# Patient Record
Sex: FEMALE | Race: BLACK OR AFRICAN AMERICAN | NOT HISPANIC OR LATINO | Employment: OTHER | URBAN - METROPOLITAN AREA
[De-identification: names, ages, dates, MRNs, and addresses within clinical notes are randomized per-mention and may not be internally consistent; named-entity substitution may affect disease eponyms.]

---

## 2017-01-05 ENCOUNTER — AMBULATORY - HEALTHEAST (OUTPATIENT)
Dept: NURSING | Facility: CLINIC | Age: 67
End: 2017-01-05

## 2017-01-05 DIAGNOSIS — I26.99 PULMONARY EMBOLISM (H): ICD-10-CM

## 2017-01-10 ENCOUNTER — COMMUNICATION - HEALTHEAST (OUTPATIENT)
Dept: NURSING | Facility: CLINIC | Age: 67
End: 2017-01-10

## 2017-01-14 ENCOUNTER — COMMUNICATION - HEALTHEAST (OUTPATIENT)
Dept: CARDIOLOGY | Facility: CLINIC | Age: 67
End: 2017-01-14

## 2017-01-14 DIAGNOSIS — I25.10 CAD (CORONARY ARTERY DISEASE): ICD-10-CM

## 2017-01-17 ENCOUNTER — COMMUNICATION - HEALTHEAST (OUTPATIENT)
Dept: NURSING | Facility: CLINIC | Age: 67
End: 2017-01-17

## 2017-01-17 ENCOUNTER — OFFICE VISIT - HEALTHEAST (OUTPATIENT)
Dept: CARDIOLOGY | Facility: CLINIC | Age: 67
End: 2017-01-17

## 2017-01-17 ENCOUNTER — AMBULATORY - HEALTHEAST (OUTPATIENT)
Dept: LAB | Facility: CLINIC | Age: 67
End: 2017-01-17

## 2017-01-17 DIAGNOSIS — E78.5 HYPERLIPIDEMIA, UNSPECIFIED HYPERLIPIDEMIA TYPE: ICD-10-CM

## 2017-01-17 DIAGNOSIS — I10 ESSENTIAL HYPERTENSION: ICD-10-CM

## 2017-01-17 DIAGNOSIS — I25.10 CAD (CORONARY ARTERY DISEASE): ICD-10-CM

## 2017-01-17 DIAGNOSIS — I26.99 PULMONARY EMBOLISM (H): ICD-10-CM

## 2017-01-17 ASSESSMENT — MIFFLIN-ST. JEOR: SCORE: 1107.82

## 2017-02-06 ENCOUNTER — COMMUNICATION - HEALTHEAST (OUTPATIENT)
Dept: PULMONOLOGY | Facility: OTHER | Age: 67
End: 2017-02-06

## 2017-02-12 ENCOUNTER — COMMUNICATION - HEALTHEAST (OUTPATIENT)
Dept: CARDIOLOGY | Facility: CLINIC | Age: 67
End: 2017-02-12

## 2017-02-12 DIAGNOSIS — R07.9 CHEST PAIN: ICD-10-CM

## 2017-02-28 ENCOUNTER — COMMUNICATION - HEALTHEAST (OUTPATIENT)
Dept: NURSING | Facility: CLINIC | Age: 67
End: 2017-02-28

## 2017-03-01 ENCOUNTER — COMMUNICATION - HEALTHEAST (OUTPATIENT)
Dept: NURSING | Facility: CLINIC | Age: 67
End: 2017-03-01

## 2017-03-01 ENCOUNTER — AMBULATORY - HEALTHEAST (OUTPATIENT)
Dept: LAB | Facility: CLINIC | Age: 67
End: 2017-03-01

## 2017-03-01 DIAGNOSIS — I26.99 PULMONARY EMBOLISM (H): ICD-10-CM

## 2017-03-10 ENCOUNTER — COMMUNICATION - HEALTHEAST (OUTPATIENT)
Dept: NURSING | Facility: CLINIC | Age: 67
End: 2017-03-10

## 2017-03-10 DIAGNOSIS — I26.99 PULMONARY EMBOLISM (H): ICD-10-CM

## 2017-03-20 ENCOUNTER — AMBULATORY - HEALTHEAST (OUTPATIENT)
Dept: CARE COORDINATION | Facility: CLINIC | Age: 67
End: 2017-03-20

## 2017-03-28 ENCOUNTER — AMBULATORY - HEALTHEAST (OUTPATIENT)
Dept: CARE COORDINATION | Facility: CLINIC | Age: 67
End: 2017-03-28

## 2017-04-12 ENCOUNTER — COMMUNICATION - HEALTHEAST (OUTPATIENT)
Dept: NURSING | Facility: CLINIC | Age: 67
End: 2017-04-12

## 2017-04-12 ENCOUNTER — OFFICE VISIT - HEALTHEAST (OUTPATIENT)
Dept: FAMILY MEDICINE | Facility: CLINIC | Age: 67
End: 2017-04-12

## 2017-04-12 DIAGNOSIS — F17.200 TOBACCO USE DISORDER: ICD-10-CM

## 2017-04-12 DIAGNOSIS — I25.10 CORONARY ATHEROSCLEROSIS: ICD-10-CM

## 2017-04-12 DIAGNOSIS — I10 ESSENTIAL HYPERTENSION: ICD-10-CM

## 2017-04-12 DIAGNOSIS — Z12.11 COLON CANCER SCREENING: ICD-10-CM

## 2017-04-12 DIAGNOSIS — R35.0 FREQUENT URINATION: ICD-10-CM

## 2017-04-12 DIAGNOSIS — J44.9 CHRONIC OBSTRUCTIVE PULMONARY DISEASE, UNSPECIFIED COPD TYPE (H): ICD-10-CM

## 2017-04-12 DIAGNOSIS — E78.5 HYPERLIPIDEMIA, UNSPECIFIED HYPERLIPIDEMIA TYPE: ICD-10-CM

## 2017-04-12 DIAGNOSIS — I26.99 PULMONARY EMBOLISM (H): ICD-10-CM

## 2017-04-12 LAB
CHOLEST SERPL-MCNC: 153 MG/DL
FASTING STATUS PATIENT QL REPORTED: YES
HDLC SERPL-MCNC: 53 MG/DL
LDLC SERPL CALC-MCNC: 91 MG/DL
TRIGL SERPL-MCNC: 45 MG/DL

## 2017-04-12 ASSESSMENT — MIFFLIN-ST. JEOR: SCORE: 1103.29

## 2017-04-15 ENCOUNTER — COMMUNICATION - HEALTHEAST (OUTPATIENT)
Dept: CARDIOLOGY | Facility: CLINIC | Age: 67
End: 2017-04-15

## 2017-04-15 DIAGNOSIS — I25.10 CAD (CORONARY ARTERY DISEASE): ICD-10-CM

## 2017-05-24 ENCOUNTER — COMMUNICATION - HEALTHEAST (OUTPATIENT)
Dept: NURSING | Facility: CLINIC | Age: 67
End: 2017-05-24

## 2017-05-24 ENCOUNTER — AMBULATORY - HEALTHEAST (OUTPATIENT)
Dept: LAB | Facility: CLINIC | Age: 67
End: 2017-05-24

## 2017-05-24 DIAGNOSIS — I26.99 PULMONARY EMBOLISM (H): ICD-10-CM

## 2017-06-01 ENCOUNTER — COMMUNICATION - HEALTHEAST (OUTPATIENT)
Dept: FAMILY MEDICINE | Facility: CLINIC | Age: 67
End: 2017-06-01

## 2017-06-01 DIAGNOSIS — I82.90 BLOOD CLOT IN VEIN: ICD-10-CM

## 2017-06-20 ENCOUNTER — AMBULATORY - HEALTHEAST (OUTPATIENT)
Dept: PULMONOLOGY | Facility: OTHER | Age: 67
End: 2017-06-20

## 2017-06-20 ENCOUNTER — COMMUNICATION - HEALTHEAST (OUTPATIENT)
Dept: PULMONOLOGY | Facility: OTHER | Age: 67
End: 2017-06-20

## 2017-06-21 ENCOUNTER — COMMUNICATION - HEALTHEAST (OUTPATIENT)
Dept: CARDIOLOGY | Facility: CLINIC | Age: 67
End: 2017-06-21

## 2017-06-21 DIAGNOSIS — E78.5 HYPERLIPIDEMIA: ICD-10-CM

## 2017-07-12 ENCOUNTER — COMMUNICATION - HEALTHEAST (OUTPATIENT)
Dept: NURSING | Facility: CLINIC | Age: 67
End: 2017-07-12

## 2017-07-13 ENCOUNTER — COMMUNICATION - HEALTHEAST (OUTPATIENT)
Dept: FAMILY MEDICINE | Facility: CLINIC | Age: 67
End: 2017-07-13

## 2017-07-13 ENCOUNTER — AMBULATORY - HEALTHEAST (OUTPATIENT)
Dept: LAB | Facility: CLINIC | Age: 67
End: 2017-07-13

## 2017-07-13 DIAGNOSIS — I26.99 PULMONARY EMBOLISM (H): ICD-10-CM

## 2017-08-17 ENCOUNTER — COMMUNICATION - HEALTHEAST (OUTPATIENT)
Dept: CARDIOLOGY | Facility: CLINIC | Age: 67
End: 2017-08-17

## 2017-08-17 DIAGNOSIS — I10 HTN (HYPERTENSION): ICD-10-CM

## 2017-08-24 ENCOUNTER — COMMUNICATION - HEALTHEAST (OUTPATIENT)
Dept: NURSING | Facility: CLINIC | Age: 67
End: 2017-08-24

## 2017-08-24 ENCOUNTER — AMBULATORY - HEALTHEAST (OUTPATIENT)
Dept: LAB | Facility: CLINIC | Age: 67
End: 2017-08-24

## 2017-08-24 DIAGNOSIS — I26.99 PULMONARY EMBOLISM (H): ICD-10-CM

## 2017-09-18 ENCOUNTER — RECORDS - HEALTHEAST (OUTPATIENT)
Dept: GENERAL RADIOLOGY | Facility: CLINIC | Age: 67
End: 2017-09-18

## 2017-09-18 ENCOUNTER — OFFICE VISIT - HEALTHEAST (OUTPATIENT)
Dept: FAMILY MEDICINE | Facility: CLINIC | Age: 67
End: 2017-09-18

## 2017-09-18 DIAGNOSIS — M54.50 ACUTE MIDLINE LOW BACK PAIN WITHOUT SCIATICA: ICD-10-CM

## 2017-09-18 DIAGNOSIS — M54.50 LOW BACK PAIN: ICD-10-CM

## 2017-09-18 DIAGNOSIS — Z23 NEED FOR IMMUNIZATION AGAINST INFLUENZA: ICD-10-CM

## 2017-09-18 ASSESSMENT — MIFFLIN-ST. JEOR: SCORE: 1108.28

## 2017-09-20 ENCOUNTER — OFFICE VISIT - HEALTHEAST (OUTPATIENT)
Dept: PHYSICAL THERAPY | Facility: REHABILITATION | Age: 67
End: 2017-09-20

## 2017-09-20 DIAGNOSIS — F17.200 TOBACCO USE DISORDER: ICD-10-CM

## 2017-09-20 DIAGNOSIS — M54.50 ACUTE MIDLINE LOW BACK PAIN WITHOUT SCIATICA: ICD-10-CM

## 2017-09-20 DIAGNOSIS — M81.0 OSTEOPOROSIS: ICD-10-CM

## 2017-09-20 DIAGNOSIS — M62.81 GENERALIZED MUSCLE WEAKNESS: ICD-10-CM

## 2017-09-27 ENCOUNTER — OFFICE VISIT - HEALTHEAST (OUTPATIENT)
Dept: PHYSICAL THERAPY | Facility: REHABILITATION | Age: 67
End: 2017-09-27

## 2017-09-27 DIAGNOSIS — M81.0 OSTEOPOROSIS: ICD-10-CM

## 2017-09-27 DIAGNOSIS — F17.200 TOBACCO USE DISORDER: ICD-10-CM

## 2017-09-27 DIAGNOSIS — M62.81 GENERALIZED MUSCLE WEAKNESS: ICD-10-CM

## 2017-09-27 DIAGNOSIS — M54.50 ACUTE MIDLINE LOW BACK PAIN WITHOUT SCIATICA: ICD-10-CM

## 2017-10-05 ENCOUNTER — OFFICE VISIT - HEALTHEAST (OUTPATIENT)
Dept: PHYSICAL THERAPY | Facility: REHABILITATION | Age: 67
End: 2017-10-05

## 2017-10-05 DIAGNOSIS — M81.0 OSTEOPOROSIS: ICD-10-CM

## 2017-10-05 DIAGNOSIS — M62.81 GENERALIZED MUSCLE WEAKNESS: ICD-10-CM

## 2017-10-05 DIAGNOSIS — F17.200 TOBACCO USE DISORDER: ICD-10-CM

## 2017-10-05 DIAGNOSIS — M54.50 ACUTE MIDLINE LOW BACK PAIN WITHOUT SCIATICA: ICD-10-CM

## 2017-10-26 ENCOUNTER — COMMUNICATION - HEALTHEAST (OUTPATIENT)
Dept: NURSING | Facility: CLINIC | Age: 67
End: 2017-10-26

## 2017-10-27 ENCOUNTER — AMBULATORY - HEALTHEAST (OUTPATIENT)
Dept: LAB | Facility: CLINIC | Age: 67
End: 2017-10-27

## 2017-10-27 ENCOUNTER — COMMUNICATION - HEALTHEAST (OUTPATIENT)
Dept: NURSING | Facility: CLINIC | Age: 67
End: 2017-10-27

## 2017-10-27 DIAGNOSIS — I26.99 PULMONARY EMBOLISM (H): ICD-10-CM

## 2017-11-15 ENCOUNTER — RECORDS - HEALTHEAST (OUTPATIENT)
Dept: ADMINISTRATIVE | Facility: OTHER | Age: 67
End: 2017-11-15

## 2017-11-18 ASSESSMENT — MIFFLIN-ST. JEOR: SCORE: 1127.1

## 2017-11-20 ENCOUNTER — SURGERY - HEALTHEAST (OUTPATIENT)
Dept: CARDIOLOGY | Facility: CLINIC | Age: 67
End: 2017-11-20

## 2017-11-20 ASSESSMENT — MIFFLIN-ST. JEOR: SCORE: 1116.9

## 2017-11-21 ENCOUNTER — COMMUNICATION - HEALTHEAST (OUTPATIENT)
Dept: FAMILY MEDICINE | Facility: CLINIC | Age: 67
End: 2017-11-21

## 2017-11-21 ENCOUNTER — COMMUNICATION - HEALTHEAST (OUTPATIENT)
Dept: ADMINISTRATIVE | Facility: CLINIC | Age: 67
End: 2017-11-21

## 2017-11-21 DIAGNOSIS — I26.99 PULMONARY EMBOLISM (H): ICD-10-CM

## 2017-11-22 ENCOUNTER — COMMUNICATION - HEALTHEAST (OUTPATIENT)
Dept: FAMILY MEDICINE | Facility: CLINIC | Age: 67
End: 2017-11-22

## 2017-11-24 ENCOUNTER — COMMUNICATION - HEALTHEAST (OUTPATIENT)
Dept: NURSING | Facility: CLINIC | Age: 67
End: 2017-11-24

## 2017-11-24 ENCOUNTER — AMBULATORY - HEALTHEAST (OUTPATIENT)
Dept: LAB | Facility: CLINIC | Age: 67
End: 2017-11-24

## 2017-11-24 DIAGNOSIS — I26.99 PULMONARY EMBOLISM (H): ICD-10-CM

## 2017-11-27 ENCOUNTER — OFFICE VISIT - HEALTHEAST (OUTPATIENT)
Dept: FAMILY MEDICINE | Facility: CLINIC | Age: 67
End: 2017-11-27

## 2017-11-27 ENCOUNTER — COMMUNICATION - HEALTHEAST (OUTPATIENT)
Dept: NURSING | Facility: CLINIC | Age: 67
End: 2017-11-27

## 2017-11-27 DIAGNOSIS — I26.99 PULMONARY EMBOLISM (H): ICD-10-CM

## 2017-11-27 DIAGNOSIS — R68.84 JAW PAIN: ICD-10-CM

## 2017-11-27 DIAGNOSIS — I20.89 EQUIVALENT ANGINA (H): ICD-10-CM

## 2017-11-27 DIAGNOSIS — S40.021S: ICD-10-CM

## 2017-11-27 DIAGNOSIS — I25.83 CORONARY ATHEROSCLEROSIS DUE TO LIPID RICH PLAQUE: ICD-10-CM

## 2017-11-27 DIAGNOSIS — I10 ESSENTIAL HYPERTENSION: ICD-10-CM

## 2017-11-27 DIAGNOSIS — R23.4 LOCALIZED SKIN DESQUAMATION: ICD-10-CM

## 2017-11-27 DIAGNOSIS — I21.4 NSTEMI (NON-ST ELEVATED MYOCARDIAL INFARCTION) (H): ICD-10-CM

## 2017-11-27 DIAGNOSIS — F17.200 TOBACCO USE DISORDER: ICD-10-CM

## 2017-11-27 ASSESSMENT — MIFFLIN-ST. JEOR: SCORE: 1119.62

## 2017-11-28 ENCOUNTER — COMMUNICATION - HEALTHEAST (OUTPATIENT)
Dept: CARE COORDINATION | Facility: CLINIC | Age: 67
End: 2017-11-28

## 2017-11-30 ENCOUNTER — AMBULATORY - HEALTHEAST (OUTPATIENT)
Dept: CARDIAC REHAB | Facility: CLINIC | Age: 67
End: 2017-11-30

## 2017-11-30 DIAGNOSIS — I21.4 NSTEMI (NON-ST ELEVATED MYOCARDIAL INFARCTION) (H): ICD-10-CM

## 2017-11-30 DIAGNOSIS — Z95.5 S/P CORONARY ARTERY STENT PLACEMENT: ICD-10-CM

## 2017-11-30 DIAGNOSIS — R68.84 JAW PAIN: ICD-10-CM

## 2017-12-01 ENCOUNTER — COMMUNICATION - HEALTHEAST (OUTPATIENT)
Dept: CARE COORDINATION | Facility: CLINIC | Age: 67
End: 2017-12-01

## 2017-12-04 ENCOUNTER — OFFICE VISIT - HEALTHEAST (OUTPATIENT)
Dept: CARDIOLOGY | Facility: CLINIC | Age: 67
End: 2017-12-04

## 2017-12-04 DIAGNOSIS — F17.200 TOBACCO USE DISORDER: ICD-10-CM

## 2017-12-04 DIAGNOSIS — I21.4 NSTEMI (NON-ST ELEVATED MYOCARDIAL INFARCTION) (H): ICD-10-CM

## 2017-12-04 DIAGNOSIS — I10 ESSENTIAL HYPERTENSION: ICD-10-CM

## 2017-12-04 DIAGNOSIS — I25.83 CORONARY ATHEROSCLEROSIS DUE TO LIPID RICH PLAQUE: ICD-10-CM

## 2017-12-04 DIAGNOSIS — I26.99 PULMONARY EMBOLISM (H): ICD-10-CM

## 2017-12-04 ASSESSMENT — MIFFLIN-ST. JEOR: SCORE: 1135.04

## 2017-12-05 ENCOUNTER — COMMUNICATION - HEALTHEAST (OUTPATIENT)
Dept: CARDIOLOGY | Facility: CLINIC | Age: 67
End: 2017-12-05

## 2017-12-05 ENCOUNTER — COMMUNICATION - HEALTHEAST (OUTPATIENT)
Dept: CARE COORDINATION | Facility: CLINIC | Age: 67
End: 2017-12-05

## 2017-12-05 ENCOUNTER — COMMUNICATION - HEALTHEAST (OUTPATIENT)
Dept: FAMILY MEDICINE | Facility: CLINIC | Age: 67
End: 2017-12-05

## 2017-12-05 DIAGNOSIS — I82.90 BLOOD CLOT IN VEIN: ICD-10-CM

## 2017-12-05 DIAGNOSIS — I25.10 CAD (CORONARY ARTERY DISEASE): ICD-10-CM

## 2017-12-06 ENCOUNTER — AMBULATORY - HEALTHEAST (OUTPATIENT)
Dept: CARDIOLOGY | Facility: CLINIC | Age: 67
End: 2017-12-06

## 2017-12-06 ENCOUNTER — COMMUNICATION - HEALTHEAST (OUTPATIENT)
Dept: NURSING | Facility: CLINIC | Age: 67
End: 2017-12-06

## 2017-12-06 ENCOUNTER — AMBULATORY - HEALTHEAST (OUTPATIENT)
Dept: CARDIAC REHAB | Facility: CLINIC | Age: 67
End: 2017-12-06

## 2017-12-06 DIAGNOSIS — Z95.5 S/P CORONARY ARTERY STENT PLACEMENT: ICD-10-CM

## 2017-12-06 DIAGNOSIS — I26.99 PULMONARY EMBOLISM (H): ICD-10-CM

## 2017-12-06 DIAGNOSIS — I25.10 CAD (CORONARY ARTERY DISEASE): ICD-10-CM

## 2017-12-06 DIAGNOSIS — I21.4 NSTEMI (NON-ST ELEVATED MYOCARDIAL INFARCTION) (H): ICD-10-CM

## 2017-12-06 LAB
CHOLEST SERPL-MCNC: 134 MG/DL
FASTING STATUS PATIENT QL REPORTED: YES
HDLC SERPL-MCNC: 51 MG/DL
LDLC SERPL CALC-MCNC: 74 MG/DL
TRIGL SERPL-MCNC: 44 MG/DL

## 2017-12-11 ENCOUNTER — AMBULATORY - HEALTHEAST (OUTPATIENT)
Dept: CARDIAC REHAB | Facility: CLINIC | Age: 67
End: 2017-12-11

## 2017-12-11 DIAGNOSIS — I21.4 NSTEMI (NON-ST ELEVATED MYOCARDIAL INFARCTION) (H): ICD-10-CM

## 2017-12-12 ENCOUNTER — AMBULATORY - HEALTHEAST (OUTPATIENT)
Dept: LAB | Facility: CLINIC | Age: 67
End: 2017-12-12

## 2017-12-12 ENCOUNTER — COMMUNICATION - HEALTHEAST (OUTPATIENT)
Dept: SCHEDULING | Facility: CLINIC | Age: 67
End: 2017-12-12

## 2017-12-12 ENCOUNTER — COMMUNICATION - HEALTHEAST (OUTPATIENT)
Dept: NURSING | Facility: CLINIC | Age: 67
End: 2017-12-12

## 2017-12-12 DIAGNOSIS — I26.99 PULMONARY EMBOLISM (H): ICD-10-CM

## 2017-12-13 ENCOUNTER — AMBULATORY - HEALTHEAST (OUTPATIENT)
Dept: CARDIAC REHAB | Facility: CLINIC | Age: 67
End: 2017-12-13

## 2017-12-13 DIAGNOSIS — Z95.5 S/P CORONARY ARTERY STENT PLACEMENT: ICD-10-CM

## 2017-12-13 DIAGNOSIS — I21.4 NSTEMI (NON-ST ELEVATED MYOCARDIAL INFARCTION) (H): ICD-10-CM

## 2017-12-15 ENCOUNTER — AMBULATORY - HEALTHEAST (OUTPATIENT)
Dept: CARDIAC REHAB | Facility: CLINIC | Age: 67
End: 2017-12-15

## 2017-12-15 ENCOUNTER — COMMUNICATION - HEALTHEAST (OUTPATIENT)
Dept: CARE COORDINATION | Facility: CLINIC | Age: 67
End: 2017-12-15

## 2017-12-15 DIAGNOSIS — I21.4 NSTEMI (NON-ST ELEVATED MYOCARDIAL INFARCTION) (H): ICD-10-CM

## 2017-12-15 DIAGNOSIS — Z95.5 S/P CORONARY ARTERY STENT PLACEMENT: ICD-10-CM

## 2017-12-18 ENCOUNTER — AMBULATORY - HEALTHEAST (OUTPATIENT)
Dept: CARDIAC REHAB | Facility: CLINIC | Age: 67
End: 2017-12-18

## 2017-12-18 DIAGNOSIS — I21.4 NSTEMI (NON-ST ELEVATED MYOCARDIAL INFARCTION) (H): ICD-10-CM

## 2017-12-20 ENCOUNTER — AMBULATORY - HEALTHEAST (OUTPATIENT)
Dept: CARDIAC REHAB | Facility: CLINIC | Age: 67
End: 2017-12-20

## 2017-12-20 DIAGNOSIS — I21.4 NSTEMI (NON-ST ELEVATED MYOCARDIAL INFARCTION) (H): ICD-10-CM

## 2017-12-22 ENCOUNTER — AMBULATORY - HEALTHEAST (OUTPATIENT)
Dept: CARDIAC REHAB | Facility: CLINIC | Age: 67
End: 2017-12-22

## 2017-12-22 DIAGNOSIS — I21.4 NSTEMI (NON-ST ELEVATED MYOCARDIAL INFARCTION) (H): ICD-10-CM

## 2017-12-22 DIAGNOSIS — Z95.5 S/P CORONARY ARTERY STENT PLACEMENT: ICD-10-CM

## 2017-12-26 ENCOUNTER — AMBULATORY - HEALTHEAST (OUTPATIENT)
Dept: LAB | Facility: CLINIC | Age: 67
End: 2017-12-26

## 2017-12-26 ENCOUNTER — COMMUNICATION - HEALTHEAST (OUTPATIENT)
Dept: NURSING | Facility: CLINIC | Age: 67
End: 2017-12-26

## 2017-12-26 DIAGNOSIS — I26.99 PULMONARY EMBOLISM (H): ICD-10-CM

## 2017-12-27 ENCOUNTER — AMBULATORY - HEALTHEAST (OUTPATIENT)
Dept: CARDIAC REHAB | Facility: CLINIC | Age: 67
End: 2017-12-27

## 2017-12-27 DIAGNOSIS — I21.4 NSTEMI (NON-ST ELEVATED MYOCARDIAL INFARCTION) (H): ICD-10-CM

## 2017-12-27 DIAGNOSIS — Z95.5 S/P CORONARY ARTERY STENT PLACEMENT: ICD-10-CM

## 2018-01-03 ENCOUNTER — AMBULATORY - HEALTHEAST (OUTPATIENT)
Dept: CARDIAC REHAB | Facility: CLINIC | Age: 68
End: 2018-01-03

## 2018-01-03 DIAGNOSIS — I21.4 NSTEMI (NON-ST ELEVATED MYOCARDIAL INFARCTION) (H): ICD-10-CM

## 2018-01-03 DIAGNOSIS — Z95.5 S/P CORONARY ARTERY STENT PLACEMENT: ICD-10-CM

## 2018-01-05 ENCOUNTER — AMBULATORY - HEALTHEAST (OUTPATIENT)
Dept: CARDIAC REHAB | Facility: CLINIC | Age: 68
End: 2018-01-05

## 2018-01-05 DIAGNOSIS — Z95.5 S/P CORONARY ARTERY STENT PLACEMENT: ICD-10-CM

## 2018-01-05 DIAGNOSIS — I21.4 NSTEMI (NON-ST ELEVATED MYOCARDIAL INFARCTION) (H): ICD-10-CM

## 2018-01-08 ENCOUNTER — AMBULATORY - HEALTHEAST (OUTPATIENT)
Dept: CARDIAC REHAB | Facility: CLINIC | Age: 68
End: 2018-01-08

## 2018-01-08 DIAGNOSIS — I21.4 NSTEMI (NON-ST ELEVATED MYOCARDIAL INFARCTION) (H): ICD-10-CM

## 2018-01-10 ENCOUNTER — AMBULATORY - HEALTHEAST (OUTPATIENT)
Dept: CARDIAC REHAB | Facility: CLINIC | Age: 68
End: 2018-01-10

## 2018-01-10 DIAGNOSIS — I21.4 NSTEMI (NON-ST ELEVATED MYOCARDIAL INFARCTION) (H): ICD-10-CM

## 2018-01-13 ENCOUNTER — COMMUNICATION - HEALTHEAST (OUTPATIENT)
Dept: CARDIOLOGY | Facility: CLINIC | Age: 68
End: 2018-01-13

## 2018-01-13 DIAGNOSIS — I25.10 CAD (CORONARY ARTERY DISEASE): ICD-10-CM

## 2018-01-16 ENCOUNTER — AMBULATORY - HEALTHEAST (OUTPATIENT)
Dept: LAB | Facility: CLINIC | Age: 68
End: 2018-01-16

## 2018-01-16 ENCOUNTER — COMMUNICATION - HEALTHEAST (OUTPATIENT)
Dept: NURSING | Facility: CLINIC | Age: 68
End: 2018-01-16

## 2018-01-16 DIAGNOSIS — I26.99 PULMONARY EMBOLISM (H): ICD-10-CM

## 2018-01-16 LAB — INR PPP: 3.3 (ref 0.9–1.1)

## 2018-01-17 ENCOUNTER — OFFICE VISIT - HEALTHEAST (OUTPATIENT)
Dept: CARDIOLOGY | Facility: CLINIC | Age: 68
End: 2018-01-17

## 2018-01-17 ENCOUNTER — AMBULATORY - HEALTHEAST (OUTPATIENT)
Dept: CARDIAC REHAB | Facility: CLINIC | Age: 68
End: 2018-01-17

## 2018-01-17 DIAGNOSIS — I21.4 NSTEMI (NON-ST ELEVATED MYOCARDIAL INFARCTION) (H): ICD-10-CM

## 2018-01-17 DIAGNOSIS — I25.10 CAD (CORONARY ARTERY DISEASE): ICD-10-CM

## 2018-01-17 DIAGNOSIS — Z95.5 S/P CORONARY ARTERY STENT PLACEMENT: ICD-10-CM

## 2018-01-17 ASSESSMENT — MIFFLIN-ST. JEOR: SCORE: 1139.58

## 2018-01-19 ENCOUNTER — AMBULATORY - HEALTHEAST (OUTPATIENT)
Dept: CARDIAC REHAB | Facility: CLINIC | Age: 68
End: 2018-01-19

## 2018-01-19 DIAGNOSIS — Z95.5 S/P CORONARY ARTERY STENT PLACEMENT: ICD-10-CM

## 2018-01-19 DIAGNOSIS — I21.4 NSTEMI (NON-ST ELEVATED MYOCARDIAL INFARCTION) (H): ICD-10-CM

## 2018-01-22 ENCOUNTER — COMMUNICATION - HEALTHEAST (OUTPATIENT)
Dept: CARDIOLOGY | Facility: CLINIC | Age: 68
End: 2018-01-22

## 2018-01-22 DIAGNOSIS — E78.5 HYPERLIPIDEMIA: ICD-10-CM

## 2018-01-24 ENCOUNTER — AMBULATORY - HEALTHEAST (OUTPATIENT)
Dept: CARDIAC REHAB | Facility: CLINIC | Age: 68
End: 2018-01-24

## 2018-01-24 DIAGNOSIS — I21.4 NSTEMI (NON-ST ELEVATED MYOCARDIAL INFARCTION) (H): ICD-10-CM

## 2018-01-24 DIAGNOSIS — Z95.5 S/P CORONARY ARTERY STENT PLACEMENT: ICD-10-CM

## 2018-01-26 ENCOUNTER — AMBULATORY - HEALTHEAST (OUTPATIENT)
Dept: CARDIAC REHAB | Facility: CLINIC | Age: 68
End: 2018-01-26

## 2018-01-26 DIAGNOSIS — I21.4 NSTEMI (NON-ST ELEVATED MYOCARDIAL INFARCTION) (H): ICD-10-CM

## 2018-01-26 DIAGNOSIS — Z95.5 S/P CORONARY ARTERY STENT PLACEMENT: ICD-10-CM

## 2018-01-29 ENCOUNTER — AMBULATORY - HEALTHEAST (OUTPATIENT)
Dept: CARDIAC REHAB | Facility: CLINIC | Age: 68
End: 2018-01-29

## 2018-01-29 DIAGNOSIS — I21.4 NSTEMI (NON-ST ELEVATED MYOCARDIAL INFARCTION) (H): ICD-10-CM

## 2018-01-29 DIAGNOSIS — Z95.5 S/P CORONARY ARTERY STENT PLACEMENT: ICD-10-CM

## 2018-01-30 ENCOUNTER — AMBULATORY - HEALTHEAST (OUTPATIENT)
Dept: LAB | Facility: CLINIC | Age: 68
End: 2018-01-30

## 2018-01-30 ENCOUNTER — COMMUNICATION - HEALTHEAST (OUTPATIENT)
Dept: NURSING | Facility: CLINIC | Age: 68
End: 2018-01-30

## 2018-01-30 DIAGNOSIS — I26.99 PULMONARY EMBOLISM (H): ICD-10-CM

## 2018-01-30 LAB — INR PPP: 3 (ref 0.9–1.1)

## 2018-01-31 ENCOUNTER — AMBULATORY - HEALTHEAST (OUTPATIENT)
Dept: CARDIAC REHAB | Facility: CLINIC | Age: 68
End: 2018-01-31

## 2018-01-31 DIAGNOSIS — I21.4 NSTEMI (NON-ST ELEVATED MYOCARDIAL INFARCTION) (H): ICD-10-CM

## 2018-01-31 DIAGNOSIS — Z95.5 S/P CORONARY ARTERY STENT PLACEMENT: ICD-10-CM

## 2018-02-02 ENCOUNTER — AMBULATORY - HEALTHEAST (OUTPATIENT)
Dept: CARDIOLOGY | Facility: CLINIC | Age: 68
End: 2018-02-02

## 2018-02-02 ENCOUNTER — AMBULATORY - HEALTHEAST (OUTPATIENT)
Dept: CARDIAC REHAB | Facility: CLINIC | Age: 68
End: 2018-02-02

## 2018-02-02 DIAGNOSIS — I21.4 NSTEMI (NON-ST ELEVATED MYOCARDIAL INFARCTION) (H): ICD-10-CM

## 2018-02-02 DIAGNOSIS — Z95.5 S/P CORONARY ARTERY STENT PLACEMENT: ICD-10-CM

## 2018-02-07 ENCOUNTER — AMBULATORY - HEALTHEAST (OUTPATIENT)
Dept: CARDIAC REHAB | Facility: CLINIC | Age: 68
End: 2018-02-07

## 2018-02-07 DIAGNOSIS — Z95.5 S/P CORONARY ARTERY STENT PLACEMENT: ICD-10-CM

## 2018-02-07 DIAGNOSIS — I21.4 NSTEMI (NON-ST ELEVATED MYOCARDIAL INFARCTION) (H): ICD-10-CM

## 2018-02-12 ENCOUNTER — AMBULATORY - HEALTHEAST (OUTPATIENT)
Dept: CARDIAC REHAB | Facility: CLINIC | Age: 68
End: 2018-02-12

## 2018-02-12 DIAGNOSIS — I21.4 NSTEMI (NON-ST ELEVATED MYOCARDIAL INFARCTION) (H): ICD-10-CM

## 2018-02-12 DIAGNOSIS — Z95.5 S/P CORONARY ARTERY STENT PLACEMENT: ICD-10-CM

## 2018-02-13 ENCOUNTER — COMMUNICATION - HEALTHEAST (OUTPATIENT)
Dept: NURSING | Facility: CLINIC | Age: 68
End: 2018-02-13

## 2018-02-13 ENCOUNTER — COMMUNICATION - HEALTHEAST (OUTPATIENT)
Dept: CARDIOLOGY | Facility: CLINIC | Age: 68
End: 2018-02-13

## 2018-02-13 ENCOUNTER — AMBULATORY - HEALTHEAST (OUTPATIENT)
Dept: LAB | Facility: CLINIC | Age: 68
End: 2018-02-13

## 2018-02-13 DIAGNOSIS — I26.99 PULMONARY EMBOLISM (H): ICD-10-CM

## 2018-02-13 DIAGNOSIS — I10 HTN (HYPERTENSION): ICD-10-CM

## 2018-02-13 LAB — INR PPP: 3.1 (ref 0.9–1.1)

## 2018-02-14 ENCOUNTER — AMBULATORY - HEALTHEAST (OUTPATIENT)
Dept: CARDIAC REHAB | Facility: CLINIC | Age: 68
End: 2018-02-14

## 2018-02-14 DIAGNOSIS — I21.4 NSTEMI (NON-ST ELEVATED MYOCARDIAL INFARCTION) (H): ICD-10-CM

## 2018-02-14 DIAGNOSIS — Z95.5 S/P CORONARY ARTERY STENT PLACEMENT: ICD-10-CM

## 2018-02-16 ENCOUNTER — AMBULATORY - HEALTHEAST (OUTPATIENT)
Dept: CARDIAC REHAB | Facility: CLINIC | Age: 68
End: 2018-02-16

## 2018-02-16 DIAGNOSIS — Z95.5 S/P CORONARY ARTERY STENT PLACEMENT: ICD-10-CM

## 2018-02-16 DIAGNOSIS — I21.4 NSTEMI (NON-ST ELEVATED MYOCARDIAL INFARCTION) (H): ICD-10-CM

## 2018-02-20 ENCOUNTER — COMMUNICATION - HEALTHEAST (OUTPATIENT)
Dept: CARDIOLOGY | Facility: CLINIC | Age: 68
End: 2018-02-20

## 2018-02-20 DIAGNOSIS — I10 HTN (HYPERTENSION): ICD-10-CM

## 2018-02-21 ENCOUNTER — AMBULATORY - HEALTHEAST (OUTPATIENT)
Dept: CARDIAC REHAB | Facility: CLINIC | Age: 68
End: 2018-02-21

## 2018-02-21 DIAGNOSIS — Z95.5 S/P CORONARY ARTERY STENT PLACEMENT: ICD-10-CM

## 2018-02-21 DIAGNOSIS — I21.4 NSTEMI (NON-ST ELEVATED MYOCARDIAL INFARCTION) (H): ICD-10-CM

## 2018-02-22 ENCOUNTER — COMMUNICATION - HEALTHEAST (OUTPATIENT)
Dept: CARDIOLOGY | Facility: CLINIC | Age: 68
End: 2018-02-22

## 2018-02-23 ENCOUNTER — AMBULATORY - HEALTHEAST (OUTPATIENT)
Dept: CARDIAC REHAB | Facility: CLINIC | Age: 68
End: 2018-02-23

## 2018-02-23 DIAGNOSIS — I21.4 NSTEMI (NON-ST ELEVATED MYOCARDIAL INFARCTION) (H): ICD-10-CM

## 2018-02-26 ENCOUNTER — AMBULATORY - HEALTHEAST (OUTPATIENT)
Dept: CARDIAC REHAB | Facility: CLINIC | Age: 68
End: 2018-02-26

## 2018-02-26 DIAGNOSIS — I21.4 NSTEMI (NON-ST ELEVATED MYOCARDIAL INFARCTION) (H): ICD-10-CM

## 2018-02-26 DIAGNOSIS — Z95.5 S/P CORONARY ARTERY STENT PLACEMENT: ICD-10-CM

## 2018-02-28 ENCOUNTER — AMBULATORY - HEALTHEAST (OUTPATIENT)
Dept: CARDIAC REHAB | Facility: CLINIC | Age: 68
End: 2018-02-28

## 2018-02-28 DIAGNOSIS — Z95.5 S/P CORONARY ARTERY STENT PLACEMENT: ICD-10-CM

## 2018-02-28 DIAGNOSIS — I21.4 NSTEMI (NON-ST ELEVATED MYOCARDIAL INFARCTION) (H): ICD-10-CM

## 2018-03-01 ENCOUNTER — COMMUNICATION - HEALTHEAST (OUTPATIENT)
Dept: INTERNAL MEDICINE | Facility: CLINIC | Age: 68
End: 2018-03-01

## 2018-03-01 ENCOUNTER — AMBULATORY - HEALTHEAST (OUTPATIENT)
Dept: LAB | Facility: CLINIC | Age: 68
End: 2018-03-01

## 2018-03-01 DIAGNOSIS — I26.99 PULMONARY EMBOLISM (H): ICD-10-CM

## 2018-03-01 LAB — INR PPP: 2 (ref 0.9–1.1)

## 2018-03-02 ENCOUNTER — AMBULATORY - HEALTHEAST (OUTPATIENT)
Dept: CARDIAC REHAB | Facility: CLINIC | Age: 68
End: 2018-03-02

## 2018-03-02 DIAGNOSIS — I21.4 NSTEMI (NON-ST ELEVATED MYOCARDIAL INFARCTION) (H): ICD-10-CM

## 2018-03-02 DIAGNOSIS — Z95.5 S/P CORONARY ARTERY STENT PLACEMENT: ICD-10-CM

## 2018-03-05 ENCOUNTER — AMBULATORY - HEALTHEAST (OUTPATIENT)
Dept: CARDIAC REHAB | Facility: CLINIC | Age: 68
End: 2018-03-05

## 2018-03-05 DIAGNOSIS — I21.4 NSTEMI (NON-ST ELEVATED MYOCARDIAL INFARCTION) (H): ICD-10-CM

## 2018-03-07 ENCOUNTER — AMBULATORY - HEALTHEAST (OUTPATIENT)
Dept: CARDIAC REHAB | Facility: CLINIC | Age: 68
End: 2018-03-07

## 2018-03-07 DIAGNOSIS — I21.4 NSTEMI (NON-ST ELEVATED MYOCARDIAL INFARCTION) (H): ICD-10-CM

## 2018-03-07 DIAGNOSIS — Z95.5 S/P CORONARY ARTERY STENT PLACEMENT: ICD-10-CM

## 2018-03-09 ENCOUNTER — AMBULATORY - HEALTHEAST (OUTPATIENT)
Dept: CARDIAC REHAB | Facility: CLINIC | Age: 68
End: 2018-03-09

## 2018-03-09 DIAGNOSIS — I21.4 NSTEMI (NON-ST ELEVATED MYOCARDIAL INFARCTION) (H): ICD-10-CM

## 2018-03-09 DIAGNOSIS — Z95.5 S/P CORONARY ARTERY STENT PLACEMENT: ICD-10-CM

## 2018-03-15 ENCOUNTER — AMBULATORY - HEALTHEAST (OUTPATIENT)
Dept: LAB | Facility: CLINIC | Age: 68
End: 2018-03-15

## 2018-03-15 ENCOUNTER — COMMUNICATION - HEALTHEAST (OUTPATIENT)
Dept: INTERNAL MEDICINE | Facility: CLINIC | Age: 68
End: 2018-03-15

## 2018-03-15 DIAGNOSIS — I26.99 PULMONARY EMBOLISM (H): ICD-10-CM

## 2018-03-15 LAB — INR PPP: 2.5 (ref 0.9–1.1)

## 2018-04-12 ENCOUNTER — AMBULATORY - HEALTHEAST (OUTPATIENT)
Dept: LAB | Facility: CLINIC | Age: 68
End: 2018-04-12

## 2018-04-12 ENCOUNTER — COMMUNICATION - HEALTHEAST (OUTPATIENT)
Dept: ANTICOAGULATION | Facility: CLINIC | Age: 68
End: 2018-04-12

## 2018-04-12 DIAGNOSIS — I26.99 PULMONARY EMBOLISM (H): ICD-10-CM

## 2018-04-12 LAB — INR PPP: 2.5 (ref 0.9–1.1)

## 2018-05-10 ENCOUNTER — AMBULATORY - HEALTHEAST (OUTPATIENT)
Dept: LAB | Facility: CLINIC | Age: 68
End: 2018-05-10

## 2018-05-10 ENCOUNTER — COMMUNICATION - HEALTHEAST (OUTPATIENT)
Dept: ANTICOAGULATION | Facility: CLINIC | Age: 68
End: 2018-05-10

## 2018-05-10 DIAGNOSIS — I26.99 PULMONARY EMBOLISM (H): ICD-10-CM

## 2018-05-10 LAB — INR PPP: 1.8 (ref 0.9–1.1)

## 2018-05-24 ENCOUNTER — COMMUNICATION - HEALTHEAST (OUTPATIENT)
Dept: ANTICOAGULATION | Facility: CLINIC | Age: 68
End: 2018-05-24

## 2018-05-24 ENCOUNTER — AMBULATORY - HEALTHEAST (OUTPATIENT)
Dept: LAB | Facility: CLINIC | Age: 68
End: 2018-05-24

## 2018-05-24 DIAGNOSIS — I26.99 PULMONARY EMBOLISM (H): ICD-10-CM

## 2018-05-24 LAB — INR PPP: 2.3 (ref 0.9–1.1)

## 2018-06-07 ENCOUNTER — COMMUNICATION - HEALTHEAST (OUTPATIENT)
Dept: ANTICOAGULATION | Facility: CLINIC | Age: 68
End: 2018-06-07

## 2018-06-07 ENCOUNTER — AMBULATORY - HEALTHEAST (OUTPATIENT)
Dept: LAB | Facility: CLINIC | Age: 68
End: 2018-06-07

## 2018-06-07 DIAGNOSIS — I26.99 PULMONARY EMBOLISM (H): ICD-10-CM

## 2018-06-07 LAB — INR PPP: 2.3 (ref 0.9–1.1)

## 2018-06-28 ENCOUNTER — COMMUNICATION - HEALTHEAST (OUTPATIENT)
Dept: ANTICOAGULATION | Facility: CLINIC | Age: 68
End: 2018-06-28

## 2018-06-28 ENCOUNTER — AMBULATORY - HEALTHEAST (OUTPATIENT)
Dept: LAB | Facility: CLINIC | Age: 68
End: 2018-06-28

## 2018-06-28 DIAGNOSIS — I26.99 PULMONARY EMBOLISM (H): ICD-10-CM

## 2018-06-28 LAB — INR PPP: 3.4 (ref 0.9–1.1)

## 2018-07-12 ENCOUNTER — COMMUNICATION - HEALTHEAST (OUTPATIENT)
Dept: ANTICOAGULATION | Facility: CLINIC | Age: 68
End: 2018-07-12

## 2018-07-12 ENCOUNTER — AMBULATORY - HEALTHEAST (OUTPATIENT)
Dept: LAB | Facility: CLINIC | Age: 68
End: 2018-07-12

## 2018-07-12 DIAGNOSIS — I26.99 PULMONARY EMBOLISM (H): ICD-10-CM

## 2018-07-12 LAB — INR PPP: 1.9 (ref 0.9–1.1)

## 2018-07-26 ENCOUNTER — COMMUNICATION - HEALTHEAST (OUTPATIENT)
Dept: ANTICOAGULATION | Facility: CLINIC | Age: 68
End: 2018-07-26

## 2018-07-26 ENCOUNTER — AMBULATORY - HEALTHEAST (OUTPATIENT)
Dept: LAB | Facility: CLINIC | Age: 68
End: 2018-07-26

## 2018-07-26 DIAGNOSIS — I26.99 PULMONARY EMBOLISM (H): ICD-10-CM

## 2018-07-26 LAB — INR PPP: 2.3 (ref 0.9–1.1)

## 2018-08-16 ENCOUNTER — COMMUNICATION - HEALTHEAST (OUTPATIENT)
Dept: ANTICOAGULATION | Facility: CLINIC | Age: 68
End: 2018-08-16

## 2018-08-27 ENCOUNTER — COMMUNICATION - HEALTHEAST (OUTPATIENT)
Dept: CARDIOLOGY | Facility: CLINIC | Age: 68
End: 2018-08-27

## 2018-08-27 DIAGNOSIS — E78.5 HYPERLIPIDEMIA: ICD-10-CM

## 2018-08-29 ENCOUNTER — COMMUNICATION - HEALTHEAST (OUTPATIENT)
Dept: ANTICOAGULATION | Facility: CLINIC | Age: 68
End: 2018-08-29

## 2018-08-29 ENCOUNTER — OFFICE VISIT - HEALTHEAST (OUTPATIENT)
Dept: FAMILY MEDICINE | Facility: CLINIC | Age: 68
End: 2018-08-29

## 2018-08-29 DIAGNOSIS — R53.83 FATIGUE, UNSPECIFIED TYPE: ICD-10-CM

## 2018-08-29 DIAGNOSIS — Z12.31 VISIT FOR SCREENING MAMMOGRAM: ICD-10-CM

## 2018-08-29 DIAGNOSIS — H81.10 BENIGN PAROXYSMAL POSITIONAL VERTIGO, UNSPECIFIED LATERALITY: ICD-10-CM

## 2018-08-29 DIAGNOSIS — I26.99 PULMONARY EMBOLISM (H): ICD-10-CM

## 2018-08-29 DIAGNOSIS — Z23 NEED FOR VACCINATION: ICD-10-CM

## 2018-08-29 DIAGNOSIS — F17.200 TOBACCO USE DISORDER: ICD-10-CM

## 2018-08-29 DIAGNOSIS — J43.9 PULMONARY EMPHYSEMA, UNSPECIFIED EMPHYSEMA TYPE (H): ICD-10-CM

## 2018-08-29 DIAGNOSIS — J42 CHRONIC BRONCHITIS, UNSPECIFIED CHRONIC BRONCHITIS TYPE (H): ICD-10-CM

## 2018-08-29 LAB
ANION GAP SERPL CALCULATED.3IONS-SCNC: 12 MMOL/L (ref 5–18)
BUN SERPL-MCNC: 18 MG/DL (ref 8–22)
CALCIUM SERPL-MCNC: 10.1 MG/DL (ref 8.5–10.5)
CHLORIDE BLD-SCNC: 106 MMOL/L (ref 98–107)
CO2 SERPL-SCNC: 23 MMOL/L (ref 22–31)
CREAT SERPL-MCNC: 1.08 MG/DL (ref 0.6–1.1)
ERYTHROCYTE [DISTWIDTH] IN BLOOD BY AUTOMATED COUNT: 11.4 % (ref 11–14.5)
GFR SERPL CREATININE-BSD FRML MDRD: 50 ML/MIN/1.73M2
GLUCOSE BLD-MCNC: 103 MG/DL (ref 70–125)
HCT VFR BLD AUTO: 49.3 % (ref 35–47)
HGB BLD-MCNC: 16.2 G/DL (ref 12–16)
INR PPP: 3.1 (ref 0.9–1.1)
MCH RBC QN AUTO: 32 PG (ref 27–34)
MCHC RBC AUTO-ENTMCNC: 32.8 G/DL (ref 32–36)
MCV RBC AUTO: 98 FL (ref 80–100)
PLATELET # BLD AUTO: 241 THOU/UL (ref 140–440)
PMV BLD AUTO: 7.7 FL (ref 7–10)
POTASSIUM BLD-SCNC: 4.1 MMOL/L (ref 3.5–5)
RBC # BLD AUTO: 5.05 MILL/UL (ref 3.8–5.4)
SODIUM SERPL-SCNC: 141 MMOL/L (ref 136–145)
WBC: 8.5 THOU/UL (ref 4–11)

## 2018-08-29 ASSESSMENT — MIFFLIN-ST. JEOR: SCORE: 1098.75

## 2018-08-30 ENCOUNTER — COMMUNICATION - HEALTHEAST (OUTPATIENT)
Dept: FAMILY MEDICINE | Facility: CLINIC | Age: 68
End: 2018-08-30

## 2018-09-05 ENCOUNTER — HOSPITAL ENCOUNTER (OUTPATIENT)
Dept: MAMMOGRAPHY | Facility: CLINIC | Age: 68
Discharge: HOME | End: 2018-09-05
Attending: FAMILY MEDICINE

## 2018-09-05 DIAGNOSIS — Z12.31 VISIT FOR SCREENING MAMMOGRAM: ICD-10-CM

## 2018-09-19 ENCOUNTER — COMMUNICATION - HEALTHEAST (OUTPATIENT)
Dept: ANTICOAGULATION | Facility: CLINIC | Age: 68
End: 2018-09-19

## 2018-09-20 ENCOUNTER — COMMUNICATION - HEALTHEAST (OUTPATIENT)
Dept: ANTICOAGULATION | Facility: CLINIC | Age: 68
End: 2018-09-20

## 2018-09-20 ENCOUNTER — AMBULATORY - HEALTHEAST (OUTPATIENT)
Dept: LAB | Facility: CLINIC | Age: 68
End: 2018-09-20

## 2018-09-20 DIAGNOSIS — I26.99 PULMONARY EMBOLISM (H): ICD-10-CM

## 2018-09-20 LAB — INR PPP: 2.8 (ref 0.9–1.1)

## 2018-10-04 ENCOUNTER — COMMUNICATION - HEALTHEAST (OUTPATIENT)
Dept: ANTICOAGULATION | Facility: CLINIC | Age: 68
End: 2018-10-04

## 2018-10-04 ENCOUNTER — AMBULATORY - HEALTHEAST (OUTPATIENT)
Dept: LAB | Facility: CLINIC | Age: 68
End: 2018-10-04

## 2018-10-04 DIAGNOSIS — I26.99 PULMONARY EMBOLISM (H): ICD-10-CM

## 2018-10-04 LAB — INR PPP: 2.6 (ref 0.9–1.1)

## 2018-10-12 ENCOUNTER — COMMUNICATION - HEALTHEAST (OUTPATIENT)
Dept: CARDIOLOGY | Facility: CLINIC | Age: 68
End: 2018-10-12

## 2018-10-12 DIAGNOSIS — I25.10 CAD (CORONARY ARTERY DISEASE): ICD-10-CM

## 2018-10-15 ENCOUNTER — COMMUNICATION - HEALTHEAST (OUTPATIENT)
Dept: FAMILY MEDICINE | Facility: CLINIC | Age: 68
End: 2018-10-15

## 2018-10-15 DIAGNOSIS — I21.4 NSTEMI (NON-ST ELEVATED MYOCARDIAL INFARCTION) (H): ICD-10-CM

## 2018-10-15 DIAGNOSIS — J43.9 PULMONARY EMPHYSEMA, UNSPECIFIED EMPHYSEMA TYPE (H): ICD-10-CM

## 2018-11-01 ENCOUNTER — COMMUNICATION - HEALTHEAST (OUTPATIENT)
Dept: ANTICOAGULATION | Facility: CLINIC | Age: 68
End: 2018-11-01

## 2018-11-01 ENCOUNTER — AMBULATORY - HEALTHEAST (OUTPATIENT)
Dept: LAB | Facility: CLINIC | Age: 68
End: 2018-11-01

## 2018-11-01 DIAGNOSIS — I26.99 PULMONARY EMBOLISM (H): ICD-10-CM

## 2018-11-01 LAB — INR PPP: 2.9 (ref 0.9–1.1)

## 2018-11-07 ENCOUNTER — OFFICE VISIT - HEALTHEAST (OUTPATIENT)
Dept: CARDIOLOGY | Facility: CLINIC | Age: 68
End: 2018-11-07

## 2018-11-07 DIAGNOSIS — I25.10 CORONARY ARTERY DISEASE: ICD-10-CM

## 2018-11-07 DIAGNOSIS — E78.5 HYPERLIPIDEMIA: ICD-10-CM

## 2018-11-07 DIAGNOSIS — I21.4 NSTEMI (NON-ST ELEVATED MYOCARDIAL INFARCTION) (H): ICD-10-CM

## 2018-11-07 DIAGNOSIS — I10 HTN (HYPERTENSION): ICD-10-CM

## 2018-11-07 ASSESSMENT — MIFFLIN-ST. JEOR: SCORE: 1089.68

## 2018-11-09 ENCOUNTER — COMMUNICATION - HEALTHEAST (OUTPATIENT)
Dept: ANTICOAGULATION | Facility: CLINIC | Age: 68
End: 2018-11-09

## 2018-11-09 DIAGNOSIS — I26.99 PULMONARY EMBOLISM (H): ICD-10-CM

## 2018-11-29 ENCOUNTER — AMBULATORY - HEALTHEAST (OUTPATIENT)
Dept: LAB | Facility: CLINIC | Age: 68
End: 2018-11-29

## 2018-11-29 ENCOUNTER — COMMUNICATION - HEALTHEAST (OUTPATIENT)
Dept: ANTICOAGULATION | Facility: CLINIC | Age: 68
End: 2018-11-29

## 2018-11-29 DIAGNOSIS — I26.99 PULMONARY EMBOLISM (H): ICD-10-CM

## 2018-11-29 LAB — INR PPP: 1.9 (ref 0.9–1.1)

## 2018-12-13 ENCOUNTER — AMBULATORY - HEALTHEAST (OUTPATIENT)
Dept: LAB | Facility: CLINIC | Age: 68
End: 2018-12-13

## 2018-12-13 ENCOUNTER — COMMUNICATION - HEALTHEAST (OUTPATIENT)
Dept: ANTICOAGULATION | Facility: CLINIC | Age: 68
End: 2018-12-13

## 2018-12-13 DIAGNOSIS — I26.99 PULMONARY EMBOLISM (H): ICD-10-CM

## 2018-12-13 LAB — INR PPP: 2.7 (ref 0.9–1.1)

## 2019-01-01 ENCOUNTER — COMMUNICATION - HEALTHEAST (OUTPATIENT)
Dept: FAMILY MEDICINE | Facility: CLINIC | Age: 69
End: 2019-01-01

## 2019-01-01 DIAGNOSIS — I82.90 BLOOD CLOT IN VEIN: ICD-10-CM

## 2019-01-03 ENCOUNTER — COMMUNICATION - HEALTHEAST (OUTPATIENT)
Dept: ANTICOAGULATION | Facility: CLINIC | Age: 69
End: 2019-01-03

## 2019-01-03 ENCOUNTER — AMBULATORY - HEALTHEAST (OUTPATIENT)
Dept: LAB | Facility: CLINIC | Age: 69
End: 2019-01-03

## 2019-01-03 DIAGNOSIS — I26.99 PULMONARY EMBOLISM (H): ICD-10-CM

## 2019-01-03 LAB — INR PPP: 1.7 (ref 0.9–1.1)

## 2019-01-17 ENCOUNTER — AMBULATORY - HEALTHEAST (OUTPATIENT)
Dept: LAB | Facility: CLINIC | Age: 69
End: 2019-01-17

## 2019-01-17 ENCOUNTER — COMMUNICATION - HEALTHEAST (OUTPATIENT)
Dept: ANTICOAGULATION | Facility: CLINIC | Age: 69
End: 2019-01-17

## 2019-01-17 DIAGNOSIS — I26.99 PULMONARY EMBOLISM (H): ICD-10-CM

## 2019-01-17 LAB — INR PPP: 2.4 (ref 0.9–1.1)

## 2019-02-05 ENCOUNTER — COMMUNICATION - HEALTHEAST (OUTPATIENT)
Dept: ANTICOAGULATION | Facility: CLINIC | Age: 69
End: 2019-02-05

## 2019-02-05 ENCOUNTER — AMBULATORY - HEALTHEAST (OUTPATIENT)
Dept: LAB | Facility: CLINIC | Age: 69
End: 2019-02-05

## 2019-02-05 DIAGNOSIS — I26.99 PULMONARY EMBOLISM (H): ICD-10-CM

## 2019-02-05 LAB — INR PPP: 1.9 (ref 0.9–1.1)

## 2019-02-19 ENCOUNTER — AMBULATORY - HEALTHEAST (OUTPATIENT)
Dept: LAB | Facility: CLINIC | Age: 69
End: 2019-02-19

## 2019-02-19 ENCOUNTER — COMMUNICATION - HEALTHEAST (OUTPATIENT)
Dept: ANTICOAGULATION | Facility: CLINIC | Age: 69
End: 2019-02-19

## 2019-02-19 DIAGNOSIS — I26.99 PULMONARY EMBOLISM (H): ICD-10-CM

## 2019-02-19 LAB — INR PPP: 2.4 (ref 0.9–1.1)

## 2019-03-06 ENCOUNTER — COMMUNICATION - HEALTHEAST (OUTPATIENT)
Dept: FAMILY MEDICINE | Facility: CLINIC | Age: 69
End: 2019-03-06

## 2019-03-06 DIAGNOSIS — J43.9 PULMONARY EMPHYSEMA, UNSPECIFIED EMPHYSEMA TYPE (H): ICD-10-CM

## 2019-03-13 ENCOUNTER — COMMUNICATION - HEALTHEAST (OUTPATIENT)
Dept: FAMILY MEDICINE | Facility: CLINIC | Age: 69
End: 2019-03-13

## 2019-03-19 ENCOUNTER — COMMUNICATION - HEALTHEAST (OUTPATIENT)
Dept: ANTICOAGULATION | Facility: CLINIC | Age: 69
End: 2019-03-19

## 2019-03-21 ENCOUNTER — COMMUNICATION - HEALTHEAST (OUTPATIENT)
Dept: ANTICOAGULATION | Facility: CLINIC | Age: 69
End: 2019-03-21

## 2019-03-21 ENCOUNTER — AMBULATORY - HEALTHEAST (OUTPATIENT)
Dept: LAB | Facility: CLINIC | Age: 69
End: 2019-03-21

## 2019-03-21 DIAGNOSIS — I26.99 PULMONARY EMBOLISM (H): ICD-10-CM

## 2019-03-21 LAB — INR PPP: 2.1 (ref 0.9–1.1)

## 2019-04-10 ENCOUNTER — COMMUNICATION - HEALTHEAST (OUTPATIENT)
Dept: CARDIOLOGY | Facility: CLINIC | Age: 69
End: 2019-04-10

## 2019-04-10 DIAGNOSIS — I25.10 CAD (CORONARY ARTERY DISEASE): ICD-10-CM

## 2019-04-13 ENCOUNTER — COMMUNICATION - HEALTHEAST (OUTPATIENT)
Dept: FAMILY MEDICINE | Facility: CLINIC | Age: 69
End: 2019-04-13

## 2019-04-13 DIAGNOSIS — J43.9 PULMONARY EMPHYSEMA, UNSPECIFIED EMPHYSEMA TYPE (H): ICD-10-CM

## 2019-04-25 ENCOUNTER — COMMUNICATION - HEALTHEAST (OUTPATIENT)
Dept: ANTICOAGULATION | Facility: CLINIC | Age: 69
End: 2019-04-25

## 2019-05-02 ENCOUNTER — AMBULATORY - HEALTHEAST (OUTPATIENT)
Dept: LAB | Facility: CLINIC | Age: 69
End: 2019-05-02

## 2019-05-02 ENCOUNTER — COMMUNICATION - HEALTHEAST (OUTPATIENT)
Dept: ANTICOAGULATION | Facility: CLINIC | Age: 69
End: 2019-05-02

## 2019-05-02 DIAGNOSIS — I26.99 PULMONARY EMBOLISM (H): ICD-10-CM

## 2019-05-02 LAB — INR PPP: 1.7 (ref 0.9–1.1)

## 2019-05-16 ENCOUNTER — AMBULATORY - HEALTHEAST (OUTPATIENT)
Dept: LAB | Facility: CLINIC | Age: 69
End: 2019-05-16

## 2019-05-16 ENCOUNTER — COMMUNICATION - HEALTHEAST (OUTPATIENT)
Dept: ANTICOAGULATION | Facility: CLINIC | Age: 69
End: 2019-05-16

## 2019-05-16 DIAGNOSIS — I26.99 PULMONARY EMBOLISM (H): ICD-10-CM

## 2019-05-16 LAB — INR PPP: 1.7 (ref 0.9–1.1)

## 2019-05-30 ENCOUNTER — AMBULATORY - HEALTHEAST (OUTPATIENT)
Dept: LAB | Facility: CLINIC | Age: 69
End: 2019-05-30

## 2019-05-30 ENCOUNTER — COMMUNICATION - HEALTHEAST (OUTPATIENT)
Dept: ANTICOAGULATION | Facility: CLINIC | Age: 69
End: 2019-05-30

## 2019-05-30 DIAGNOSIS — I26.99 PULMONARY EMBOLISM (H): ICD-10-CM

## 2019-05-30 LAB — INR PPP: 2.4 (ref 0.9–1.1)

## 2019-06-10 ENCOUNTER — OFFICE VISIT - HEALTHEAST (OUTPATIENT)
Dept: FAMILY MEDICINE | Facility: CLINIC | Age: 69
End: 2019-06-10

## 2019-06-10 ENCOUNTER — COMMUNICATION - HEALTHEAST (OUTPATIENT)
Dept: ANTICOAGULATION | Facility: CLINIC | Age: 69
End: 2019-06-10

## 2019-06-10 DIAGNOSIS — E78.2 MIXED HYPERLIPIDEMIA: ICD-10-CM

## 2019-06-10 DIAGNOSIS — I26.99 PULMONARY EMBOLISM (H): ICD-10-CM

## 2019-06-10 DIAGNOSIS — I10 ESSENTIAL HYPERTENSION: ICD-10-CM

## 2019-06-10 DIAGNOSIS — J42 CHRONIC BRONCHITIS, UNSPECIFIED CHRONIC BRONCHITIS TYPE (H): ICD-10-CM

## 2019-06-10 DIAGNOSIS — L81.9 PIGMENTED SKIN LESION: ICD-10-CM

## 2019-06-10 DIAGNOSIS — J43.9 PULMONARY EMPHYSEMA, UNSPECIFIED EMPHYSEMA TYPE (H): ICD-10-CM

## 2019-06-10 DIAGNOSIS — F17.200 TOBACCO USE DISORDER: ICD-10-CM

## 2019-06-10 LAB
CHOLEST SERPL-MCNC: 160 MG/DL
FASTING STATUS PATIENT QL REPORTED: NO
HDLC SERPL-MCNC: 57 MG/DL
INR PPP: 2.2 (ref 0.9–1.1)
LDLC SERPL CALC-MCNC: 87 MG/DL
TRIGL SERPL-MCNC: 78 MG/DL

## 2019-06-10 ASSESSMENT — MIFFLIN-ST. JEOR: SCORE: 1089.68

## 2019-06-11 ENCOUNTER — COMMUNICATION - HEALTHEAST (OUTPATIENT)
Dept: FAMILY MEDICINE | Facility: CLINIC | Age: 69
End: 2019-06-11

## 2019-06-19 ENCOUNTER — COMMUNICATION - HEALTHEAST (OUTPATIENT)
Dept: FAMILY MEDICINE | Facility: CLINIC | Age: 69
End: 2019-06-19

## 2019-06-19 DIAGNOSIS — I82.90 BLOOD CLOT IN VEIN: ICD-10-CM

## 2019-06-26 ENCOUNTER — COMMUNICATION - HEALTHEAST (OUTPATIENT)
Dept: FAMILY MEDICINE | Facility: CLINIC | Age: 69
End: 2019-06-26

## 2019-06-26 ENCOUNTER — AMBULATORY - HEALTHEAST (OUTPATIENT)
Dept: NURSING | Facility: CLINIC | Age: 69
End: 2019-06-26

## 2019-06-28 ENCOUNTER — COMMUNICATION - HEALTHEAST (OUTPATIENT)
Dept: FAMILY MEDICINE | Facility: CLINIC | Age: 69
End: 2019-06-28

## 2019-06-28 ENCOUNTER — AMBULATORY - HEALTHEAST (OUTPATIENT)
Dept: NURSING | Facility: CLINIC | Age: 69
End: 2019-06-28

## 2019-06-28 DIAGNOSIS — I10 HTN (HYPERTENSION): ICD-10-CM

## 2019-06-28 DIAGNOSIS — I10 ESSENTIAL HYPERTENSION: ICD-10-CM

## 2019-07-08 ENCOUNTER — COMMUNICATION - HEALTHEAST (OUTPATIENT)
Dept: ANTICOAGULATION | Facility: CLINIC | Age: 69
End: 2019-07-08

## 2019-07-08 ENCOUNTER — COMMUNICATION - HEALTHEAST (OUTPATIENT)
Dept: CARDIOLOGY | Facility: CLINIC | Age: 69
End: 2019-07-08

## 2019-07-08 ENCOUNTER — COMMUNICATION - HEALTHEAST (OUTPATIENT)
Dept: FAMILY MEDICINE | Facility: CLINIC | Age: 69
End: 2019-07-08

## 2019-07-08 ENCOUNTER — AMBULATORY - HEALTHEAST (OUTPATIENT)
Dept: LAB | Facility: CLINIC | Age: 69
End: 2019-07-08

## 2019-07-08 DIAGNOSIS — I26.99 PULMONARY EMBOLISM (H): ICD-10-CM

## 2019-07-08 DIAGNOSIS — I10 ESSENTIAL HYPERTENSION: ICD-10-CM

## 2019-07-08 LAB — INR PPP: 2.2 (ref 0.9–1.1)

## 2019-07-22 ENCOUNTER — AMBULATORY - HEALTHEAST (OUTPATIENT)
Dept: NURSING | Facility: CLINIC | Age: 69
End: 2019-07-22

## 2019-07-22 ENCOUNTER — COMMUNICATION - HEALTHEAST (OUTPATIENT)
Dept: FAMILY MEDICINE | Facility: CLINIC | Age: 69
End: 2019-07-22

## 2019-07-22 ENCOUNTER — AMBULATORY - HEALTHEAST (OUTPATIENT)
Dept: LAB | Facility: CLINIC | Age: 69
End: 2019-07-22

## 2019-07-22 DIAGNOSIS — I10 ESSENTIAL HYPERTENSION: ICD-10-CM

## 2019-07-22 LAB
ANION GAP SERPL CALCULATED.3IONS-SCNC: 9 MMOL/L (ref 5–18)
BUN SERPL-MCNC: 13 MG/DL (ref 8–22)
CALCIUM SERPL-MCNC: 9.6 MG/DL (ref 8.5–10.5)
CHLORIDE BLD-SCNC: 104 MMOL/L (ref 98–107)
CO2 SERPL-SCNC: 25 MMOL/L (ref 22–31)
CREAT SERPL-MCNC: 0.98 MG/DL (ref 0.6–1.1)
GFR SERPL CREATININE-BSD FRML MDRD: 56 ML/MIN/1.73M2
GLUCOSE BLD-MCNC: 85 MG/DL (ref 70–125)
POTASSIUM BLD-SCNC: 4.5 MMOL/L (ref 3.5–5)
SODIUM SERPL-SCNC: 138 MMOL/L (ref 136–145)

## 2019-07-29 ENCOUNTER — COMMUNICATION - HEALTHEAST (OUTPATIENT)
Dept: CARDIOLOGY | Facility: CLINIC | Age: 69
End: 2019-07-29

## 2019-08-05 ENCOUNTER — AMBULATORY - HEALTHEAST (OUTPATIENT)
Dept: LAB | Facility: CLINIC | Age: 69
End: 2019-08-05

## 2019-08-05 ENCOUNTER — COMMUNICATION - HEALTHEAST (OUTPATIENT)
Dept: ANTICOAGULATION | Facility: CLINIC | Age: 69
End: 2019-08-05

## 2019-08-05 DIAGNOSIS — I26.99 PULMONARY EMBOLISM (H): ICD-10-CM

## 2019-08-05 LAB — INR PPP: 2.6 (ref 0.9–1.1)

## 2019-09-12 ENCOUNTER — AMBULATORY - HEALTHEAST (OUTPATIENT)
Dept: LAB | Facility: CLINIC | Age: 69
End: 2019-09-12

## 2019-09-12 ENCOUNTER — COMMUNICATION - HEALTHEAST (OUTPATIENT)
Dept: ANTICOAGULATION | Facility: CLINIC | Age: 69
End: 2019-09-12

## 2019-09-12 DIAGNOSIS — I26.99 PULMONARY EMBOLISM (H): ICD-10-CM

## 2019-09-12 LAB — INR PPP: 2.6 (ref 0.9–1.1)

## 2019-10-07 ENCOUNTER — COMMUNICATION - HEALTHEAST (OUTPATIENT)
Dept: ANTICOAGULATION | Facility: CLINIC | Age: 69
End: 2019-10-07

## 2019-10-07 ENCOUNTER — COMMUNICATION - HEALTHEAST (OUTPATIENT)
Dept: FAMILY MEDICINE | Facility: CLINIC | Age: 69
End: 2019-10-07

## 2019-10-07 ENCOUNTER — OFFICE VISIT - HEALTHEAST (OUTPATIENT)
Dept: FAMILY MEDICINE | Facility: CLINIC | Age: 69
End: 2019-10-07

## 2019-10-07 DIAGNOSIS — E78.2 MIXED HYPERLIPIDEMIA: ICD-10-CM

## 2019-10-07 DIAGNOSIS — J43.9 PULMONARY EMPHYSEMA, UNSPECIFIED EMPHYSEMA TYPE (H): ICD-10-CM

## 2019-10-07 DIAGNOSIS — J44.1 COPD EXACERBATION (H): ICD-10-CM

## 2019-10-07 DIAGNOSIS — Z00.00 ROUTINE GENERAL MEDICAL EXAMINATION AT A HEALTH CARE FACILITY: ICD-10-CM

## 2019-10-07 DIAGNOSIS — J42 CHRONIC BRONCHITIS, UNSPECIFIED CHRONIC BRONCHITIS TYPE (H): ICD-10-CM

## 2019-10-07 DIAGNOSIS — I10 ESSENTIAL HYPERTENSION: ICD-10-CM

## 2019-10-07 DIAGNOSIS — M81.0 AGE-RELATED OSTEOPOROSIS WITHOUT CURRENT PATHOLOGICAL FRACTURE: ICD-10-CM

## 2019-10-07 DIAGNOSIS — Z86.711 HISTORY OF PULMONARY EMBOLISM: ICD-10-CM

## 2019-10-07 DIAGNOSIS — E66.3 OVERWEIGHT (BMI 25.0-29.9): ICD-10-CM

## 2019-10-07 DIAGNOSIS — F17.200 TOBACCO USE DISORDER: ICD-10-CM

## 2019-10-07 LAB
ERYTHROCYTE [DISTWIDTH] IN BLOOD BY AUTOMATED COUNT: 11.8 % (ref 11–14.5)
HCT VFR BLD AUTO: 46.3 % (ref 35–47)
HGB BLD-MCNC: 15.4 G/DL (ref 12–16)
INR PPP: 2.2 (ref 0.9–1.1)
MCH RBC QN AUTO: 32.4 PG (ref 27–34)
MCHC RBC AUTO-ENTMCNC: 33.1 G/DL (ref 32–36)
MCV RBC AUTO: 98 FL (ref 80–100)
PLATELET # BLD AUTO: 195 THOU/UL (ref 140–440)
PMV BLD AUTO: 7.2 FL (ref 7–10)
RBC # BLD AUTO: 4.74 MILL/UL (ref 3.8–5.4)
WBC: 7.3 THOU/UL (ref 4–11)

## 2019-10-07 ASSESSMENT — MIFFLIN-ST. JEOR: SCORE: 1084.92

## 2019-10-08 ENCOUNTER — COMMUNICATION - HEALTHEAST (OUTPATIENT)
Dept: FAMILY MEDICINE | Facility: CLINIC | Age: 69
End: 2019-10-08

## 2019-10-08 ENCOUNTER — COMMUNICATION - HEALTHEAST (OUTPATIENT)
Dept: CARDIOLOGY | Facility: CLINIC | Age: 69
End: 2019-10-08

## 2019-10-08 DIAGNOSIS — E78.5 HYPERLIPIDEMIA: ICD-10-CM

## 2019-10-11 ENCOUNTER — RECORDS - HEALTHEAST (OUTPATIENT)
Dept: BONE DENSITY | Facility: CLINIC | Age: 69
End: 2019-10-11

## 2019-10-11 ENCOUNTER — RECORDS - HEALTHEAST (OUTPATIENT)
Dept: ADMINISTRATIVE | Facility: OTHER | Age: 69
End: 2019-10-11

## 2019-10-11 DIAGNOSIS — M81.0 AGE-RELATED OSTEOPOROSIS WITHOUT CURRENT PATHOLOGICAL FRACTURE: ICD-10-CM

## 2019-10-14 ENCOUNTER — COMMUNICATION - HEALTHEAST (OUTPATIENT)
Dept: FAMILY MEDICINE | Facility: CLINIC | Age: 69
End: 2019-10-14

## 2019-10-14 ENCOUNTER — COMMUNICATION - HEALTHEAST (OUTPATIENT)
Dept: ANTICOAGULATION | Facility: CLINIC | Age: 69
End: 2019-10-14

## 2019-10-14 DIAGNOSIS — M81.0 AGE-RELATED OSTEOPOROSIS WITHOUT CURRENT PATHOLOGICAL FRACTURE: ICD-10-CM

## 2019-10-14 DIAGNOSIS — Z86.711 HISTORY OF PULMONARY EMBOLISM: ICD-10-CM

## 2019-10-16 ENCOUNTER — COMMUNICATION - HEALTHEAST (OUTPATIENT)
Dept: FAMILY MEDICINE | Facility: CLINIC | Age: 69
End: 2019-10-16

## 2019-10-21 ENCOUNTER — COMMUNICATION - HEALTHEAST (OUTPATIENT)
Dept: ANTICOAGULATION | Facility: CLINIC | Age: 69
End: 2019-10-21

## 2019-10-21 ENCOUNTER — AMBULATORY - HEALTHEAST (OUTPATIENT)
Dept: LAB | Facility: CLINIC | Age: 69
End: 2019-10-21

## 2019-10-21 DIAGNOSIS — Z86.711 HISTORY OF PULMONARY EMBOLISM: ICD-10-CM

## 2019-10-21 LAB — INR PPP: 2.2 (ref 0.9–1.1)

## 2019-10-31 ENCOUNTER — COMMUNICATION - HEALTHEAST (OUTPATIENT)
Dept: ANTICOAGULATION | Facility: CLINIC | Age: 69
End: 2019-10-31

## 2019-10-31 ENCOUNTER — AMBULATORY - HEALTHEAST (OUTPATIENT)
Dept: LAB | Facility: CLINIC | Age: 69
End: 2019-10-31

## 2019-10-31 DIAGNOSIS — Z86.711 HISTORY OF PULMONARY EMBOLISM: ICD-10-CM

## 2019-10-31 LAB — INR PPP: 2.2 (ref 0.9–1.1)

## 2019-11-06 ENCOUNTER — OFFICE VISIT - HEALTHEAST (OUTPATIENT)
Dept: CARDIOLOGY | Facility: CLINIC | Age: 69
End: 2019-11-06

## 2019-11-06 DIAGNOSIS — R06.00 DYSPNEA: ICD-10-CM

## 2019-11-06 DIAGNOSIS — I25.10 CAD (CORONARY ARTERY DISEASE): ICD-10-CM

## 2019-11-06 ASSESSMENT — MIFFLIN-ST. JEOR: SCORE: 1091.95

## 2019-11-27 ENCOUNTER — COMMUNICATION - HEALTHEAST (OUTPATIENT)
Dept: ANTICOAGULATION | Facility: CLINIC | Age: 69
End: 2019-11-27

## 2019-11-27 ENCOUNTER — AMBULATORY - HEALTHEAST (OUTPATIENT)
Dept: LAB | Facility: CLINIC | Age: 69
End: 2019-11-27

## 2019-11-27 DIAGNOSIS — Z86.711 HISTORY OF PULMONARY EMBOLISM: ICD-10-CM

## 2019-11-27 LAB — INR PPP: 1.8 (ref 0.9–1.1)

## 2019-12-04 ENCOUNTER — COMMUNICATION - HEALTHEAST (OUTPATIENT)
Dept: FAMILY MEDICINE | Facility: CLINIC | Age: 69
End: 2019-12-04

## 2019-12-04 DIAGNOSIS — I82.90 BLOOD CLOT IN VEIN: ICD-10-CM

## 2019-12-09 ENCOUNTER — COMMUNICATION - HEALTHEAST (OUTPATIENT)
Dept: CARDIOLOGY | Facility: CLINIC | Age: 69
End: 2019-12-09

## 2019-12-09 DIAGNOSIS — I21.4 NSTEMI (NON-ST ELEVATED MYOCARDIAL INFARCTION) (H): ICD-10-CM

## 2019-12-11 ENCOUNTER — COMMUNICATION - HEALTHEAST (OUTPATIENT)
Dept: ANTICOAGULATION | Facility: CLINIC | Age: 69
End: 2019-12-11

## 2019-12-11 ENCOUNTER — AMBULATORY - HEALTHEAST (OUTPATIENT)
Dept: LAB | Facility: CLINIC | Age: 69
End: 2019-12-11

## 2019-12-11 DIAGNOSIS — Z86.711 HISTORY OF PULMONARY EMBOLISM: ICD-10-CM

## 2019-12-11 LAB — INR PPP: 2.4 (ref 0.9–1.1)

## 2019-12-15 ENCOUNTER — COMMUNICATION - HEALTHEAST (OUTPATIENT)
Dept: FAMILY MEDICINE | Facility: CLINIC | Age: 69
End: 2019-12-15

## 2019-12-15 DIAGNOSIS — J43.9 PULMONARY EMPHYSEMA, UNSPECIFIED EMPHYSEMA TYPE (H): ICD-10-CM

## 2020-01-06 ENCOUNTER — COMMUNICATION - HEALTHEAST (OUTPATIENT)
Dept: FAMILY MEDICINE | Facility: CLINIC | Age: 70
End: 2020-01-06

## 2020-01-06 ENCOUNTER — COMMUNICATION - HEALTHEAST (OUTPATIENT)
Dept: CARDIOLOGY | Facility: CLINIC | Age: 70
End: 2020-01-06

## 2020-01-06 DIAGNOSIS — I10 ESSENTIAL HYPERTENSION: ICD-10-CM

## 2020-01-06 DIAGNOSIS — E78.5 HYPERLIPIDEMIA: ICD-10-CM

## 2020-01-10 ENCOUNTER — COMMUNICATION - HEALTHEAST (OUTPATIENT)
Dept: ANTICOAGULATION | Facility: CLINIC | Age: 70
End: 2020-01-10

## 2020-01-10 ENCOUNTER — COMMUNICATION - HEALTHEAST (OUTPATIENT)
Dept: FAMILY MEDICINE | Facility: CLINIC | Age: 70
End: 2020-01-10

## 2020-01-10 DIAGNOSIS — I10 ESSENTIAL HYPERTENSION: ICD-10-CM

## 2020-01-15 ENCOUNTER — AMBULATORY - HEALTHEAST (OUTPATIENT)
Dept: LAB | Facility: CLINIC | Age: 70
End: 2020-01-15

## 2020-01-15 ENCOUNTER — COMMUNICATION - HEALTHEAST (OUTPATIENT)
Dept: ANTICOAGULATION | Facility: CLINIC | Age: 70
End: 2020-01-15

## 2020-01-15 DIAGNOSIS — Z86.711 HISTORY OF PULMONARY EMBOLISM: ICD-10-CM

## 2020-01-15 LAB — INR PPP: 2.2 (ref 0.9–1.1)

## 2020-02-21 ENCOUNTER — AMBULATORY - HEALTHEAST (OUTPATIENT)
Dept: LAB | Facility: CLINIC | Age: 70
End: 2020-02-21

## 2020-02-21 ENCOUNTER — COMMUNICATION - HEALTHEAST (OUTPATIENT)
Dept: ANTICOAGULATION | Facility: CLINIC | Age: 70
End: 2020-02-21

## 2020-02-21 DIAGNOSIS — Z86.711 HISTORY OF PULMONARY EMBOLISM: ICD-10-CM

## 2020-02-21 LAB — INR PPP: 2.1 (ref 0.9–1.1)

## 2020-03-20 ENCOUNTER — AMBULATORY - HEALTHEAST (OUTPATIENT)
Dept: LAB | Facility: CLINIC | Age: 70
End: 2020-03-20

## 2020-03-20 ENCOUNTER — COMMUNICATION - HEALTHEAST (OUTPATIENT)
Dept: ANTICOAGULATION | Facility: CLINIC | Age: 70
End: 2020-03-20

## 2020-03-20 DIAGNOSIS — Z86.711 HISTORY OF PULMONARY EMBOLISM: ICD-10-CM

## 2020-03-20 LAB — INR PPP: 1.9 (ref 0.9–1.1)

## 2020-03-27 ENCOUNTER — COMMUNICATION - HEALTHEAST (OUTPATIENT)
Dept: ANTICOAGULATION | Facility: CLINIC | Age: 70
End: 2020-03-27

## 2020-04-10 ENCOUNTER — COMMUNICATION - HEALTHEAST (OUTPATIENT)
Dept: ANTICOAGULATION | Facility: CLINIC | Age: 70
End: 2020-04-10

## 2020-04-15 ENCOUNTER — COMMUNICATION - HEALTHEAST (OUTPATIENT)
Dept: ANTICOAGULATION | Facility: CLINIC | Age: 70
End: 2020-04-15

## 2020-04-15 ENCOUNTER — COMMUNICATION - HEALTHEAST (OUTPATIENT)
Dept: FAMILY MEDICINE | Facility: CLINIC | Age: 70
End: 2020-04-15

## 2020-04-15 ENCOUNTER — AMBULATORY - HEALTHEAST (OUTPATIENT)
Dept: LAB | Facility: CLINIC | Age: 70
End: 2020-04-15

## 2020-04-15 DIAGNOSIS — Z86.711 HISTORY OF PULMONARY EMBOLISM: ICD-10-CM

## 2020-04-15 LAB — INR PPP: 1.8 (ref 0.9–1.1)

## 2020-04-29 ENCOUNTER — COMMUNICATION - HEALTHEAST (OUTPATIENT)
Dept: FAMILY MEDICINE | Facility: CLINIC | Age: 70
End: 2020-04-29

## 2020-04-29 DIAGNOSIS — I82.90 BLOOD CLOT IN VEIN: ICD-10-CM

## 2020-05-01 ENCOUNTER — RECORDS - HEALTHEAST (OUTPATIENT)
Dept: ADMINISTRATIVE | Facility: OTHER | Age: 70
End: 2020-05-01

## 2020-05-06 ENCOUNTER — AMBULATORY - HEALTHEAST (OUTPATIENT)
Dept: LAB | Facility: CLINIC | Age: 70
End: 2020-05-06

## 2020-05-06 ENCOUNTER — COMMUNICATION - HEALTHEAST (OUTPATIENT)
Dept: ANTICOAGULATION | Facility: CLINIC | Age: 70
End: 2020-05-06

## 2020-05-06 DIAGNOSIS — Z86.711 HISTORY OF PULMONARY EMBOLISM: ICD-10-CM

## 2020-05-06 LAB — INR PPP: 2.8 (ref 0.9–1.1)

## 2020-05-21 ENCOUNTER — COMMUNICATION - HEALTHEAST (OUTPATIENT)
Dept: FAMILY MEDICINE | Facility: CLINIC | Age: 70
End: 2020-05-21

## 2020-05-21 ENCOUNTER — OFFICE VISIT - HEALTHEAST (OUTPATIENT)
Dept: CARDIOLOGY | Facility: CLINIC | Age: 70
End: 2020-05-21

## 2020-05-21 DIAGNOSIS — J43.9 PULMONARY EMPHYSEMA, UNSPECIFIED EMPHYSEMA TYPE (H): ICD-10-CM

## 2020-05-27 ENCOUNTER — AMBULATORY - HEALTHEAST (OUTPATIENT)
Dept: LAB | Facility: CLINIC | Age: 70
End: 2020-05-27

## 2020-05-27 ENCOUNTER — COMMUNICATION - HEALTHEAST (OUTPATIENT)
Dept: ANTICOAGULATION | Facility: CLINIC | Age: 70
End: 2020-05-27

## 2020-05-27 DIAGNOSIS — Z86.711 HISTORY OF PULMONARY EMBOLISM: ICD-10-CM

## 2020-05-27 LAB — INR PPP: 2.8 (ref 0.9–1.1)

## 2020-06-03 ENCOUNTER — COMMUNICATION - HEALTHEAST (OUTPATIENT)
Dept: SCHEDULING | Facility: CLINIC | Age: 70
End: 2020-06-03

## 2020-07-01 ENCOUNTER — AMBULATORY - HEALTHEAST (OUTPATIENT)
Dept: LAB | Facility: CLINIC | Age: 70
End: 2020-07-01

## 2020-07-01 ENCOUNTER — COMMUNICATION - HEALTHEAST (OUTPATIENT)
Dept: ANTICOAGULATION | Facility: CLINIC | Age: 70
End: 2020-07-01

## 2020-07-01 DIAGNOSIS — Z86.711 HISTORY OF PULMONARY EMBOLISM: ICD-10-CM

## 2020-07-01 LAB — INR PPP: 3.9 (ref 0.9–1.1)

## 2020-07-15 ENCOUNTER — COMMUNICATION - HEALTHEAST (OUTPATIENT)
Dept: ANTICOAGULATION | Facility: CLINIC | Age: 70
End: 2020-07-15

## 2020-07-15 ENCOUNTER — AMBULATORY - HEALTHEAST (OUTPATIENT)
Dept: LAB | Facility: CLINIC | Age: 70
End: 2020-07-15

## 2020-07-15 DIAGNOSIS — Z86.711 HISTORY OF PULMONARY EMBOLISM: ICD-10-CM

## 2020-07-15 LAB — INR PPP: 2.4 (ref 0.9–1.1)

## 2020-07-29 ENCOUNTER — AMBULATORY - HEALTHEAST (OUTPATIENT)
Dept: LAB | Facility: CLINIC | Age: 70
End: 2020-07-29

## 2020-07-29 ENCOUNTER — COMMUNICATION - HEALTHEAST (OUTPATIENT)
Dept: ANTICOAGULATION | Facility: CLINIC | Age: 70
End: 2020-07-29

## 2020-07-29 DIAGNOSIS — Z86.711 HISTORY OF PULMONARY EMBOLISM: ICD-10-CM

## 2020-07-29 LAB — INR PPP: 2.4 (ref 0.9–1.1)

## 2020-08-26 ENCOUNTER — COMMUNICATION - HEALTHEAST (OUTPATIENT)
Dept: ANTICOAGULATION | Facility: CLINIC | Age: 70
End: 2020-08-26

## 2020-08-26 ENCOUNTER — AMBULATORY - HEALTHEAST (OUTPATIENT)
Dept: LAB | Facility: CLINIC | Age: 70
End: 2020-08-26

## 2020-08-26 DIAGNOSIS — Z86.711 HISTORY OF PULMONARY EMBOLISM: ICD-10-CM

## 2020-08-26 LAB — INR PPP: 2.2 (ref 0.9–1.1)

## 2020-08-30 ENCOUNTER — COMMUNICATION - HEALTHEAST (OUTPATIENT)
Dept: FAMILY MEDICINE | Facility: CLINIC | Age: 70
End: 2020-08-30

## 2020-08-30 DIAGNOSIS — I10 HTN (HYPERTENSION): ICD-10-CM

## 2020-09-04 ENCOUNTER — COMMUNICATION - HEALTHEAST (OUTPATIENT)
Dept: CARDIOLOGY | Facility: CLINIC | Age: 70
End: 2020-09-04

## 2020-09-04 DIAGNOSIS — I21.4 NSTEMI (NON-ST ELEVATED MYOCARDIAL INFARCTION) (H): ICD-10-CM

## 2020-09-11 ENCOUNTER — SURGERY - HEALTHEAST (OUTPATIENT)
Dept: SURGERY | Facility: CLINIC | Age: 70
End: 2020-09-11

## 2020-09-11 ENCOUNTER — ANESTHESIA - HEALTHEAST (OUTPATIENT)
Dept: SURGERY | Facility: CLINIC | Age: 70
End: 2020-09-11

## 2020-09-11 DIAGNOSIS — N20.1 URETERAL STONE: ICD-10-CM

## 2020-09-11 ASSESSMENT — MIFFLIN-ST. JEOR: SCORE: 1098.75

## 2020-09-12 ENCOUNTER — COMMUNICATION - HEALTHEAST (OUTPATIENT)
Dept: SCHEDULING | Facility: CLINIC | Age: 70
End: 2020-09-12

## 2020-09-12 ASSESSMENT — MIFFLIN-ST. JEOR: SCORE: 1110.09

## 2020-09-15 ENCOUNTER — AMBULATORY - HEALTHEAST (OUTPATIENT)
Dept: CARE COORDINATION | Facility: CLINIC | Age: 70
End: 2020-09-15

## 2020-09-15 ENCOUNTER — COMMUNICATION - HEALTHEAST (OUTPATIENT)
Dept: FAMILY MEDICINE | Facility: CLINIC | Age: 70
End: 2020-09-15

## 2020-09-15 ENCOUNTER — COMMUNICATION - HEALTHEAST (OUTPATIENT)
Dept: ANTICOAGULATION | Facility: CLINIC | Age: 70
End: 2020-09-15

## 2020-09-15 DIAGNOSIS — N17.9 AKI (ACUTE KIDNEY INJURY) (H): ICD-10-CM

## 2020-09-15 DIAGNOSIS — Z86.711 HISTORY OF PULMONARY EMBOLISM: ICD-10-CM

## 2020-09-16 ENCOUNTER — COMMUNICATION - HEALTHEAST (OUTPATIENT)
Dept: FAMILY MEDICINE | Facility: CLINIC | Age: 70
End: 2020-09-16

## 2020-09-16 ENCOUNTER — COMMUNICATION - HEALTHEAST (OUTPATIENT)
Dept: NURSING | Facility: CLINIC | Age: 70
End: 2020-09-16

## 2020-09-16 DIAGNOSIS — I82.90 BLOOD CLOT IN VEIN: ICD-10-CM

## 2020-09-17 ENCOUNTER — AMBULATORY - HEALTHEAST (OUTPATIENT)
Dept: LAB | Facility: CLINIC | Age: 70
End: 2020-09-17

## 2020-09-17 ENCOUNTER — COMMUNICATION - HEALTHEAST (OUTPATIENT)
Dept: ANTICOAGULATION | Facility: CLINIC | Age: 70
End: 2020-09-17

## 2020-09-17 DIAGNOSIS — Z86.711 HISTORY OF PULMONARY EMBOLISM: ICD-10-CM

## 2020-09-17 LAB — INR PPP: 2.9 (ref 0.9–1.1)

## 2020-09-21 ENCOUNTER — COMMUNICATION - HEALTHEAST (OUTPATIENT)
Dept: ANTICOAGULATION | Facility: CLINIC | Age: 70
End: 2020-09-21

## 2020-09-21 ENCOUNTER — OFFICE VISIT - HEALTHEAST (OUTPATIENT)
Dept: FAMILY MEDICINE | Facility: CLINIC | Age: 70
End: 2020-09-21

## 2020-09-21 DIAGNOSIS — J43.9 PULMONARY EMPHYSEMA, UNSPECIFIED EMPHYSEMA TYPE (H): ICD-10-CM

## 2020-09-21 DIAGNOSIS — N20.1 URETERAL STONE: ICD-10-CM

## 2020-09-21 DIAGNOSIS — N17.9 AKI (ACUTE KIDNEY INJURY) (H): ICD-10-CM

## 2020-09-21 DIAGNOSIS — N10 ACUTE PYELONEPHRITIS: ICD-10-CM

## 2020-09-21 DIAGNOSIS — Z86.711 HISTORY OF PULMONARY EMBOLISM: ICD-10-CM

## 2020-09-21 DIAGNOSIS — Z23 NEED FOR IMMUNIZATION AGAINST INFLUENZA: ICD-10-CM

## 2020-09-21 DIAGNOSIS — J42 CHRONIC BRONCHITIS, UNSPECIFIED CHRONIC BRONCHITIS TYPE (H): ICD-10-CM

## 2020-09-21 DIAGNOSIS — I10 ESSENTIAL HYPERTENSION: ICD-10-CM

## 2020-09-21 LAB — INR PPP: 2.5 (ref 0.9–1.1)

## 2020-09-21 ASSESSMENT — MIFFLIN-ST. JEOR: SCORE: 1113.27

## 2020-09-22 ENCOUNTER — COMMUNICATION - HEALTHEAST (OUTPATIENT)
Dept: FAMILY MEDICINE | Facility: CLINIC | Age: 70
End: 2020-09-22

## 2020-09-23 ENCOUNTER — COMMUNICATION - HEALTHEAST (OUTPATIENT)
Dept: CARDIOLOGY | Facility: CLINIC | Age: 70
End: 2020-09-23

## 2020-09-23 DIAGNOSIS — E78.5 HYPERLIPIDEMIA: ICD-10-CM

## 2020-09-24 ENCOUNTER — AMBULATORY - HEALTHEAST (OUTPATIENT)
Dept: SURGERY | Facility: HOSPITAL | Age: 70
End: 2020-09-24

## 2020-09-24 DIAGNOSIS — Z11.59 ENCOUNTER FOR SCREENING FOR OTHER VIRAL DISEASES: ICD-10-CM

## 2020-09-25 ENCOUNTER — AMBULATORY - HEALTHEAST (OUTPATIENT)
Dept: LAB | Facility: CLINIC | Age: 70
End: 2020-09-25

## 2020-09-25 DIAGNOSIS — Z11.59 ENCOUNTER FOR SCREENING FOR OTHER VIRAL DISEASES: ICD-10-CM

## 2020-09-27 ENCOUNTER — COMMUNICATION - HEALTHEAST (OUTPATIENT)
Dept: SCHEDULING | Facility: CLINIC | Age: 70
End: 2020-09-27

## 2020-09-29 ENCOUNTER — SURGERY - HEALTHEAST (OUTPATIENT)
Dept: SURGERY | Facility: HOSPITAL | Age: 70
End: 2020-09-29

## 2020-09-29 ENCOUNTER — ANESTHESIA - HEALTHEAST (OUTPATIENT)
Dept: SURGERY | Facility: HOSPITAL | Age: 70
End: 2020-09-29

## 2020-09-30 ENCOUNTER — COMMUNICATION - HEALTHEAST (OUTPATIENT)
Dept: FAMILY MEDICINE | Facility: CLINIC | Age: 70
End: 2020-09-30

## 2020-09-30 DIAGNOSIS — Z86.711 HISTORY OF PULMONARY EMBOLISM: ICD-10-CM

## 2020-10-05 LAB
ANION GAP SERPL CALCULATED.3IONS-SCNC: 10 MMOL/L (ref 5–18)
BUN SERPL-MCNC: 13 MG/DL (ref 8–28)
CALCIUM SERPL-MCNC: 9.3 MG/DL (ref 8.5–10.5)
CHLORIDE BLD-SCNC: 107 MMOL/L (ref 98–107)
CO2 SERPL-SCNC: 22 MMOL/L (ref 22–31)
CREAT SERPL-MCNC: 0.95 MG/DL (ref 0.6–1.1)
GFR SERPL CREATININE-BSD FRML MDRD: 58 ML/MIN/1.73M2
GLUCOSE BLD-MCNC: 78 MG/DL (ref 70–125)
POTASSIUM BLD-SCNC: 4.5 MMOL/L (ref 3.5–5)
SODIUM SERPL-SCNC: 139 MMOL/L (ref 136–145)

## 2020-10-06 ENCOUNTER — COMMUNICATION - HEALTHEAST (OUTPATIENT)
Dept: ANTICOAGULATION | Facility: CLINIC | Age: 70
End: 2020-10-06

## 2020-10-06 ENCOUNTER — AMBULATORY - HEALTHEAST (OUTPATIENT)
Dept: LAB | Facility: CLINIC | Age: 70
End: 2020-10-06

## 2020-10-06 DIAGNOSIS — Z86.711 HISTORY OF PULMONARY EMBOLISM: ICD-10-CM

## 2020-10-06 LAB — INR PPP: 1.8 (ref 0.9–1.1)

## 2020-10-16 ENCOUNTER — COMMUNICATION - HEALTHEAST (OUTPATIENT)
Dept: ANTICOAGULATION | Facility: CLINIC | Age: 70
End: 2020-10-16

## 2020-10-16 ENCOUNTER — AMBULATORY - HEALTHEAST (OUTPATIENT)
Dept: ANTICOAGULATION | Facility: CLINIC | Age: 70
End: 2020-10-16

## 2020-10-16 ENCOUNTER — AMBULATORY - HEALTHEAST (OUTPATIENT)
Dept: LAB | Facility: CLINIC | Age: 70
End: 2020-10-16

## 2020-10-16 DIAGNOSIS — Z86.711 HISTORY OF PULMONARY EMBOLISM: ICD-10-CM

## 2020-10-16 LAB — INR PPP: 2.1 (ref 0.9–1.1)

## 2020-10-20 ENCOUNTER — AMBULATORY - HEALTHEAST (OUTPATIENT)
Dept: ANTICOAGULATION | Facility: CLINIC | Age: 70
End: 2020-10-20

## 2020-10-20 DIAGNOSIS — Z86.711 HISTORY OF PULMONARY EMBOLISM: ICD-10-CM

## 2020-10-21 ENCOUNTER — COMMUNICATION - HEALTHEAST (OUTPATIENT)
Dept: SCHEDULING | Facility: CLINIC | Age: 70
End: 2020-10-21

## 2020-10-30 ENCOUNTER — AMBULATORY - HEALTHEAST (OUTPATIENT)
Dept: LAB | Facility: CLINIC | Age: 70
End: 2020-10-30

## 2020-10-30 ENCOUNTER — COMMUNICATION - HEALTHEAST (OUTPATIENT)
Dept: ANTICOAGULATION | Facility: CLINIC | Age: 70
End: 2020-10-30

## 2020-10-30 DIAGNOSIS — Z86.711 HISTORY OF PULMONARY EMBOLISM: ICD-10-CM

## 2020-10-30 LAB — INR PPP: 2.3 (ref 0.9–1.1)

## 2020-11-20 ENCOUNTER — COMMUNICATION - HEALTHEAST (OUTPATIENT)
Dept: SCHEDULING | Facility: CLINIC | Age: 70
End: 2020-11-20

## 2020-11-28 ENCOUNTER — COMMUNICATION - HEALTHEAST (OUTPATIENT)
Dept: FAMILY MEDICINE | Facility: CLINIC | Age: 70
End: 2020-11-28

## 2020-11-28 DIAGNOSIS — I10 HTN (HYPERTENSION): ICD-10-CM

## 2020-11-29 RX ORDER — METOPROLOL SUCCINATE 100 MG/1
100 TABLET, EXTENDED RELEASE ORAL AT BEDTIME
Qty: 90 TABLET | Refills: 2 | Status: SHIPPED | OUTPATIENT
Start: 2020-11-29 | End: 2021-08-06

## 2020-12-07 ENCOUNTER — AMBULATORY - HEALTHEAST (OUTPATIENT)
Dept: LAB | Facility: CLINIC | Age: 70
End: 2020-12-07

## 2020-12-07 ENCOUNTER — COMMUNICATION - HEALTHEAST (OUTPATIENT)
Dept: ANTICOAGULATION | Facility: CLINIC | Age: 70
End: 2020-12-07

## 2020-12-07 DIAGNOSIS — Z86.711 HISTORY OF PULMONARY EMBOLISM: ICD-10-CM

## 2020-12-07 LAB — INR PPP: 2.2 (ref 0.9–1.1)

## 2020-12-15 ENCOUNTER — COMMUNICATION - HEALTHEAST (OUTPATIENT)
Dept: FAMILY MEDICINE | Facility: CLINIC | Age: 70
End: 2020-12-15

## 2020-12-15 DIAGNOSIS — I10 ESSENTIAL HYPERTENSION: ICD-10-CM

## 2020-12-17 ENCOUNTER — COMMUNICATION - HEALTHEAST (OUTPATIENT)
Dept: ADMINISTRATIVE | Facility: CLINIC | Age: 70
End: 2020-12-17

## 2020-12-17 DIAGNOSIS — J43.9 PULMONARY EMPHYSEMA, UNSPECIFIED EMPHYSEMA TYPE (H): ICD-10-CM

## 2021-01-04 ENCOUNTER — COMMUNICATION - HEALTHEAST (OUTPATIENT)
Dept: FAMILY MEDICINE | Facility: CLINIC | Age: 71
End: 2021-01-04

## 2021-01-04 ENCOUNTER — COMMUNICATION - HEALTHEAST (OUTPATIENT)
Dept: ANTICOAGULATION | Facility: CLINIC | Age: 71
End: 2021-01-04

## 2021-01-04 ENCOUNTER — AMBULATORY - HEALTHEAST (OUTPATIENT)
Dept: LAB | Facility: CLINIC | Age: 71
End: 2021-01-04

## 2021-01-04 DIAGNOSIS — Z86.711 HISTORY OF PULMONARY EMBOLISM: ICD-10-CM

## 2021-01-04 LAB — INR PPP: 2.1 (ref 0.9–1.1)

## 2021-01-05 ENCOUNTER — COMMUNICATION - HEALTHEAST (OUTPATIENT)
Dept: FAMILY MEDICINE | Facility: CLINIC | Age: 71
End: 2021-01-05

## 2021-01-05 ENCOUNTER — OFFICE VISIT - HEALTHEAST (OUTPATIENT)
Dept: FAMILY MEDICINE | Facility: CLINIC | Age: 71
End: 2021-01-05

## 2021-01-05 DIAGNOSIS — I10 ESSENTIAL HYPERTENSION: ICD-10-CM

## 2021-01-05 DIAGNOSIS — Z12.11 COLON CANCER SCREENING: ICD-10-CM

## 2021-01-05 DIAGNOSIS — E78.2 MIXED HYPERLIPIDEMIA: ICD-10-CM

## 2021-01-05 LAB
ANION GAP SERPL CALCULATED.3IONS-SCNC: 12 MMOL/L (ref 5–18)
BUN SERPL-MCNC: 14 MG/DL (ref 8–28)
CALCIUM SERPL-MCNC: 9 MG/DL (ref 8.5–10.5)
CHLORIDE BLD-SCNC: 108 MMOL/L (ref 98–107)
CHOLEST SERPL-MCNC: 158 MG/DL
CO2 SERPL-SCNC: 19 MMOL/L (ref 22–31)
CREAT SERPL-MCNC: 0.9 MG/DL (ref 0.6–1.1)
ERYTHROCYTE [DISTWIDTH] IN BLOOD BY AUTOMATED COUNT: 12.7 % (ref 11–14.5)
FASTING STATUS PATIENT QL REPORTED: YES
GFR SERPL CREATININE-BSD FRML MDRD: >60 ML/MIN/1.73M2
GLUCOSE BLD-MCNC: 84 MG/DL (ref 70–125)
HCT VFR BLD AUTO: 49.5 % (ref 35–47)
HDLC SERPL-MCNC: 57 MG/DL
HGB BLD-MCNC: 16.3 G/DL (ref 12–16)
LDLC SERPL CALC-MCNC: 82 MG/DL
MCH RBC QN AUTO: 31.5 PG (ref 27–34)
MCHC RBC AUTO-ENTMCNC: 32.9 G/DL (ref 32–36)
MCV RBC AUTO: 96 FL (ref 80–100)
PLATELET # BLD AUTO: 230 THOU/UL (ref 140–440)
PMV BLD AUTO: 6.9 FL (ref 7–10)
POTASSIUM BLD-SCNC: 4 MMOL/L (ref 3.5–5)
RBC # BLD AUTO: 5.16 MILL/UL (ref 3.8–5.4)
SODIUM SERPL-SCNC: 139 MMOL/L (ref 136–145)
TRIGL SERPL-MCNC: 96 MG/DL
WBC: 8.4 THOU/UL (ref 4–11)

## 2021-01-05 ASSESSMENT — MIFFLIN-ST. JEOR: SCORE: 1100.57

## 2021-01-06 ENCOUNTER — COMMUNICATION - HEALTHEAST (OUTPATIENT)
Dept: FAMILY MEDICINE | Facility: CLINIC | Age: 71
End: 2021-01-06

## 2021-01-06 DIAGNOSIS — N63.0 LUMP OR MASS IN BREAST: ICD-10-CM

## 2021-01-06 DIAGNOSIS — N63.41 UNSPECIFIED LUMP IN RIGHT BREAST, SUBAREOLAR: ICD-10-CM

## 2021-01-13 ENCOUNTER — HOSPITAL ENCOUNTER (OUTPATIENT)
Dept: MAMMOGRAPHY | Facility: CLINIC | Age: 71
Discharge: HOME | End: 2021-01-13
Attending: FAMILY MEDICINE

## 2021-01-13 DIAGNOSIS — N63.41 UNSPECIFIED LUMP IN RIGHT BREAST, SUBAREOLAR: ICD-10-CM

## 2021-01-13 DIAGNOSIS — N63.0 LUMP OR MASS IN BREAST: ICD-10-CM

## 2021-01-14 ENCOUNTER — COMMUNICATION - HEALTHEAST (OUTPATIENT)
Dept: FAMILY MEDICINE | Facility: CLINIC | Age: 71
End: 2021-01-14

## 2021-01-21 ENCOUNTER — OFFICE VISIT - HEALTHEAST (OUTPATIENT)
Dept: FAMILY MEDICINE | Facility: CLINIC | Age: 71
End: 2021-01-21

## 2021-01-21 DIAGNOSIS — I10 ESSENTIAL HYPERTENSION: ICD-10-CM

## 2021-01-21 DIAGNOSIS — N61.1 ABSCESS OF SKIN OF BREAST: ICD-10-CM

## 2021-01-21 ASSESSMENT — MIFFLIN-ST. JEOR: SCORE: 1099.66

## 2021-01-26 ENCOUNTER — AMBULATORY - HEALTHEAST (OUTPATIENT)
Dept: MULTI SPECIALTY CLINIC | Facility: CLINIC | Age: 71
End: 2021-01-26

## 2021-01-26 LAB — COLOGUARD-ABSTRACT: NEGATIVE

## 2021-02-01 ENCOUNTER — COMMUNICATION - HEALTHEAST (OUTPATIENT)
Dept: ADMINISTRATIVE | Facility: CLINIC | Age: 71
End: 2021-02-01

## 2021-02-01 DIAGNOSIS — I10 ESSENTIAL HYPERTENSION: ICD-10-CM

## 2021-02-02 RX ORDER — LOSARTAN POTASSIUM AND HYDROCHLOROTHIAZIDE 12.5; 1 MG/1; MG/1
1 TABLET ORAL DAILY
Qty: 90 TABLET | Refills: 3 | Status: SHIPPED | OUTPATIENT
Start: 2021-02-02 | End: 2022-02-22

## 2021-02-15 ENCOUNTER — COMMUNICATION - HEALTHEAST (OUTPATIENT)
Dept: ANTICOAGULATION | Facility: CLINIC | Age: 71
End: 2021-02-15

## 2021-02-15 ENCOUNTER — AMBULATORY - HEALTHEAST (OUTPATIENT)
Dept: LAB | Facility: CLINIC | Age: 71
End: 2021-02-15

## 2021-02-15 DIAGNOSIS — Z86.711 HISTORY OF PULMONARY EMBOLISM: ICD-10-CM

## 2021-02-15 LAB — INR PPP: 2 (ref 0.9–1.1)

## 2021-02-17 ENCOUNTER — AMBULATORY - HEALTHEAST (OUTPATIENT)
Dept: ANTICOAGULATION | Facility: CLINIC | Age: 71
End: 2021-02-17

## 2021-02-17 ENCOUNTER — COMMUNICATION - HEALTHEAST (OUTPATIENT)
Dept: FAMILY MEDICINE | Facility: CLINIC | Age: 71
End: 2021-02-17

## 2021-02-17 DIAGNOSIS — Z86.711 HISTORY OF PULMONARY EMBOLISM: ICD-10-CM

## 2021-02-27 ENCOUNTER — OFFICE VISIT - HEALTHEAST (OUTPATIENT)
Dept: FAMILY MEDICINE | Facility: CLINIC | Age: 71
End: 2021-02-27

## 2021-02-27 ENCOUNTER — COMMUNICATION - HEALTHEAST (OUTPATIENT)
Dept: SCHEDULING | Facility: CLINIC | Age: 71
End: 2021-02-27

## 2021-02-27 DIAGNOSIS — Z71.85 VACCINE COUNSELING: ICD-10-CM

## 2021-02-27 DIAGNOSIS — L02.219 CUTANEOUS ABSCESS OF TRUNK, UNSPECIFIED SITE OF TRUNK: ICD-10-CM

## 2021-02-27 DIAGNOSIS — N64.4 BREAST PAIN: ICD-10-CM

## 2021-03-10 ENCOUNTER — COMMUNICATION - HEALTHEAST (OUTPATIENT)
Dept: FAMILY MEDICINE | Facility: CLINIC | Age: 71
End: 2021-03-10

## 2021-03-10 DIAGNOSIS — J43.9 PULMONARY EMPHYSEMA, UNSPECIFIED EMPHYSEMA TYPE (H): ICD-10-CM

## 2021-03-24 ENCOUNTER — COMMUNICATION - HEALTHEAST (OUTPATIENT)
Dept: FAMILY MEDICINE | Facility: CLINIC | Age: 71
End: 2021-03-24

## 2021-03-29 ENCOUNTER — COMMUNICATION - HEALTHEAST (OUTPATIENT)
Dept: ANTICOAGULATION | Facility: CLINIC | Age: 71
End: 2021-03-29

## 2021-03-29 ENCOUNTER — AMBULATORY - HEALTHEAST (OUTPATIENT)
Dept: LAB | Facility: CLINIC | Age: 71
End: 2021-03-29

## 2021-03-29 DIAGNOSIS — Z86.711 HISTORY OF PULMONARY EMBOLISM: ICD-10-CM

## 2021-03-29 LAB — INR PPP: 2.6 (ref 0.9–1.1)

## 2021-04-14 ENCOUNTER — OFFICE VISIT - HEALTHEAST (OUTPATIENT)
Dept: FAMILY MEDICINE | Facility: CLINIC | Age: 71
End: 2021-04-14

## 2021-04-14 ENCOUNTER — COMMUNICATION - HEALTHEAST (OUTPATIENT)
Dept: SCHEDULING | Facility: CLINIC | Age: 71
End: 2021-04-14

## 2021-04-14 DIAGNOSIS — L72.9 INFECTED CYST OF SKIN: ICD-10-CM

## 2021-04-14 DIAGNOSIS — L08.9 INFECTED CYST OF SKIN: ICD-10-CM

## 2021-04-14 LAB
FASTING STATUS PATIENT QL REPORTED: NORMAL
GLUCOSE BLD-MCNC: 85 MG/DL (ref 70–125)

## 2021-04-19 ENCOUNTER — COMMUNICATION - HEALTHEAST (OUTPATIENT)
Dept: FAMILY MEDICINE | Facility: CLINIC | Age: 71
End: 2021-04-19

## 2021-04-19 DIAGNOSIS — N64.4 BREAST PAIN, RIGHT: ICD-10-CM

## 2021-04-19 DIAGNOSIS — N60.01 BENIGN CYST OF RIGHT BREAST: ICD-10-CM

## 2021-04-23 ENCOUNTER — AMBULATORY - HEALTHEAST (OUTPATIENT)
Dept: FAMILY MEDICINE | Facility: CLINIC | Age: 71
End: 2021-04-23

## 2021-04-23 DIAGNOSIS — N64.4 BREAST PAIN, RIGHT: ICD-10-CM

## 2021-04-29 ENCOUNTER — HOSPITAL ENCOUNTER (OUTPATIENT)
Dept: MAMMOGRAPHY | Facility: CLINIC | Age: 71
Discharge: HOME | End: 2021-04-29
Attending: STUDENT IN AN ORGANIZED HEALTH CARE EDUCATION/TRAINING PROGRAM

## 2021-04-29 ENCOUNTER — OFFICE VISIT - HEALTHEAST (OUTPATIENT)
Dept: FAMILY MEDICINE | Facility: CLINIC | Age: 71
End: 2021-04-29

## 2021-04-29 ENCOUNTER — COMMUNICATION - HEALTHEAST (OUTPATIENT)
Dept: FAMILY MEDICINE | Facility: CLINIC | Age: 71
End: 2021-04-29

## 2021-04-29 DIAGNOSIS — N64.4 BREAST PAIN, RIGHT: ICD-10-CM

## 2021-04-29 DIAGNOSIS — H81.11 BENIGN PAROXYSMAL POSITIONAL VERTIGO OF RIGHT EAR: ICD-10-CM

## 2021-04-29 DIAGNOSIS — N60.01 BENIGN CYST OF RIGHT BREAST: ICD-10-CM

## 2021-04-29 LAB
ALBUMIN UR-MCNC: NEGATIVE G/DL
APPEARANCE UR: CLEAR
ATRIAL RATE - MUSE: 81 BPM
BASOPHILS # BLD AUTO: 0.1 THOU/UL (ref 0–0.2)
BASOPHILS NFR BLD AUTO: 1 % (ref 0–2)
BILIRUB UR QL STRIP: NEGATIVE
COLOR UR AUTO: YELLOW
DIASTOLIC BLOOD PRESSURE - MUSE: NORMAL
EOSINOPHIL # BLD AUTO: 0.1 THOU/UL (ref 0–0.4)
EOSINOPHIL NFR BLD AUTO: 1 % (ref 0–6)
ERYTHROCYTE [DISTWIDTH] IN BLOOD BY AUTOMATED COUNT: 13.5 % (ref 11–14.5)
GLUCOSE UR STRIP-MCNC: NEGATIVE MG/DL
HCT VFR BLD AUTO: 48.5 % (ref 35–47)
HGB BLD-MCNC: 16.3 G/DL (ref 12–16)
HGB UR QL STRIP: NEGATIVE
IMM GRANULOCYTES # BLD: 0.1 THOU/UL
IMM GRANULOCYTES NFR BLD: 1 %
INTERPRETATION ECG - MUSE: NORMAL
KETONES UR STRIP-MCNC: NEGATIVE MG/DL
LEUKOCYTE ESTERASE UR QL STRIP: NEGATIVE
LYMPHOCYTES # BLD AUTO: 2.1 THOU/UL (ref 0.8–4.4)
LYMPHOCYTES NFR BLD AUTO: 23 % (ref 20–40)
MCH RBC QN AUTO: 31.7 PG (ref 27–34)
MCHC RBC AUTO-ENTMCNC: 33.6 G/DL (ref 32–36)
MCV RBC AUTO: 94 FL (ref 80–100)
MONOCYTES # BLD AUTO: 0.8 THOU/UL (ref 0–0.9)
MONOCYTES NFR BLD AUTO: 8 % (ref 2–10)
NEUTROPHILS # BLD AUTO: 6 THOU/UL (ref 2–7.7)
NEUTROPHILS NFR BLD AUTO: 66 % (ref 50–70)
NITRATE UR QL: NEGATIVE
P AXIS - MUSE: 76 DEGREES
PH UR STRIP: 5.5 [PH] (ref 5–8)
PLATELET # BLD AUTO: 228 THOU/UL (ref 140–440)
PMV BLD AUTO: 8.6 FL (ref 7–10)
PR INTERVAL - MUSE: 146 MS
QRS DURATION - MUSE: 72 MS
QT - MUSE: 358 MS
QTC - MUSE: 415 MS
R AXIS - MUSE: 64 DEGREES
RBC # BLD AUTO: 5.14 MILL/UL (ref 3.8–5.4)
SP GR UR STRIP: 1.02 (ref 1–1.03)
SYSTOLIC BLOOD PRESSURE - MUSE: NORMAL
T AXIS - MUSE: 64 DEGREES
UROBILINOGEN UR STRIP-ACNC: NORMAL
VENTRICULAR RATE- MUSE: 81 BPM
WBC: 9.1 THOU/UL (ref 4–11)

## 2021-04-29 RX ORDER — MECLIZINE HYDROCHLORIDE 25 MG/1
25 TABLET ORAL 3 TIMES DAILY PRN
Qty: 30 TABLET | Refills: 0 | Status: SHIPPED | OUTPATIENT
Start: 2021-04-29 | End: 2021-09-09

## 2021-05-03 ENCOUNTER — HOSPITAL ENCOUNTER (OUTPATIENT)
Dept: SURGERY | Facility: CLINIC | Age: 71
Discharge: HOME | End: 2021-05-03
Attending: STUDENT IN AN ORGANIZED HEALTH CARE EDUCATION/TRAINING PROGRAM

## 2021-05-03 DIAGNOSIS — N63.10 BREAST MASS, RIGHT: ICD-10-CM

## 2021-05-03 ASSESSMENT — MIFFLIN-ST. JEOR: SCORE: 1085.14

## 2021-05-04 ENCOUNTER — COMMUNICATION - HEALTHEAST (OUTPATIENT)
Dept: SURGERY | Facility: CLINIC | Age: 71
End: 2021-05-04

## 2021-05-05 ENCOUNTER — OFFICE VISIT - HEALTHEAST (OUTPATIENT)
Dept: OCCUPATIONAL THERAPY | Facility: REHABILITATION | Age: 71
End: 2021-05-05

## 2021-05-05 DIAGNOSIS — R26.81 UNSTEADINESS ON FEET: ICD-10-CM

## 2021-05-05 DIAGNOSIS — H81.11 BENIGN PAROXYSMAL POSITIONAL VERTIGO, RIGHT: ICD-10-CM

## 2021-05-05 DIAGNOSIS — Z78.9 DECREASED ACTIVITIES OF DAILY LIVING (ADL): ICD-10-CM

## 2021-05-07 ENCOUNTER — OFFICE VISIT - HEALTHEAST (OUTPATIENT)
Dept: OCCUPATIONAL THERAPY | Facility: REHABILITATION | Age: 71
End: 2021-05-07

## 2021-05-07 DIAGNOSIS — H81.11 BENIGN PAROXYSMAL POSITIONAL VERTIGO, RIGHT: ICD-10-CM

## 2021-05-07 DIAGNOSIS — R26.81 UNSTEADINESS ON FEET: ICD-10-CM

## 2021-05-07 DIAGNOSIS — Z78.9 DECREASED ACTIVITIES OF DAILY LIVING (ADL): ICD-10-CM

## 2021-05-09 ENCOUNTER — AMBULATORY - HEALTHEAST (OUTPATIENT)
Dept: SURGERY | Facility: AMBULATORY SURGERY CENTER | Age: 71
End: 2021-05-09

## 2021-05-09 DIAGNOSIS — Z11.59 ENCOUNTER FOR SCREENING FOR OTHER VIRAL DISEASES: ICD-10-CM

## 2021-05-11 ENCOUNTER — COMMUNICATION - HEALTHEAST (OUTPATIENT)
Dept: FAMILY MEDICINE | Facility: CLINIC | Age: 71
End: 2021-05-11

## 2021-05-11 DIAGNOSIS — I82.90 BLOOD CLOT IN VEIN: ICD-10-CM

## 2021-05-18 ENCOUNTER — COMMUNICATION - HEALTHEAST (OUTPATIENT)
Dept: FAMILY MEDICINE | Facility: CLINIC | Age: 71
End: 2021-05-18

## 2021-05-18 ENCOUNTER — COMMUNICATION - HEALTHEAST (OUTPATIENT)
Dept: ANTICOAGULATION | Facility: CLINIC | Age: 71
End: 2021-05-18

## 2021-05-18 ENCOUNTER — OFFICE VISIT - HEALTHEAST (OUTPATIENT)
Dept: FAMILY MEDICINE | Facility: CLINIC | Age: 71
End: 2021-05-18

## 2021-05-18 DIAGNOSIS — H60.393 INFECTIVE OTITIS EXTERNA, BILATERAL: ICD-10-CM

## 2021-05-18 DIAGNOSIS — Z66 DNR (DO NOT RESUSCITATE): ICD-10-CM

## 2021-05-18 DIAGNOSIS — Z01.818 PREOP GENERAL PHYSICAL EXAM: ICD-10-CM

## 2021-05-18 DIAGNOSIS — J43.9 PULMONARY EMPHYSEMA, UNSPECIFIED EMPHYSEMA TYPE (H): ICD-10-CM

## 2021-05-18 DIAGNOSIS — Z78.9 DNI (DO NOT INTUBATE): ICD-10-CM

## 2021-05-18 DIAGNOSIS — I82.409 RECURRENT DEEP VEIN THROMBOSIS (DVT) (H): ICD-10-CM

## 2021-05-18 DIAGNOSIS — I25.83 CORONARY ATHEROSCLEROSIS DUE TO LIPID RICH PLAQUE: ICD-10-CM

## 2021-05-18 DIAGNOSIS — Z95.5 HISTORY OF HEART ARTERY STENT: ICD-10-CM

## 2021-05-18 DIAGNOSIS — Z86.711 HISTORY OF PULMONARY EMBOLISM: ICD-10-CM

## 2021-05-18 DIAGNOSIS — I25.2 HISTORY OF MI (MYOCARDIAL INFARCTION): ICD-10-CM

## 2021-05-18 DIAGNOSIS — Z79.01 ON WARFARIN AT HOME: ICD-10-CM

## 2021-05-18 LAB
ANION GAP SERPL CALCULATED.3IONS-SCNC: 10 MMOL/L (ref 5–18)
BUN SERPL-MCNC: 14 MG/DL (ref 8–28)
CALCIUM SERPL-MCNC: 8.9 MG/DL (ref 8.5–10.5)
CHLORIDE BLD-SCNC: 103 MMOL/L (ref 98–107)
CO2 SERPL-SCNC: 25 MMOL/L (ref 22–31)
CREAT SERPL-MCNC: 0.87 MG/DL (ref 0.6–1.1)
ERYTHROCYTE [DISTWIDTH] IN BLOOD BY AUTOMATED COUNT: 13.5 % (ref 11–14.5)
GFR SERPL CREATININE-BSD FRML MDRD: >60 ML/MIN/1.73M2
GLUCOSE BLD-MCNC: 84 MG/DL (ref 70–125)
HCT VFR BLD AUTO: 46.7 % (ref 35–47)
HGB BLD-MCNC: 15.8 G/DL (ref 12–16)
INR PPP: 2.2 (ref 0.9–1.1)
MCH RBC QN AUTO: 32 PG (ref 27–34)
MCHC RBC AUTO-ENTMCNC: 33.8 G/DL (ref 32–36)
MCV RBC AUTO: 95 FL (ref 80–100)
PLATELET # BLD AUTO: 216 THOU/UL (ref 140–440)
PMV BLD AUTO: 8.6 FL (ref 7–10)
POTASSIUM BLD-SCNC: 4.4 MMOL/L (ref 3.5–5)
RBC # BLD AUTO: 4.94 MILL/UL (ref 3.8–5.4)
SODIUM SERPL-SCNC: 138 MMOL/L (ref 136–145)
WBC: 7.1 THOU/UL (ref 4–11)

## 2021-05-18 RX ORDER — ALBUTEROL SULFATE 90 UG/1
AEROSOL, METERED RESPIRATORY (INHALATION)
Qty: 25.5 G | Refills: 3 | Status: SHIPPED | OUTPATIENT
Start: 2021-05-18 | End: 2022-06-01

## 2021-05-18 ASSESSMENT — MIFFLIN-ST. JEOR: SCORE: 1103.75

## 2021-05-19 ENCOUNTER — COMMUNICATION - HEALTHEAST (OUTPATIENT)
Dept: FAMILY MEDICINE | Facility: CLINIC | Age: 71
End: 2021-05-19

## 2021-05-19 ENCOUNTER — COMMUNICATION - HEALTHEAST (OUTPATIENT)
Dept: ANTICOAGULATION | Facility: CLINIC | Age: 71
End: 2021-05-19

## 2021-05-20 ENCOUNTER — COMMUNICATION - HEALTHEAST (OUTPATIENT)
Dept: FAMILY MEDICINE | Facility: CLINIC | Age: 71
End: 2021-05-20

## 2021-05-24 ENCOUNTER — COMMUNICATION - HEALTHEAST (OUTPATIENT)
Dept: FAMILY MEDICINE | Facility: CLINIC | Age: 71
End: 2021-05-24

## 2021-05-25 ENCOUNTER — OFFICE VISIT - HEALTHEAST (OUTPATIENT)
Dept: FAMILY MEDICINE | Facility: CLINIC | Age: 71
End: 2021-05-25

## 2021-05-25 DIAGNOSIS — N60.01 CYST OF RIGHT BREAST: ICD-10-CM

## 2021-05-26 ENCOUNTER — RECORDS - HEALTHEAST (OUTPATIENT)
Dept: ADMINISTRATIVE | Facility: OTHER | Age: 71
End: 2021-05-26

## 2021-05-26 DIAGNOSIS — E78.5 HYPERLIPIDEMIA: ICD-10-CM

## 2021-05-26 RX ORDER — ROSUVASTATIN CALCIUM 40 MG/1
TABLET, COATED ORAL
Qty: 90 TABLET | Refills: 0 | Status: SHIPPED | OUTPATIENT
Start: 2021-05-26 | End: 2021-08-05

## 2021-05-27 ENCOUNTER — COMMUNICATION - HEALTHEAST (OUTPATIENT)
Dept: FAMILY MEDICINE | Facility: CLINIC | Age: 71
End: 2021-05-27

## 2021-05-27 VITALS
HEART RATE: 96 BPM | DIASTOLIC BLOOD PRESSURE: 83 MMHG | TEMPERATURE: 97.9 F | RESPIRATION RATE: 18 BRPM | OXYGEN SATURATION: 97 % | SYSTOLIC BLOOD PRESSURE: 130 MMHG

## 2021-05-27 VITALS
DIASTOLIC BLOOD PRESSURE: 78 MMHG | WEIGHT: 146 LBS | HEIGHT: 60 IN | SYSTOLIC BLOOD PRESSURE: 138 MMHG | OXYGEN SATURATION: 97 % | HEART RATE: 84 BPM | BODY MASS INDEX: 28.66 KG/M2

## 2021-05-27 DIAGNOSIS — Z86.711 HISTORY OF PULMONARY EMBOLISM: ICD-10-CM

## 2021-05-27 LAB — BACTERIA SPEC CULT: NORMAL

## 2021-05-27 NOTE — TELEPHONE ENCOUNTER
Refill Approved    Rx renewed per Medication Renewal Policy. Medication was last renewed on 3/7/2019.         Gold Lopez, [facility] Connection Triage/Med Refill 4/15/2019     Requested Prescriptions   Pending Prescriptions Disp Refills     PROAIR HFA 90 mcg/actuation inhaler [Pharmacy Med Name: PROAIR HFA INH 8.5GM W/COUNT 90MCG] 25.5 g 0     Sig: USE 1 TO 2 INHALATIONS EVERY 4 TO 6 HOURS AS NEEDED FOR WHEEZING (NEED APPOINTMENT FOR FOLLOW UP MONITORING FOR THIS MEDICATION)       Albuterol/Levalbuterol Refill Protocol Passed - 4/13/2019 12:18 PM        Passed - PCP or prescribing provider visit in last year     Last office visit with prescriber/PCP: 8/29/2018 Miri Woodruff MD OR same dept: 8/29/2018 Miri Woodruff MD OR same specialty: 8/29/2018 Miri Woodruff MD Last physical: Visit date not found       Next appt within 3 mo: Visit date not found  Next physical within 3 mo: Visit date not found  Prescriber OR PCP: Miri Woodruff MD  Last diagnosis associated with med order: 1. Pulmonary emphysema, unspecified emphysema type (H)  - PROAIR HFA 90 mcg/actuation inhaler [Pharmacy Med Name: PROAIR HFA INH 8.5GM W/COUNT 90MCG]; USE 1 TO 2 INHALATIONS EVERY 4 TO 6 HOURS AS NEEDED FOR WHEEZING (NEED APPOINTMENT FOR FOLLOW UP MONITORING FOR THIS MEDICATION)  Dispense: 25.5 g; Refill: 0    If protocol passes may refill for 6 months if within 3 months of last provider visit (or a total of 9 months). If patient requesting >1 inhaler per month refill x 6 months and have patient make appointment with provider.

## 2021-05-28 NOTE — TELEPHONE ENCOUNTER
ANTICOAGULATION  MANAGEMENT PROGRAM    Leonela Christianson is overdue for INR check.  Reminder call made.    Left message for Leonela. If returning call, please schedule INR check as soon as possible.    Hector Holguin RN

## 2021-05-28 NOTE — TELEPHONE ENCOUNTER
ANTICOAGULATION  MANAGEMENT    Assessment     Today's INR result of 1.7 is Subtherapeutic (goal INR of 2.0-3.0)        Warfarin taken as previously instructed    No new diet changes affecting INR    No new medication/supplements affecting INR    Continues to tolerate warfarin with no reported s/s of bleeding or thromboembolism     Previous INR was Therapeutic    Plan:     Spoke with Leonela regarding INR result and instructed:     Warfarin Dosing Instructions:  Take a one time boost dose of 4.5 mg today only then continue current warfarin dose 3 mg daily on Tue, Thu, Sat; and 4.5 mg daily rest of week  (0 % change)    Instructed patient to follow up no later than: 2 weeks    Education provided: target INR goal and significance of current INR result, importance of following up for INR monitoring at instructed interval, importance of taking warfarin as instructed and importance of notifying clinic for changes in medications    Leonela verbalizes understanding and agrees to warfarin dosing plan.    Instructed to call the [facility] for any changes, questions or concerns. (#[phone])   ?   Jenny Stanley RN    Subjective/Objective:      Leonela Christianson, a 68 y.o. female is on warfarin.     Leonela reports:     Home warfarin dose: verbally confirmed home dose with Leonela and updated on anticoagulation calendar     Missed doses: No     Medication changes:  No     S/S of bleeding or thromboembolism:  No     New Injury or illness:  No     Changes in diet or alcohol consumption:  No     Upcoming surgery, procedure or cardioversion:  No    Anticoagulation Episode Summary     Current INR goal:   2.0-3.0   TTR:   79.0 % (4.2 y)   Next INR check:   5/16/2019   INR from last check:   1.70! (5/2/2019)   Weekly max warfarin dose:      Target end date:      INR check location:      Preferred lab:      Send INR reminders to:   ANTICOAGULATION POOL B (MPW,HUG,STW,RVL,OAK,RLN)    Indications    Pulmonary embolism (H) [I26.99]            Comments:            Anticoagulation Care Providers     Provider Role Specialty Phone number    Miri Woodruff MD Referring Family Medicine [phone]

## 2021-05-28 NOTE — TELEPHONE ENCOUNTER
Who is calling:  patient  Reason for Call:  Patient scheduled INR check for 5-2-19  Date of last appointment with primary care: 8-29-18  Okay to leave a detailed message: Yes

## 2021-05-28 NOTE — TELEPHONE ENCOUNTER
ANTICOAGULATION  MANAGEMENT    Assessment     Today's INR result of 1.7 is Subtherapeutic (goal INR of 2.0-3.0)        Warfarin taken as previously instructed    Increased greens/vitamin K intake may be affecting INR- had broccoli 2x last week (normally does not eat broccoli; she'll cut it out)    No new medication/supplements affecting INR    Continues to tolerate warfarin with no reported s/s of bleeding or thromboembolism     Previous INR was Subtherapeutic    Plan:     Spoke with Leonela regarding INR result and instructed:     Warfarin Dosing Instructions:  Take booster dose of 4.5 mg today only then continue current warfarin dose    3 mg every Tue, Thu, Sat; 4.5 mg all other days         Instructed patient to follow up no later than: 2 weeks. Appt is made for 5/30.     Education provided: importance of consistent vitamin K intake, impact of vitamin K foods on INR, vitamin K content of foods and importance of taking warfarin as instructed    Leonela verbalizes understanding and agrees to warfarin dosing plan.    Instructed to call the [facility] for any changes, questions or concerns. (#[phone])   ?   Hector Holguin RN    Subjective/Objective:      Leonelagayle Christianson, a 68 y.o. female is on warfarin.     Leonela reports:     Home warfarin dose: verbally confirmed home dose with pt and updated on anticoagulation calendar     Missed doses: No     Medication changes:  No     S/S of bleeding or thromboembolism:  No     New Injury or illness:  No     Changes in diet or alcohol consumption:  Yes, had broccoli.      Upcoming surgery, procedure or cardioversion:  No    Anticoagulation Episode Summary     Current INR goal:   2.0-3.0   TTR:   78.3 % (4.3 y)   Next INR check:   5/30/2019   INR from last check:   1.70! (5/16/2019)   Weekly max warfarin dose:      Target end date:      INR check location:      Preferred lab:      Send INR reminders to:   ANTICOAGULATION POOL B (MPJUSTIN,JESUS,STW,RVL,OAK,RLN)     Indications    Pulmonary embolism (H) [I26.99]           Comments:            Anticoagulation Care Providers     Provider Role Specialty Phone number    Miri Woodruff MD Referring Family Medicine [phone]

## 2021-05-29 NOTE — TELEPHONE ENCOUNTER
ANTICOAGULATION  MANAGEMENT    Assessment     Today's INR result of 2.2 is Therapeutic (goal INR of 2.0-3.0)        Warfarin taken as previously instructed    No new diet changes affecting INR    No interaction expected between lisinopril-hctz and warfarin - stopping this today    Also is going to start smoking cessation, by using lozenges    Continues to tolerate warfarin with no reported s/s of bleeding or thromboembolism     Previous INR was Therapeutic    Plan:     Spoke with Leonela regarding INR result and instructed:     Warfarin Dosing Instructions:  Continue current warfarin dose 3 mg daily on Tue, Thu, Sat; and 4.5 mg daily rest of week  (0 % change)    Instructed patient to follow up no later than: 2-4 weeks    Education provided: target INR goal and significance of current INR result, importance of following up for INR monitoring at instructed interval, importance of taking warfarin as instructed, no interaction anticipated between warfarin and lisinopril-hctz, importance of notifying clinic for changes in medications, monitoring for bleeding signs and symptoms and when to seek medical attention/emergency care    Leonela verbalizes understanding and agrees to warfarin dosing plan.    Instructed to call the [facility] for any changes, questions or concerns. (#[phone])   ?   Jenny Stanley RN    Subjective/Objective:      Leonela Christianson, a 69 y.o. female is on warfarin.     Leonela reports:     Home warfarin dose: verbally confirmed home dose with Leonela and updated on anticoagulation calendar     Missed doses: No     Medication changes:  Yes: stopping lisinopril-hctz today     S/S of bleeding or thromboembolism:  No     New Injury or illness:  No     Changes in diet or alcohol consumption:  No     Upcoming surgery, procedure or cardioversion:  No    Anticoagulation Episode Summary     Current INR goal:   2.0-3.0   TTR:   78.3 % (4.3 y)   Next INR check:   7/8/2019   INR from last check:   2.20  (6/10/2019)   Weekly max warfarin dose:      Target end date:      INR check location:      Preferred lab:      Send INR reminders to:   [facility]    Indications    Pulmonary embolism (H) [I26.99]           Comments:            Anticoagulation Care Providers     Provider Role Specialty Phone number    Miri Woodruff MD Referring Family Medicine [phone]

## 2021-05-29 NOTE — PROGRESS NOTES
Assessment/Plan:       1. Nicotine Dependence  Patient continues to be interested in smoking cessation.  She would like to try a gradual approach.  After discussion of risks and benefits of multiple methods, she wishes to try nicotine lozenges.  Sent this to her mail order pharmacy.  She will try to replace 1 cigarette/week with a lozenge and taper gradually.  - nicotine polacrilex (COMMIT) 4 MG lozenge; Apply 1 lozenge (4 mg total) to the mouth or throat as needed for smoking cessation.  Dispense: 48 each; Refill: 1    2. Pulmonary emphysema, unspecified emphysema type (H)/Chronic bronchitis, unspecified chronic bronchitis type (H)  Patient has seen pulmonology in the past Dr. Truong.  Reviewed PFTs from 2016 showing moderate to severe obstruction.  She continues on tiotropium-olodaterol and feels that this works well.  She rarely uses her pro-air inhaler.  She does not wish to have another visit with pulmonology at present, feels as though her breathing is pretty stable.    3. Essential hypertension  Patient has been taking her lisinopril-hydrochlorothiazide irregularly.  Asked her to stop this and recheck her blood pressure at a nurse-only visit in 1 to 2 weeks.  We can then decide if she needs to restart this medication.  She denies any history of lower extremity edema.    4. Pigmented skin lesion  Unclear etiology.  This appears consistent with a solar lentigo, patient states it is only been present for 1 week.  She will keep an eye on growth or change and come in for biopsy if needed.    5. Mixed hyperlipidemia  Patient continues on Crestor and tolerates this well.  She is due for updated lipid profile, drawn today.  - Lipid [place] RANDOM    6. Pulmonary embolism (H)  Due for INR, continues on Coumadin and Plavix.  - INR        Subjective:       Leonela Christianson is a 69 y.o. female who presents for follow-up of her COPD she states.  She has been coughing more frequently recently.  She is wondering if  "this could have to do with her lisinopril-hydrochlorothiazide.  She states she has been taking this every other evening.  Her sister told her that this could be the cause of her cough.  She does feel short of breath from time to time, continues on her maintenance inhaler in the morning.  She rarely uses pro-air.  She states that \"sometimes\" she will come down to 1/2 pack/day of cigarettes, sometimes she will smoke more.  She is quite sedentary, does not participate in any regular activities.  Once a week she will go to visit her father, but smokes outside his home.  She smokes outside her own home as well.  She continues to be interested in quitting smoking, has tried hypnosis, acupuncture and a nicotine patch which gave her a rash.  She would also like to show me a skin lesion on her left wrist that has been present for approximately 1 week.  It is darkly pigmented.  It does not bother, itch or hurt.  She has no similar lesions elsewhere.    Labs Reviewed:  Lab Results   Component Value Date    WBC 8.5 08/29/2018    HGB 16.2 (H) 08/29/2018    HCT 49.3 (H) 08/29/2018     08/29/2018    CHOL 134 12/06/2017    TRIG 44 12/06/2017    HDL 51 12/06/2017    ALT 22 12/17/2015    AST 24 12/17/2015     08/29/2018    K 4.1 08/29/2018     08/29/2018    CREATININE 1.08 08/29/2018    BUN 18 08/29/2018    CO2 23 08/29/2018    TSH 1.90 10/10/2016    INR 2.20 (H) 06/10/2019    HGBA1C 6.3 (H) 10/29/2012     Other studies Reviewed:  PFT 6/27/16:  FEV1/FVC is 60% prebronchodilator and 59% postbronchodilator and is reduced.  FEV1 is 1.03L or 58% predicted and is reduced.  FVC is 1.71L or 77% predicted and reduced.  There was no improvement in spirometry after a single inhaled dose of bronchodilator.  TLC is 4.46L or 104% predicted and is normal.  RV is 2.73L or 150% predicted and is increased.  DLCO is 4.08 ml/min/mmHg or 21% predicted and is reduced when it   is corrected for hemoglobin.     Impression:  Full " Pulmonary Function Test is abnormal.  PFTs are consistent with moderate-severe obstructive ventilatory defect.  Spirometry is not consistent with reversibility.  There is no hyperinflation.  There is air-trapping.  Diffusion capacity when corrected for hemoglobin is severely reduced.     Yonis Truong DO         The following portions of the patient's history were reviewed and updated as appropriate: allergies, current medications, past medical history, past social history, past surgical history and problem list.      Current Outpatient Medications:      clopidogrel (PLAVIX) 75 mg tablet, Take 1 tablet (75 mg total) by mouth daily., Disp: 90 tablet, Rfl: 5     lisinopril-hydrochlorothiazide (PRINZIDE,ZESTORETIC) 10-12.5 mg per tablet, TAKE 1 TABLET DAILY, Disp: 90 tablet, Rfl: 1     metoprolol succinate (TOPROL XL) 50 MG 24 hr tablet, Take 1 tablet (50 mg total) by mouth at bedtime., Disp: 90 tablet, Rfl: 5     nitroglycerin (NITROSTAT) 0.4 MG SL tablet, Place 1 tablet (0.4 mg total) under the tongue every 5 (five) minutes as needed for chest pain., Disp: 20 tablet, Rfl: 0     PROAIR HFA 90 mcg/actuation inhaler, USE 1 TO 2 INHALATIONS EVERY 4 TO 6 HOURS AS NEEDED FOR WHEEZING (NEED APPOINTMENT FOR FOLLOW UP MONITORING FOR THIS MEDICATION), Disp: 25.5 g, Rfl: 0     rosuvastatin (CRESTOR) 40 MG tablet, Take 1 tablet (40 mg total) by mouth Daily after lunch., Disp: 90 tablet, Rfl: 5     tiotropium-olodaterol 2.5-2.5 mcg/actuation Mist, Inhale 2 puffs daily., Disp: 12 g, Rfl: 3     warfarin (COUMADIN/JANTOVEN) 3 MG tablet, TAKE ONE AND ONE-HALF TABLETS DAILY AS DIRECTED (DOSE ADJUSTED BASED ON INR), Disp: 126 tablet, Rfl: 1     cyclobenzaprine (FLEXERIL) 10 MG tablet, Take 1 tablet (10 mg total) by mouth every 8 (eight) hours as needed for muscle spasms., Disp: 30 tablet, Rfl: 1    Review of Systems   A 12 point comprehensive review of systems was negative except as noted.      Objective:      /76 (Patient Site:  Left Arm, Patient Position: Sitting, Cuff Size: Adult Regular)   Pulse 88   Resp 24   Ht 5' (1.524 m)   Wt 144 lb (65.3 kg)   SpO2 97%   Breastfeeding? No   BMI 28.12 kg/m      General appearance: alert, appears stated age and cooperative  Head: Normocephalic, without obvious abnormality, atraumatic  Eyes: Conjunctivae clear, sclerae anicteric  Lungs: Slightly decreased breath sounds throughout, symmetric, slightly prolonged expiratory phase, minimal wheeze auscultated  Heart: regular rate and rhythm, S1, S2 normal, no murmur, click, rub or gallop  Extremities: extremities normal, atraumatic, no cyanosis or edema  Skin: 4 x 2 mm darkly pigmented macule left dorsal wrist

## 2021-05-29 NOTE — TELEPHONE ENCOUNTER
ANTICOAGULATION  MANAGEMENT    Assessment     Today's INR result of 2.4 is Therapeutic (goal INR of 2.0-3.0)        Warfarin taken as previously instructed    No new diet changes affecting INR    No new medication/supplements affecting INR    Continues to tolerate warfarin with no reported s/s of bleeding or thromboembolism     Previous INR was Subtherapeutic    Plan:     Spoke with Leonela regarding INR result and instructed:     Warfarin Dosing Instructions:  Continue current warfarin dose 3 mg daily on Tue, Thu, Sat; and 4.5 mg daily rest of week  (0 % change)    Instructed patient to follow up no later than: 2-3 weeks (will check at 6/10 OV)    Education provided: target INR goal and significance of current INR result, importance of following up for INR monitoring at instructed interval, importance of taking warfarin as instructed and importance of notifying clinic for changes in medications    Leonela verbalizes understanding and agrees to warfarin dosing plan.    Instructed to call the [facility] for any changes, questions or concerns. (#[phone])   ?   Jenny Stanley RN    Subjective/Objective:      Leonela Christianson, a 69 y.o. female is on warfarin.     Leonela reports:     Home warfarin dose: verbally confirmed home dose with Leonela and updated on anticoagulation calendar     Missed doses: No     Medication changes:  No     S/S of bleeding or thromboembolism:  No     New Injury or illness:  No     Changes in diet or alcohol consumption:  No     Upcoming surgery, procedure or cardioversion:  No    Anticoagulation Episode Summary     Current INR goal:   2.0-3.0   TTR:   78.1 % (4.3 y)   Next INR check:   6/20/2019   INR from last check:   2.40 (5/30/2019)   Weekly max warfarin dose:      Target end date:      INR check location:      Preferred lab:      Send INR reminders to:   [facility]    Indications    Pulmonary embolism (H) [I26.99]           Comments:            Anticoagulation Care Providers      Provider Role Specialty Phone number    Miri Woodruff MD Referring Family Medicine [phone]

## 2021-05-29 NOTE — TELEPHONE ENCOUNTER
Routed to the anticoagulation pool.    Ashly Summers, RN, BSN, PHN  [facility] Medication Refill Nurse  6/19/2019  7:58 AM

## 2021-05-29 NOTE — PATIENT INSTRUCTIONS - HE
Let us stop the lisinopril-hydrochlorothiazide and come in for a nurse-only blood pressure check in 1 to 2 weeks.  We will see if you even need this medication.    I sent nicotine lozenges to your pharmacy.  Try to replace 1 cigarette/week with a lozenge.      Let us see you back in the fall for a Medicare wellness visit.

## 2021-05-30 VITALS — HEIGHT: 60 IN | BODY MASS INDEX: 28.86 KG/M2 | WEIGHT: 147 LBS

## 2021-05-30 VITALS — BODY MASS INDEX: 29.06 KG/M2 | WEIGHT: 148 LBS | HEIGHT: 60 IN

## 2021-05-30 NOTE — TELEPHONE ENCOUNTER
Return call to patient to discuss BP issues. Patient notes that she stopped her lisinopril/hctz last month after she developed cough. Subsequently, her PCP increased metoprolol succinate to 100 mg daily. Today upon recheck of her BP, it was noted to be elevated at 150/100. She also states she had BP rechecked an hour later at her local fire station and it remained elevated, she reports heart rate in the 90's.     Dr. Gonzalez, patient states PCP is out on maternity leave so she is seeking your advise as what to do with her BP pressure medications. Please advise. -ejb

## 2021-05-30 NOTE — TELEPHONE ENCOUNTER
----- Message from Nitish Shields sent at 7/8/2019 10:42 AM CDT -----  Contact: Leonela  General phone call:    Caller: Leonela  Primary cardiologist: Dr. Gonzalez  Detailed reason for call: Patient has been having issues with her BP being too high she is wanting to know if there are any other medications she can take to control this.  New or active symptoms? Active  Best phone number: [phone]  Best time to contact: anytime  Ok to leave a detailed message? yes  Device? no    Additional Info:

## 2021-05-30 NOTE — TELEPHONE ENCOUNTER
Called and spoke with patient, stated whatever you think would be most beneficial for her to take she will try    Would [facility] with what you decide    Laura Brown, CMA

## 2021-05-30 NOTE — PROGRESS NOTES
I met with Leonela Christianson at the request of Dr. Woodruff to recheck her blood pressure.  Blood pressure medications on the MAR were reviewed with patient.    Patient has taken all medications as per usual regimen: Yes  Patient reports tolerating them without any issues or concerns: Yes    Vitals:    06/26/19 1043   BP: 154/76   Patient Site: Left Arm   Patient Position: Sitting   Cuff Size: Adult Regular   Pulse: 84       Pt was in a hurry and unable to wait for 5 minutes for the recheck. Pt states she will stop back in clinic on Friday to have BP checked again.

## 2021-05-30 NOTE — PROGRESS NOTES
This is high enough that I would have her restart her lisinopril-HCTZ.  She should take this daily in the AM.  She can come back in about a week for a recheck.

## 2021-05-30 NOTE — TELEPHONE ENCOUNTER
Called patient and reviewed recommendations per Dr. Gonzalez. Patient verbalized understanding and agreement with plan. Patient states she will have labs and v/s checked at PCP office. Order for BMP placed and Rx sent to mail order pharmacy per patient request. She will call 1 week after starting medication with update. Direct contact information reviewed. No further questions or concerns. -silvinab    ----- Message -----  From: Bandar Gonzalez MD  Sent: 7/8/2019   3:07 PM  To: Merlene Lew RN    Would trial losartan/HCTZ 50mg/12.5mg combination and obtain BMP in 1 weeks and have her BP/HR checked next week as well

## 2021-05-30 NOTE — TELEPHONE ENCOUNTER
Patient Returning Call  Reason for call:  Patient called back.  Information relayed to patient:  Informed of Dr Woodruff's message listed below.  Patient states she is just going to stay on the same dose of metoprolol 50 mg and go back on lisinopril/HCTZ 10-12.5 mg.  Please call and advise.  Patient has additional questions:  No  If YES, what are your questions/concerns:    Okay to leave a detailed message?: Yes

## 2021-05-30 NOTE — TELEPHONE ENCOUNTER
I sent in a 90-day supply of an increased dose of metoprolol.  After starting this medicine, about 2 weeks later, she should come in for a recheck of her blood pressure and pulse.

## 2021-05-30 NOTE — TELEPHONE ENCOUNTER
We can either restart just the HCTZ portion, which can be helpful if she ever has lower extremity swelling, or we can increase her metoprolol.  Let me know if she has a preference.

## 2021-05-30 NOTE — TELEPHONE ENCOUNTER
----- Message from Bandar Gonzalez MD sent at 7/29/2019  3:15 PM CDT -----  Labs ar enormal - good news

## 2021-05-30 NOTE — TELEPHONE ENCOUNTER
Patient in today for lab draw and requesting BP check since there has been med changes.  BP today is 150/100.  Patient states she will call her cardiologist  Shelia James

## 2021-05-30 NOTE — TELEPHONE ENCOUNTER
Patient in today for BP check.  Started Losartan on 7/8  BP  Is 136 80  Pulse 80  Patient will notify her cardiologist of these results  Shelia James

## 2021-05-30 NOTE — TELEPHONE ENCOUNTER
I called and spoke with patient myself.  She is willing to try an increased dose of metoprolol.  She will let us know if she has any side effects, otherwise will come in in about 2 weeks for recheck of blood pressure and pulse.

## 2021-05-30 NOTE — TELEPHONE ENCOUNTER
ANTICOAGULATION  MANAGEMENT    Assessment     Today's INR result of 2.2 is Therapeutic (goal INR of 2.0-3.0)        Warfarin taken as previously instructed    No new diet changes affecting INR    No new medication/supplements affecting INR    Continues to tolerate warfarin with no reported s/s of bleeding or thromboembolism     Previous INR was Therapeutic    Plan:     Left a detailed message for Leonela regarding INR result and instructed:     Warfarin Dosing Instructions:  Continue current warfarin dose 3 mg daily on Tue, Thu, Sat; and 4.5 mg daily rest of week  (0 % change)    Instructed patient to follow up no later than: 4 weeks    Education provided: target INR goal and significance of current INR result and importance of notifying clinic for changes in medications    Instructed to call the [facility] for any changes, questions or concerns. (#[phone])   ?   Jenny Stanley RN    Subjective/Objective:      Leonela Christianson, a 69 y.o. female is on warfarin.     Leonela reports:     Home warfarin dose: as updated on anticoagulation calendar per template     Missed doses: No     Medication changes:  No     S/S of bleeding or thromboembolism:  No     New Injury or illness:  No     Changes in diet or alcohol consumption:  No     Upcoming surgery, procedure or cardioversion:  No    Anticoagulation Episode Summary     Current INR goal:   2.0-3.0   TTR:   78.6 % (4.4 y)   Next INR check:   8/5/2019   INR from last check:   2.20 (7/8/2019)   Weekly max warfarin dose:      Target end date:      INR check location:      Preferred lab:      Send INR reminders to:   [facility]    Indications    Pulmonary embolism (H) [I26.99]           Comments:            Anticoagulation Care Providers     Provider Role Specialty Phone number    Miri Woodruff MD Referring Family Medicine [phone]

## 2021-05-30 NOTE — TELEPHONE ENCOUNTER
Patient in today for BP check    Patient declines restarting the lisinopril/HCTZ as it causes her to cough    BP today was 140/86 with no medication     Patient wondering if you feel like she needs a prescription, if something else could be prescribed that does not cause her to cough.    Laura Brown, CMA

## 2021-05-30 NOTE — TELEPHONE ENCOUNTER
Called patient with lab results. Patient notes BP improved last week when she had checked at PCP office-- noted to be 136/80. Patient will continue current dose of losartan/hctz 50/12.5 mg daily and call with any other questions or concerns. -vaishali

## 2021-05-30 NOTE — TELEPHONE ENCOUNTER
----- Message from Miri Woodruff MD sent at 6/26/2019 11:05 AM CDT -----    This is high enough that I would have her restart her lisinopril-HCTZ.  She should take this daily in the AM.  She can come back in about a week for a recheck.

## 2021-05-31 VITALS — HEIGHT: 60 IN | WEIGHT: 150.6 LBS | BODY MASS INDEX: 29.57 KG/M2

## 2021-05-31 VITALS — BODY MASS INDEX: 29.88 KG/M2 | WEIGHT: 153 LBS

## 2021-05-31 VITALS — HEIGHT: 60 IN | WEIGHT: 148.1 LBS | BODY MASS INDEX: 29.08 KG/M2

## 2021-05-31 VITALS — HEIGHT: 60 IN | WEIGHT: 152 LBS | BODY MASS INDEX: 29.84 KG/M2

## 2021-05-31 VITALS — BODY MASS INDEX: 30.08 KG/M2 | WEIGHT: 154 LBS

## 2021-05-31 VITALS — HEIGHT: 60 IN | BODY MASS INDEX: 30.43 KG/M2 | WEIGHT: 155 LBS

## 2021-05-31 VITALS — BODY MASS INDEX: 29.88 KG/M2 | WEIGHT: 153 LBS | WEIGHT: 154 LBS | BODY MASS INDEX: 30.08 KG/M2

## 2021-05-31 VITALS — BODY MASS INDEX: 30.12 KG/M2 | WEIGHT: 154.2 LBS

## 2021-05-31 VITALS — WEIGHT: 153 LBS | BODY MASS INDEX: 29.88 KG/M2

## 2021-05-31 VITALS — BODY MASS INDEX: 30.23 KG/M2 | WEIGHT: 154.8 LBS

## 2021-05-31 VITALS — BODY MASS INDEX: 30.27 KG/M2 | WEIGHT: 155 LBS

## 2021-05-31 VITALS — BODY MASS INDEX: 30.51 KG/M2 | WEIGHT: 156.2 LBS

## 2021-05-31 VITALS — BODY MASS INDEX: 29.84 KG/M2 | WEIGHT: 152.8 LBS

## 2021-05-31 VITALS — WEIGHT: 154.8 LBS | BODY MASS INDEX: 30.23 KG/M2

## 2021-05-31 VITALS — HEIGHT: 60 IN | BODY MASS INDEX: 30.23 KG/M2 | WEIGHT: 154 LBS

## 2021-05-31 VITALS — WEIGHT: 155 LBS | BODY MASS INDEX: 30.27 KG/M2

## 2021-05-31 NOTE — TELEPHONE ENCOUNTER
ANTICOAGULATION  MANAGEMENT    Assessment     Today's INR result of 2.6 is Therapeutic (goal INR of 2.0-3.0)        Warfarin taken as previously instructed    No new diet changes affecting INR    No new medication/supplements affecting INR    Continues to tolerate warfarin with no reported s/s of bleeding or thromboembolism     Previous INR was Therapeutic    Plan:     Left a detailed message for Leonela regarding INR result and instructed:     Warfarin Dosing Instructions:  Continue current warfarin dose 3 mg daily on Tue, Thu, Sat; and 4.5 mg daily rest of week  (0 % change)    Instructed patient to follow up no later than: 4-6 weeks    Education provided: target INR goal and significance of current INR result, importance of notifying clinic for changes in medications and importance of notifying clinic for diarrhea, nausea/vomiting, reduced intake and/or illness    Instructed to call the [facility] for any changes, questions or concerns. (#[phone])   ?   Jenny Stanley RN    Subjective/Objective:      Leonela Christianson, a 69 y.o. female is on warfarin.     Leonela reports:     Home warfarin dose: as updated on anticoagulation calendar per template     Missed doses: No     Medication changes:  No     S/S of bleeding or thromboembolism:  No     New Injury or illness:  No     Changes in diet or alcohol consumption:  No     Upcoming surgery, procedure or cardioversion:  No    Anticoagulation Episode Summary     Current INR goal:   2.0-3.0   TTR:   79.0 % (4.5 y)   Next INR check:   9/16/2019   INR from last check:   2.60 (8/5/2019)   Weekly max warfarin dose:      Target end date:      INR check location:      Preferred lab:      [facility]    Indications    Pulmonary embolism (H) [I26.99]           Comments:            Anticoagulation Care Providers     Provider Role Specialty Phone number    Miri Woodruff MD Referring Family Medicine [phone]

## 2021-06-01 ENCOUNTER — RECORDS - HEALTHEAST (OUTPATIENT)
Dept: ADMINISTRATIVE | Facility: OTHER | Age: 71
End: 2021-06-01

## 2021-06-01 VITALS — WEIGHT: 152.2 LBS | BODY MASS INDEX: 29.72 KG/M2

## 2021-06-01 VITALS — BODY MASS INDEX: 30.08 KG/M2 | WEIGHT: 154 LBS

## 2021-06-01 VITALS — BODY MASS INDEX: 30.27 KG/M2 | WEIGHT: 155 LBS

## 2021-06-01 VITALS — WEIGHT: 154.6 LBS | BODY MASS INDEX: 30.19 KG/M2

## 2021-06-01 VITALS — BODY MASS INDEX: 30.04 KG/M2 | WEIGHT: 153.8 LBS

## 2021-06-01 VITALS — BODY MASS INDEX: 29.96 KG/M2 | WEIGHT: 153.4 LBS

## 2021-06-01 VITALS — BODY MASS INDEX: 30.39 KG/M2 | WEIGHT: 155.6 LBS

## 2021-06-01 VITALS — BODY MASS INDEX: 30.23 KG/M2 | WEIGHT: 154.8 LBS

## 2021-06-01 VITALS — WEIGHT: 155.6 LBS | BODY MASS INDEX: 30.39 KG/M2

## 2021-06-01 VITALS — BODY MASS INDEX: 30.35 KG/M2 | WEIGHT: 155.4 LBS

## 2021-06-01 VITALS — WEIGHT: 154.8 LBS | BODY MASS INDEX: 30.23 KG/M2

## 2021-06-01 DIAGNOSIS — I21.4 NSTEMI (NON-ST ELEVATED MYOCARDIAL INFARCTION) (H): ICD-10-CM

## 2021-06-01 RX ORDER — CLOPIDOGREL BISULFATE 75 MG/1
TABLET ORAL
Qty: 90 TABLET | Refills: 0 | Status: SHIPPED | OUTPATIENT
Start: 2021-06-01 | End: 2021-08-31

## 2021-06-01 NOTE — TELEPHONE ENCOUNTER
ANTICOAGULATION  MANAGEMENT    Assessment     Today's INR result of 2.6 is Therapeutic (goal INR of 2.0-3.0)        Warfarin taken as previously instructed    No new diet changes affecting INR    No new medication/supplements affecting INR    Continues to tolerate warfarin with no reported s/s of bleeding or thromboembolism     Previous INR was Therapeutic    Plan:     Spoke with Leonela regarding INR result and instructed:     Warfarin Dosing Instructions:  Continue current warfarin dose 3 mg daily on Tue, Thu, Sat; and 4.5 mg daily rest of week  (0 % change)    Instructed patient to follow up no later than: 4-6 weeks (Okay to check at 10/7 [facility])    Education provided: target INR goal and significance of current INR result, importance of following up for INR monitoring at instructed interval, importance of taking warfarin as instructed and importance of notifying clinic for changes in medications    Leonela verbalizes understanding and agrees to warfarin dosing plan.    Instructed to call the [facility] for any changes, questions or concerns. (#[phone])   ?   Jenny Stanley RN    Subjective/Objective:      Leonela Christianson, a 69 y.o. female is on warfarin.     Leonela reports:     Home warfarin dose: verbally confirmed home dose with Leonela and updated on anticoagulation calendar     Missed doses: No     Medication changes:  No     S/S of bleeding or thromboembolism:  No     New Injury or illness:  No     Changes in diet or alcohol consumption:  No     Upcoming surgery, procedure or cardioversion:  No    Anticoagulation Episode Summary     Current INR goal:   2.0-3.0   TTR:   79.5 %   Next INR check:   10/24/2019   INR from last check:   2.60 (9/12/2019)   Weekly max warfarin dose:      Target end date:      INR check location:      Preferred lab:      Send INR reminders to:   [facility]    Indications    Pulmonary embolism (H) [I26.99]           Comments:            Anticoagulation Care Providers      Provider Role Specialty Phone number    Miri Woodruff MD Referring Family Medicine [phone]

## 2021-06-02 VITALS — WEIGHT: 146 LBS | BODY MASS INDEX: 28.66 KG/M2 | HEIGHT: 60 IN

## 2021-06-02 VITALS — WEIGHT: 144 LBS | HEIGHT: 60 IN | BODY MASS INDEX: 28.27 KG/M2

## 2021-06-02 NOTE — TELEPHONE ENCOUNTER
----- Message from Miri Woodruff MD sent at 10/12/2019  5:57 PM CDT -----  Please call patient:  The bone density test shows osteoporosis and a high risk of fracture.  Please ask patient if she would be willing to try a bone density medicine that she only has to take once weekly.  I would also like her to take a calcium supplement and I will send to pharm if she is willing to take this also.  ELLIOT oWodruff MD

## 2021-06-02 NOTE — TELEPHONE ENCOUNTER
I called pt about her INR while she was on hold waiting to talk to clinic about the medications prescribed at her [facility] today.    The pharmacy her meds were sent to is not covered anymore under her insurance.    Pt would like her new meds send to [facility] home delivery on file.  Please call pt with any questions or concerns (she knows to check INR approx 4 days after starting doxy/pred)

## 2021-06-02 NOTE — TELEPHONE ENCOUNTER
Patient Returning Call  Reason for call:  lmtcb   Information relayed to patient:  Relayed msg and patient states: She does not want to start Bone density medication ,but she will try the Supplement. Please send to : [facility] ( AS LISTED). Patient will follow up as needed.   Patient has additional questions:  No  If YES, what are your questions/concerns:  Na  Okay to leave a detailed message?: No call back needed

## 2021-06-02 NOTE — PROGRESS NOTES
Assessment and Plan:     1. Routine general medical examination at a health care facility  Routine medicare wellness visit, updated in EMR.  Labs updated recently with the exception of hemogram, will update today.  Patient will return for pneumonia vaccine, will get flu vaccine today.  See below for problem-specific plans.    2. Pulmonary emphysema, unspecified emphysema type (H)/Chronic bronchitis, unspecified chronic bronchitis type (H)  Patient was encouraged to continue to attempt to quit smoking.  She continues on tiotropium-olodaterol per pulmonology.    3.  COPD exacerbation (H)  Recommended a taper of steroid and course of antibiotics as below.  Follow-up as needed if not improved.  - predniSONE (DELTASONE) 20 MG tablet; Take 2 tabs daily for 4 days, then 1 tab daily for 4 days then 0.5 tab daily for 4 days.  Dispense: 14 tablet; Refill: 0  - doxycycline (MONODOX) 100 MG capsule; Take 1 capsule (100 mg total) by mouth 2 (two) times a day.  Dispense: 20 capsule; Refill: 0    4. Essential hypertension  Well-controlled on losartan-HCTZ.  Labs updated recently and normal.  - HM2(CBC w/o Differential)    5. Mixed hyperlipidemia  Continues on Crestor and tolerates this well.  Lipid profile up to date.    6. Nicotine Dependence  Patient continues to try to cut back, albeit largely unsuccessfully.  Nicotine replacement is not covered by her insurance, which she cites as a barrier.  Discussed free samples through [employer] [place].    7. Overweight (BMI 25.0-29.9)  Patient walks for exercise.  She is otherwise precontemplative about lifestyle changes in this regard.  Generally has a very unhealthy diet.    8. Age-related osteoporosis without current pathological fracture  Patient is willing to update her bone density.  She would be open to calcium supplements, declines bisphosphonate therapy.  - DXA Bone Density Scan; Future    9. History of pulmonary embolism  Continues on warfarin.  - INR      The patient's current  medical problems were reviewed.    I have had an Advance Directives discussion with the patient.  The following health maintenance schedule was reviewed with the patient and provided in printed form in the after visit summary:   Health Maintenance   Topic Date Due     HEPATITIS C SCREENING  1950     MEDICARE ANNUAL WELLNESS VISIT  05/28/2015     PNEUMOCOCCAL IMMUNIZATION 65+ LOW/MEDIUM RISK (1 of 2 - PCV13) 05/28/2015     DXA SCAN  05/28/2015     ZOSTER VACCINES (2 of 2) 06/07/2017     INFLUENZA VACCINE RULE BASED (1) 08/01/2019     FALL RISK ASSESSMENT  08/29/2019     MAMMOGRAM  09/05/2020     COLOGUARD  11/15/2020     TD 18+ HE  07/06/2021     ADVANCE CARE PLANNING  04/12/2022        Subjective:   Chief Complaint: Leonela Christianson is an 69 y.o. female here for an Annual Wellness visit.     HPI:  Patient reports she has had a productive cough for 1.5 months.  This started with typical URI symptoms, however she has been left with a productive cough.  She denies significant shortness of breath, but does feel that she wheezes.  She continues to smoke.  She denies fevers.  She also reports itchy ears bilaterally.    Review of Systems:  Please see above.  The rest of the complete review of systems are negative for all systems.    Patient Care Team:  Miri Woodruff MD as PCP - General (Family Medicine)  Miri Woodruff MD as Assigned PCP     Patient Active Problem List   Diagnosis     Hyperlipidemia     Nicotine Dependence     Overweight (BMI 25.0-29.9)     Osteoporosis     Chronic bronchitis, unspecified chronic bronchitis type (H)     History of pulmonary embolism     Upper back pain on left side     Lump of skin     Pulmonary nodule     Pulmonary emphysema, unspecified emphysema type (H)     Essential hypertension     History of non-ST elevation myocardial infarction (NSTEMI)     Coronary atherosclerosis due to lipid rich plaque     Pigmented skin lesion     Past Medical History:   Diagnosis  Date     COPD (chronic obstructive pulmonary disease) (H)      Heart attack (H) 01/2012     Hyperlipidemia      Hypertension      Pulmonary embolism (H)       Past Surgical History:   Procedure Laterality Date     CV CORONARY ANGIOGRAM N/A 11/20/2017    Procedure: Coronary Angiogram;  Surgeon: Daniela Mazariegos MD;  Location: [facility];  Service:      CV LEFT HEART CATHETERIZATION WO LEFT VETRICULOGRAM Left 11/20/2017    Procedure: Left Heart Catheterization Without Left Ventriculogram;  Surgeon: Daniela Mazariegos MD;  Location: [facility];  Service:      HYSTERECTOMY      fibroids     OOPHORECTOMY       MA TOTAL ABDOM HYSTERECTOMY      Description: Total Abdominal Hysterectomy;  Recorded: 04/19/2012;  Comments: for fibroids      Family History   Problem Relation Age of Onset     No Medical Problems Mother      No Medical Problems Father      Diabetes Paternal Uncle      Breast cancer Neg Hx      Cancer Neg Hx      Colon cancer Neg Hx      Heart disease Neg Hx       Social History     Socioeconomic History     Marital status:      Spouse name: Not on file     Number of children: Not on file     Years of education: Not on file     Highest education level: Not on file   Occupational History     Not on file   Social Needs     Financial resource strain: Not on file     Food insecurity:     Worry: Not on file     Inability: Not on file     Transportation needs:     Medical: Not on file     Non-medical: Not on file   Tobacco Use     Smoking status: Current Every Day Smoker     Packs/day: 0.75     Years: 55.00     Pack years: 41.25     Types: Cigarettes     Smokeless tobacco: Never Used   Substance and Sexual Activity     Alcohol use: No     Drug use: No     Sexual activity: Not Currently     Partners: Male   Lifestyle     Physical activity:     Days per week: Not on file     Minutes per session: Not on file     Stress: Not on file   Relationships     Social connections:     Talks on phone: Not on  "file     Gets together: Not on file     Attends [employer] service: Not on file     Active member of club or organization: Not on file     Attends meetings of clubs or organizations: Not on file     Relationship status: Not on file     Intimate partner violence:     Fear of current or ex partner: Not on file     Emotionally abused: Not on file     Physically abused: Not on file     Forced sexual activity: Not on file   Other Topics Concern     Not on file   Social History Narrative     Not on file      Current Outpatient Medications   Medication Sig Dispense Refill     clopidogrel (PLAVIX) 75 mg tablet Take 1 tablet (75 mg total) by mouth daily. 90 tablet 5     losartan-hydrochlorothiazide (HYZAAR) 50-12.5 mg per tablet Take 1 tablet by mouth daily. 90 tablet 3     metoprolol succinate (TOPROL-XL) 100 MG 24 hr tablet Take 1 tablet (100 mg total) by mouth at bedtime. 90 tablet 3     nitroglycerin (NITROSTAT) 0.4 MG SL tablet Place 1 tablet (0.4 mg total) under the tongue every 5 (five) minutes as needed for chest pain. 20 tablet 0     PROAIR HFA 90 mcg/actuation inhaler USE 1 TO 2 INHALATIONS EVERY 4 TO 6 HOURS AS NEEDED FOR WHEEZING (NEED APPOINTMENT FOR FOLLOW UP MONITORING FOR THIS MEDICATION) 25.5 g 0     rosuvastatin (CRESTOR) 40 MG tablet Take 1 tablet (40 mg total) by mouth Daily after lunch. 90 tablet 5     tiotropium-olodaterol 2.5-2.5 mcg/actuation Mist Inhale 2 puffs daily. 12 g 3     warfarin (COUMADIN/JANTOVEN) 3 MG tablet TAKE ONE AND ONE-HALF TABLETS DAILY AS DIRECTED (DOSE ADJUSTED BASED ON INR) 126 tablet 1     cyclobenzaprine (FLEXERIL) 10 MG tablet Take 1 tablet (10 mg total) by mouth every 8 (eight) hours as needed for muscle spasms. 30 tablet 1     No current facility-administered medications for this visit.       Objective:     Visit Vitals  /68 (Patient Site: Left Arm, Patient Position: Sitting, Cuff Size: Adult Regular)   Pulse 100   Temp 98.2  F (36.8  C) (Oral)   Resp 20   Ht 4' 11.1\" " "(1.501 m)   Wt 146 lb 1.6 oz (66.3 kg)   SpO2 95%   Breastfeeding? No   BMI 29.41 kg/m         VisionScreening:  No exam data present     PHYSICAL EXAM  /68 (Patient Site: Left Arm, Patient Position: Sitting, Cuff Size: Adult Regular)   Pulse 100   Temp 98.2  F (36.8  C) (Oral)   Resp 20   Ht 4' 11.1\" (1.501 m)   Wt 146 lb 1.6 oz (66.3 kg)   SpO2 95%   Breastfeeding? No   BMI 29.41 kg/m      General Appearance:    Alert, cooperative, no distress, appears stated age   Head:    Normocephalic, without obvious abnormality, atraumatic   Eyes:    PERRL, conjunctiva/corneas clear, EOM's intact both eyes   Ears:    Normal TM's and external ear canals, both ears   Nose:   Nares normal, septum midline, mucosa normal, no drainage     or sinus tenderness   Throat:   Lips, mucosa, and tongue normal; teeth and gums normal   Neck:   Supple, symmetrical, trachea midline, no adenopathy;     thyroid:  no enlargement/tenderness/nodules; no carotid    bruit or JVD   Back:     Symmetric, no curvature, ROM normal, no CVA tenderness   Lungs:     Clear to auscultation bilaterally, respirations unlabored   Chest Wall:    No tenderness or deformity    Heart:    Regular rate and rhythm, S1 and S2 normal, no murmur, rub    or gallop   Breast Exam:    No tenderness, masses, or nipple abnormality   Abdomen:     Soft, non-tender, bowel sounds active all four quadrants,     no masses, no organomegaly   Genitalia:    Not examined   Rectal:    Not examined   Extremities:   Extremities normal, atraumatic, no cyanosis or edema   Pulses:   2+ and symmetric all extremities   Skin:   Skin color, texture, turgor normal, no rashes or lesions   Lymph nodes:   Cervical, supraclavicular, and axillary nodes normal   Neurologic:   CNII-XII intact, normal strength, sensation and reflexes     throughout         Assessment Results 10/7/2019   Activities of Daily Living No help needed   Instrumental Activities of Daily Living No help needed   Mini Cog " Total Score 4   Some recent data might be hidden     A Mini-Cog score of 0-2 suggests the possibility of dementia, score of 3-5 suggests no dementia    Identified Health Risks:     The patient was provided with suggestions to help her develop a healthy physical lifestyle.   The patient was counseled and encouraged to consider modifying their diet and eating habits. She was provided with information on recommended healthy diet options.  Information on urinary incontinence and treatment options given to patient.  Information regarding advance directives (living [place]), including where she can download the appropriate form, was provided to the patient via the AVS.

## 2021-06-02 NOTE — TELEPHONE ENCOUNTER
Left message to call back for: ariadna  Information to relay to patient:  Results and MD note below

## 2021-06-02 NOTE — TELEPHONE ENCOUNTER
ANTICOAGULATION  MANAGEMENT    Assessment     Today's INR result of 2.2 is Therapeutic (goal INR of 2.0-3.0)        Warfarin taken as previously instructed    Plans to start eating doris greens tomorrow    Potential interaction between doxycyline and prednisone and warfarin which may affect subsequent INRs - is having it sent via home delivery so will not receive right away    Continues to tolerate warfarin with no reported s/s of bleeding or thromboembolism     Previous INR was Therapeutic    Plan:     Spoke with Leonela regarding INR result and instructed:     Warfarin Dosing Instructions:  Change warfarin dose to 3 mg daily on Tue, Sat; and 4.5 mg daily rest of week  (5.6 % change)  Pt tends to go low when she eats doris greens as she eats them daily until gone    Instructed patient to follow up no later than: 4-5 days after starting doxy and prednisone    Education provided: target INR goal and significance of current INR result, importance of following up for INR monitoring at instructed interval, importance of taking warfarin as instructed and importance of notifying clinic for changes in medications    Leonela verbalizes understanding and agrees to warfarin dosing plan.    Instructed to call the [facility] for any changes, questions or concerns. (#[phone])   ?   Jenny Stanley RN    Subjective/Objective:      Leonela Christianson, a 69 y.o. female is on warfarin.     Leonela reports:     Home warfarin dose: verbally confirmed home dose with Leonela and updated on anticoagulation calendar     Missed doses: No     Medication changes:  Yes: doxy and prednisone, 10 and 12 days worth     S/S of bleeding or thromboembolism:  No     New Injury or illness:  Yes: COPD exacerbation     Changes in diet or alcohol consumption:  Yes: plans to start eating doris greens tomorrow     Upcoming surgery, procedure or cardioversion:  No    Anticoagulation Episode Summary     Current INR goal:   2.0-3.0   TTR:   79.5 %    Next INR check:   10/18/2019   INR from last check:   2.20 (10/7/2019)   Weekly max warfarin dose:      Target end date:      INR check location:      Preferred lab:      Send INR reminders to:   [facility]    Indications    History of pulmonary embolism [Z86.711]           Comments:            Anticoagulation Care Providers     Provider Role Specialty Phone number    Miri Woodruff MD Referring Family Medicine [phone]

## 2021-06-02 NOTE — TELEPHONE ENCOUNTER
Who is calling:  Patient  Reason for Call:  Patient is asking for call back from [facility] to clarify when she should follow up with INR, she states that she started new medication and thought she was advised to follow up within 4 days, however, is unable to get to clinic within 4 days.  Date of last appointment with primary care: na  Okay to leave a detailed message: Yes

## 2021-06-02 NOTE — TELEPHONE ENCOUNTER
Medication Question or Clarification  Who is calling: Patient  What medication are you calling about? (include dose and sig)   rosuvastatin (CRESTOR) 40 MG tablet 90 tablet 5 11/7/2018     Sig - Route: Take 1 tablet (40 mg total) by mouth Daily after lunch. - Oral    Sent to pharmacy as: rosuvastatin (CRESTOR) 40 MG tablet        Who prescribed the medication?:   Bandar Gonzalez MD  What is your question/concern?:   Patient is needing a bridge prescription of 10 tablets of above medication.  Patient's mail delivery pharmacy is mailing above medication tomorrow.  Patient is requesting to  medication today.  She is out of above medication.  Pharmacy:   [facility] #[id number]  Okay to leave a detailed message?: Yes  Site CMT - Please call the pharmacy to obtain any additional needed information.    Please expedite per patient request.

## 2021-06-02 NOTE — TELEPHONE ENCOUNTER
ANTICOAGULATION  MANAGEMENT    Assessment     Today's INR result of 2.2 is Therapeutic (goal INR of 2.0-3.0)        Warfarin taken as previously instructed    No new diet changes affecting INR    No new medication/supplements affecting INR    Continues to tolerate warfarin with no reported s/s of bleeding or thromboembolism     Previous INR was Therapeutic    Plan:     Spoke with Leonela regarding INR result and instructed:     Warfarin Dosing Instructions:  Continue current warfarin dose 3 mg daily on Tue, Thu, Sat; and 4.5 mg daily rest of week  (0 % change)    Instructed patient to follow up no later than: 4 weeks    Education provided: target INR goal and significance of current INR result, importance of notifying clinic for changes in medications and importance of notifying clinic for diarrhea, nausea/vomiting, reduced intake and/or illness    Leonela verbalizes understanding and agrees to warfarin dosing plan.    Instructed to call the [facility] for any changes, questions or concerns. (#[phone])   ?   Jenny Stanley RN    Subjective/Objective:      Leonela Christianson, a 69 y.o. female is on warfarin.     Leonela reports:     Home warfarin dose: as updated on anticoagulation calendar per template     Missed doses: No     Medication changes:  No     S/S of bleeding or thromboembolism:  No     New Injury or illness:  No     Changes in diet or alcohol consumption:  No     Upcoming surgery, procedure or cardioversion:  No    Anticoagulation Episode Summary     Current INR goal:   2.0-3.0   TTR:   79.5 %   Next INR check:   11/28/2019   INR from last check:   2.20 (10/31/2019)   Weekly max warfarin dose:      Target end date:      INR check location:      Preferred lab:      Send INR reminders to:   [facility]    Indications    History of pulmonary embolism [Z86.711]           Comments:            Anticoagulation Care Providers     Provider Role Specialty Phone number    Miri Woodruff MD  [facility] [phone]

## 2021-06-02 NOTE — TELEPHONE ENCOUNTER
Anticoagulation Annual Referral Renewal Review    Leonela Christianson's chart reviewed for annual renewal of referral to anticoagulation monitoring.        Criteria for anticoagulation nurse and/or pharmacist renewal met   Warfarin indication: PE Yes , DVT/PE with previous provider documentation patient to be on extended anticoagulation. see encounter 3/10/17   Current with INR monitoring/compliant Yes Yes   Date of last office visit 10/7/19 Yes, had office visit within last year   Time in Therapeutic Range (TTR) 83 % Yes, TTR > 60%       Leonela Christianson met all criteria for anticoagulation management program initiated renewal.  New INR standing orders and anticoagulation referral renewal placed.      Hector Holguin RN  3:49 PM

## 2021-06-02 NOTE — TELEPHONE ENCOUNTER
Spoke with pt.  Unable to get into clinic this week, can check INR on Monday.  Pt was planning on eating doris greens last week, but never did, advised that pt could add in 1-2 servings this week.  Advised pt to monitor for any signs of bleeding or increased bruising.

## 2021-06-02 NOTE — TELEPHONE ENCOUNTER
ANTICOAGULATION  MANAGEMENT    Assessment     Today's INR result of 2.2 is Therapeutic (goal INR of 2.0-3.0)        Less warfarin taken than instructed which may be affecting INR - never did eat the greens like she thought she would, so she didn't change her dose    No new diet changes affecting INR    Interaction between prednisone and doxycycline and warfarin may be affecting INR - 2 more days of doxy and 4 more days of pred    Continues to tolerate warfarin with no reported s/s of bleeding or thromboembolism     Previous INR was Therapeutic    Plan:     Spoke with Leonela regarding INR result and instructed:     Warfarin Dosing Instructions:  Continue current warfarin dose 3 mg daily on Tue, Thu, Sat; and 4.5 mg daily rest of week  (0 % change)    Instructed patient to follow up no later than: 7-10 days    Education provided: target INR goal and significance of current INR result, importance of following up for INR monitoring at instructed interval, importance of taking warfarin as instructed, potential interaction between warfarin and prednisone and doxycycline, importance of notifying clinic for changes in medications and importance of notifying clinic for diarrhea, nausea/vomiting, reduced intake and/or illness    Leonela verbalizes understanding and agrees to warfarin dosing plan.    Instructed to call the [facility] for any changes, questions or concerns. (#[phone])   ?   Jenny Stanley RN    Subjective/Objective:      Leoenla Christianson, a 69 y.o. female is on warfarin.     Leonela reports:     Home warfarin dose: verbally confirmed home dose with Leonela and updated on anticoagulation calendar     Missed doses: No     Medication changes:  Yes: still on doxy and pred     S/S of bleeding or thromboembolism:  No     New Injury or illness:  No     Changes in diet or alcohol consumption:  No     Upcoming surgery, procedure or cardioversion:  No    Anticoagulation Episode Summary     Current INR goal:    2.0-3.0   TTR:   79.5 %   Next INR check:   10/30/2019   INR from last check:   2.20 (10/21/2019)   Weekly max warfarin dose:      Target end date:      INR check location:      Preferred lab:      Send INR reminders to:   [facility]    Indications    History of pulmonary embolism [Z86.711]           Comments:            Anticoagulation Care Providers     Provider Role Specialty Phone number    Miri Woodruff MD Referring Family Medicine [phone]

## 2021-06-02 NOTE — TELEPHONE ENCOUNTER
Who is calling:  Patient  Reason for Call:  Patient was returning Jenny's phone call about her INR.   Date of last appointment with primary care: n/a  Okay to leave a detailed message: Yes

## 2021-06-03 VITALS — HEIGHT: 60 IN | WEIGHT: 144 LBS | BODY MASS INDEX: 28.27 KG/M2

## 2021-06-03 VITALS
DIASTOLIC BLOOD PRESSURE: 70 MMHG | BODY MASS INDEX: 29.84 KG/M2 | RESPIRATION RATE: 16 BRPM | SYSTOLIC BLOOD PRESSURE: 110 MMHG | HEART RATE: 92 BPM | WEIGHT: 148 LBS | HEIGHT: 59 IN

## 2021-06-03 VITALS
HEART RATE: 100 BPM | RESPIRATION RATE: 20 BRPM | SYSTOLIC BLOOD PRESSURE: 112 MMHG | BODY MASS INDEX: 29.45 KG/M2 | TEMPERATURE: 98.2 F | OXYGEN SATURATION: 95 % | WEIGHT: 146.1 LBS | DIASTOLIC BLOOD PRESSURE: 68 MMHG | HEIGHT: 59 IN

## 2021-06-03 NOTE — TELEPHONE ENCOUNTER
ANTICOAGULATION  MANAGEMENT    Assessment     Today's INR result of 1.8 is Subtherapeutic (goal INR of 2.0-3.0)        Warfarin taken as previously instructed    Increased greens/vitamin K intake may be affecting INR - eating more greens this week    No new medication/supplements affecting INR    Continues to tolerate warfarin with no reported s/s of bleeding or thromboembolism     Previous INR was Therapeutic    Plan:     Spoke with Leonela regarding INR result and instructed:     Warfarin Dosing Instructions:  Take 6 mg today and 4.5 mg tomorrow then continue current warfarin dose 3 mg daily on Tue, Thu, Sat; and 4.5 mg daily rest of week  (0 % change)    Instructed patient to follow up no later than: 2 weeks    Education provided: importance of consistent vitamin K intake, target INR goal and significance of current INR result, importance of following up for INR monitoring at instructed interval, importance of taking warfarin as instructed, importance of notifying clinic for changes in medications and importance of notifying clinic for diarrhea, nausea/vomiting, reduced intake and/or illness    Leonela verbalizes understanding and agrees to warfarin dosing plan.    Instructed to call the [facility] for any changes, questions or concerns. (#[phone])   ?   Jenny Stanley RN    Subjective/Objective:      Leonela Christianson, a 69 y.o. female is on warfarin.     Leonela reports:     Home warfarin dose: verbally confirmed home dose with Leonela and updated on anticoagulation calendar     Missed doses: No     Medication changes:  No     S/S of bleeding or thromboembolism:  No     New Injury or illness:  No     Changes in diet or alcohol consumption:  Yes: eating more greens this week     Upcoming surgery, procedure or cardioversion:  No    Anticoagulation Episode Summary     Current INR goal:   2.0-3.0   TTR:   75.9 % (1 y)   Next INR check:   12/11/2019   INR from last check:   1.80! (11/27/2019)   Weekly max  warfarin dose:      Target end date:      INR check location:      Preferred lab:      Send INR reminders to:   [facility]    Indications    History of pulmonary embolism [Z86.711]           Comments:            Anticoagulation Care Providers     Provider Role Specialty Phone number    Miri Woodruff MD Referring Family Medicine [phone]

## 2021-06-04 ENCOUNTER — OFFICE VISIT - HEALTHEAST (OUTPATIENT)
Dept: CARDIOLOGY | Facility: CLINIC | Age: 71
End: 2021-06-04

## 2021-06-04 ENCOUNTER — COMMUNICATION - HEALTHEAST (OUTPATIENT)
Dept: FAMILY MEDICINE | Facility: CLINIC | Age: 71
End: 2021-06-04

## 2021-06-04 VITALS
DIASTOLIC BLOOD PRESSURE: 73 MMHG | WEIGHT: 145 LBS | BODY MASS INDEX: 29.29 KG/M2 | SYSTOLIC BLOOD PRESSURE: 111 MMHG | HEART RATE: 95 BPM

## 2021-06-04 VITALS — WEIGHT: 148.5 LBS | BODY MASS INDEX: 29.16 KG/M2 | HEIGHT: 60 IN

## 2021-06-04 DIAGNOSIS — I21.4 NSTEMI (NON-ST ELEVATED MYOCARDIAL INFARCTION) (H): ICD-10-CM

## 2021-06-04 DIAGNOSIS — Z86.711 HISTORY OF PULMONARY EMBOLISM: ICD-10-CM

## 2021-06-04 RX ORDER — NITROGLYCERIN 0.4 MG/1
0.4 TABLET SUBLINGUAL EVERY 5 MIN PRN
Qty: 20 TABLET | Refills: 11 | Status: SHIPPED | OUTPATIENT
Start: 2021-06-04 | End: 2022-06-30

## 2021-06-04 NOTE — TELEPHONE ENCOUNTER
RN cannot approve Refill Request    RN can NOT refill this medication med is not covered by policy/route to provider     . Last office visit: 6/10/2019 Miri Woodruff MD Last Physical: 10/7/2019 Last MTM visit: Visit date not found Last visit same specialty: 6/10/2019 Miri Woodruff MD.  Next visit within 3 mo: Visit date not found  Next physical within 3 mo: Visit date not found      Ratna English, [facility] Triage/Med Refill 12/16/2019    Requested Prescriptions   Pending Prescriptions Disp Refills     STIOLTO RESPIMAT 2.5-2.5 mcg/actuation Mist inhaler [Pharmacy Med Name: STIOLTO RESPIMAT INH [id number] 2.5/2.5] 12 g 4     Sig: USE 2 INHALATIONS DAILY       There is no refill protocol information for this order

## 2021-06-04 NOTE — TELEPHONE ENCOUNTER
ANTICOAGULATION  MANAGEMENT    Assessment     Today's INR result of 2.4 is Therapeutic (goal INR of 2.0-3.0)        Warfarin taken as previously instructed    No new diet changes affecting INR    No new medication/supplements affecting INR    Continues to tolerate warfarin with no reported s/s of bleeding or thromboembolism     Previous INR was Subtherapeutic    Plan:     Left a detailed message for Leonela regarding INR result and instructed:     Warfarin Dosing Instructions:  Continue current warfarin dose 3 mg daily on Tue, THu, Sat; and 4.5 mg daily rest of week  (0 % change)    Instructed patient to follow up no later than: 2-3 weeks    Education provided: target INR goal and significance of current INR result, importance of notifying clinic for changes in medications and importance of notifying clinic for diarrhea, nausea/vomiting, reduced intake and/or illness    Instructed to call the [facility] for any changes, questions or concerns. (#[phone])   ?   Jenny Stanley RN    Subjective/Objective:      Leonela Christianson, a 69 y.o. female is on warfarin.     Leonela reports:     Home warfarin dose: as updated on anticoagulation calendar per template     Missed doses: No     Medication changes:  No     S/S of bleeding or thromboembolism:  No     New Injury or illness:  No     Changes in diet or alcohol consumption:  No     Upcoming surgery, procedure or cardioversion:  No    Anticoagulation Episode Summary     Current INR goal:   2.0-3.0   TTR:   75.7 % (1 y)   Next INR check:   1/1/2020   INR from last check:   2.40 (12/11/2019)   Weekly max warfarin dose:      Target end date:      INR check location:      Preferred lab:      Send INR reminders to:   [facility]    Indications    History of pulmonary embolism [Z86.711]           Comments:            Anticoagulation Care Providers     Provider Role Specialty Phone number    Miri Woodruff MD Referring Family Medicine [phone]

## 2021-06-05 VITALS
DIASTOLIC BLOOD PRESSURE: 96 MMHG | WEIGHT: 149.2 LBS | HEIGHT: 60 IN | HEART RATE: 75 BPM | BODY MASS INDEX: 29.29 KG/M2 | SYSTOLIC BLOOD PRESSURE: 154 MMHG

## 2021-06-05 VITALS
SYSTOLIC BLOOD PRESSURE: 110 MMHG | DIASTOLIC BLOOD PRESSURE: 64 MMHG | HEART RATE: 95 BPM | BODY MASS INDEX: 28.51 KG/M2 | OXYGEN SATURATION: 95 % | WEIGHT: 146 LBS

## 2021-06-05 VITALS
WEIGHT: 146.4 LBS | HEIGHT: 60 IN | DIASTOLIC BLOOD PRESSURE: 91 MMHG | SYSTOLIC BLOOD PRESSURE: 152 MMHG | BODY MASS INDEX: 28.74 KG/M2 | HEART RATE: 95 BPM

## 2021-06-05 VITALS
BODY MASS INDEX: 28.7 KG/M2 | SYSTOLIC BLOOD PRESSURE: 149 MMHG | HEART RATE: 81 BPM | HEIGHT: 60 IN | DIASTOLIC BLOOD PRESSURE: 92 MMHG | WEIGHT: 146.2 LBS

## 2021-06-05 VITALS — BODY MASS INDEX: 29.1 KG/M2 | WEIGHT: 149 LBS

## 2021-06-05 VITALS
SYSTOLIC BLOOD PRESSURE: 120 MMHG | HEART RATE: 83 BPM | BODY MASS INDEX: 27.97 KG/M2 | TEMPERATURE: 98 F | OXYGEN SATURATION: 96 % | WEIGHT: 143.2 LBS | DIASTOLIC BLOOD PRESSURE: 79 MMHG | RESPIRATION RATE: 12 BRPM

## 2021-06-05 VITALS — BODY MASS INDEX: 28.07 KG/M2 | WEIGHT: 143 LBS | HEIGHT: 60 IN

## 2021-06-05 NOTE — TELEPHONE ENCOUNTER
Attempted to call patient to discuss options, no answer.  Left VM that I will try back later today.

## 2021-06-05 NOTE — TELEPHONE ENCOUNTER
ANTICOAGULATION  MANAGEMENT PROGRAM    Leonela Christianson is overdue for INR check.     Spoke with Leonela and scheduled INR appointment on 1/15.      Jenny Stanley, RN

## 2021-06-05 NOTE — TELEPHONE ENCOUNTER
Medication Request    Medication name:     losartan (COZAAR) 50 MG tablet  hydroCHLOROthiazide (MICROZIDE) 12.5 mg capsule    Requested Pharmacy: [facility]     Reason for request:     Needs a short supply of med, [facility].    When did you use medication last?:  Unknown    Patient offered appointment:    patient declined     Okay to leave a detailed message: yes

## 2021-06-05 NOTE — TELEPHONE ENCOUNTER
Pt stopped lisinopril due to cough and she was on a water pill in 2008 and they took her off of it due to an adverse reaction she cant remember but does not want to pay for 2 separate pills so is it ok if when I get here tomorrow I call the pharmacy and talk to them about different options and cost like there cheapest 2 pill options or what do you think about her not being on a water pill she said she doesn't want one. And is there any news on when her medication will be available?

## 2021-06-05 NOTE — TELEPHONE ENCOUNTER
Attempted to call patient again, did not leave an additional message.  Please try calling patient back later today.  If she does not pay for her combination pill, it is unlikely that she would need to pay a co-pay for the 2 medications given separately (these should be cheap and generic).  If she refuses to take the 2 medicines, we can drop the diuretic, but she has been on this medicine since I started seeing her in 2015.  Likely it was started by cardiology.  If she has any puffiness in her ankles, the diuretic helps with this.  Let me know what she decides.

## 2021-06-05 NOTE — TELEPHONE ENCOUNTER
"Medication Question or Clarification  Who is calling: Patient  What medication are you calling about (include dose and sig)?: losartan-hydrochlorothiazide (HYZAAR) 50-12.5 mg per tablet. Take 1 tablet by mouth daily.  Who prescribed the medication?: Miri Woodruff MD  What is your question/concern?: This medication is on a national back order and the patient was advised to get this through her local pharmacy. However, the local pharmacies are unable to get this as well. The patient has no interest in changing to 2 separate drugs as this would cost her more out of pocket. The patient stated, \"Dr Woodruff needs to figure out a new medication that does the same thing without me having to take 2 separate medications.\". Please advise.   Requested Pharmacy: [facility]   Okay to leave a detailed message?: No        "

## 2021-06-05 NOTE — TELEPHONE ENCOUNTER
ANTICOAGULATION  MANAGEMENT    Assessment     Today's INR result of 2.2 is Therapeutic (goal INR of 2.0-3.0)        Warfarin taken as previously instructed    No new diet changes affecting INR    No new medication/supplements affecting INR    Continues to tolerate warfarin with no reported s/s of bleeding or thromboembolism     Previous INR was Therapeutic    Plan:     Spoke with Leonela regarding INR result and instructed:     Warfarin Dosing Instructions:  Continue current warfarin dose 3 mg daily on Tues, Thurs, Sat; and 4.5 mg daily rest of week  (0 % change)    Instructed patient to follow up no later than: 4 weeks     Leonela verbalizes understanding and agrees to warfarin dosing plan.    Instructed to call the [facility] for any changes, questions or concerns. (#[phone])   ?   Kim Avendano RN    Subjective/Objective:      Leonelagayle Christianson, a 69 y.o. female is on warfarin.     Leonela reports:     Home warfarin dose: verbally confirmed home dose with Leonela and updated on anticoagulation calendar     Missed doses: No     Medication changes:  No     S/S of bleeding or thromboembolism:  No     New Injury or illness:  No     Changes in diet or alcohol consumption:  No     Upcoming surgery, procedure or cardioversion:  No    Anticoagulation Episode Summary     Current INR goal:   2.0-3.0   TTR:   79.1 % (1 y)   Next INR check:   2/12/2020   INR from last check:   2.20 (1/15/2020)   Weekly max warfarin dose:      Target end date:      INR check location:      Preferred lab:      Send INR reminders to:   [facility]    Indications    History of pulmonary embolism [Z86.711]           Comments:            Anticoagulation Care Providers     Provider Role Specialty Phone number    Miri Woodruff MD Referring Family Medicine [phone]

## 2021-06-05 NOTE — TELEPHONE ENCOUNTER
"Patient Returning Call  Reason for call:  Medication   Information relayed to patient:  \"Attempted to call patient again, did not leave an additional message.  Please try calling patient back later today.  If she does not pay for her combination pill, it is unlikely that she would need to pay a co-pay for the 2 medications given separately (these should be cheap and generic).  If she refuses to take the 2 medicines, we can drop the diuretic, but she has been on this medicine since I started seeing her in 2015.  Likely it was started by cardiology.  If she has any puffiness in her ankles, the diuretic helps with this.  Let me know what she decides.\"  Patient has additional questions:  Yes  If YES, what are your questions/concerns:  She is asking for the below prescriptions to be sent in:    -Losartan 50 mg tablet    -Hydrochlorothiazide 12.5 mg tablet    She does not want to be placed on Norvasc or Lisinopril    Okay to leave a detailed message?: No      "

## 2021-06-06 NOTE — TELEPHONE ENCOUNTER
ANTICOAGULATION  MANAGEMENT    Assessment     Today's INR result of 2.1 is Therapeutic (goal INR of 2.0-3.0)        Warfarin taken as previously instructed    No new diet changes affecting INR    No new medication/supplements affecting INR    Continues to tolerate warfarin with no reported s/s of bleeding or thromboembolism     Previous INR was Therapeutic    Plan:     Left a detailed message for Leonela regarding INR result and instructed:     Warfarin Dosing Instructions:  Continue current warfarin dose 3 mg daily on Tue, Thu, Sat; and 4.5 mg daily rest of week  (0 % change)    Instructed patient to follow up no later than: 4 weeks    Education provided: target INR goal and significance of current INR result, importance of notifying clinic for changes in medications and importance of notifying clinic for diarrhea, nausea/vomiting, reduced intake and/or illness    Instructed to call the [facility] for any changes, questions or concerns. (#[phone])   ?   Jenny Stanley RN    Subjective/Objective:      Leonela Christianson, a 69 y.o. female is on warfarin.     Leonela reports:     Home warfarin dose: as updated on anticoagulation calendar per template     Missed doses: No     Medication changes:  No     S/S of bleeding or thromboembolism:  No     New Injury or illness:  No     Changes in diet or alcohol consumption:  No     Upcoming surgery, procedure or cardioversion:  No    Anticoagulation Episode Summary     Current INR goal:   2.0-3.0   TTR:   80.9 % (1 y)   Next INR check:   3/20/2020   INR from last check:   2.10 (2/21/2020)   Weekly max warfarin dose:      Target end date:      INR check location:      Preferred lab:      Send INR reminders to:   [facility]    Indications    History of pulmonary embolism [Z86.711]           Comments:            Anticoagulation Care Providers     Provider Role Specialty Phone number    Miri Woodruff MD Referring Family Medicine [phone]

## 2021-06-07 NOTE — TELEPHONE ENCOUNTER
RN cannot approve Refill Request    RN can NOT refill this medication med is not covered by policy/route to provider. Last office visit: 6/10/2019 Miri Woodruff MD Last Physical: 10/7/2019 Last MTM visit: Visit date not found Last visit same specialty: 6/10/2019 Miri Woodruff MD.  Next visit within 3 mo: Visit date not found  Next physical within 3 mo: Visit date not found      Leanna Li, [facility] Triage/Med Refill 4/29/2020    Requested Prescriptions   Pending Prescriptions Disp Refills     warfarin ANTICOAGULANT (COUMADIN/JANTOVEN) 3 MG tablet [Pharmacy Med Name: WARFARIN TABS 3MG] 126 tablet 3     Sig: TAKE ONE AND ONE-HALF TABLETS DAILY AS DIRECTED (DOSE ADJUSTED BASED ON INR)       Warfarin Refill Protocol  Failed - 4/29/2020  2:34 AM        Failed -  Route to appropriate pool/provider     Last Anticoagulation Summary:   Anticoagulation Episode Summary     Current INR goal:   2.0-3.0   TTR:   77.1 % (11.7 mo)   Next INR check:   4/29/2020   INR from last check:      Weekly max warfarin dose:      Target end date:      INR check location:      Preferred lab:      Send INR reminders to:   [facility]    Indications    History of pulmonary embolism [Z86.711]           Comments:            Anticoagulation Care Providers     Provider Role Specialty Phone number    Miri Woodruff MD Referring Family Medicine [phone]                Passed - Provider visit in last year     Last office visit with prescriber/PCP: 6/10/2019 Miri Woodruff MD OR same dept: 6/10/2019 Miri Woodruff MD OR same specialty: 6/10/2019 Miri Woodruff MD  Last physical: 10/7/2019 Last MTM visit: Visit date not found    Next appt within 3 mo: Visit date not found Next physical within 3 mo: Visit date not found  Prescriber OR PCP: Miri Woodruff MD  Last diagnosis associated with med order: 1. Blood clot in vein  - warfarin ANTICOAGULANT (COUMADIN/JANTOVEN) 3 MG tablet  [Pharmacy Med Name: WARFARIN TABS 3MG]; TAKE ONE AND ONE-HALF TABLETS DAILY AS DIRECTED (DOSE ADJUSTED BASED ON INR)  Dispense: 126 tablet; Refill: 3    If protocol passes may refill for 6 months if within 3 months of last provider visit (or a total of 9 months).

## 2021-06-07 NOTE — TELEPHONE ENCOUNTER
FYI - Status Update  Who is Calling: Patient  Update: Returning a phone call from ZEENAT Ellis nurse. Please call back.   Okay to leave a detailed message?:  Yes

## 2021-06-07 NOTE — TELEPHONE ENCOUNTER
We are calling regarding your appointment on 4/3. Please call the anticoagulation clinic back as soon as possible prior to your appointment to discuss important changes at [phone].

## 2021-06-07 NOTE — TELEPHONE ENCOUNTER
"FYI - Status Update  Who is Calling: Patient  Update: Went to have her INR drawn this morning, but per patient \"there were too many people in that waiting area\" so she left. This PSR did assist her to schedule another INR next week 5/06/20 when she would have the second appointment of the day, and \"hopefully not so many people waiting around\" Patient does request a call back from her ACN.  Okay to leave a detailed message?:  No    " Mrs. Cleaning was admitted to MICU for Type B aortic dissection on esmolol gtt to maintain HR of 60 and SBP <120. Vascular surgery following, no surgical intervention warranted initially. CTA read concerning for possible aortic rupture in the descending aorta with high-density fluid noted in the posterior mediastinum; additionally, there was a suspected penetrating ulcer in the upper abdominal aorta adjacent to the true lumen. Due to high risk/ possible rupture, Lumbar drain was placed by interventional radiology and TEVAR completed by vascular surgery. Early 11/16, pt noted to be in hemorragic shock complicated by liver disease as vasopressor requirements increased significantly, rising lactic acidosis, and patient was found to be bleeding from bilateral groin sites with significant hematoma formation to left groin. Pressure was held for an extended period and patient received 4 U PRBC, 3 U FFP, 1 U platelets, 1 U cryo, and vitamin K. With improvement in coagulopathy and volume resuscitation, she was able to be weaned from vasopressors. However, on 11/17, a stroke code was called around 0800 due to acute left sided weakness; right sided thalamic bleed noted on CT. No intervention per vascular neurology, however have had to give multiple units of platelets, FFP, and cyro in order to keep platelets > 50,000, INR at 1.5, and fibrinogen >100. Repeat CT showed that hemorrhage had not changed. Neuro exam much improved with lessening left sided hemiparesis. New RUQ abdominal pain reported on 11/19 and, US with doppler noted probable right portal vein thrombosis; anticoagulation contraindicated.      On the early AM of 11/20, it was noted that Mrs. Cleaning was no longer able to move bilateral lower extremities and left upper extremity (this is a change as she was having improved strength in left upper/lower extremity previously and able to move RLE). Additionally, she was more somnolent with expressive aphasia noted.  Discussions with vascular surgery, vascular neurology and [facility] team held with plans for stat CT head. Spinal drain opened up to drain 30 ml's in first hour and then 15 ml/hr subsequent to that. Additionally, levophed started to maintain MAP of 100 mm Hg but aim to keep SBP <160 given concern for spinal ischemia, but with subsequent hemorrhagic CVA. MRI Brain and spine performed on 11/21 with stable acute right thalamic hemorrhage, small punctate foci in posterior temporal/parietal regions, no cord edema or signs of cord infarction.  Episode of emesis evening of 11/21, imaging suggesting ileus.  NG placed with 800 ml output.  Lactulose enemas given with increased stool output.  LP drain clamped and removed 11/23.  Oxygen requirements increased and was placed on high flow nasal cannula. LE US negative for DVT. Began diuresing patient patient on 11/23/18. Passed swallow assessment and started on a diet on 11/23/18.   On 11/26/18, she was more lethargic, complaining of abdominal pain and suddenly became hypotensive and bradycardic.  She was given 1 mg of atropine and IV calcium to reverse oral CCB she was receiving for hypertension.  Central line was inserted and she was started on norepinephrine drip.  Her lactic acid elevated to 6.3 and she was giving IV fluids.  Chest X-Ray with new right upper lobe opacity.  She was started empirically on vancomycin and zosyn. CT abd/pevis 11/26 concerning for mesenteric ischemia. General surgery to forego surgery at this time. Palliative consulted. Goals of care discussion with family who wish to proceed with comfort measures.

## 2021-06-07 NOTE — TELEPHONE ENCOUNTER
Anticoagulation Management    Discussed INR home monitoring program with Leonela Christianson reviewing:      Elibigility requirements: >= 3 months of anticoagulation therapy, indication for chronic anticoagulation and order from provider    Required testing frequency (q1-2 weeks)    Home meters, testing supplies, meter training, and reporting of INR results done through an outside company. Patient would be contacted by home monitoring company to review insurance coverage with home monitoring company prior to enrolling.    [employer] would continue to receive and manage INR results.    Home monitoring application may take several weeks and must continue to follow up with recommended INR monitoring in clinic until receives monitor and training completed.     Home monitoring terms reviewed with patient      Patient agrees to frequency of testing as directed by referring provider ( weekly or biweekly) Yes    Testing to be performed during business hours of [facility] Yes    Patient agrees they have the skill ( or a designated caregiver) necessary to perform the self test Yes    Patient agrees to report all INR results to INR home monitoring company Yes    Patient agrees to have additional INR test in clinic if a home result is critical Yes    Patient agrees to schedule an INR test at a [facility] clinic yearly for technique observation and quality check of INR results with their home meter Yes    Patient agrees to use a [facility] approved service provider and device for home monitoring Yes          Leonela Christianson is interested home INR monitoring and requests order be submitted.        Jenny Stanley RN

## 2021-06-07 NOTE — TELEPHONE ENCOUNTER
ANTICOAGULATION  MANAGEMENT    Assessment     Today's INR result of 1.8 is Subtherapeutic (goal INR of 2.0-3.0)        Warfarin taken differently than instructed, but no impact to total weekly dose doesn't want to break pills, we adjusted dose accordingly    No new diet changes affecting INR    No new medication/supplements affecting INR    Continues to tolerate warfarin with no reported s/s of bleeding or thromboembolism     Previous INR was Subtherapeutic    Plan:     Left a detailed message for Leonela regarding INR result and instructed:     Warfarin Dosing Instructions:  Change warfarin dose to 6 mg daily on mon/wed/fri; and 3 mg daily rest of week  (11 % change)    Instructed patient to follow up no later than: two weeks    Instructed to call the [facility] for any changes, questions or concerns. (#[phone])   ?   Caleb Queen RN    Subjective/Objective:      Leonela Christianson, a 69 y.o. female is on warfarin.     Leonela reports:     Home warfarin dose: as updated on anticoagulation calendar per template     Missed doses: No     Medication changes:  No     S/S of bleeding or thromboembolism:  No     New Injury or illness:  No     Changes in diet or alcohol consumption:  No     Upcoming surgery, procedure or cardioversion:  No    Anticoagulation Episode Summary     Current INR goal:   2.0-3.0   TTR:   74.1 % (1 y)   Next INR check:   4/29/2020   INR from last check:   1.80! (4/15/2020)   Weekly max warfarin dose:      Target end date:      INR check location:      Preferred lab:      Send INR reminders to:   COLBY ELLER    Indications    History of pulmonary embolism [Z86.711]           Comments:            Anticoagulation Care Providers     Provider Role Specialty Phone number    Miri Woodruff MD Referring Family Medicine [phone]

## 2021-06-08 ENCOUNTER — COMMUNICATION - HEALTHEAST (OUTPATIENT)
Dept: FAMILY MEDICINE | Facility: CLINIC | Age: 71
End: 2021-06-08

## 2021-06-08 DIAGNOSIS — J43.9 PULMONARY EMPHYSEMA, UNSPECIFIED EMPHYSEMA TYPE (H): ICD-10-CM

## 2021-06-08 RX ORDER — TIOTROPIUM BROMIDE AND OLODATEROL 3.124; 2.736 UG/1; UG/1
SPRAY, METERED RESPIRATORY (INHALATION)
Qty: 12 G | Refills: 3 | Status: SHIPPED | OUTPATIENT
Start: 2021-06-08 | End: 2022-06-30

## 2021-06-08 NOTE — TELEPHONE ENCOUNTER
Refill Approved    Rx renewed per Medication Renewal Policy. Medication was last renewed on 4/15/19.    Ratna English, [facility] Triage/Med Refill 5/21/2020     Requested Prescriptions   Pending Prescriptions Disp Refills     albuterol (PROAIR HFA) 90 mcg/actuation inhaler 25.5 g 0       Albuterol/Levalbuterol Refill Protocol Passed - 5/21/2020 11:06 AM        Passed - PCP or prescribing provider visit in last year     Last office visit with prescriber/PCP: 6/10/2019 Miri Woodruff MD OR same dept: 6/10/2019 Miri Woodruff MD OR same specialty: 6/10/2019 Miri Woodruff MD Last physical: 10/7/2019       Next appt within 3 mo: Visit date not found  Next physical within 3 mo: Visit date not found  Prescriber OR PCP: Miri Woodruff MD  Last diagnosis associated with med order: 1. Pulmonary emphysema, unspecified emphysema type (H)  - albuterol (PROAIR HFA) 90 mcg/actuation inhaler  Dispense: 25.5 g; Refill: 0    If protocol passes may refill for 6 months if within 3 months of last provider visit (or a total of 9 months). If patient requesting >1 inhaler per month refill x 6 months and have patient make appointment with provider.

## 2021-06-08 NOTE — TELEPHONE ENCOUNTER
Patient reports she has been on warfarin for years.  Patient noticed new bruise on her right upper arm on Sunday.  Patient says there is sharp intermittent pain where the bruises occur.  Patient says pain usually just last a couple seconds and then it goes away; has occur twice today.  Patient says she felt better after using CBD cream and taking flexeril.  Patient says there's two bruises.  They are between one to 1.5 inches in diameter.  Patient does not know if they have grown in size.  Patient denies bleeding or bruises anywhere else.  Last INR was done on 5/27/20.   Reviewed care advice with caller per RN triage protocol.  FNA advised to call back with any concerns.   Caller verbalized understanding and agrees with plan.  Message routed to pool for JAGDEEP Woodruff.      Reason for Disposition    Taking Coumadin (warfarin) or other strong blood thinner, or known bleeding disorder (e.g., thrombocytopenia)    Additional Information    Negative: Shock suspected (e.g., cold/pale/clammy skin, too weak to stand, low BP, rapid pulse)    Negative: Sounds like a life-threatening emergency to the triager    Negative: Dizziness or lightheadedness    Negative: [1] Bruise on head/face, chest, or abdomen AND [2]  taking Coumadin (warfarin) or other strong blood thinner, or known bleeding disorder (e.g., thrombocytopenia)    Negative: Unexplained bleeding from another site (e.g., gums, nose, urine) as well    Negative: Patient sounds very sick or weak to the triager    Negative: [1] Not caused by an injury AND [2] 5 or more bruises now    Negative: [1] Raised bruise AND [2] size > 2 inches (5 cm) AND [3] getting bigger    Negative: [1] SEVERE pain AND [2] not improved 2 hours after pain medicine/ice packs    Negative: Suspicious history for the injury    Protocols used: BRUISES-A-

## 2021-06-08 NOTE — TELEPHONE ENCOUNTER
ANTICOAGULATION  MANAGEMENT    Assessment     Today's INR result of 2.8 is Therapeutic (goal INR of 2.0-3.0)        Warfarin taken as previously instructed    No new diet changes affecting INR    No new medication/supplements affecting INR    Continues to tolerate warfarin with no reported s/s of bleeding or thromboembolism     Previous INR was Therapeutic    Plan:     Spoke with Leonela regarding INR result and instructed:     Warfarin Dosing Instructions:  Continue current warfarin dose 6 mg daily on Mon, Wed, Fri; and 3 mg daily rest of week  (0 % change)    Instructed patient to follow up no later than: 4 weeks    Education provided: target INR goal and significance of current INR result, importance of following up for INR monitoring at instructed interval, importance of taking warfarin as instructed, importance of notifying clinic for changes in medications and importance of notifying clinic for diarrhea, nausea/vomiting, reduced intake and/or illness    Leonela verbalizes understanding and agrees to warfarin dosing plan.    Instructed to call the [facility] for any changes, questions or concerns. (#[phone])   ?   Jenny Stanley RN    Subjective/Objective:      Leonela Christianson, a 69 y.o. female is on warfarin.     Leonela reports:     Home warfarin dose: as updated on anticoagulation calendar per template     Missed doses: No     Medication changes:  No     S/S of bleeding or thromboembolism:  No     New Injury or illness:  No     Changes in diet or alcohol consumption:  No     Upcoming surgery, procedure or cardioversion:  No    Anticoagulation Episode Summary     Current INR goal:   2.0-3.0   TTR:   82.9 % (1 y)   Next INR check:   6/24/2020   INR from last check:   2.80 (5/27/2020)   Weekly max warfarin dose:      Target end date:      INR check location:      Preferred lab:      Send INR reminders to:   COLBY ELLER    Indications    History of pulmonary embolism [Z86.711]           Comments:             Anticoagulation Care Providers     Provider Role Specialty Phone number    Miri Woodruff MD Referring Family Medicine [phone]

## 2021-06-08 NOTE — PROGRESS NOTES
Review of Systems - History obtained from the patient  General ROS: negative  Psychological ROS: negative  Ophthalmic ROS: negative  ENT ROS: negative  Allergy and Immunology ROS: negative  Hematological and Lymphatic ROS: negative  Endocrine ROS: negative  Respiratory ROS: positive for - cough, shortness of breath, wheezing and snoring   Cardiovascular ROS: negative  Gastrointestinal ROS: negative  Genito-Urinary ROS: positive for - nocturia  Musculoskeletal ROS: negative  Neurological ROS: positive for - daytime sleepiness  Dermatological ROS: negative

## 2021-06-08 NOTE — PROGRESS NOTES
[facility] Note    Assessment/Plan:    Leonela Christianson is a 66 y.o. old female with:  1. CAD - per pt - had MI in the past - but no WMA on last echo, on aspirin, metoprolol and crestor, check lipid profile  2. HTN - well controlled on metoprolol  3. Parasthesia right arm/hip - pt wished to have referral to neurology for their input.    Dr. Bandar Gonzalez  [facility]  [phone]  ______________________________________________________________________    Subjective:    I had the opportunity to see Leonela Christianson at the [facility]. Leonela Christianson is a 66 y.o. female with a known history of pulmonary embolus on warfarin with complaints of right sided parathesia including right arm and leg.   Symptoms present for 3 months, no weakness but feeling numb, feeling goes into hip/buttocks but not down to the feet.  She had chest pain last fall, that has subsided, some dyspnea on exertion, no different fromt he past - had PFT inJun and CT in December 2016, no change other than atelectasis right middle lobe.  She continues to use tob but now less than ppd.  Tolerating crestor and aspirin, along with metoprolol for HTN and hx of MI int he past - normal echo in 2014 with no WMA.   No edema or syncopal events  ______________________________________________________________________      Review of Systems:   General: WNL  Eyes: WNL  Ears/Nose/Throat: WNL  Lungs: WNL  Heart: WNL  Stomach: WNL  Bladder: WNL  Muscle/Joints: WNL  Skin: WNL  Nervous System: WNL  Mental Health: WNL     Blood: WNL    Problem List:  Patient Active Problem List   Diagnosis     Hyperlipidemia     Nicotine Dependence     Lateral Epicondylitis     Obesity     Osteoporosis     Benign Essential Hypertension     Coronary Artery Disease     Pulmonary embolism     Eyelid edema, unspecified laterality     Upper back pain on left side     Lump of skin     Pulmonary nodule     COPD (chronic obstructive  pulmonary disease)     Medical History:  No past medical history on file.  Surgical History:  Past Surgical History   Procedure Laterality Date     Pr total abdom hysterectomy       Description: Total Abdominal Hysterectomy;  Recorded: 04/19/2012;  Comments: for fibroids     Oophorectomy       Hysterectomy       fibroids     Social History:  No pertinent social hx related to patient's chief complaint other than above in subjective        Family History:  No pertinent family hx related to patient's chief complaint other than above in subjective      Allergies:  Allergies   Allergen Reactions     Sulfa (Sulfonamide Antibiotics) Anaphylaxis       Medications:  Current Outpatient Prescriptions   Medication Sig Dispense Refill     albuterol (PROAIR HFA) 90 mcg/actuation inhaler Inhale 1-2 puffs every 4 (four) hours as needed for wheezing. Every 4 to 6 hours as needed 1 each 5     aspirin 81 MG EC tablet Take 81 mg by mouth daily. As directed       CRESTOR 40 mg tablet TAKE 1 TABLET DAILY 90 tablet 1     lisinopril-hydrochlorothiazide (PRINZIDE,ZESTORETIC) 10-12.5 mg per tablet TAKE 1 TABLET DAILY 90 tablet 0     metoprolol succinate (TOPROL-XL) 50 MG 24 hr tablet TAKE 1 TABLET AT BEDTIME 90 tablet 1     nitroglycerin (NITROSTAT) 0.4 MG SL tablet Place 0.4 mg under the tongue every 5 (five) minutes as needed for chest pain (up to 3 doses).       warfarin (COUMADIN) 3 MG tablet Take 1 and 1/2 tabs (4.5mg) by mouth daily, as directed.  Dose adjusted based on INR. 156 tablet 3     cyclobenzaprine (FLEXERIL) 10 MG tablet Take 1 tablet (10 mg total) by mouth every 8 (eight) hours as needed for muscle spasms. 20 tablet 0     No current facility-administered medications for this visit.        Objective:   Vital signs:  Visit Vitals     /72 (Patient Site: Left Arm, Patient Position: Sitting, Cuff Size: Adult Regular)     Pulse 100     Resp 16     Ht 5' (1.524 m)     Wt 148 lb (67.1 kg)     BMI 28.9 kg/m2         Physical  Exam:    GENERAL APPEARANCE: Alert, cooperative and in no acute distress.  HEENT: No scleral icterus. No Xanthelasma. Oral mucuos membranes pink and moist.  NECK: JVP<6cm. No Hepatojugular reflux. Thyroid not visualized  CHEST: clear to auscultation  CARDIOVASCULAR: S1, S2 without murmur ,clicks or rubs. Brachial, radial and posterior tibial pulses are intact and symetric. No carotid bruits noted.    ABDOMEN: Nontender. BS+. No bruits.  EXTREMITIES: No cyanosis, clubbing or edema.    Lab Results:  LIPIDS:  Lab Results   Component Value Date    CHOL 148 12/17/2015    CHOL 139 11/18/2014    CHOL 144 01/02/2014     Lab Results   Component Value Date    HDL 56 12/17/2015    HDL 53 11/18/2014    HDL 53 01/02/2014     Lab Results   Component Value Date    LDLCALC 82 12/17/2015    LDLCALC 77 11/18/2014    LDLCALC 80 01/02/2014     Lab Results   Component Value Date    TRIG 52 12/17/2015    TRIG 44 11/18/2014    TRIG 53 01/02/2014     No components found for: CHOLHDL    BMP:  Lab Results   Component Value Date    CREATININE 0.96 10/10/2016    BUN 15 10/10/2016     10/10/2016    K 3.9 10/10/2016     10/10/2016    CO2 24 10/10/2016         Bandar Gonzalez MD  [facility]  [phone]

## 2021-06-09 NOTE — TELEPHONE ENCOUNTER
ANTICOAGULATION  MANAGEMENT    Assessment     Today's INR result of 2.4 is Therapeutic (goal INR of 2.0-3.0)        Warfarin taken as previously instructed    No new diet changes affecting INR    No new medication/supplements affecting INR    Continues to tolerate warfarin with no reported s/s of bleeding or thromboembolism     Previous INR was Supratherapeutic    Plan:     Spoke with Leonela regarding INR result and instructed:     Warfarin Dosing Instructions:  Continue current warfarin dose 6 mg daily on Mon, Fri; and 3 mg daily rest of week  (0 % change)    Instructed patient to follow up no later than: 2 weeks    Education provided: target INR goal and significance of current INR result, importance of following up for INR monitoring at instructed interval, importance of taking warfarin as instructed, importance of notifying clinic for changes in medications and importance of notifying clinic for diarrhea, nausea/vomiting, reduced intake and/or illness    Leonela verbalizes understanding and agrees to warfarin dosing plan.    Instructed to call the [facility] for any changes, questions or concerns. (#[phone])   ?   Jenny Stanley RN    Subjective/Objective:      Leonelagayle Christianson, a 70 y.o. female is on warfarin.     Leonela reports:     Home warfarin dose: as updated on anticoagulation calendar per template     Missed doses: No     Medication changes:  No     S/S of bleeding or thromboembolism:  No     New Injury or illness:  No     Changes in diet or alcohol consumption:  No     Upcoming surgery, procedure or cardioversion:  No    Anticoagulation Episode Summary     Current INR goal:   2.0-3.0   TTR:   72.8 % (1 y)   Next INR check:   7/29/2020   INR from last check:   2.40 (7/15/2020)   Weekly max warfarin dose:      Target end date:      INR check location:      Preferred lab:      Send INR reminders to:   [facility]    Indications    History of pulmonary embolism [Z86.711]           Comments:             Anticoagulation Care Providers     Provider Role Specialty Phone number    Miri Woodruff MD Referring Family Medicine [phone]

## 2021-06-09 NOTE — TELEPHONE ENCOUNTER
ANTICOAGULATION  MANAGEMENT    Assessment     Today's INR result of 3.9 is Supratherapeutic (goal INR of 2.0-3.0)        Warfarin taken as previously instructed    No new diet changes affecting INR    No new medication/supplements affecting INR    Continues to tolerate warfarin with no reported s/s of bleeding or thromboembolism     Previous INR was Therapeutic    Plan:     Spoke with Leonela regarding INR result and instructed:     Warfarin Dosing Instructions:  Hold warfarin today only then change warfarin dose to 6 mg daily on Mon, Fri; and 3 mg daily rest of week  (10 % change)    Instructed patient to follow up no later than: 2 weeks    Education provided: target INR goal and significance of current INR result, importance of notifying clinic for changes in medications and importance of notifying clinic for diarrhea, nausea/vomiting, reduced intake and/or illness    Leonela verbalizes understanding and agrees to warfarin dosing plan.    Instructed to call the [facility] for any changes, questions or concerns. (#[phone])   ?   Jenny Stanley RN    Subjective/Objective:      Leonela Christianson, a 70 y.o. female is on warfarin.     Leonela reports:     Home warfarin dose: verbally confirmed home dose with Leonela and updated on anticoagulation calendar     Missed doses: No     Medication changes:  No     S/S of bleeding or thromboembolism:  No     New Injury or illness:  Yes: some increase in bruising for past month     Changes in diet or alcohol consumption:  No     Upcoming surgery, procedure or cardioversion:  No    Anticoagulation Episode Summary     Current INR goal:   2.0-3.0   TTR:   75.1 % (1 y)   Next INR check:   7/15/2020   INR from last check:   3.90! (7/1/2020)   Weekly max warfarin dose:      Target end date:      INR check location:      Preferred lab:      Send INR reminders to:   COLBY ELLER    Indications    History of pulmonary embolism [Z86.711]           Comments:             Anticoagulation Care Providers     Provider Role Specialty Phone number    Miri Woodruff MD Referring Family Medicine [phone]

## 2021-06-10 ENCOUNTER — AMBULATORY - HEALTHEAST (OUTPATIENT)
Dept: SURGERY | Facility: CLINIC | Age: 71
End: 2021-06-10

## 2021-06-10 DIAGNOSIS — L72.3 SEBACEOUS CYST: ICD-10-CM

## 2021-06-10 RX ORDER — MUPIROCIN 20 MG/G
OINTMENT TOPICAL
Status: SHIPPED | COMMUNITY
Start: 2021-05-25 | End: 2022-03-28

## 2021-06-10 RX ORDER — WARFARIN SODIUM 3 MG/1
3 TABLET ORAL SEE ADMIN INSTRUCTIONS
Status: SHIPPED | COMMUNITY
Start: 2021-06-10 | End: 2022-02-22

## 2021-06-10 NOTE — TELEPHONE ENCOUNTER
ANTICOAGULATION  MANAGEMENT    Assessment     Today's INR result of 2.2 is Therapeutic (goal INR of 2.0-3.0)        Warfarin taken as previously instructed    No new diet changes affecting INR    No new medication/supplements affecting INR    Continues to tolerate warfarin with no reported s/s of bleeding or thromboembolism     Previous INR was Therapeutic    Plan:     Spoke on phone with Leonela regarding INR result and instructed:     Warfarin Dosing Instructions:  Continue current warfarin dose    6 mg every Mon, Fri; 3 mg all other days         Instructed patient to follow up no later than: 4 weeks.     Education provided: importance of following up for INR monitoring at instructed interval and importance of taking warfarin as instructed    Leonela verbalizes understanding and agrees to warfarin dosing plan.    Instructed to call the [facility] for any changes, questions or concerns. (#[phone])   ?   Hector Holguin RN    Subjective/Objective:      Leonela ZARATE Tristen Christianson, a 70 y.o. female is on warfarin.     Leonela reports:     Home warfarin dose: as updated on anticoagulation calendar per template     Missed doses: No     Medication changes:  No     S/S of bleeding or thromboembolism:  No     New Injury or illness:  No     Changes in diet or alcohol consumption:  No     Upcoming surgery, procedure or cardioversion:  No    Anticoagulation Episode Summary     Current INR goal:   2.0-3.0   TTR:   72.8 % (1 y)   Next INR check:   9/23/2020   INR from last check:   2.20 (8/26/2020)   Weekly max warfarin dose:      Target end date:      INR check location:      Preferred lab:      Send INR reminders to:   [facility]    Indications    History of pulmonary embolism [Z86.711]           Comments:            Anticoagulation Care Providers     Provider Role Specialty Phone number    Miri Woodruff MD Referring Family Medicine [phone]

## 2021-06-10 NOTE — PROGRESS NOTES
Subjective:       Leonela Christianson is a 66 y.o. female who presents for primarily a discussion of her long-term use of Coumadin.  She is due for annual renewal for the anticoagulation monitoring program.  I had recommended that she come in to discuss her history in more detail.  She states that she has had 2 pulmonary emboli, once in approximately 1984 after a miscarriage and again in approximately 1996 after a hysterectomy.  She does not recall any workup for a clotting disorder.  She had no prolonged immobility with either of these episodes, no recent car trips or plane rides.  After her second PE, she was instructed that she would need anticoagulation lifelong.  She states that she does not mind the monitoring required for Coumadin, would like to stay on this medication.  To review, she also has coronary artery disease and follows with [facility] heart care Dr. Gonzalez.  She has a bare-metal stent in place.  She has never had cancer.  She continues to smoke cigarettes and is not interested in discussing cessation.  She is due for colon cancer screening.  She has done fecal occult blood testing in the past.  She is interested in talking about cologuard.  Patient's only other concern today is that of frequent urination.  This happens during the day as well, but is most bothersome overnight.  She gets up anywhere from 1 to several times nightly to urinate.  She does feel that she empties completely.  She has no leakage with cough or sneeze.    The following portions of the patient's history were reviewed and updated as appropriate: allergies, current medications, past medical history, past surgical history and problem list.    Current Outpatient Prescriptions   Medication Sig     albuterol (PROAIR HFA) 90 mcg/actuation inhaler Inhale 1-2 puffs every 4 (four) hours as needed for wheezing. Every 4 to 6 hours as needed     aspirin 81 MG EC tablet Take 81 mg by mouth daily. As directed     CRESTOR 40 mg tablet  TAKE 1 TABLET DAILY     lisinopril-hydrochlorothiazide (PRINZIDE,ZESTORETIC) 10-12.5 mg per tablet TAKE 1 TABLET DAILY     nitroglycerin (NITROSTAT) 0.4 MG SL tablet Place 0.4 mg under the tongue every 5 (five) minutes as needed for chest pain (up to 3 doses).     TOPROL XL 50 mg 24 hr tablet TAKE 1 TABLET AT BEDTIME     warfarin (COUMADIN) 3 MG tablet Take 1 and 1/2 tabs (4.5mg) by mouth daily, as directed.  Dose adjusted based on INR.         Review of Systems   Pertinent items are noted in HPI.      Objective:      /72 (Patient Site: Right Arm, Patient Position: Sitting, Cuff Size: Adult Regular)  Pulse 96  Resp 20  Ht 5' (1.524 m)  Wt 147 lb (66.7 kg)  BMI 28.71 kg/m2    General appearance: alert, appears stated age and cooperative  Lungs: clear to auscultation bilaterally , mildly diminished lung sounds throughout consistent with previous  Heart: regular rate and rhythm, S1, S2 normal, no murmur, click, rub or gallop  Extremities: extremities normal, atraumatic, no cyanosis or edema        Assessment/Plan:       1. Pulmonary embolism  Patient has had 2 PEs in the past, unclear if these were provoked but it sounds as though they were not.  She was recommended to pursue lifelong anticoagulation.  She would like to stay on Coumadin as opposed to changing to a newer anticoagulant.  - INR    2. Frequent urination  Based on her description, it sounds as though she does not necessarily empty completely.  She has had a vaginal delivery in the past.  She likely has some pelvic floor dysfunction.  Discussed minimizing fluids after 7 PM, trying to drink plenty of fluids and try not to hold the urine during the day.  Also discussed Kegel exercises.  She does not desire further workup at this point but will keep me posted.    3. Colon cancer screening  Assisted patient in ordering Cologuard for routine screening.  - Cologuard    4. Hyperlipidemia, unspecified hyperlipidemia type  Patient continues on Crestor and  tolerates this well.  Lipid panel updated per Dr. Gonzalez.  - Lipid panel    5. Essential hypertension  Well-controlled on current medication regimen.  - Lipid panel    6. Coronary atherosclerosis  Doing well, no recent anginal symptoms.  Follows closely with [facility] heart care.  - Lipid panel    7. Nicotine Dependence  Continues to smoke cigarettes, tries to minimize use from time to time, not interested in assistance with cessation.  I have counseled the patient for tobacco cessation and the follow up will occur  at the next visit.    8. Chronic obstructive pulmonary disease, unspecified COPD type  Not on a controller medication.  Denies symptoms of shortness of breath.       Follow-up: 6 months for Medicare wellness visit

## 2021-06-10 NOTE — TELEPHONE ENCOUNTER
ANTICOAGULATION  MANAGEMENT    Assessment     Today's INR result of 2.4 is Therapeutic (goal INR of 2.0-3.0)        Warfarin taken as previously instructed    No new diet changes affecting INR    No new medication/supplements affecting INR    Continues to tolerate warfarin with no reported s/s of bleeding or thromboembolism     Previous INR was Therapeutic    Plan:     Spoke with Leonela regarding INR result and instructed:     Warfarin Dosing Instructions:  Continue current warfarin dose 6 mg daily on mon/fri; and 3 mg daily rest of week  (0 % change)    Instructed patient to follow up no later than: 4 weeks    Education provided: importance of therapeutic range  Leonela verbalizes understanding to plan.    Instructed to call the [facility] for any changes, questions or concerns. (#[phone])   ?   Yuliet Cardenas RN    Subjective/Objective:      Leonela Christianson, a 70 y.o. female is on warfarin.     Leonela reports:     Home warfarin dose: as updated on anticoagulation calendar per template     Missed doses: No     Medication changes:  No     S/S of bleeding or thromboembolism:  No     New Injury or illness:  No     Changes in diet or alcohol consumption:  No     Upcoming surgery, procedure or cardioversion:  No    Anticoagulation Episode Summary     Current INR goal:   2.0-3.0   TTR:   72.8 % (1 y)   Next INR check:   8/26/2020   INR from last check:   2.40 (7/29/2020)   Weekly max warfarin dose:      Target end date:      INR check location:      Preferred lab:      Send INR reminders to:   [facility]    Indications    History of pulmonary embolism [Z86.711]           Comments:            Anticoagulation Care Providers     Provider Role Specialty Phone number    Miri Woodruff MD Referring Family Medicine [phone]

## 2021-06-11 NOTE — ANESTHESIA PREPROCEDURE EVALUATION
Anesthesia Evaluation      Patient summary reviewed   No history of anesthetic complications     Airway   Mallampati: II  Neck ROM: full   Pulmonary - normal exam   (+) COPD moderate, a smoker                         Cardiovascular - normal exam  Exercise tolerance: > or = 4 METS  (+) hypertension, past MI, CAD, CABG/stent, , hypercholesterolemia,     (-) angina  ECG reviewed        Neuro/Psych - negative ROS     Endo/Other - negative ROS      GI/Hepatic/Renal    (+) GERD,   chronic renal disease,           Dental    (+) poor dentition                       Anesthesia Plan  Planned anesthetic: MAC    ASA 3 - emergent     Anesthetic plan and risks discussed with: patient    Post-op plan: routine recovery

## 2021-06-11 NOTE — TELEPHONE ENCOUNTER
ANTICOAGULATION  MANAGEMENT: Discharge Review    Leonela Christianson chart reviewed for anticoagulation continuity of care    Hospital admission on  9/11 to 9/14 for hypoxia; ureteral stone.    Discharge disposition: Home    INR Results:       Recent labs: (last 7 days)     09/11/20  1803 09/12/20  0821 09/13/20  0847 09/14/20  0941   INR 1.53* 1.85* 2.15* 2.06*       Warfarin inpatient management: more warfarin administered than maintenance regimen    Warfarin discharge instructions: 6 mg daily.     Medication Changes Affecting Anticoagulation: No    Additional Factors Affecting Anticoagulation: No    Plan     Agree with dosing adjustment on discharge    Spoke with Leonela  . INR appt is scheduled for 9/17 per discharge instructions.    Anticoagulation calendar updated    Hector Holguin RN

## 2021-06-11 NOTE — TELEPHONE ENCOUNTER
ANTICOAGULATION  MANAGEMENT    Assessment     9/29's INR result of 1.51 is Subtherapeutic (goal INR of 2.0-3.0)        Warfarin recently held as instructed which may be affecting INR- had cystoscopy 9/29.     No new diet changes affecting INR    No new medication/supplements affecting INR    Continues to tolerate warfarin with no reported s/s of bleeding or thromboembolism     Previous INR was Therapeutic    Plan:     Spoke on phone with Leonela regarding INR result and instructed:     Warfarin Dosing Instructions:  Take booster dose of 6 mg today 9/30 only then continue current warfarin dose    6 mg every Mon, Fri; 3 mg all other days         Instructed patient to follow up no later than: 1-2 weeks. Pt said she prefers to check in 1 week.     Education provided: importance of following up for INR monitoring at instructed interval and importance of taking warfarin as instructed    Leonela verbalizes understanding and agrees to warfarin dosing plan.    Instructed to call the [facility] for any changes, questions or concerns. (#[phone])   ?   Hector Holguin RN    Subjective/Objective:      Leonela ELLIOT Christianson, a 70 y.o. female is on warfarin.     Leonela reports:     Home warfarin dose: verbally confirmed home dose with pt and updated on anticoagulation calendar     Missed doses: No     Medication changes:  No     S/S of bleeding or thromboembolism:  No     New Injury or illness:  No     Changes in diet or alcohol consumption:  No     Upcoming surgery, procedure or cardioversion:  No    Anticoagulation Episode Summary     Current INR goal:   2.0-3.0   TTR:   68.0 % (11.9 mo)   Next INR check:   10/6/2020   INR from last check:   1.51! (9/29/2020)   Weekly max warfarin dose:      Target end date:      INR check location:      Preferred lab:      Send INR reminders to:   [facility]    Indications    History of pulmonary embolism [Z86.711]           Comments:            Anticoagulation Care Providers      Provider Role Specialty Phone number    Miri Woodruff MD Referring Family Medicine [phone]

## 2021-06-11 NOTE — PATIENT INSTRUCTIONS - HE
Please restart losartan (previous dose was 50 mg daily).  Call me if you are having trouble getting refills of this medication.    Try to get a hold of the blood pressure cuff and check your blood pressure a couple of times weekly.  I will have Sandra call you in 1 week to report these blood pressures.

## 2021-06-11 NOTE — TELEPHONE ENCOUNTER
ANTICOAGULATION  MANAGEMENT    Assessment     Today's INR result of 2.90 is Therapeutic (goal INR of 2.0-3.0)        Warfarin taken as previously instructed    - reported, she was being given a higher warfarin dose of 6mg daily.    No new diet changes affecting INR    Potential interaction between Cefdinir and warfarin which may affect subsequent INRs    - Cefdinir 300mg for 10 days for pyelonephritis, from 9/14-24.   - HELD Losartan and Hydrochlorothiazide d/t LEELA.    Continues to tolerate warfarin with no reported s/s of bleeding or thromboembolism     Previous INR was Therapeutic at 2.06 on 9/14/20.    S/p Cystoscopy with ureteral stent placement on 9/11/20.  (left urethral stone)    Plan:     Spoke on phone with Leonela regarding INR result and instructed:     Warfarin Dosing Instructions:   (has 3mg tabs)   - advised to resume her previous wkly warfarin dose prior to hospitalization.   - Continue current warfarin dose 6 mg daily on Mon/Fri; and 3 mg daily rest of week.    Instructed patient to follow up no later than:  Scheduled to recheck INR status on 9/21/20 during hospital f/u with Dr. Woodruff    Education provided: importance of consistent vitamin K intake, target INR goal and significance of current INR result, potential interaction between warfarin and Cefdinir and importance of notifying clinic for changes in medications    Leonela verbalizes understanding and agrees to warfarin dosing plan.    Instructed to call the [facility] for any changes, questions or concerns. (#[phone])   ?   Jeanne Olsen RN    Subjective/Objective:      Leonela Christianson, a 70 y.o. female is on warfarin.     Leonela reports:     Home warfarin dose: as updated on anticoagulation calendar per template     Missed doses: No.  Leonela reported, during hospitalization she was given higher warfarin dose of 6mg daily.     Medication changes:  Yes:  Currently on 10 day ABX - Cefdinjir from 9/14-24 for pyelonephritis.     S/S of  bleeding or thromboembolism:  No     New Injury or illness:  Yes:  S/p Cystoscopy with urethral stent placement on 9/11/20, r/t left urethral stone).  Will have appt with Urologist to remove stent.     Changes in diet or alcohol consumption:  No     Upcoming surgery, procedure or cardioversion:  No    Anticoagulation Episode Summary     Current INR goal:   2.0-3.0   TTR:   69.2 % (12 mo)   Next INR check:   9/24/2020   INR from last check:   2.90 (9/17/2020)   Weekly max warfarin dose:      Target end date:      INR check location:      Preferred lab:      Send INR reminders to:   [facility]    Indications    History of pulmonary embolism [Z86.711]           Comments:            Anticoagulation Care Providers     Provider Role Specialty Phone number    Miri Woodruff MD Referring Family Medicine [phone]

## 2021-06-11 NOTE — PROGRESS NOTES
"Called and spoke with Leonela.  She is feeling fine and does not think she needs to come back and see Dr.. Akers. (was due for 1 year follow up visit in June).  We were following her nodules, but that was \"completed\" in Dec. 2016.  Told her that she can certainly call and schedule an appointment at any time if she or her PCP feel she needs to be seen again.  "

## 2021-06-11 NOTE — TELEPHONE ENCOUNTER
RN cannot approve Refill Request    RN can NOT refill this medication Protocol failed and NO refill given. Last office visit: 6/10/2019 Miri Woodruff MD Last Physical: 10/7/2019 Last MTM visit: Visit date not found Last visit same specialty: 6/10/2019 Miri Woodruff MD.  Next visit within 3 mo: Visit date not found  Next physical within 3 mo: Visit date not found      Ratna English, [facility] Triage/Med Refill 9/17/2020    Requested Prescriptions   Pending Prescriptions Disp Refills     warfarin ANTICOAGULANT (COUMADIN/JANTOVEN) 3 MG tablet [Pharmacy Med Name: WARFARIN TABS 3MG] 140 tablet 4     Sig: TAKE 1 TO 2 TABLETS DAILY, ADJUST DOSE PER INR RESULT AS DIRECTED       Warfarin Refill Protocol  Failed - 9/16/2020  2:40 AM        Failed -  Route to appropriate pool/provider     Last Anticoagulation Summary:   Anticoagulation Episode Summary     Current INR goal:   2.0-3.0   TTR:   69.2 % (12 mo)   Next INR check:   9/24/2020   INR from last check:   2.90 (9/17/2020)   Weekly max warfarin dose:      Target end date:      INR check location:      Preferred lab:      Send INR reminders to:   [facility]    Indications    History of pulmonary embolism [Z86.711]           Comments:            Anticoagulation Care Providers     Provider Role Specialty Phone number    Miri Woodruff MD Referring Family Medicine [phone]                Passed - Provider visit in last year     Last office visit with prescriber/PCP: 6/10/2019 Miri Woodruff MD OR same dept: Visit date not found OR same specialty: 6/10/2019 Miri Woodruff MD  Last physical: 10/7/2019 Last MTM visit: Visit date not found    Next appt within 3 mo: Visit date not found Next physical within 3 mo: Visit date not found  Prescriber OR PCP: Miri Woodruff MD  Last diagnosis associated with med order: 1. Blood clot in vein  - warfarin ANTICOAGULANT (COUMADIN/JANTOVEN) 3 MG tablet [Pharmacy Med Name: WARFARIN  TABS 3MG]; TAKE 1 TO 2 TABLETS DAILY, ADJUST DOSE PER INR RESULT AS DIRECTED  Dispense: 140 tablet; Refill: 4    If protocol passes may refill for 6 months if within 3 months of last provider visit (or a total of 9 months).

## 2021-06-11 NOTE — ANESTHESIA POSTPROCEDURE EVALUATION
Patient: Leonela Christianson  Procedure(s):  CYSTOSCOPY, LEFT URETEROSCOPY, LASER LITHOTRIPSY, LEFT STENT PLACEMENT, left retrograde pyelogram, basket extraction (Left)  Anesthesia type: general    Patient location: Phase II Recovery  Last vitals:   Vitals Value Taken Time   /76 9/29/2020  1:44 PM   Temp 36.7  C (98.1  F) 9/29/2020  1:30 PM   Pulse 83 9/29/2020  1:52 PM   Resp 26 9/29/2020  1:32 PM   SpO2 95 % 9/29/2020  1:52 PM   Vitals shown include unvalidated device data.  Post vital signs: stable  Level of consciousness: awake and responds to simple questions  Post-anesthesia pain: pain controlled  Post-anesthesia nausea and vomiting: no  Pulmonary: unassisted, return to baseline  Cardiovascular: stable and blood pressure at baseline  Hydration: adequate  Anesthetic events: no    QCDR Measures:  ASA# 11 - Lizett-op Cardiac Arrest: ASA11B - Patient did NOT experience unanticipated cardiac arrest  ASA# 12 - Lizett-op Mortality Rate: ASA12B - Patient did NOT die  ASA# 13 - PACU Re-Intubation Rate: ASA13B - Patient did NOT require a new airway mgmt  ASA# 10 - Composite Anes Safety: ASA10A - No serious adverse event    Additional Notes:

## 2021-06-11 NOTE — TELEPHONE ENCOUNTER
ANTICOAGULATION  MANAGEMENT    Assessment     Today's INR result of 2.5 is Therapeutic (goal INR of 2.0-3.0)        Warfarin taken as previously instructed    No new diet changes affecting INR    No new medication/supplements affecting INR has been taking keflex since 9/14-9/24    Continues to tolerate warfarin with no reported s/s of bleeding or thromboembolism     Previous INR was Therapeutic     Possible ureter stent removal soon but not scheduled. Leonela said she was not instructed to change warfarin for this, She will let us know  When scheduled if warfarin hold is advised    Plan:     Left detailed message for Leonela regarding INR result and instructed:     Warfarin Dosing Instructions:  Continue current warfarin dose 6 mg daily on mon/fri; and 3 mg daily rest of week  (0 % change)    Instructed patient to follow up no later than: 1-2  weeks or as instructed by stent team    .  Instructed to call the [facility] for any changes, questions or concerns. (#[phone])   ?   Caleb Queen RN    Subjective/Objective:      Leonela Christianson, a 70 y.o. female is on warfarin.     Leonela reports:     Home warfarin dose: as updated on anticoagulation calendar per template     Missed doses: No     Medication changes:  No     S/S of bleeding or thromboembolism:  No     New Injury or illness:  No     Changes in diet or alcohol consumption:  No     Upcoming surgery, procedure or cardioversion:  Yes: maybe stent removal soon    Anticoagulation Episode Summary     Current INR goal:   2.0-3.0   TTR:   69.2 % (12 mo)   Next INR check:   10/5/2020   INR from last check:   2.50 (9/21/2020)   Weekly max warfarin dose:      Target end date:      INR check location:      Preferred lab:      Send INR reminders to:   [facility]    Indications    History of pulmonary embolism [Z86.711]           Comments:            Anticoagulation Care Providers     Provider Role Specialty Phone number    Miri Woodruff MD Referring  Family Medicine [phone]

## 2021-06-11 NOTE — ANESTHESIA CARE TRANSFER NOTE
Last vitals:   Vitals:    09/29/20 1310   BP: 142/73   Pulse: 91   Resp: 16   Temp:    SpO2: 100%     Patient's level of consciousness is drowsy  Spontaneous respirations: yes  Maintains airway independently: yes  Dentition unchanged: yes  Oropharynx: oropharynx clear of all foreign objects    QCDR Measures:  ASA# 20 - Surgical Safety Checklist: WHO surgical safety checklist completed prior to induction    PQRS# 430 - Adult PONV Prevention: 4558F - Pt received => 2 anti-emetic agents (different classes) preop & intraop  ASA# 8 - Peds PONV Prevention: NA - Not pediatric patient, not GA or 2 or more risk factors NOT present  PQRS# 424 - Lizett-op Temp Management: 4559F - At least one body temp DOCUMENTED => 35.5C or 95.9F within required timeframe  PQRS# 426 - PACU Transfer Protocol: - Transfer of care checklist used  ASA# 14 - Acute Post-op Pain: ASA14B - Patient did NOT experience pain >= 7 out of 10

## 2021-06-11 NOTE — ANESTHESIA CARE TRANSFER NOTE
Last vitals:   Vitals:    09/12/20 0009   BP: 154/76   Pulse: 97   Resp: 16   Temp: 37.1  C (98.7  F)   SpO2: 100%     Patient's level of consciousness is drowsy  Spontaneous respirations: yes  Maintains airway independently: yes  Dentition unchanged: yes  Oropharynx: oropharynx clear of all foreign objects    QCDR Measures:  ASA# 20 - Surgical Safety Checklist: WHO surgical safety checklist completed prior to induction    PQRS# 430 - Adult PONV Prevention: 4558F - Pt received => 2 anti-emetic agents (different classes) preop & intraop  ASA# 8 - Peds PONV Prevention: NA - Not pediatric patient, not GA or 2 or more risk factors NOT present  PQRS# 424 - Lizett-op Temp Management: 4559F - At least one body temp DOCUMENTED => 35.5C or 95.9F within required timeframe  PQRS# 426 - PACU Transfer Protocol: - Transfer of care checklist used  ASA# 14 - Acute Post-op Pain: ASA14B - Patient did NOT experience pain >= 7 out of 10

## 2021-06-11 NOTE — TELEPHONE ENCOUNTER
Spoke with pt, she updated me on her hospitalization and health, I gave her directions to [facility] for her f/u next week.  No further instructions.

## 2021-06-11 NOTE — TELEPHONE ENCOUNTER
Who is calling: Patient  Reason for Call:  The patient is calling [facility] regarding her surgery yesterday. She is requesting a return marilee to discuss her wafarin dosing.    Okay to leave a detailed message: Yes

## 2021-06-11 NOTE — PROGRESS NOTES
Assessment/Plan:       1. Ureteral stone  Patient reports that she is currently pain-free.  She is going to be contacted by Urology for stent removal this week she states.  - Basic Metabolic Panel    2. Acute pyelonephritis  Patient continues on Cefdinir.  She has no side effects from this med that she has noticed.  - Basic Metabolic Panel    3. LEELA (acute kidney injury) (H)  Nearly resolved by the time of discharge.  Plan recheck once more today.  - Basic Metabolic Panel    4. Pulmonary emphysema, unspecified emphysema type (H)  5. Chronic bronchitis, unspecified chronic bronchitis type (H)  Patient continues her Stiolto and reports her breathing is at her baseline.    6. Essential hypertension  Elevated pressure today, her losartan was discontinued during her inpatient stay due to some lower pressures.  Recommended restart and Rx sent.  - Basic Metabolic Panel  - losartan (COZAAR) 50 MG tablet; Take 1 tablet (50 mg total) by mouth daily.  Dispense: 90 tablet; Refill: 3    7. History of pulmonary embolism  Patient continues on warfarin.  INR due today.  - INR    8. Need for immunization against influenza  Flu vaccine updated today.  - Influenza,Quad,High Dose,PF 65 YR+        Subjective:       Leonela Christianson is a 70 y.o. female who presents for hospital follow-up.  She was hospitalized at [facility] from 9/11-9/14/2020 with left-sided back pain that ended up being due to nephrolithiasis.  She was also diagnosed with pyelonephritis and acute kidney injury.  She ended up having a ureteral stent placed that will need to be removed as an outpatient.  She was treated with antibiotics for pyelonephritis along with pain control.  Her acute kidney injury had nearly resolved by the time of discharge.  She had some lower blood pressures as an inpatient, and so her HCTZ (12.5 mg) and losartan (50 mg) were held.  She also had some hypoxia on presentation, but this resolved without intervention.  Since  discharge, patient reports she is feeling much better.  She is tolerating her antibiotic, and really denies any pain.  She has no other questions or concerns today.    Labs Reviewed:  Recent Results (from the past 240 hour(s))   Comprehensive Metabolic Panel   Result Value Ref Range    Sodium 134 (L) 136 - 145 mmol/L    Potassium 4.0 3.5 - 5.0 mmol/L    Chloride 101 98 - 107 mmol/L    CO2 23 22 - 31 mmol/L    Anion Gap, Calculation 10 5 - 18 mmol/L    Glucose 112 70 - 125 mg/dL    BUN 14 8 - 28 mg/dL    Creatinine 1.14 (H) 0.60 - 1.10 mg/dL    GFR MDRD Af Amer 57 (L) >60 mL/min/1.73m2    GFR MDRD Non Af Amer 47 (L) >60 mL/min/1.73m2    Bilirubin, Total 0.4 0.0 - 1.0 mg/dL    Calcium 9.6 8.5 - 10.5 mg/dL    Protein, Total 7.8 6.0 - 8.0 g/dL    Albumin 3.9 3.5 - 5.0 g/dL    Alkaline Phosphatase 69 45 - 120 U/L    AST 24 0 - 40 U/L    ALT 17 0 - 45 U/L   Lipase   Result Value Ref Range    Lipase 28 0 - 52 U/L   INR   Result Value Ref Range    INR 1.53 (H) 0.90 - 1.10   APTT(PTT)   Result Value Ref Range    PTT 30 24 - 37 seconds   HM1 (CBC with Diff)   Result Value Ref Range    WBC 12.0 (H) 4.0 - 11.0 thou/uL    RBC 5.13 3.80 - 5.40 mill/uL    Hemoglobin 16.1 (H) 12.0 - 16.0 g/dL    Hematocrit 47.8 (H) 35.0 - 47.0 %    MCV 93 80 - 100 fL    MCH 31.4 27.0 - 34.0 pg    MCHC 33.7 32.0 - 36.0 g/dL    RDW 13.3 11.0 - 14.5 %    Platelets 221 140 - 440 thou/uL    MPV 9.0 8.5 - 12.5 fL    Neutrophils % 84 (H) 50 - 70 %    Lymphocytes % 9 (L) 20 - 40 %    Monocytes % 7 2 - 10 %    Eosinophils % 0 0 - 6 %    Basophils % 0 0 - 2 %    Immature Granulocyte % 0 <=0 %    Neutrophils Absolute 10.0 (H) 2.0 - 7.7 thou/uL    Lymphocytes Absolute 1.1 0.8 - 4.4 thou/uL    Monocytes Absolute 0.8 0.0 - 0.9 thou/uL    Eosinophils Absolute 0.0 0.0 - 0.4 thou/uL    Basophils Absolute 0.1 0.0 - 0.2 thou/uL    Immature Granulocyte Absolute 0.1 (H) <=0.0 thou/uL   Troponin I   Result Value Ref Range    Troponin I 0.02 0.00 - 0.29 ng/mL    BNP(B-type Natriuretic Peptide)   Result Value Ref Range    BNP <10 0 - 120 pg/mL   Lactic Acid   Result Value Ref Range    Lactic Acid 0.9 0.7 - 2.0 mmol/L   Urinalysis-UC if Indicated   Result Value Ref Range    Color, UA Yellow Colorless, Yellow, Straw, Light Yellow    Clarity, UA Cloudy (!) Clear    Glucose, UA Negative Negative    Bilirubin, UA Negative Negative    Ketones, UA Negative Negative    Specific Gravity, UA 1.021 1.001 - 1.030    Blood, UA Small (!) Negative    pH, UA 5.0 4.5 - 8.0    Protein, UA Trace (!) Negative mg/dL    Urobilinogen, UA <2.0 E.U./dL <2.0 E.U./dL, 2.0 E.U./dL    Nitrite, UA Negative Negative    Leukocytes, UA Negative Negative    Bacteria, UA Moderate (!) None Seen hpf    RBC, UA 3-5 (!) None Seen, 0-2 hpf    WBC, UA 0-5 None Seen, 0-5 hpf    Squam Epithel, UA 25-50 (!) None Seen, 0-5 lpf    Mucus, UA Few (!) None Seen lpf    Granular Casts, UA 0-5 (!) None Seen lpf   Culture, Urine    Specimen: Urine   Result Value Ref Range    Culture No Growth    ECG 12 lead nursing unit performed   Result Value Ref Range    SYSTOLIC BLOOD PRESSURE 148 mmHg    DIASTOLIC BLOOD PRESSURE 82 mmHg    VENTRICULAR RATE 100 BPM    ATRIAL RATE 100 BPM    P-R INTERVAL 140 ms    QRS DURATION 68 ms    Q-T INTERVAL 328 ms    QTC CALCULATION (BEZET) 423 ms    P Axis 72 degrees    R AXIS 71 degrees    T AXIS 66 degrees    MUSE DIAGNOSIS       Normal sinus rhythm  Nonspecific ST abnormality  Abnormal ECG  When compared with ECG of 20-NOV-2017 14:26,  Nonspecific T wave abnormality has replaced inverted T waves in Inferior leads  Confirmed by SEE ED PROVIDER NOTE FOR, ECG INTERPRETATION ([id number]),  LYNN BHAGAT ([id number]) on 9/11/2020 9:12:39 PM     Urinalysis-UC if Indicated   Result Value Ref Range    Color, UA Yellow Colorless, Yellow, Straw, Light Yellow    Clarity, UA Cloudy (!) Clear    Glucose, UA Negative Negative    Bilirubin, UA Negative Negative    Ketones, UA Negative Negative    Specific  Gravity, UA 1.021 1.001 - 1.030    Blood, UA Small (!) Negative    pH, UA 5.0 4.5 - 8.0    Protein, UA Negative Negative mg/dL    Urobilinogen, UA <2.0 E.U./dL <2.0 E.U./dL, 2.0 E.U./dL    Nitrite, UA Negative Negative    Leukocytes, UA Negative Negative    Bacteria, UA Few (!) None Seen hpf    RBC, UA 3-5 (!) None Seen, 0-2 hpf    WBC, UA 0-5 None Seen, 0-5 hpf    Squam Epithel, UA 10-25 (!) None Seen, 0-5 lpf    Mucus, UA Few (!) None Seen lpf   COVID-19 Virus PCR MRF    Specimen: Nasopharyngeal Swab; Respiratory   Result Value Ref Range    COVID-19 VIRUS SPECIMEN SOURCE Nasopharyngeal     2019-nCOV       Test received-See reflex to IDDL test SARS CoV2 (COVID-19) Virus RT-PCR   SARS-CoV-2 (COVID-19) RT-PCR-IDDL    Specimen: Nasopharyngeal Swab; Respiratory   Result Value Ref Range    SARS-CoV-2 Virus Specimen Source Nasopharyngeal     SARS-CoV-2 PCR Result NEGATIVE     SARS-COV-2 PCR COMMENT       Testing was performed using the [employer] COVID-19 Direct Assay on the [employer]   Urinalysis-UC if Indicated   Result Value Ref Range    Color, UA Yellow Colorless, Yellow, Straw, Light Yellow    Clarity, UA Clear Clear    Glucose, UA Negative Negative    Bilirubin, UA Negative Negative    Ketones, UA Negative Negative    Specific Gravity, UA 1.022 1.001 - 1.030    Blood, UA Trace (!) Negative    pH, UA 5.0 4.5 - 8.0    Protein, UA Trace (!) Negative mg/dL    Urobilinogen, UA <2.0 E.U./dL <2.0 E.U./dL, 2.0 E.U./dL    Nitrite, UA Negative Negative    Leukocytes, UA Negative Negative    Bacteria, UA Few (!) None Seen hpf    RBC, UA 3-5 (!) None Seen, 0-2 hpf    WBC, UA 0-5 None Seen, 0-5 hpf    Squam Epithel, UA 0-5 None Seen, 0-5 lpf    Amorphous, UA Few (!) None Seen    Mucus, UA Few (!) None Seen lpf    Hyaline Casts, UA 0-5 0-5, None Seen lpf   Pregnancy, urine   Result Value Ref Range    Pregnancy Test, Urine Negative Negative   Culture, Urine    Specimen: Kidney, Left; Urine   Result Value Ref Range    Culture No  Growth    INR   Result Value Ref Range    INR 1.85 (H) 0.90 - 1.10   Basic Metabolic Panel   Result Value Ref Range    Sodium 135 (L) 136 - 145 mmol/L    Potassium 3.9 3.5 - 5.0 mmol/L    Chloride 104 98 - 107 mmol/L    CO2 22 22 - 31 mmol/L    Anion Gap, Calculation 9 5 - 18 mmol/L    Glucose 112 70 - 125 mg/dL    Calcium 8.9 8.5 - 10.5 mg/dL    BUN 17 8 - 28 mg/dL    Creatinine 1.17 (H) 0.60 - 1.10 mg/dL    GFR MDRD Af Amer 55 (L) >60 mL/min/1.73m2    GFR MDRD Non Af Amer 46 (L) >60 mL/min/1.73m2   HM2(CBC w/o Differential)   Result Value Ref Range    WBC 9.2 4.0 - 11.0 thou/uL    RBC 4.50 3.80 - 5.40 mill/uL    Hemoglobin 14.2 12.0 - 16.0 g/dL    Hematocrit 42.0 35.0 - 47.0 %    MCV 93 80 - 100 fL    MCH 31.6 27.0 - 34.0 pg    MCHC 33.8 32.0 - 36.0 g/dL    RDW 13.5 11.0 - 14.5 %    Platelets 183 140 - 440 thou/uL    MPV 9.2 8.5 - 12.5 fL   Magnesium   Result Value Ref Range    Magnesium 2.0 1.8 - 2.6 mg/dL   Phosphorus   Result Value Ref Range    Phosphorus 3.6 2.5 - 4.5 mg/dL   D-dimer, Quantitative   Result Value Ref Range    D-Dimer, Quant <=0.27 <=0.50 FEU ug/mL   INR   Result Value Ref Range    INR 2.15 (H) 0.90 - 1.10   Basic Metabolic Panel   Result Value Ref Range    Sodium 138 136 - 145 mmol/L    Potassium 4.1 3.5 - 5.0 mmol/L    Chloride 105 98 - 107 mmol/L    CO2 25 22 - 31 mmol/L    Anion Gap, Calculation 8 5 - 18 mmol/L    Glucose 103 70 - 125 mg/dL    Calcium 8.6 8.5 - 10.5 mg/dL    BUN 13 8 - 28 mg/dL    Creatinine 1.01 0.60 - 1.10 mg/dL    GFR MDRD Af Amer >60 >60 mL/min/1.73m2    GFR MDRD Non Af Amer 54 (L) >60 mL/min/1.73m2   INR   Result Value Ref Range    INR 2.06 (H) 0.90 - 1.10   INR   Result Value Ref Range    INR 2.90 (H) 0.90 - 1.10     Imaging Reviewed:  Ct Abdomen Pelvis Without Oral Without Iv Contrast  Result Date: 9/11/2020  EXAM: CT ABDOMEN PELVIS WO ORAL WO IV CONTRAST LOCATION: [facility] DATE/TIME: 9/11/2020 7:37 PM INDICATION: LLQ abdominal pain, diverticulitis  suspected COMPARISON: None. TECHNIQUE: CT scan of the abdomen and pelvis was performed without oral or IV contrast. Multiplanar reformats were obtained. Dose reduction techniques were used. CONTRAST: None. FINDINGS: LOWER CHEST: Severe emphysema. Moderate-sized hiatal hernia. HEPATOBILIARY: Small hepatic cysts. PANCREAS: Normal. SPLEEN: Normal. ADRENAL GLANDS: Normal. KIDNEY/BLADDER: Moderate left hydronephrosis. 4 x 4 x 5 mm obstructing calculus at the left ureteropelvic junction. No additional renal calculi. BOWEL: Scattered colonic diverticula, without signs of diverticulitis. LYMPH NODES: Normal. VASCULATURE: Unremarkable. PELVIC ORGANS: Hysterectomy. MUSCULOSKELETAL: Normal.   1.  5 mm obstructing calculus at the left ureteropelvic junction results in moderate left hydronephrosis.    Xr Chest 1 View Portable  Result Date: 9/11/2020  EXAM: XR CHEST 1 VIEW PORTABLE LOCATION: [facility] DATE/TIME: 9/11/2020 8:40 PM INDICATION: hypoxia w/ exertion, hx COPD COMPARISON: 11/18/2017   Lungs hyperinflated consistent with COPD. Slight linear scarring or atelectasis at right lung base with lungs otherwise clear. Heart size normal.    Xr Retrograde Pyelogram W Or Wo Kub Intraoperative  Result Date: 9/12/2020  EXAM: XR RETROGRADE PYELOGRAM W OR WO KUB INTRAOPERATIVE LOCATION: [facility] DATE/TIME: 9/12/2020 12:04 AM INDICATION: ureteral stent placement COMPARISON: CT 09/11/2020 TECHNIQUE: Exam performed by Urologist. FLUOROSCOPIC TIME: .3 minutes NUMBER OF IMAGES: 3 FINDINGS: Contrast injected into the left upper collecting system demonstrating a goblet sign at the UPJ consistent with UPJ stone. Mild initial hydronephrosis. Final images demonstrate a left renal stent in good position.      The following portions of the patient's history were reviewed and updated as appropriate: allergies, current medications, past medical history, past social history, past surgical history and problem  list.      Current Outpatient Medications:      albuterol (PROAIR HFA) 90 mcg/actuation inhaler, USE 1 TO 2 INHALATIONS EVERY 4 TO 6 HOURS AS NEEDED FOR WHEEZING (NEED APPOINTMENT FOR FOLLOW UP MONITORING FOR THIS MEDICATION), Disp: 25.5 g, Rfl: 10     cefdinir (OMNICEF) 300 MG capsule, Take 1 capsule (300 mg total) by mouth 2 (two) times a day for 10 days., Disp: 20 capsule, Rfl: 0     clopidogreL (PLAVIX) 75 mg tablet, TAKE 1 TABLET DAILY, Disp: 90 tablet, Rfl: 2     nitroglycerin (NITROSTAT) 0.4 MG SL tablet, Place 1 tablet (0.4 mg total) under the tongue every 5 (five) minutes as needed for chest pain., Disp: 20 tablet, Rfl: 0     rosuvastatin (CRESTOR) 40 MG tablet, TAKE 1 TABLET DAILY AFTER LUNCH, Disp: 90 tablet, Rfl: 2     STIOLTO RESPIMAT 2.5-2.5 mcg/actuation Mist inhaler, USE 2 INHALATIONS DAILY (Patient taking differently: Inhale 2 Inhalation every evening. ), Disp: 12 g, Rfl: 4     TOPROL  mg 24 hr tablet, TAKE 1 TABLET AT BEDTIME, Disp: 90 tablet, Rfl: 0     warfarin ANTICOAGULANT (COUMADIN/JANTOVEN) 3 MG tablet, Take 1 to 2 tablets (3 to 6 mg) by mouth daily. Adjust dose per INR results as instructed., Disp: 100 tablet, Rfl: 1     warfarin ANTICOAGULANT (COUMADIN/JANTOVEN) 6 MG tablet, Take 1 tablet (6 mg total) by mouth daily., Disp: 30 tablet, Rfl: 0     acetaminophen (TYLENOL) 325 MG tablet, Take 2 tablets (650 mg total) by mouth every 4 (four) hours as needed., Disp:  , Rfl: 0     cyclobenzaprine (FLEXERIL) 10 MG tablet, Take 1 tablet (10 mg total) by mouth every 8 (eight) hours as needed for muscle spasms., Disp: 30 tablet, Rfl: 1     Post Discharge Medication Reconciliation Status: discharge medications reconciled and changed, per note/orders      Review of Systems   A 12 point comprehensive review of systems was negative except as noted.      Objective:      BP (!) 154/96 (Patient Site: Right Arm, Patient Position: Sitting, Cuff Size: Adult Regular)   Pulse 75   Ht 5' (1.524 m)   Wt  149 lb 3.2 oz (67.7 kg)   Breastfeeding No   BMI 29.14 kg/m      General appearance: alert, appears stated age and cooperative  Head: Normocephalic, without obvious abnormality, atraumatic  Eyes: conjunctivae clear, sclerae anicteric  Lungs: clear to auscultation bilaterally  Heart: regular rate and rhythm, S1, S2 normal, no murmur, click, rub or gallop  Abdomen: soft, non-tender; bowel sounds normal; no masses,  no organomegaly  Extremities: extremities normal, atraumatic, no cyanosis or edema  Neurologic: Grossly normal, good historian        Results for orders placed or performed in visit on 09/21/20   Basic Metabolic Panel   Result Value Ref Range    Sodium 139 mmol/L    Potassium 4.5 mmol/L    Chloride 107 mmol/L    CO2 22 mmol/L    Anion Gap, Calculation 10 mmol/L    Glucose 78 mg/dL    Calcium 9.3 mg/dL    BUN 13 mg/dL    Creatinine 0.95 mg/dL    GFR MDRD Af Amer >60 mL/min/1.73m2    GFR MDRD Non Af Amer 58 (L) mL/min/1.73m2   INR   Result Value Ref Range    INR 2.50 (H) 0.90 - 1.10

## 2021-06-11 NOTE — TELEPHONE ENCOUNTER
Refill Approved    Rx renewed per Medication Renewal Policy. Medication was last renewed on 6/28/19.    Ratna English, [facility] Triage/Med Refill 9/1/2020     Requested Prescriptions   Pending Prescriptions Disp Refills     TOPROL  mg 24 hr tablet [Pharmacy Med Name: TOPROL XL TABS 100MG] 90 tablet 3     Sig: TAKE 1 TABLET AT BEDTIME       Beta-Blockers Refill Protocol Passed - 8/30/2020 10:06 AM        Passed - PCP or prescribing provider visit in past 12 months or next 3 months     Last office visit with prescriber/PCP: 6/10/2019 Miri Woodruff MD OR same dept: Visit date not found OR same specialty: 6/10/2019 Miri Woodruff MD  Last physical: 10/7/2019 Last MTM visit: Visit date not found   Next visit within 3 mo: Visit date not found  Next physical within 3 mo: Visit date not found  Prescriber OR PCP: Miri Woodruff MD  Last diagnosis associated with med order: 1. HTN (hypertension)  - TOPROL  mg 24 hr tablet [Pharmacy Med Name: TOPROL XL TABS 100MG]; TAKE 1 TABLET AT BEDTIME  Dispense: 90 tablet; Refill: 3    If protocol passes may refill for 12 months if within 3 months of last provider visit (or a total of 15 months).             Passed - Blood pressure filed in past 12 months     BP Readings from Last 1 Encounters:   05/21/20 111/73

## 2021-06-11 NOTE — ANESTHESIA PREPROCEDURE EVALUATION
Anesthesia Evaluation      Patient summary reviewed   No history of anesthetic complications     Airway   Mallampati: II  Neck ROM: full   Pulmonary - normal exam   (+) COPD moderate, a smoker                         Cardiovascular - normal exam  Exercise tolerance: > or = 4 METS  (+) hypertension, past MI, CAD, CABG/stent, , hypercholesterolemia,     (-) angina  ECG reviewed        Neuro/Psych - negative ROS     Endo/Other - negative ROS      GI/Hepatic/Renal    (+) GERD,   chronic renal disease,           Dental    (+) poor dentition                         Anesthesia Plan  Planned anesthetic: general LMA    ASA 3   Induction: intravenous   Anesthetic plan and risks discussed with: patient  Anesthesia plan special considerations: antiemetics,   Post-op plan: routine recovery

## 2021-06-11 NOTE — ANESTHESIA POSTPROCEDURE EVALUATION
Patient: Leonela Christianson  Procedure(s):  CYSTOSCOPY, WITH URETERAL STENT INSERTION (Left)  Anesthesia type: MAC    Patient location: PACU  Last vitals:   Vitals Value Taken Time   /78 9/12/2020 12:16 AM   Temp 37.1  C (98.7  F) 9/12/2020 12:09 AM   Pulse 99 9/12/2020 12:20 AM   Resp 25 9/12/2020 12:20 AM   SpO2 97 % 9/12/2020 12:20 AM   Vitals shown include unvalidated device data.  Post vital signs: stable  Level of consciousness: awake and responds to simple questions  Post-anesthesia pain: pain controlled  Post-anesthesia nausea and vomiting: no  Pulmonary: unassisted, return to baseline  Cardiovascular: stable and blood pressure at baseline  Hydration: adequate  Anesthetic events: no    QCDR Measures:  ASA# 11 - Lizett-op Cardiac Arrest: ASA11B - Patient did NOT experience unanticipated cardiac arrest  ASA# 12 - Lizett-op Mortality Rate: ASA12B - Patient did NOT die  ASA# 13 - PACU Re-Intubation Rate: NA - No ETT / LMA used for case  ASA# 10 - Composite Anes Safety: ASA10A - No serious adverse event    Additional Notes:

## 2021-06-11 NOTE — PROGRESS NOTES
Clinic Care Coordination Contact  Community Health Worker Initial Outreach    Patient accepts CC: No, patient said she does not feel like she is need of any assistance at this time. Patient will be sent Care Coordination introduction letter for future reference.

## 2021-06-12 NOTE — TELEPHONE ENCOUNTER
ANTICOAGULATION  MANAGEMENT    Assessment     Today's INR result of 2.3 is Therapeutic (goal INR of 2.0-3.0)        Warfarin taken as previously instructed    No new diet changes affecting INR    No new medication/supplements affecting INR    Continues to tolerate warfarin with no reported s/s of bleeding or thromboembolism     Previous INR was Therapeutic    Plan:     Spoke on phone with Leonela regarding INR result and instructed:     Warfarin Dosing Instructions:  Continue current warfarin dose    6 mg every Sun, Tue, Fri; 3 mg all other days         Instructed patient to follow up no later than: 4 weeks.     Education provided: importance of following up for INR monitoring at instructed interval and importance of taking warfarin as instructed    Leonela verbalizes understanding and agrees to warfarin dosing plan.    Instructed to call the [facility] for any changes, questions or concerns. (#[phone])   ?   Hector Holguin RN    Subjective/Objective:      Leonela Christianson, a 70 y.o. female is on warfarin.     Leonela reports:     Home warfarin dose: as updated on anticoagulation calendar per template     Missed doses: No     Medication changes:  No     S/S of bleeding or thromboembolism:  No     New Injury or illness:  No     Changes in diet or alcohol consumption:  No     Upcoming surgery, procedure or cardioversion:  No    Anticoagulation Episode Summary     Current INR goal:  2.0-3.0   TTR:  64.2 % (12 mo)   Next INR check:  11/27/2020   INR from last check:  2.30 (10/30/2020)   Weekly max warfarin dose:     Target end date:     INR check location:     Preferred lab:     [facility]    Indications    History of pulmonary embolism [Z86.711]           Comments:           Anticoagulation Care Providers     Provider Role Specialty Phone number    Miri Woodruff MD Referring Family Medicine [phone]

## 2021-06-12 NOTE — TELEPHONE ENCOUNTER
ANTICOAGULATION  MANAGEMENT    Assessment     Today's INR result of 2.1 is Therapeutic (goal INR of 2.0-3.0)        Warfarin taken as previously instructed    No new diet changes affecting INR    No new medication/supplements affecting INR    Continues to tolerate warfarin with no reported s/s of bleeding or thromboembolism     Previous INR was Subtherapeutic    Plan:     Spoke on phone with Leonela regarding INR result and instructed:     Warfarin Dosing Instructions:  Continue current warfarin dose    6 mg every Sun, Tue, Fri; 3 mg all other days         Instructed patient to follow up no later than: 2 weeks.     Education provided: importance of following up for INR monitoring at instructed interval and importance of taking warfarin as instructed    Leonela verbalizes understanding and agrees to warfarin dosing plan.    Instructed to call the [facility] for any changes, questions or concerns. (#[phone])   ?   Hector Holguin RN    Subjective/Objective:      Leonela Christianson, a 70 y.o. female is on warfarin.     Leonela reports:     Home warfarin dose: as updated on anticoagulation calendar per template     Missed doses: No     Medication changes:  No     S/S of bleeding or thromboembolism:  No     New Injury or illness:  No     Changes in diet or alcohol consumption:  No     Upcoming surgery, procedure or cardioversion:  No    Anticoagulation Episode Summary     Current INR goal:  2.0-3.0   TTR:  64.3 % (12 mo)   Next INR check:  10/30/2020   INR from last check:  2.10 (10/16/2020)   Weekly max warfarin dose:     Target end date:     INR check location:     Preferred lab:     Send INR reminders to:  [facility]    Indications    History of pulmonary embolism [Z86.711]           Comments:           Anticoagulation Care Providers     Provider Role Specialty Phone number    Miri Woodruff MD Referring Family Medicine [phone]

## 2021-06-13 NOTE — TELEPHONE ENCOUNTER
Reason for Call:  Medication or medication refill: Refill- Lavona from [employer] states this was faxed a few times for approval but has not received so requesting Rx    Do you use a [facility]?  Name of the pharmacy and phone number for the current request: [facility]    Name of the medication requested: albuterol (PROAIR HFA) 90 mcg/actuation inhaler       Other request: new Rx    Can we leave a detailed message on this number? No call back needed    Phone number patient can be reached at: Other phone number:  [phone] ([facility])    Best Time: no return call needed    Call taken on 12/17/2020 at 12:19 PM by Shayna Mckeon

## 2021-06-13 NOTE — TELEPHONE ENCOUNTER
Spoke with pt.  She has dental extraction on 12/8.  They would like INR under 3.0 with an INR 24 hours prior to appt.  Rescheduled 11/27 appt for 12/8.  No further questions.

## 2021-06-13 NOTE — TELEPHONE ENCOUNTER
ANTICOAGULATION  MANAGEMENT    Assessment     Today's INR result of 2.2 is Therapeutic (goal INR of 2.0-3.0)        Warfarin taken as previously instructed    No new diet changes affecting INR    No new medication/supplements affecting INR    Continues to tolerate warfarin with no reported s/s of bleeding or thromboembolism     Previous INR was Therapeutic    Plan:     Spoke on phone with Leonela regarding INR result and instructed:     Warfarin Dosing Instructions:  Continue current warfarin dose 6 mg daily on Sun, Tue, Fri; and 3 mg daily rest of week  (0 % change)    Instructed patient to follow up no later than: 4 weeks    Education provided: target INR goal and significance of current INR result, importance of following up for INR monitoring at instructed interval, importance of taking warfarin as instructed, importance of notifying clinic for changes in medications and importance of notifying clinic for diarrhea, nausea/vomiting, reduced intake and/or illness    Leonela verbalizes understanding and agrees to warfarin dosing plan.    Instructed to call the [facility] for any changes, questions or concerns. (#[phone])   ?   Jenny Stanley RN    Subjective/Objective:      Leonela Christianson, a 70 y.o. female is on warfarin.     Leonela reports:     Home warfarin dose: verbally confirmed home dose with Leonela and updated on anticoagulation calendar     Missed doses: No     Medication changes:  No     S/S of bleeding or thromboembolism:  No     New Injury or illness:  No     Changes in diet or alcohol consumption:  No     Upcoming surgery, procedure or cardioversion:  Yes: dental procedure tomorrow    Anticoagulation Episode Summary     Current INR goal:  2.0-3.0   TTR:  69.4 % (12 mo)   Next INR check:  1/4/2021   INR from last check:  2.20 (12/7/2020)   Weekly max warfarin dose:     Target end date:     INR check location:     Preferred lab:     Send INR reminders to:  COLBY ELLER    Indications     History of pulmonary embolism [Z86.711]           Comments:           Anticoagulation Care Providers     Provider Role Specialty Phone number    Miri Woodruff MD Referring Family Medicine [phone]

## 2021-06-13 NOTE — TELEPHONE ENCOUNTER
Leonela is calling and is requesting to speak with an INR RN  regarding upcoming appointment as he has a dental appointment for extraction of teeth and is needing to change appointment.  Please phone Leonela.      COVID 19 Nurse Triage Plan/Patient Instructions    Please be aware that novel coronavirus (COVID-19) may be circulating in the community. If you develop symptoms such as fever, cough, or SOB or if you have concerns about the presence of another infection including coronavirus (COVID-19), please contact your health care provider or visit [email or web address].     Disposition/Instructions    Home care recommended. Follow home care protocol based instructions.    Thank you for taking steps to prevent the spread of this virus.  o Limit your contact with others.  o Wear a simple mask to cover your cough.  o Wash your hands well and often.    Resources    [facility]: About COVID-19: [email or web address]    CDC: What to Do If You're Sick: www.cdc.gov/coronavirus/2019-ncov/about/steps-when-sick.html    CDC: Ending Home Isolation: www.cdc.gov/coronavirus/2019-ncov/hcp/disposition-in-home-patients.html     CDC: Caring for Someone: www.cdc.gov/coronavirus/2019-ncov/if-you-are-sick/care-for-someone.html     [facility]: Interim Guidance for Hospital Discharge to Home: www.health.[facility].mn.us/diseases/coronavirus/hcp/hospdischarge.pdf    [facility] clinical trials (COVID-19 research studies): clinicalaffairs.[facility]-clinical-trials     Below are the COVID-19 hotlines at the [facility] of Health ([facility]). Interpreters are available.   o For health questions: Call 788-934-9216 or 1-696.640.8841 (7 a.m. to 7 p.m.)  o For questions about schools and childcare: Call 245-272-1296 or 1-275.868.9093 (7 a.m. to 7 p.m.)       Reason for Disposition    Information only question and nurse able to answer    Additional Information    Negative: Nursing judgment    Negative: Nursing judgment    Negative:  Nursing judgment    Negative: Nursing judgment    Protocols used: NO PROTOCOL AVAILABLE - INFORMATION ONLY-A-[place]

## 2021-06-13 NOTE — PROGRESS NOTES
"[facility] Daily Progress/Discharge summary     Patient Name: Leonela Christianson  Date: 10/5/2017  Visit #: 3  PTA visit #:    Referral Diagnosis: acute midline low back pain  Referring provider: Miri Woodruff MD  Visit Diagnosis:     ICD-10-CM    1. Acute midline low back pain without sciatica M54.5    2. Generalized muscle weakness M62.81    3. Nicotine Dependence F17.200    4. Osteoporosis M81.0          Assessment:   Patient feels that she's made 100% progress since beginning PT and has met her goals. She would like to hold her chart open for 30 days in case she needs to return for flare ups, questions, or concerns. After 30 days, she will be discharged and will need a new order to resume. Patient is agreeable to this plan.    HEP/POC compliance is  good .  Patient demonstrates understanding/independence with home program.    Goal Status:  Pt. will demonstrate/verbalize independence in self-management of condition in : 6 weeks;Met  Pt. will be independent with home exercise program in : 6 weeks;Met  Pt will: have less pain getting out of bed in the mornings within 8 weeks in order to return to OF. (GOAL MET)    Plan / Patient Education:       Exercises:  Exercise #1:  glut/piriformis stretch  Comment #1: 30\" holds B each, 2-3 reps  Exercise #2: HS stretch on stair  Comment #2: 30\" holds B, 2-3 reps  Exercise #3: abd set+march  Comment #3: x10 with 5\" holds  Exercise #4: clamshell  Comment #4: 5\" holds, until fatigue  Exercise #5: sit to stands  Comment #5: until fatigue  Exercise #6: abd set +pilates arch- straight and \"V\"  Comment #6: x10 with 4# weight     Plan for next visit: Hold chart open for 30 days.    Subjective:     Pain Ratin/10    Woke up with upper back soreness today. Low back is feeling good. Feels that she's made 100% progress since beginning PT.       Objective:   Some pain with single leg ext + abd set; terminated and switched exs to march, which didn't increase any " pain  SOB with Nu' Step due to COPD  No increase in pain with today's exs    Treatment Today     TREATMENT MINUTES COMMENTS   Evaluation     Self-care/ Home management     Manual therapy     Neuromuscular Re-education     Therapeutic Activity     Therapeutic Exercises 23 Nu' Step 5' L5. Discussed progress, goals, and D/C. Had patient fill out HELADIO. Progressed HEP-see flow sheet   Gait training     Modality__________________                Total 23    Blank areas are intentional and mean the treatment did not include these items.       Syed Flores, PT, DPT  10/5/2017

## 2021-06-13 NOTE — PROGRESS NOTES
[facility] Certification Request    September 20, 2017      Patient: Leonela Christianson  MR Number: [id number]  YOB: 1950  Date of Visit: 9/20/2017      Dear Dr. Woodruff,    Thank you for this referral.   We are seeing Leonela Christianson for Physical Therapy of acute low back pain.    Medicare and/or Medicaid requires physician review and approval of the treatment plan. Please review the plan of care and verify that you agree with the therapy plan of care by co-signing this note.      Plan of Care  Authorization / Certification Start Date: 09/20/17  Authorization / Certification End Date: 12/20/17  Authorization / Certification Number of Visits: 12  Communication with: Referral Source  Patient Related Instruction: Nature of Condition;Treatment plan and rationale;Self Care instruction;Basis of treatment;Posture;Body mechanics;Precautions;Next steps;Expected outcome  Times per Week: 1-2  Number of Weeks: 6-8  Number of Visits: 12  Precautions / Restrictions : Osteoporosis, COPD  Therapeutic Exercise: ROM;Stretching;Strengthening  Neuromuscular Reeducation: posture;core  Equipment: theraband    Goals:  Pt. will demonstrate/verbalize independence in self-management of condition in : 6 weeks  Pt. will be independent with home exercise program in : 6 weeks  Pt will: have less pain getting out of bed in the mornings within 8 weeks in order to return to PLOF.      If you have any questions or concerns, please don't hesitate to call.    Sincerely,      Syed Flores, PT, DPT        Physician recommendation:     ___ Follow therapist's recommendation        ___ Modify therapy      *Physician co-signature indicates they certify the need for these services furnished within this plan and while under their care.        [facility]   [facility] Initial Evaluation    Patient Name: Leonela Christianson  Date of evaluation: 9/20/2017  Referral Diagnosis: Acute midline low back pain  without sciatica  Referring provider: Miri Woodruff MD  Visit Diagnosis:     ICD-10-CM    1. Acute midline low back pain without sciatica M54.5    2. Generalized muscle weakness M62.81    3. Nicotine Dependence F17.200    4. Osteoporosis M81.0        Assessment:      Leonela Christianson is a 67 y.o. female who presents to therapy today with chief complaints of acute midline low back pain. Symptoms began about a week ago when she was getting out of bed in the morning and noticed her pain. The pain gets better as she moves throughout the day and bothersome only with getting out of bed in the mornings.  Patient has a PMH that is positive for COPD, Osteoporosis, CAD, hx of pulmonary embolism, nicotine dependence, and HTN. Pain symptoms are intermittent and improving.  Patient demonstrates signs and sx consistent with muscle tightness. Imaging also reveals some degenerative changes. Patient appears motivated for physical therapy and is appropriate for skilled therapy services.    Goals:  Pt. will demonstrate/verbalize independence in self-management of condition in : 6 weeks  Pt. will be independent with home exercise program in : 6 weeks  Pt will: have less pain getting out of bed in the mornings within 8 weeks in order to return to PLOF.    Patient's expectations/goals are realistic.    Barriers to Learning or Achieving Goals:  Co-morbidities or other medical factors.  see PMH       Plan / Patient Instructions:        Plan of Care:   Authorization / Certification Start Date: 09/20/17  Authorization / Certification End Date: 12/20/17  Authorization / Certification Number of Visits: 12  Communication with: Referral Source  Patient Related Instruction: Nature of Condition;Treatment plan and rationale;Self Care instruction;Basis of treatment;Posture;Body mechanics;Precautions;Next steps;Expected outcome  Times per Week: 1-2  Number of Weeks: 6-8  Number of Visits: 12  Precautions / Restrictions : Osteoporosis,  "COPD  Therapeutic Exercise: ROM;Stretching;Strengthening  Neuromuscular Reeducation: posture;core  Equipment: theraband     Exercises:  Exercise #1:  glut/piriformis stretch  Comment #1: 30\" holds B each, 2-3 reps  Exercise #2: HS stretch on stair  Comment #2: 30\" holds B, 2-3 reps    Plan for next visit: add in sit to stands, abd sets, clamshell     Subjective:         Social information:   Living Situation:apartment   Occupation:retired   Hobbies: watches grand son (3 years old) 3-5 days/week         History of Present Illness:    Leonela is a 67 y.o. female who presents to therapy today with complaints of midline low back pain. Date of onset/duration of symptoms is . Her pain began when she got out of bed. Once she got moving, she felt pretty good. The next day, she had the same problem. She tried sleeping in the recliner for a few nights and the pain seemed to improve. Patient started using her bed again and the pain got worse again. Patient tried sleeping in her son's bed and she had the same problem. She sleeps on her back and her side primarily.  Symptoms are intermittent and getting better. She reports  an episodic  history of similar symptoms, but this episode didn't go away as quick as it does normally. She describes their previous level of function as not limited. Coughing increased her pain too initially. She had some tingling into her initially. No change in activity recently.    Pain Ratin  Pain rating at best: 0  Pain rating at worst: 10  Pain description: \"pain, it just hurts\"    Functional limitations are described as occurring with: getting out of bed in the morning      Patient reports benefit from:  movement , muscle relaxor         Objective:      Note: Items left blank indicates the item was not performed or not indicated at the time of the evaluation.    Patient Outcome Measures :    Modified Oswestry Low Back Pain Disablity Questionnaire  in %: 8   Scores range from 0-100%, where a score " of 0% represents minimal pain and maximal function. The minimal clinically important difference is a score reduction of 12%.    Examination  1. Acute midline low back pain without sciatica     2. Generalized muscle weakness     3. Nicotine Dependence     4. Osteoporosis       Involved side: midline and lateral of bilaterally  Posture Observation:      General sitting posture is  fair.    Lumbar ROM:    Date:      *Indicate scale AROM AROM AROM   Lumbar Flexion Proximal-mid tibia, limited by HS tightness B     Lumbar Extension Mod-severely limited, limited by pain and stiffness      Right Left Right Left Right Left   Lumbar Sidebending WFL, pain-free WFL, pain-free       Lumbar Rotation         Thoracic Flexion      Thoracic Extension      Thoracic Sidebending         Thoracic Rotation           Lower Extremity Strength:   4/5 B, pain-free    Sensation    Intact per subjective    Palpation: tender B glut/piriformis complexes, B PSIS, L3-5 spinous processes, B lumbar paraspinals    Lumbar Special Tests:     Lumbar Special Tests Right Left SI Tests Right  Left   Quadrant test   SI Compression + +   Straight leg raise 75 degrees, limited by HS length 75 degrees, limited by HS length SI Distraction     Crossover response neg neg POSH Test     Slump Felt different than the right, but no increase in radicular pain neg Sacral Thrust     Sit-up test  FADIR     Trunk extensor endurance test  Resisted Abduction     Prone instability test  Other:     Pubic shotgun  Other:       LE Screen:  B hip PROM: WNL, increased LBP with most movements       Treatment Today     TREATMENT MINUTES COMMENTS   Evaluation 15    Self-care/ Home management     Manual therapy     Neuromuscular Re-education     Therapeutic Activity     Therapeutic Exercises 21 Discussed PT POC and pathology of condition. Answered patient questions. Began HEP-see flowsheet. Recommend patient continue heat as needed.   Gait training     Modality__________________                 Total 36    Blank areas are intentional and mean the treatment did not include these items.     PT Evaluation Code: (Please list factors)  Patient History/Comorbidities: COPD, Osteoporosis, CAD, hx of pulmonary embolism, nicotine dependence, HTN  Examination: acute LBP  Clinical Presentation: stable  Clinical Decision Making: low    Patient History/  Comorbidities Examination  (body structures and functions, activity limitations, and/or participation restrictions) Clinical Presentation Clinical Decision Making (Complexity)   No documented Comorbidities or personal factors 1-2 Elements Stable and/or uncomplicated Low   1-2 documented comorbidities or personal factor 3 Elements Evolving clinical presentation with changing characteristics Moderate   3-4 documented comorbidities or personal factors 4 or more Unstable and unpredictable High              Syed Flores, PT, DPT  9/20/2017  11:05 AM

## 2021-06-13 NOTE — TELEPHONE ENCOUNTER
Refill Approved    Rx renewed per Medication Renewal Policy. Medication was last renewed on 9/1/20, last OV 9/21/20.    Deidre Bhatti, [facility] Triage/Med Refill 11/29/2020     Requested Prescriptions   Pending Prescriptions Disp Refills     metoprolol succinate (TOPROL-XL) 100 MG 24 hr tablet [Pharmacy Med Name: METOPROLOL SUCCINATE ER TABS 100MG] 90 tablet 3     Sig: TAKE 1 TABLET AT BEDTIME       Beta-Blockers Refill Protocol Passed - 11/28/2020  1:44 AM        Passed - PCP or prescribing provider visit in past 12 months or next 3 months     Last office visit with prescriber/PCP: 9/21/2020 Miri Woodruff MD OR same dept: Visit date not found OR same specialty: 9/21/2020 Miri Woodruff MD  Last physical: 10/7/2019 Last MTM visit: Visit date not found   Next visit within 3 mo: Visit date not found  Next physical within 3 mo: Visit date not found  Prescriber OR PCP: Miri Woodruff MD  Last diagnosis associated with med order: 1. HTN (hypertension)  - metoprolol succinate (TOPROL-XL) 100 MG 24 hr tablet [Pharmacy Med Name: METOPROLOL SUCCINATE ER TABS 100MG]; TAKE 1 TABLET AT BEDTIME  Dispense: 90 tablet; Refill: 3    If protocol passes may refill for 12 months if within 3 months of last provider visit (or a total of 15 months).             Passed - Blood pressure filed in past 12 months     BP Readings from Last 1 Encounters:   09/29/20 137/71

## 2021-06-13 NOTE — PROGRESS NOTES
"[facility] Daily Progress     Patient Name: Leonela Christianson  Date: 2017  Visit #: 2  PTA visit #:    Referral Diagnosis: acute midline low back pain  Referring provider: Miri Woodruff MD  Visit Diagnosis:     ICD-10-CM    1. Acute midline low back pain without sciatica M54.5    2. Generalized muscle weakness M62.81    3. Nicotine Dependence F17.200    4. Osteoporosis M81.0          Assessment:     HEP/POC compliance is  good .  Patient demonstrates understanding/independence with home program.  Patient is benefitting from skilled physical therapy and is making steady progress toward functional goals.  Patient is appropriate to continue with skilled physical therapy intervention, as indicated by initial plan of care.    Goal Status:  Pt. will demonstrate/verbalize independence in self-management of condition in : 6 weeks  Pt. will be independent with home exercise program in : 6 weeks  Pt will: have less pain getting out of bed in the mornings within 8 weeks in order to return to PLOF.    Plan / Patient Education:     Continue with initial plan of care.  Progress with home program as tolerated.     Exercises:  Exercise #1:  glut/piriformis stretch  Comment #1: 30\" holds B each, 2-3 reps  Exercise #2: HS stretch on stair  Comment #2: 30\" holds B, 2-3 reps  Exercise #3: abd set  Comment #3: x10 with 5\" holds  Exercise #4: clamshell  Comment #4: 5\" holds, until fatigue  Exercise #5: sit to stands  Comment #5: until fatigue     Plan for next visit: bridging, abd set +single leg ext, pilates arch    Subjective:     Pain Ratin/10    Feeling much better. Was doing some work over the weekend and had right sided low back pain.       Objective:     Sit to stands until fatigue: 17 with out use of UE's  No increase in pain with today's exs    Treatment Today     TREATMENT MINUTES COMMENTS   Evaluation     Self-care/ Home management     Manual therapy     Neuromuscular Re-education   "   Therapeutic Activity     Therapeutic Exercises 23 Nu' Step 5' L5. Discussed progress. Progressed HEP- see flow sheet.   Gait training     Modality__________________                Total 23    Blank areas are intentional and mean the treatment did not include these items.       Syed Flores, PT, DPT  9/27/2017

## 2021-06-13 NOTE — PROGRESS NOTES
Subjective:       Leonela Christianson is a 67 y.o. female who presents for evaluation of lower back pain.  This started 5 days ago, she awoke with the pain.  She had no recent injury.  This is worse at night and it is hard for her to find a comfortable sleeping position.  She sleeps better in a recliner chair.  She describes pain across her low back but does not radiate into her legs.  She has had an occasional sensation of her right leg falling asleep.  This is not present today.  She has had in the past some upper back pain for which we performed an x-ray which looked generally pretty good.  She has been taking an old prescription for cyclobenzaprine at home.    The following portions of the patient's history were reviewed and updated as appropriate: allergies, current medications, past medical history and problem list.    Review of Systems   Pertinent items are noted in HPI.      Objective:      /76 (Patient Site: Left Arm, Patient Position: Sitting, Cuff Size: Adult Regular)  Pulse 84  Resp 20  Ht 5' (1.524 m)  Wt 148 lb 1.6 oz (67.2 kg)  Breastfeeding? No  BMI 28.92 kg/m2    General appearance: alert, appears stated age and cooperative  Back: Normal range of motion, pain with flexion and extension, mild tenderness with palpation of the paraspinous musculature of the lumbar spine, straight leg raise is negative  Lungs: clear to auscultation bilaterally  Heart: regular rate and rhythm, S1, S2 normal, no murmur, click, rub or gallop   Neurologic: 1+ patellar reflexes bilaterally, 5/5 strength with dorsiflexion and plantar flexion bilaterally        X-ray lumbar spine: Fairly normal lumbar vertebral bodies without compression fracture, some anterolisthesis of L4 on L5 and L5 on S1 (my read)     Assessment/Plan:       1. Acute midline low back pain without sciatica  Patient seems to have an exaggerated lumbar lordotic curvature with some anterolisthesis.  No compression fracture.  Discussed physical  therapy as the first step in treatment.  She will also apply heat to the low back, continue cyclobenzaprine as needed.  She was warned not to drive while taking this medication.  If not improving with physical therapy, consider referral to spine care.  - XR Lumbar Spine 2 or 3 VWS; Future  - Ambulatory referral to PT/OT    2. Need for immunization against influenza  - Influenza High Dose, Seasonal 65+ yrs

## 2021-06-14 NOTE — PATIENT INSTRUCTIONS - HE
Please check your blood pressure outside the clinic a couple of times per week if you can.  I will have Sandra call you in about 2 weeks to see if you have any readings yet.    We are increasing the losartan to 100 mg.  I sent a new prescription to [facility].  We will also call and make sure the HCTZ gets canceled.

## 2021-06-14 NOTE — TELEPHONE ENCOUNTER
Pt said she would like the combo hydrochlorothiazide pill sent to [facility], she does not want to fill a small amount at a local pharmacy

## 2021-06-14 NOTE — TELEPHONE ENCOUNTER
Sorry, but this would really need to be examined.  We may have been able to avoid the imaging if we had seen her, but this should be looked at prior to prescribing medicine.

## 2021-06-14 NOTE — TELEPHONE ENCOUNTER
Reason for call:  Patient reporting a symptom    Symptom or request: Patient called stating that she had a mammogram and breast ultrasound done yesterday and it looks like she has a cyst under her right breast. Per patient the tech stated it may be infected but to reach out to provider to see if provider can prescribed an antibiotic. Patient is wondering if Dr. Woodruff can send in an antibiotic to [facility] in [place] on [address] and NOT through [facility] as it will take a few days to get it. Patient stated she does not want to come in if not needed. Communicated to patient, I will send the message to the provider's team and someone will reach back out to her. She stated understanding.    Duration (how long have symptoms been present): for awhile now    Have you been treated for this before? No    Additional comments: N/A    Phone Number patient can be reached at:    Cell number on file:    Telephone Information:   Mobile [phone]       Best Time:  Any time    Can we leave a detailed message on this number: Yes    Call taken on 1/14/2021 at 8:25 AM by Alice Cruz

## 2021-06-14 NOTE — PROGRESS NOTES
Assessment & Plan     Essential hypertension  Blood pressure above goal today.  Kidney function has not completely rebounded to normal, so we will stay off HCTZ for now.  Recommended increasing losartan to 100 mg daily and prescription sent.  Labs will be updated as below.  Advised patient to check her blood pressure periodically outside the clinic.  We will call her to see if she has readings to give us in a couple of weeks.  - losartan (COZAAR) 100 MG tablet  Dispense: 90 tablet; Refill: 3  - HM2(CBC w/o Differential)  - Basic Metabolic Panel    Colon cancer screening  Patient wishes to do Cologuard again for colon cancer screening.  Order placed.  - Cologuard    Mixed hyperlipidemia  Random lipid profile updated today while patient is here.  This is overdue.  She continues on Crestor and tolerates this well.  - Lipid Cascade RANDOM      Review of prior external note(s) from - Dr. Peter, cardiology 5/21/2020 and discharge summary from 9/14/2020  Review of the result(s) of each unique test - Previous CBC, INR, lipid profile, kidney function as noted below       Tobacco Cessation:   reports that she has been smoking cigarettes. She has a 41.25 pack-year smoking history. She has never used smokeless tobacco.  I have counseled the patient for tobacco cessation and the follow up will occur  at the next visit.  BMI:   Estimated body mass index is 28.59 kg/m  as calculated from the following:    Height as of this encounter: 5' (1.524 m).    Weight as of this encounter: 146 lb 6.4 oz (66.4 kg).         Return in about 6 months (around 7/5/2021) for Recheck blood pressure.    Miri Woodruff MD  [facility]    Subjective     Leonela ELLIOT Ramon Sammy is 70 y.o. and presents to clinic today for the following health issues   HPI     Patient presents today for a blood pressure recheck.  She is also due for recheck of her Cologuard for colon cancer screening.  This was last performed  11/15/2017.  In terms of her blood pressure, she denies any chest pain, shortness of breath, or lower extremity edema.  She does not check her blood pressure at home, but is thinking about getting a home cuff.  She does not wish to come back for repeat blood pressure checks upon change of therapy.  Upon review of the electronic medical record, she last saw cardiology 5/21/2020.  She was stable at that time and no changes to her medication regimen were made.  This was with Dr. Gonzalez.  I also reviewed her discharge summary from her hospitalization for pyelonephritis and ureteral stone.  This was from 9/14/2020.  At that time, she was taken off of her losartan and hydrochlorothiazide because of acute renal insufficiency.  We had restarted losartan, but not her hydrochlorothiazide.  She continues on metoprolol as well.    Labs Reviewed:  Lab Results   Component Value Date    CHOL 160 06/10/2019    TRIG 78 06/10/2019    HDL 57 06/10/2019    ALT 17 09/11/2020    AST 24 09/11/2020     09/21/2020    K 4.5 09/21/2020     09/21/2020    CREATININE 0.95 09/21/2020    BUN 13 09/21/2020    CO2 22 09/21/2020    TSH 1.90 10/10/2016    INR 2.10 (H) 01/04/2021    HGBA1C 6.3 (H) 10/29/2012       Review of Systems  Reviewed and negative except as noted above.      Objective    BP (!) 152/91 (Patient Site: Left Arm, Patient Position: Sitting, Cuff Size: Adult Regular)   Pulse 95   Ht 5' (1.524 m)   Wt 146 lb 6.4 oz (66.4 kg)   Breastfeeding No   BMI 28.59 kg/m    Body mass index is 28.59 kg/m .  Physical Exam  General: Well-developed well-nourished female, no acute distress  Respiratory: Lung sounds are diminished bilaterally but no obvious wheeze, wet-sounding cough, chronic  Cardiovascular: Regular rate and rhythm, no murmur auscultated  Extremities: No pitting edema          Results for orders placed or performed in visit on 01/05/21   HM2(CBC w/o Differential)   Result Value Ref Range    WBC 8.4 4.0 - 11.0 thou/uL     RBC 5.16 3.80 - 5.40 mill/uL    Hemoglobin 16.3 (H) 12.0 - 16.0 g/dL    Hematocrit 49.5 (H) 35.0 - 47.0 %    MCV 96 80 - 100 fL    MCH 31.5 27.0 - 34.0 pg    MCHC 32.9 32.0 - 36.0 g/dL    RDW 12.7 11.0 - 14.5 %    Platelets 230 140 - 440 thou/uL    MPV 6.9 (L) 7.0 - 10.0 fL

## 2021-06-14 NOTE — PROGRESS NOTES
"Cardiac Rehab  Phase II Assessment    Assessment Date: 11/30/2017    Diagnosis: NSTEMI  Date of Onset: 11/18/2017  Procedure: PCI/DANA to Lcx  Date of Onset: 11/20/2017  ICD/Pacemaker: No   Post-op Complications: Right forearm tenderness from radial access site.  ECG History: SR EF%:Normal  Past Medical History:   Patient Active Problem List   Diagnosis     Hyperlipidemia     Nicotine Dependence     Lateral Epicondylitis     Overweight (BMI 25.0-29.9)     Osteoporosis     Chronic bronchitis, unspecified chronic bronchitis type     Pulmonary embolism     Eyelid edema, unspecified laterality     Upper back pain on left side     Lump of skin     Pulmonary nodule     Pulmonary emphysema, unspecified emphysema type     Essential hypertension     Jaw pain     Equivalent angina     Shortness of breath     NSTEMI (non-ST elevated myocardial infarction)     Pure hypercholesterolemia     Coronary atherosclerosis due to lipid rich plaque       Physical Assessment  Precautions/ Physical Limitations: denies any limitations  Oxygen: No  O2 Sats: 93 Lung Sounds: clear Edema: right forearm   Incisions: no open wounds  Sleeping Pattern: good   Appetite: good   Nutrition Risk Screen: denies any concerns    Pain  Location: right arm  Characteristics:intermittent, wakes patient up at night, achy, no pain with motion, painful to touch  Intensity: (0-10 scale) 4  Current Pain Management: \"Nothing.  I am not taking another pill.\"  Intervention: none  Response: repositions    Psychosocial/ Emotional Health  1. In the past 12 months, have you been in a relationship where you have been abused physically, emotionally, sexually or financially? No  notified: NA  2. Who do you turn to for emotional support?: Son, sister, sister-in-law, friends  3. Do you have cultural or spiritual needs? No  4. Have there been any major life changes in the past 12 months? No    Referral Information  Primary Physician: Miri Woodruff, " MD  Cardiologist: Dr. Gonzalez  Surgeon: N/A    Home exercise/Equipment: none    Patient's long-term goal(s): Decrease pain in right arm.  Eliminate smoking.      1. Living Accommodations: Apartment Steps: Yes, 5 with landing than 5 more      Support people at home: 10 year old son   2. Marital Status:   3. Family is able to assist with cares      Nondenominational/Community involvement: No concerns, back to usual routines  4. 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

## 2021-06-14 NOTE — TELEPHONE ENCOUNTER
ANTICOAGULATION  MANAGEMENT    Assessment     Today's INR result of 2.1 is Therapeutic (goal INR of 2.0-3.0)        Warfarin taken as previously instructed    No new diet changes affecting INR    No new medication/supplements affecting INR    Continues to tolerate warfarin with no reported s/s of bleeding or thromboembolism     Previous INR was Therapeutic    Plan:     Left detailed message for Leonela regarding INR result and instructed:     Warfarin Dosing Instructions:  Continue current warfarin dose 6 mg daily on Sun, Tue, Fri; and 3 mg daily rest of week  (0 % change)    Instructed patient to follow up no later than: 6 weeks    Education provided: target INR goal and significance of current INR result, importance of notifying clinic for changes in medications and importance of notifying clinic for diarrhea, nausea/vomiting, reduced intake and/or illness    Instructed to call the [facility] for any changes, questions or concerns. (#[phone])   ?   Jenny Stanley RN    Subjective/Objective:      Leonela Christianson, a 70 y.o. female is on warfarin.     Leonela reports:     Home warfarin dose: as updated on anticoagulation calendar per template     Missed doses: No     Medication changes:  No     S/S of bleeding or thromboembolism:  No     New Injury or illness:  No     Changes in diet or alcohol consumption:  No     Upcoming surgery, procedure or cardioversion:  No    Anticoagulation Episode Summary     Current INR goal:  2.0-3.0   TTR:  69.4 % (12 mo)   Next INR check:  2/15/2021   INR from last check:  2.10 (1/4/2021)   Weekly max warfarin dose:     Target end date:     INR check location:     Preferred lab:     Send INR reminders to:  [facility]    Indications    History of pulmonary embolism [Z86.711]           Comments:           Anticoagulation Care Providers     Provider Role Specialty Phone number    Miri Woodruff MD Referring Family Medicine [phone]

## 2021-06-14 NOTE — TELEPHONE ENCOUNTER
Okay, I would recommend we restart a small dose of hydrochlorothiazide.  Does she still have some of this at home?  If not, does she want this sent to mail order or a small supply to a local pharmacy while awaiting mail order?

## 2021-06-14 NOTE — PROGRESS NOTES
ITP ASSESSMENT   Assessment Day: Initial  Session Number: 1  Precautions: standard cardiac precautions  Diagnosis: Stent;MI  Risk Stratification: High (due to smoking)  Referring Provider: Daniela Mazariegos MD  EXERCISE  Exercise Assessment: Initial     6 Minute Walk Test   Pre   Pre Exercise HR: 86                  Pre Exercise BP: 132/79    Peak  Peak HR: 91                 Peak BP: 143/94  Peak feet: 875  Peak O2 SAT: 98  Peak RPE: 12  Peak MPH: 1.66    Symptoms:  Peak Symptoms: fatigued at end    5 mins. Post  5 Min Post HR: 88  5 Min Post BP: 121/76                         Exercise Plan  Goals Next 30 days  ADL'S: Patient will be able to resume light housekeeping tasks such as sweeping the floors and doing the laundry without symptoms or SOB.  Leisure: Patient will resume social outings such as going out to eat.  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              Education Goals: Medication review;Has system for taking medication.;Patient can state cardiac s/s and appropriate emergency response.  Education Goals Met: Patient can state cardiac s/s and appropriate emergency response.;Has system for taking medication.;Medication review.    Exercise Prescription  Exercise Mode: Treadmill;Nustep;Elliptical  Frequency: 2-3 times per week  Duration: 20-30 minutes  Intensity / THR: 20-30 beats above resting heart rate  RPE 11-14  Progression / Met level: 2-3 METS  Resistive Training?: Yes (when right arm is feeling better)    Current Exercise (mins/week): 1    Interventions  Home Exercise:  Mode: walking  Frequency: daily  Duration: 10-15 minutes    Education Material : Educational videos;Provide written material;Individual education and counseling;Offer educational classes    Education Completed  Exercise Education Completed: Cardiac Anatomy;Signs and Symptoms;Emergency Plan;Home Exercise;Warm up/cool down;Benefits of Exercise;End point of exercise          "   Exercise Follow-up/Discharge  Follow up/Discharge: Encourage follow through of home exercise program. NUTRITION  Nutrition Assessment: Initial    Nutrition Risk Factors:  Nutrition Risk Factors: Dyslipidemia;Overweight  Cholesterol: 153  LDL: 91  HDL: 53  Triglycerides: 45    Nutrition Plan  Interventions  Nutrition Interventions: Provide with written material;Therapist/Patient discussion;Educational videos      Education Completed  Nutrition Education Completed: Low Saturated fat diet;Low sodium diet    Goals  Nutrition Goals (Next 30 days): Patient can identify their risk factors for CAD;Patient will follow a low sodium diet;Patient will lose weight;Patient will follow a low saturated fat diet;Patient knows appropriate portion size    Goals Met  Nutrition Goals Met: Patient can identify their risk factors for CAD;Patient follows a low sodium diet;Patient states following a low saturated fat diet    Height, Weight, and  BMI  Weight: 154 lb (69.9 kg)  Height: 5' (1.524 m)  BMI: 30.08    Nutrition Follow-up  Follow-up/Discharge: \"I will just tell you know I like to eat and I eat what I want.\"  Comment made when asked to work on [employer] at home and bring back.       Other Risk Factors  Other Risk Factor Assessment: Initial    HTN Risk Factor: Hypertension    Pre Exercise BP: 132/79  Post Exercise BP: 122/74    Hypertension Plan  Goals  HTN Goals: Exercises regularly;Follow low sodium diet;Take medication as prescribed    Goals Met  HTN Goals Met: Follow low sodium diet;Take medication as prescribed    HTN Interventions  HTN Interventions: Therapist/patient discussion;Provide written material;Offer educational classes    HTN Education Completed  HTN Education Completed: Low sodium diet;Medication review;Risk factor overview    Tobacco Risk Factor: Tobacco    Initial Use:: 1-2 ppd  Current Use:: .5 ppd    Tobacco Plan  Tobacco Goals  Tobacco Goals: Patient remain tobacco free;Patient to reduce tobacco use " (see comments for amount);Patient to set quite date (Reduce to 1-2 cigs per day or less)    Goals Met  Tobacco Goals Met: Patient to reduce tobacco use (see comments for amount);patient to set quit date (reduce to 1-2 cigs per day or less)    Tobacco Interventions  Tobacco Interventions: Therapist/patient discussion;Provide written material;Offer educational videos;Offer class on risk factor modification    Tobacco Education Completed  Tobacco Education Completed: Identifies triggers;Health benefits of tobacco cessation;Risk factor overview    Risk Factor Follow-up      PSYCHOSOCIAL  Psychosocial Assessment: Initial     [place] COOP Q of L Summary Score: 21    SHARA-D Score: 2    Psychosocial Risk Factor: Stress    Psychosocial Plan  Interventions  If SHARA-D > 15 send letter to MD  Interventions: Offer educational videos and classes;Provide written material;Individual education and counseling    Education Completed  Education Completed: Effects of stress on body;S/S of depression;Relaxation/Coping Techniques    Goals  Goals (Next 30 days): Oriented to stress management classes;Improvement in [place] COOP score;Identified Support system;Practicing stress management skills    Goals Met  Goals Met: Identified Support system;Identify stressors;Improvement in [place] COOP score    Psychosocial Follow-up  No Data Recorded           Patient involved in Goal setting?: Yes    Signature: _____________________________________________________________    Date: __________________    Time: __________________

## 2021-06-14 NOTE — TELEPHONE ENCOUNTER
Reason for Call: Request for an order     Order or referral being requested: Mammogram    Date needed: at your convenience    Has the patient been seen by the PCP for this problem? Yes, yesterday, but patient forgot to mention it to the provider.     Additional comments: Patient would like to see if provider can place order for a mammogram. Patient stated that she forgot to mention, but she has a lump under the right nipple area. She would like to know if she needs another appt with provider or if the order can be placed.    Phone number Patient can be reached at:    Cell number on file:    Telephone Information:   Mobile [phone]       Best Time:  Any time    Can we leave a detailed message on this number?  Yes    Call taken on 1/6/2021 at 9:21 AM by Alice Cruz

## 2021-06-14 NOTE — TELEPHONE ENCOUNTER
Who is calling:  Patient   Reason for Call:  Has further questions for ACN- would not elaborate  Date of last appointment with primary care: n/a  Okay to leave a detailed message: Yes

## 2021-06-14 NOTE — PROGRESS NOTES
Assessment/Plan:       1. Jaw pain/Equivalent angina  Patient has had no further episodes of her anginal equivalent.  She has been feeling well since discharge from the hospital.  She has had no further shortness of breath or sweating episodes.  She understands what symptoms she needs to watch for and when to present to the emergency department.    2. NSTEMI (non-ST elevated myocardial infarction)/Coronary atherosclerosis due to lipid rich plaque  Patient had an additional stent placed to the left circumflex.  She was placed on Plavix for 1 year.  She will continue aspirin for 1 month, then Plavix and Coumadin for 11 months, resuming aspirin once Plavix is discontinued.  She continues to follow closely with cardiology.  She continues with adequate blood pressure control, takes Crestor faithfully.    5. Nicotine Dependence  Again discussed the importance of cessation.  Patient states she is continuing to try to cut back.  We discussed possibly trying a nicotine replacement product, replacing one cigarette per week.  Recommended gum or lozenge for this purpose.  Patient will consider this.  I have counseled the patient for tobacco cessation and the follow up will occur  at the next visit.    6. Essential hypertension  Well-controlled on current regimen of lisinopril-HCTZ and metoprolol.    7. Pulmonary embolism  Remains on Coumadin lifelong.  - INR    8. Localized skin desquamation  Likely a reaction to detergent or die, possibly bleach in mesh underpants at the hospital.  This is resolving nicely.  Discussed anti-itch lotions.    9. Superficial bruising of arm, right, sequela  Likely related to catheter for PCI procedure.  Possibly some superficial phlebitis.  Discussed warm packs to the area.        Subjective:       Leonela Christianson is a 67 y.o. female who presents for hospital follow-up.  She was hospitalized from 11/18 to 11/21/17 at [facility].  She was at her son's basketball game when she  began having jaw pain and sweating.  She initially sat in the car, then decided to drive home.  She called several friends and relatives prior to calling EMS.  She was eventually transferred to the hospital by ambulance.  Upon arrival, she was short of breath and diaphoretic.  Her symptoms resolved with nitroglycerin given at the emergency department, did not resolve with nitro she had at home, although she did note that this was .  EKG initially in the emergency department was unremarkable, second and third troponin were elevated after resolution of her jaw pain symptoms.  Cardiology recommended coronary angiogram.  70% left circumflex stenosis was seen along with a patent prior RCA stent.  She did have a stent placed to the left circumflex.  Patient's only concern since discharge from the hospital is some peeling on both buttocks.  She states that initially when she got home, she had some blistering in this area.  She now notes some itching that travels down her legs.  She continues to smoke just over one half pack per day of cigarettes.  This is significantly cutting back from what she previously smoked she says.  She did not smoke for 3 days when she was in the hospital, but has resumed since being home.  She also describes some right wrist pain and bruising.  She states that this is where a catheter was introduced for her PCI procedure.    Labs Reviewed:  Lab Results   Component Value Date    WBC 9.7 2017    HGB 13.9 2017    HCT 47.2 (H) 2017     2017    CHOL 153 2017    TRIG 45 2017    HDL 53 2017    ALT 22 2015    AST 24 2015     2017    K 3.8 2017     (H) 2017    CREATININE 0.82 2017    BUN 13 2017    CO2 20 (L) 2017    TSH 1.90 10/10/2016    INR 2.20 (H) 2017    HGBA1C 6.3 (H) 10/29/2012       Imaging Reviewed:  Xr Chest Ap Portable    Result Date: 2017  XR CHEST AP PORTABLE  11/18/2017, 12:27 PM INDICATION: Chest pain. COMPARISON: 12/22/2016. FINDINGS: Lungs are hyperinflated with emphysematous change. Several strands of fibrosis in the lower lungs. Tortuous thoracic aorta. Heart size normal. Benign calcification near the left apex is in the soft tissues of the upper back.       The following portions of the patient's history were reviewed and updated as appropriate: allergies, current medications, past family history, past medical history, past social history, past surgical history and problem list.      Current Outpatient Prescriptions:      albuterol (PROAIR HFA) 90 mcg/actuation inhaler, Inhale 1-2 puffs every 4 (four) hours as needed for wheezing. Every 4 to 6 hours as needed, Disp: 1 each, Rfl: 5     aspirin 81 mg chewable tablet, Chew 1 tablet (81 mg total) daily., Disp: 30 tablet, Rfl: 0     clopidogrel (PLAVIX) 75 mg tablet, Take 1 tablet (75 mg total) by mouth daily., Disp: 90 tablet, Rfl: 3     cyclobenzaprine (FLEXERIL) 10 MG tablet, Take 1 tablet (10 mg total) by mouth every 8 (eight) hours as needed for muscle spasms., Disp: 30 tablet, Rfl: 1     lisinopril-hydrochlorothiazide (PRINZIDE,ZESTORETIC) 10-12.5 mg per tablet, Take 1 tablet by mouth daily with supper., Disp: , Rfl:      metoprolol succinate (TOPROL XL) 50 MG 24 hr tablet, Take 1 tablet (50 mg total) by mouth at bedtime., Disp: 90 tablet, Rfl: 1     nitroglycerin (NITROSTAT) 0.4 MG SL tablet, Place 1 tablet (0.4 mg total) under the tongue every 5 (five) minutes as needed for chest pain., Disp: 20 tablet, Rfl: 0     rosuvastatin (CRESTOR) 40 MG tablet, Take 40 mg by mouth Daily after lunch., Disp: , Rfl:      warfarin (COUMADIN) 3 MG tablet, Take 4.5 mg by mouth See Admin Instructions. Taking 4.5mg (1 and 1/2  Of 3 mg) daily at 1100, Disp: , Rfl:     Review of Systems   A 12 point comprehensive review of systems was negative except as noted.      Objective:      /64 (Patient Site: Left Arm, Patient Position:  Sitting, Cuff Size: Adult Regular)  Pulse 88  Temp 97.8  F (36.6  C) (Oral)   Resp 20  Ht 5' (1.524 m)  Wt 150 lb 9.6 oz (68.3 kg)  Breastfeeding? No  BMI 29.41 kg/m2    General appearance: alert, appears stated age and cooperative  Head: Normocephalic, without obvious abnormality, atraumatic  Eyes: conjunctivae clear, sclerae anicteric  Neck: no adenopathy, no carotid bruit, no JVD, supple, symmetrical, trachea midline and thyroid not enlarged, symmetric, no tenderness/mass/nodules  Lungs: clear to auscultation bilaterally, diminished breath sounds throughout (consistent with previous)  Heart: regular rate and rhythm, S1, S2 normal, no murmur, click, rub or gallop  Abdomen: soft, non-tender; bowel sounds normal; no masses,  no organomegaly  Extremities: extremities normal, atraumatic, no cyanosis or edema        Results for orders placed or performed in visit on 11/27/17   INR   Result Value Ref Range    INR 2.20 (H) 0.90 - 1.10

## 2021-06-14 NOTE — TELEPHONE ENCOUNTER
Reason for Call:  Other call back      Detailed comments: patient requesting Dr Johnson call her regarding her blood pressure last BP was approximately 146/92    Phone Number Patient can be reached at:   Cell number on file:    Telephone Information:   Mobile [phone]       Best Time: anytime    Can we leave a detailed message on this number?: Yes    Call taken on 2/1/2021 at 11:54 AM by Shayna Mckeon

## 2021-06-14 NOTE — TELEPHONE ENCOUNTER
Called to get more information. Patient is thinking she needs to start her BP meds again. Had her read the last few readings to me:    1/23: 132/94    1/26: 150/102, waited a few minutes recheck 144/93    1/27: 133/86    1/28: 149/91, waited a few minutes recheck 145/88    1/30: 145/82

## 2021-06-14 NOTE — TELEPHONE ENCOUNTER
Spoke with pt, notified of notes per MD.   Pt agrees, and was scheduled at [facility] next week at pts request.

## 2021-06-14 NOTE — PROGRESS NOTES
"Leonela Christianson has participated in 6 sessions of Phase II Cardiac Rehab.    Progress Report:   Cardiac Rehab Treatment Progress Report 12/6/2017 12/11/2017 12/13/2017 12/15/2017 12/18/2017   Weight - - - - -   Pre Exercise  HR 98 98 95 98 99   Pre Exercise /70 128/64 122/64 120/60 118/60   Pre Blood Sugar (mg/dl) - - - - -   Treadmill Peak HR - 104 110 117 114   Treadmill Peak Blood Pressure - 103/76 - - 144/76   Nustep Peak Heart Rate 100 102 99 98 110   Nustep Peak Blood Pressure 118/70 136/76 128/68 124/72 138/74   Heart Rate 97 92 99 98 100   Post Exercise /60 106/60 118/60 100/58 114/60   Post Blood Sugar (mg/dl) - - - - -   ECG SR ST-ST SR-ST SR-ST SR-ST   Total Exercise Minutes 35 44 50 56 55         Current Status:  Therapists Comments: Pt resting HR's high- \"winded\" with walking.  Pt reports resting HR has been consistently around 100bpm.    If Physician recommends change in treatment plan, please place orders.        __________________________________________________      _____________  Signature                                                                                                  Date  "

## 2021-06-14 NOTE — PROGRESS NOTES
Assessment & Plan     Abscess of skin of breast  Discussed options including incision and drainage, warm packing and/or antibiotics.  Because this seems to be spontaneously decreasing in size and is not red or hot or tender, patient will continue to apply warm compresses to the area.  If this expands or becomes tender, we can try oral antibiotics prior to incision and drainage.  Patient will keep me posted if this worsens.    Essential hypertension  Patient will check blood pressures at home and we will call her to have her report these in 2 weeks.  If remains elevated, would consider restarting diuretic as her renal function has been normal.      Review of the result(s) of each unique test - labs as below, recent mammogram         BMI:   Estimated body mass index is 28.55 kg/m  as calculated from the following:    Height as of this encounter: 5' (1.524 m).    Weight as of this encounter: 146 lb 3.2 oz (66.3 kg).   The following high BMI interventions were performed this visit: encouragement to exercise, weight monitoring and lifestyle education regarding diet      Return in about 1 week (around 1/28/2021), or if symptoms worsen or fail to improve.    Miri Woodruff MD  [facility]    Subjective     Leonela Christianson is 70 y.o. and presents to clinic today for the following health issues   HPI     Patient presents today to discuss a lump in her right breast.  She noticed this after her last visit, and asked to have a diagnostic mammogram to further evaluate rather than coming back for an exam.  Mammogram showed this lump to be within the skin of the breast, and patient was advised to consult me whether this should be treated with antibiotics.  This has not drained spontaneously, but is not tender and seems to be decreasing in size per patient.  She hasn't had anything similar to this in the past.  Second, at her last visit, we increased her losartan to 100 mg daily.  Her  blood pressure remains elevated today.  She does have the ability to check this at home.  She has been checking periodically, but doesn't bring any readings today and cannot recall what her readings have been.    Review of Systems  Negative except as noted above      Objective    BP (!) 149/92 (Patient Site: Left Arm, Patient Position: Sitting, Cuff Size: Adult Regular)   Pulse 81   Ht 5' (1.524 m)   Wt 146 lb 3.2 oz (66.3 kg)   Breastfeeding No   BMI 28.55 kg/m    Body mass index is 28.55 kg/m .     Physical Exam  General: well developed, well nourished female in no apparent distress  Cardiovascular: regular rate and rhythm  Breasts: normal bilaterally with the exception of a 1.5x1.5 cm area or induration inferior right breast; no overlying erythema, no drainage, no warmth

## 2021-06-15 PROBLEM — I10 ESSENTIAL HYPERTENSION: Chronic | Status: ACTIVE | Noted: 2017-04-13

## 2021-06-15 NOTE — TELEPHONE ENCOUNTER
ANTICOAGULATION  MANAGEMENT    Assessment     Today's INR result of 2.00 is Therapeutic (goal INR of 2.0-3.0)        Warfarin taken as previously instructed    No new diet changes affecting INR    No new medication/supplements affecting INR    Continues to tolerate warfarin with no reported s/s of bleeding or thromboembolism     Previous INR was Therapeutic    Plan:     Spoke on phone with Leonela regarding INR result and instructed:      Warfarin Dosing Instructions:  Continue current warfarin dose 6 mg daily on Sun, Tue, & Fri; and 3 mg daily rest of week  (0 % change)    Instructed patient to follow up no later than: 6 weeks; lab appointment scheduled.     Education provided: importance of therapeutic range and target INR goal and significance of current INR result    Leonela verbalizes understanding and agrees to warfarin dosing plan.    Instructed to call the [facility] for any changes, questions or concerns. (#[phone])   ?   Bradly Shepard RN    Subjective/Objective:      Leonela Christianson, a 70 y.o. female is on warfarin. Leonela Gipson reports:     Home warfarin dose: as updated on anticoagulation calendar per template     Missed doses: No     Medication changes:  No     S/S of bleeding or thromboembolism:  No     New Injury or illness:  No     Changes in diet or alcohol consumption:  No     Upcoming surgery, procedure or cardioversion:  No    Anticoagulation Episode Summary     Current INR goal:  2.0-3.0   TTR:  69.4 % (12 mo)   Next INR check:  3/29/2021   INR from last check:     Weekly max warfarin dose:     Target end date:     INR check location:     Preferred lab:     Send INR reminders to:  COLBY ELLER    Indications    History of pulmonary embolism [Z86.711]           Comments:           Anticoagulation Care Providers     Provider Role Specialty Phone number    Miri Woodruff MD Referring Family Medicine [phone]

## 2021-06-15 NOTE — PROGRESS NOTES
Leonela ELLIOT Christianson has participated in 14 sessions of Phase II Cardiac Rehab.    Progress Report:   Cardiac Rehab Treatment Progress Report 1/3/2018 1/5/2018 1/8/2018 1/10/2018 1/17/2018   Weight 155 lbs 154 lbs 3 oz 154 lbs 13 oz 156 lbs 3 oz 155 lbs   Pre Exercise  HR 97 100 91 89 100   Pre Exercise /62 108/70 116/60 110/62 118/76   Pre Blood Sugar (mg/dl) - - - - -   Treadmill Peak  112 109 107 120   Treadmill Peak Blood Pressure 138/72 142/74 144/70 138/74 -   Nustep Peak Heart Rate 114 101 103 100 103   Nustep Peak Blood Pressure 138/74 142/70 144/70 138/70 144/72   Heart Rate 91 88 100 97 95   Post Exercise /62 112/64 128/70 122/64 120/60   Post Blood Sugar (mg/dl) - - - - -   ECG SR-ST SR-ST SR-ST SR-ST SR-ST   Total Exercise Minutes 44 40 48 48 43         Current Status:  Currently exercising without complaints or symptoms. Patient is exercising at a MET level of 2.4-4.1 on the Nustep and a MET level of 2.8 on the Treadmill. Patient is exercising a total of 40-45 minutes in Cardiac Rehab.     If Physician recommends change in treatment plan, please place orders.        __________________________________________________      _____________  Signature                                                                                                  Date

## 2021-06-15 NOTE — PROGRESS NOTES
" ITP ASSESSMENT   Assessment Day: 60 Day  Session Number: 18  Precautions: standard cardiac  Diagnosis: Stent;MI  Risk Stratification: High  Referring Provider: Daniela Mazariegos MD  EXERCISE                           Exercise Plan  Goals Next 30 days  ADL'S: pt will use arms regularily on nustep without oblique muscle spasms to increase mobility  Leisure: pt will socialize more as weather allows.  Work: pt will begin daily grocery shopping trips with 5-10 min walk around store as tolerated.    Education Goals: Medication review;Has system for taking medication.;Patient can state cardiac s/s and appropriate emergency response.;All goals in this section met  Education Goals Met: Patient can state cardiac s/s and appropriate emergency response.;Has system for taking medication.;Medication review.                        Goals Met  30 day ADL'S goals met: carries approx 6-8# laundry bags down bonilla to laundry with improving endurance  30 day Leisure goals met: goes out to dinner \"with my kid\" once in a while but not much due to weather  30 day Work goals met: carries 8-10 # balanced grocery load daily with less fatigue  30 Day Progression: pt states is busy with shopping and chores but will try to start some regular walking.    Initial ADL's goals met: Patient has resumed light housekeeping tasks such as sweeping the floors and doing laundry.  Does note carrying laundry down the bonilla and up/down stairs does offer more symptoms with regards to SOB.  Initial Leisure goals met: Has not resumed.  Too cold to want to go out to eat.  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  Initial Progression: Patient has tolerated 30-45 minute of exercise at 2.4-2.8 MET level.  Discussed goal of progressing to 4th MET level in order to allow greater tolerance of higher MET level activity at home.  Has not initiated consistently a home exercise program and is having " "difficulty with smoking cessation.    Exercise Prescription  Exercise Mode: Treadmill;Bike;Nustep;Arm Erg.  Frequency: 2-3 x weekly  Duration: 45-50 min  Intensity / THR: 20-30 beats above resting heart rate  RPE 11-14  Progression / Met level: 2.8-4 mets  Resistive Training?: Yes    Current Exercise (mins/week): 20    Interventions  Home Exercise:  Mode: walking   Frequency: 5-6 x week  Duration: 5-10 min / day    Education Material : Educational videos;Provide written material;Individual education and counseling;Offer educational classes    Education Completed  Exercise Education Completed: Signs and Symptoms;Medication review;RPE;Emergency Plan;Home Exercise;Warm up/cool down;FITT Principles;BP/HR Reponse to exercise;Strength training;Benefits of Exercise;End point of exercise            Exercise Follow-up/Discharge  Follow up/Discharge: seems resistant to education goals. \"Im not going to change. Will die sometime..\" NUTRITION  Nutrition Assessment: Reassessment    Nutrition Risk Factors:  Nutrition Risk Factors: Dyslipidemia  Cholesterol: 134  LDL: 74  HDL: 51  Triglycerides: 44    Nutrition Plan  Interventions  Nutrition Interventions: Declined     Education Completed  Nutrition Education Completed: Low Saturated fat diet;Low sodium diet    Goals  Nutrition Goals (Next 30 days): Patient will follow a low sodium diet    Goals Met  Nutrition Goals Met: Patient can identify their risk factors for CAD;Patient knows appropriate portion size    Height, Weight, and  BMI  Weight: 154 lb (69.9 kg)  Height: 5' (1.524 m)  BMI: 30.08    Nutrition Follow-up  Follow-up/Discharge: Pt declines any discussion w/ RD. \" I eat what I eat\"       Other Risk Factors  Other Risk Factor Assessment: Reassessment    HTN Risk Factor: Hypertension    Pre Exercise BP: 122/68  Post Exercise BP: 120/68    Hypertension Plan  Goals  HTN Goals: Follow low sodium diet;Exercises regularly    Goals Met  HTN Goals Met: Take medication as " prescribed    HTN Interventions  HTN Interventions: Therapist/patient discussion;Offer educational classes    HTN Education Completed  HTN Education Completed: Medication review;Risk factor overview (states hasn't changed diet much. I eat what I eat. No qd WT.)    Tobacco Risk Factor: Tobacco    Initial Use:: 1-2 ppd  Current Use:: 1-2 ppd currently    Tobacco Plan  Tobacco Goals  Tobacco Goals: Patient not ready to change    Goals Met  Tobacco Goals Met: patient not ready to change    Tobacco Interventions  Tobacco Interventions: Offer class on risk factor modification;Therapist/patient discussion    Tobacco Education Completed  Tobacco Education Completed: Health benefits of tobacco cessation    Risk Factor Follow-up   Follow-up/Discharge: Reports likes smoking too much and tried to quit in past, but doesn't really want to now.    PSYCHOSOCIAL  Psychosocial Assessment: Reassessment     [place] COOP Q of L Summary Score: 21    SHARA-D Score: 2    Psychosocial Risk Factor: Stress    Psychosocial Plan  Interventions  Interventions: Offer educational videos and classes;Provide written material    Education Completed  Education Completed: Relaxation/Coping Techniques;S/S of depression;Effects of stress on body    Goals  Goals (Next 30 days): Practicing stress management skills    Goals Met  Goals Met: Identified Support system;Identify stressors    Psychosocial Follow-up  Follow-up/Discharge: Trying to manage stress of [employer] using stress management skills and states is coming along ok.           Patient involved in Goal setting?: Yes    Signature: _____________________________________________________________    Date: __________________    Time: __________________

## 2021-06-15 NOTE — TELEPHONE ENCOUNTER
Reason for Call:  Request for results:    Name of test or procedure: cologard results   rec'd a letter in the mail telling her to call her clinic for the results. I did look in the chart, don't see anything under lab or media. Hopefully someone else can find her reults. Thank you, Susi at [facility]    Date of test of procedure: unknown    Location of the test or procedure: [facility] results    OK to leave the result message on voice mail or with a family member? Yes    Phone number Patient can be reached at: Cell number on file:    Telephone Information:   Mobile [phone]       Additional comments:     Call taken on 2/17/2021 at 2:33 PM by Susi Vogel

## 2021-06-15 NOTE — PROGRESS NOTES
"    Assessment & Plan     Breast pain  Cutaneous abscess of trunk, unspecified site of trunk  She has a small pea sized mass that is draining from under her right breast with purulent fluid. Very minimal bleeding. Expressed some purulent fluid and discussed possible I&D, which patient was not interested in doing and mass was small enough that it would have been of minimal help in resolving the skin infection. Discussed keeping area clean and dry, applying gauze as needed to help with drainage. Discussed taking a short course of antibiotics to clear up the infection as well. Reviewed symptoms to watch out for moving forward.  - cephalexin (KEFLEX) 500 MG capsule  Dispense: 20 capsule; Refill: 0  - Follow up with PCP in a couple days if your symptoms do not improve with antibiotics  - Keep area clean and dry    Vaccine counseling  Discussed that taking antibiotics should not prevent her from receiving her second dose of the Pfizer vaccine for COVID19.     Return in about 2 days (around 3/1/2021), or if symptoms worsen or fail to improve.    Alicia Stephens MD  [facility] Tristen Christianson is 70 y.o. and presents today for the following health issues   HPI     She had a cyst in her breast since last January. She felt it so she called her doctor's office and said she had a mammogram. She had the mammogram and was told that it was a cyst and not cancer. She was told it was in the skin. She was told it was infected. She was told that if it didn't improve after 1-2 weeks to return. She called her doctor and saw her doctor. It improved at that time and her doctor said it was healing at that time. Yesterday it hurt suddenly again. She felt it and wasn't sure what it was. This morning when she woke up and saw that it was red and bleeding again. There was \"gook\" coming out of it. She said it was a milky substance. She has not taken any antibiotics for this at all. She had guaze and " peroxide on the gauze. She taped it on with the bandaids. She called nurse line and was directed to urgent care. She is on warfarin.     She had the first dose of her Pfizer vaccine last weekend. She received it at [facility].     Review of Systems   Constitutional: Negative for chills and fever.   All other systems reviewed and are negative.        Objective    /83 (Patient Site: Left Arm, Patient Position: Sitting, Cuff Size: Adult Regular)   Pulse 96   Temp 97.9  F (36.6  C)   Resp 18   SpO2 97%   There is no height or weight on file to calculate BMI.  Physical Exam   Constitutional: She appears well-developed and well-nourished.   HENT:   Head: Normocephalic.   Eyes: Pupils are equal, round, and reactive to light.   Pulmonary/Chest: Right breast exhibits mass, skin change and tenderness. Right breast exhibits no inverted nipple and no nipple discharge.   She had a draining pinpoint whole with surrounding skin color changes with purulent fluid draining from it. Small pea sized mass with tenderness and shrank with minor pressure when fluid was expressed.

## 2021-06-15 NOTE — PROGRESS NOTES
ITP ASSESSMENT   Assessment Day: 30 Day  Session Number: 10  Precautions: standard cardiac  Diagnosis: Stent;MI  Risk Stratification: High (based on current smoker)  Referring Provider: Daniela Mazariegos MD  EXERCISE  Exercise Assessment: Reassessment     6 Minute Walk Test   Pre   Pre Exercise HR: 86                  Pre Exercise BP: 132/79    Peak  Peak HR: 91                 Peak BP: 143/94  Peak feet: 875  Peak O2 SAT: 98  Peak RPE: 12  Peak MPH: 1.66    Symptoms:  Peak Symptoms: fatigued at end    5 mins. Post  5 Min Post HR: 88  5 Min Post BP: 121/76                         Exercise Plan  Goals Next 30 days  ADL'S: Patient will be able to carry 10-15 pound load of laundry up and down the stairs and down the bonilla without fatigue or SOB,  Leisure: Patient will be able to resume social outings/out to eat at usual level as pre-procedure.  Work: Patient will be able to carry 10 pound grocery bag without SOB/fatigue.    Education Goals: Medication review;Has system for taking medication.;Patient can state cardiac s/s and appropriate emergency response.  Education Goals Met: Patient can state cardiac s/s and appropriate emergency response.;Has system for taking medication.;Medication review.                        Goals Met  Initial ADL's goals met: Patient has resumed light housekeeping tasks such as sweeping the floors and doing laundry.  Does note carrying laundry down the bonilla and up/down stairs does offer more symptoms with regards to SOB.  Initial Leisure goals met: Has not resumed.  Too cold to want to go out to eat.  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  Initial Progression: Patient has tolerated 30-45 minute of exercise at 2.4-2.8 MET level.  Discussed goal of progressing to 4th MET level in order to allow greater tolerance of higher MET level activity at home.  Has not initiated consistently a home exercise program and is having  "difficulty with smoking cessation.    Exercise Prescription  Exercise Mode: Treadmill;Nustep  Frequency: 2-3 times per week  Duration: 30-40 minutes  Intensity / THR: 20-30 beats above resting heart rate  RPE 11-14  Progression / Met level: 2.5-3.5 MET  Resistive Training?: Yes (has not initiated yet, sore from procedure right arm)    Current Exercise (mins/week): 46    Interventions  Home Exercise:  Mode: walking/stairs  Frequency: daily  Duration: 10-15 minutes    Education Material : Educational videos;Provide written material;Individual education and counseling;Offer educational classes    Education Completed  Exercise Education Completed: Cardiac Anatomy;Signs and Symptoms;Emergency Plan;Medication review;Home Exercise;Warm up/cool down;Benefits of Exercise;End point of exercise            Exercise Follow-up/Discharge  Follow up/Discharge: Encourage follow through of home exercise program.         NUTRITION  Nutrition Assessment: Reassessment    Nutrition Risk Factors:  Nutrition Risk Factors: Dyslipidemia;Overweight  Cholesterol: 134  LDL: 74  HDL: 51  Triglycerides: 44    Nutrition Plan  Interventions  Nutrition Interventions: Provide with written material;Therapist/Patient discussion;Educational videos      Education Completed  Nutrition Education Completed: Low Saturated fat diet;Low sodium diet    Goals  Nutrition Goals (Next 30 days): Patient can identify their risk factors for CAD;Patient will follow a low sodium diet;Patient will lose weight    Goals Met  Nutrition Goals Met: Patient can identify their risk factors for CAD;Patient follows a low sodium diet    Height, Weight, and  BMI  Weight: 155 lb (70.3 kg)  Height: 5' (1.524 m)  BMI: 30.27    Nutrition Follow-up  Follow-up/Discharge: \"I will just tell you know I like to eat and I eat what I want.\"  Comment made when asked to work on [employer] at home and bring back.     Other Risk Factors  Other Risk Factor Assessment: Reassessment    HTN Risk " "Factor: Hypertension    Pre Exercise BP: 124/62  Post Exercise BP: 110/62    Hypertension Plan  Goals  HTN Goals: Follow low sodium diet;Take medication as prescribed    Goals Met  HTN Goals Met: Follow low sodium diet;Take medication as prescribed    HTN Interventions  HTN Interventions: Therapist/patient discussion;Provide written material;Offer educational videos;Offer educational classes    HTN Education Completed  HTN Education Completed: Low sodium diet;Risk factor overview;Medication review;Low sodium class    Tobacco Risk Factor: Tobacco    Initial Use:: 1-2ppd  Current Use:: 1 cigarette per day.  \"I was done after I used up this pack but then I bought another pack.\"  I am going to string this out though.   See how long I can go between cigarettes.\"    Tobacco Plan  Tobacco Goals  Tobacco Goals: Patient to reduce tobacco use (see comments for amount)    Goals Met  Tobacco Goals Met: Patient to reduce tobacco use (see comments for amount) (Is going to see how long between cigarettes she can go.)    Tobacco Interventions  Tobacco Interventions: Therapist/patient discussion;Provide written material;Offer educational videos    Tobacco Education Completed  Tobacco Education Completed: Identifies triggers;Health benefits of tobacco cessation;Risk factor overview    Risk Factor Follow-up   Follow-up/Discharge: Reports is working on smoking cessation.  \"I really want to stop smoking in 2018 but I am afraid to set a date because I don't want to fail.\"       PSYCHOSOCIAL  Psychosocial Assessment: Reassessment     [facility] L Summary Score: 21    SHARA-D Score: 2    Psychosocial Risk Factor: Stress (Reports watching grandchildren stressful at times)    Psychosocial Plan  Interventions  Interventions: Offer educational videos and classes;Provide written material;Individual education and counseling    Education Completed  Education Completed: Relaxation/Coping Techniques;S/S of depression;Effects of stress on " body    Goals  Goals (Next 30 days): Identified Support system;Oriented to stress management classes;Identify stressors;Practicing stress management skills    Goals Met  Goals Met: Identified Support system;Identify stressors;Improvement in [place] COOP score;Practicing stress management skills    Psychosocial Follow-up  Follow-up/Discharge: Reports is dealing with discipline differently with grandchildren to reduce stress.         Patient involved in Goal setting?: Yes (made aware of 30 day assessment period)    Signature: _____________________________________________________________    Date: __________________    Time: __________________

## 2021-06-15 NOTE — TELEPHONE ENCOUNTER
PCP out of office this week. Will refill inhaler. Last office visit to discuss inhaler was September 2020.  Merlene Willard, DO

## 2021-06-15 NOTE — TELEPHONE ENCOUNTER
Patient calling about a cyst she had on her right breast.   She reports the MD did not put her on antibiotics. And now she reports the cyst has started bleeding. She has it covered with gauze, and reports maybe a tablespoon of blood /discharge has come out of this cyst.  She also reports she is on Coumadin.    She was advised to go to [facility] for evaluation, and possibly an INR.    Sara Trotter RN  [facility] Triage/refill nurse    Reason for Disposition    Patient wants to be seen    Breast pain and cause is not known    Breast lump    Protocols used: BREAST SYMPTOMS-A-[place]

## 2021-06-15 NOTE — PROGRESS NOTES
[facility] Care Note    Assessment/Plan:    Leonela Christianson is a 67 y.o. old female with:  1. CAD s/p PCI - doing well, tolerating her medicaions, plans on stpping tobacco this year.  Would cont plavix long term if continue to smoke.   2. Hx of PE - on warfarin - cont therapy.     Suggest follow up with Dr. Ranjan Gonzalez  [facility] HEART CARE  [phone]  ______________________________________________________________________    Subjective:    I had the opportunity to see Leonela Christianson at the [facility]. Leonela Christianson is a 67 y.o. female with a known history of tob use with recent MI still using tob sadly.  Tolerating medications, discussed continuing plavix.  No GI bleeding, she has COPD on inhalers as needed.  No syncopal events.   S/p PE - on warfarin.  ______________________________________________________________________      Review of Systems:   General: WNL  Eyes: WNL  Ears/Nose/Throat: Nosebleeds  Lungs: WNL  Heart: WNL  Stomach: WNL  Bladder: WNL  Muscle/Joints: WNL  Skin: WNL  Nervous System: WNL  Mental Health: WNL     Blood: Easy Bleeding, Easy Bruising    Problem List:  Patient Active Problem List   Diagnosis     Hyperlipidemia     Nicotine Dependence     Lateral Epicondylitis     Overweight (BMI 25.0-29.9)     Osteoporosis     Chronic bronchitis, unspecified chronic bronchitis type     Pulmonary embolism     Upper back pain on left side     Lump of skin     Pulmonary nodule     Pulmonary emphysema, unspecified emphysema type     Essential hypertension     NSTEMI (non-ST elevated myocardial infarction)     Coronary atherosclerosis due to lipid rich plaque     Medical History:  Past Medical History:   Diagnosis Date     COPD (chronic obstructive pulmonary disease)      Heart attack 01/2012     Hyperlipidemia      Hypertension      Pulmonary embolism      Surgical History:  Past Surgical History:   Procedure Laterality Date     CV CORONARY  ANGIOGRAM N/A 11/20/2017    Procedure: Coronary Angiogram;  Surgeon: Daniela Mazariegos MD;  Location: [facility] Cath Lab;  Service:      CV LEFT HEART CATHETERIZATION WO LEFT VETRICULOGRAM Left 11/20/2017    Procedure: Left Heart Catheterization Without Left Ventriculogram;  Surgeon: Daniela Mazariegos MD;  Location: [facility] Cath Lab;  Service:      HYSTERECTOMY      fibroids     OOPHORECTOMY       AZ TOTAL ABDOM HYSTERECTOMY      Description: Total Abdominal Hysterectomy;  Recorded: 04/19/2012;  Comments: for fibroids     Social History:  No pertinent social hx related to patient's chief complaint other than above in subjective        Family History:  No pertinent family hx related to patient's chief complaint other than above in subjective      Allergies:  Allergies   Allergen Reactions     Sulfa (Sulfonamide Antibiotics) Anaphylaxis       Medications:  Current Outpatient Prescriptions   Medication Sig Dispense Refill     albuterol (PROAIR HFA) 90 mcg/actuation inhaler Inhale 1-2 puffs every 4 (four) hours as needed for wheezing. Every 4 to 6 hours as needed 1 each 5     clopidogrel (PLAVIX) 75 mg tablet Take 1 tablet (75 mg total) by mouth daily. 90 tablet 3     cyclobenzaprine (FLEXERIL) 10 MG tablet Take 1 tablet (10 mg total) by mouth every 8 (eight) hours as needed for muscle spasms. 30 tablet 1     lisinopril-hydrochlorothiazide (PRINZIDE,ZESTORETIC) 10-12.5 mg per tablet Take 1 tablet by mouth daily with supper.       lisinopril-hydrochlorothiazide (PRINZIDE,ZESTORETIC) 10-12.5 mg per tablet TAKE 1 TABLET DAILY 90 tablet 2     metoprolol succinate (TOPROL XL) 50 MG 24 hr tablet Take 1 tablet (50 mg total) by mouth at bedtime. 90 tablet 1     nitroglycerin (NITROSTAT) 0.4 MG SL tablet Place 1 tablet (0.4 mg total) under the tongue every 5 (five) minutes as needed for chest pain. 20 tablet 0     rosuvastatin (CRESTOR) 40 MG tablet Take 40 mg by mouth Daily after lunch.       warfarin (COUMADIN) 3 MG tablet  Take 4.5 mg by mouth See Admin Instructions. Taking 4.5mg (1 and 1/2  Of 3 mg) daily at 1100       warfarin (COUMADIN) 3 MG tablet TAKE ONE AND ONE-HALF TABLETS DAILY AS DIRECTED (DOSE ADJUSTED BASED ON INR) 126 tablet 1     aspirin 81 mg chewable tablet Chew 1 tablet (81 mg total) daily. 30 tablet 0     No current facility-administered medications for this visit.        Objective:   Vital signs:  /72 (Patient Site: Left Arm, Patient Position: Sitting, Cuff Size: Adult Regular)  Pulse 88  Resp 20  Ht 5' (1.524 m)  Wt 155 lb (70.3 kg)  BMI 30.27 kg/m2      Physical Exam:    GENERAL APPEARANCE: Alert, cooperative and in no acute distress.  HEENT: No scleral icterus. No Xanthelasma. Oral mucuos membranes pink and moist.  NECK: JVP<6cm. No Hepatojugular reflux. Thyroid not visualized  CHEST: clear to auscultation  CARDIOVASCULAR: S1, S2 without murmur ,clicks or rubs. Brachial, radial and posterior tibial pulses are intact and symetric. No carotid bruits noted.    ABDOMEN: Nontender. BS+. No bruits.  EXTREMITIES: No cyanosis, clubbing or edema.    Lab Results:  LIPIDS:  Lab Results   Component Value Date    CHOL 134 12/06/2017    CHOL 153 04/12/2017    CHOL 148 12/17/2015     Lab Results   Component Value Date    HDL 51 12/06/2017    HDL 53 04/12/2017    HDL 56 12/17/2015     Lab Results   Component Value Date    LDLCALC 74 12/06/2017    LDLCALC 91 04/12/2017    LDLCALC 82 12/17/2015     Lab Results   Component Value Date    TRIG 44 12/06/2017    TRIG 45 04/12/2017    TRIG 52 12/17/2015     No components found for: CHOLHDL    BMP:  Lab Results   Component Value Date    CREATININE 0.82 11/21/2017    BUN 13 11/21/2017     11/21/2017    K 3.8 11/21/2017     (H) 11/21/2017    CO2 20 (L) 11/21/2017         Bandar Gonzalez MD  [facility]  [phone]

## 2021-06-15 NOTE — TELEPHONE ENCOUNTER
RN cannot approve Refill Request    RN can NOT refill this medication med is not covered by policy/route to provider. Last office visit: 1/21/2021 Miri Woodruff MD Last Physical: 10/7/2019 Last MTM visit: Visit date not found Last visit same specialty: 1/21/2021 Miri Woodruff MD.  Next visit within 3 mo: Visit date not found  Next physical within 3 mo: Visit date not found      Karol Hull, [facility] Triage/Med Refill 3/10/2021    Requested Prescriptions   Pending Prescriptions Disp Refills     STIOLTO RESPIMAT 2.5-2.5 mcg/actuation Mist inhaler [Pharmacy Med Name: STIOLTO RESPIMAT INH [id number] 2.5/2.5] 12 g 3     Sig: USE 2 INHALATIONS DAILY       There is no refill protocol information for this order

## 2021-06-15 NOTE — TELEPHONE ENCOUNTER
Left message to call back for: Leonela  Information to relay to patient:  LMTCB. Cologuard result is NEGATIVE. Repeat in 3 years

## 2021-06-16 PROBLEM — N63.10 BREAST MASS, RIGHT: Status: ACTIVE | Noted: 2021-05-04

## 2021-06-16 PROBLEM — R09.02 HYPOXIA: Status: ACTIVE | Noted: 2020-09-11

## 2021-06-16 PROBLEM — L81.9 PIGMENTED SKIN LESION: Status: ACTIVE | Noted: 2019-06-10

## 2021-06-16 PROBLEM — I25.83 CORONARY ATHEROSCLEROSIS DUE TO LIPID RICH PLAQUE: Chronic | Status: ACTIVE | Noted: 2017-11-18

## 2021-06-16 PROBLEM — N20.1 URETERAL STONE: Status: ACTIVE | Noted: 2020-09-11

## 2021-06-16 PROBLEM — Z90.710 H/O: HYSTERECTOMY: Status: ACTIVE | Noted: 2021-05-18

## 2021-06-16 PROBLEM — I25.2 HISTORY OF NON-ST ELEVATION MYOCARDIAL INFARCTION (NSTEMI): Status: ACTIVE | Noted: 2017-11-18

## 2021-06-16 NOTE — TELEPHONE ENCOUNTER
Right breast inflammed, painful. Seen by MD, told her it's healing. One week it was seeping. MD gave RX at urgent care. IT's back again. I connected her with scheduling for an appointment and advised urgent care if they can't get her in.  Marilee Yin RN  [facility]      Reason for Disposition    Breast looks infected (spreading redness, feels hot or painful to touch) and no fever    Additional Information    Negative: Chest pain    Negative: Breastfeeding questions about baby    Negative: Breastfeeding questions about mother (breast symptoms or feeling sick)    Negative: Breastfeeding questions about mother's medicines and diet    Negative: Postpartum breast pain and swelling, not breastfeeding    Negative: Small spot, skin growth or mole    Negative: SEVERE breast pain and fever > 103 F (39.4 C)    Negative: Patient sounds very sick or weak to the triager    Negative: Breast looks infected (spreading redness, feels hot or painful to touch) and fever    Protocols used: BREAST SYMPTOMS-A-[place]

## 2021-06-16 NOTE — PROGRESS NOTES
Assessment & Plan     Leonela was seen today for breast pain.    Diagnoses and all orders for this visit:    Infected cyst of skin  On exam, patient has a small, erythematous cyst at the 6 o'clock position of the areola.  Is TTP, mildly fluctuant but not actively draining pus.  There is overlying erythema.  No axillary lymphadenopathy.  No other skin changes noted.  Will prescribe Keflex once again x5 days as it worked the last time in January.  Given that she has had recurrence, did discuss possible referral to surgery for removal of the cyst wall as I suspect that it will continue to recur as long as the cyst wall is present.  Patient declined today.  She would like to see how she does with warm compresses and Keflex.  If it were occurs, she will let us know and think about doing a surgical referral.  Last HbA1c back in 2012 was 6.3.  Would also like to get a glucose today as uncontrolled diabetes can contribute to recurrence of abscesses/infected cyst.  Patient amenable.  -     Glucose  -     cephalexin (KEFLEX) 500 MG capsule; Take 1 capsule (500 mg total) by mouth 4 (four) times a day for 5 days.    Cysts of inguinal canal:  Patient does not shave in the vaginal area.  The cyst seems to be healing and nonfluctuant.  Encouraged to do warm compresses and that area as well and avoid harsh soaps.  The Keflex should also help reduce the size of the cyst.     Patient is due for several vaccines, declined them.    25 minutes spent on the date of the encounter doing chart review, history and exam, documentation and further activities per the note      Return in about 1 week (around 4/21/2021) for if no improvement .    DO ELLIOT Murillo [facility]   Leonela Christianson is 70 y.o. and presents today for the following health issues     HPI     Back in January of this year, patient had some redness/tenderness of the periareolar region on the right.  Was found to have purulent  "drainage and infectious sebaceous cyst.  Was also sent for ultrasound which confirmed sebaceous cyst/epidermal inclusion/at the 6 o'clock position near the breast at 2.6 x 0.5 x 1.2 cm.    Patient reports that she did well on the Keflex and the bump got \"really small\" and was not bothering her.  Then, a few days ago, started to experience right breast pain at the same spot that she had the cyst again.  It is not as red and actively draining pus.  She figured that she would come in earlier rather than later this time around.  The area is red and tender to the touch.  Has not had any trauma to the area.  Not currently sexually active.    Also reports recurrent cysts on on the left side of her vulva that have been going on for many years.  Patient does not shave.  Is not sexually active and does not have any itching or changes in her vaginal discharge.    Review of Systems  As per HPI         Objective    /64 (Patient Site: Right Arm, Patient Position: Sitting, Cuff Size: Adult Regular)   Pulse 95   Wt 146 lb (66.2 kg)   SpO2 95%   BMI 28.51 kg/m    Body mass index is 28.51 kg/m .     Physical Exam   Constitutional: She appears well-developed and well-nourished. No distress.   Cardiovascular: Exam reveals no gallop and no friction rub.   No murmur heard.  Musculoskeletal:      Comments: Breast:  Patient has a small, mildly fluctuant area, erythematous cyst at the 6 o'clock position of the areola.  Is TTP but has not come to ahead and is not actively draining pus.  There is some mild surrounding cellulitis.  No axillary lymphadenopathy.   exam:  Normal pubic hair growth extending to the inner thigh.  Patient has a resolving inguinal canal cyst in the left inguinal canal.  Nonfluctuant, is not draining and non-TTP.  There are scars from previous cysts in the area as well.   Skin: She is not diaphoretic.         "

## 2021-06-16 NOTE — TELEPHONE ENCOUNTER
Please call patient back and let her know that I placed the referran and also ordered a breast ultrasound since she is still having pain in the area despite antibiotic treatment.

## 2021-06-16 NOTE — TELEPHONE ENCOUNTER
ANTICOAGULATION  MANAGEMENT    Assessment     Today's INR result of 2.6 is Therapeutic (goal INR of 2.0-3.0)        Warfarin taken as previously instructed    No new diet changes affecting INR    No new medication/supplements affecting INR    Continues to tolerate warfarin with no reported s/s of bleeding or thromboembolism     Previous INR was Therapeutic    Plan:     Spoke on phone with Leonela regarding INR result and instructed:      Warfarin Dosing Instructions:  Continue current warfarin dose 6 mg daily on Sun, Tue, Fri; and 3 mg daily rest of week  (0 % change)    Instructed patient to follow up no later than: 8 weeks    Education provided: target INR goal and significance of current INR result, importance of following up for INR monitoring at instructed interval, importance of taking warfarin as instructed, importance of notifying clinic for changes in medications and importance of notifying clinic for diarrhea, nausea/vomiting, reduced intake and/or illness    Leonela verbalizes understanding and agrees to warfarin dosing plan.    Instructed to call the [facility] for any changes, questions or concerns. (#[phone])   ?   Jenny Stanley RN    Subjective/Objective:      Leonela Christianson, a 70 y.o. female is on warfarin. Leonela      Leonela reports:     Home warfarin dose: as updated on anticoagulation calendar per template     Missed doses: No     Medication changes:  No     S/S of bleeding or thromboembolism:  No     New Injury or illness:  No     Changes in diet or alcohol consumption:  No     Upcoming surgery, procedure or cardioversion:  No    Anticoagulation Episode Summary     Current INR goal:  2.0-3.0   TTR:  75.6 % (12 mo)   Next INR check:  5/24/2021   INR from last check:  2.60 (3/29/2021)   Weekly max warfarin dose:     Target end date:     INR check location:     Preferred lab:     Send INR reminders to:  [facility]    Indications    History of pulmonary embolism [Z86.711]            Comments:           Anticoagulation Care Providers     Provider Role Specialty Phone number    Miri Woodruff MD Referring Family Medicine [phone]

## 2021-06-16 NOTE — TELEPHONE ENCOUNTER
If the antibiotics takes care of her symptoms and she no longer has any area of tenderness/pain, there is no need to come in for removal. If she continues to have residual sxs once abx are done, she should let us and we will put in referral to breast surgery to help coordinate removal.

## 2021-06-16 NOTE — TELEPHONE ENCOUNTER
Patient called and advised as below      ----- Message from Miri Woodruff MD sent at 3/23/2021  6:37 PM CDT -----  Please let patient know that her Cologuard test returned negative.  Plan repeat in 3 years.  Please update [facility] as well, thanks.  ELLIOT Woodruff MD    Patient expressed understanding  Laura Brown, [facility]

## 2021-06-16 NOTE — TELEPHONE ENCOUNTER
----- Message from Tina Guaman DO sent at 4/18/2021  9:54 AM CDT -----  Please call patient and let her know:     No evidence of diabetes. Would continue the antibiotics and warm compresses for the cysts for now.     Tina Guaman DO

## 2021-06-16 NOTE — PROGRESS NOTES
" ITP ASSESSMENT   Assessment Day: 90 Day  Session Number: 27  Precautions: standard cardiac / smoking  Diagnosis: MI;Stent  Risk Stratification: High  Referring Provider: Daniela Mazariegos MD  EXERCISE                                 Exercise Plan  Goals Next 30 days  ADL'S: Pt will be able to use Lt arm for normal ADLs without oblique muscle pain  Leisure: Pt will be more involved with [employer] actiivties   Work: Pt will walk 10 min in halls when not able to get to store to walk    Education Goals: Medication review;Has system for taking medication.;Patient can state cardiac s/s and appropriate emergency response.;All goals in this section met  Education Goals Met: Patient can state cardiac s/s and appropriate emergency response.;Has system for taking medication.;Medication review.                        Goals Met  60 day ADL'S goals met: states still not able to carry light groceries  up stairs without SOB, so has family assist  60 day Leisure goals met: Not able to drive in snow so not getting out as much as she'd like  60 day Work goals met: able to do light laundry without fatigue  60 Day Progression: reports still not too motivated to do any regular walking, till weather improves and able to get outside    30 day ADL'S goals met: carries approx 6-8# laundry bags down bonilla to laundry with improving endurance  30 day Leisure goals met: goes out to dinner \"with my kid\" once in a while but not much due to weather  30 day Work goals met: carries 8-10 # balanced grocery load daily with less fatigue  30 Day Progression: pt states is busy with shopping and chores but will try to start some regular walking.    Initial ADL's goals met: Patient has resumed light housekeeping tasks such as sweeping the floors and doing laundry.  Does note carrying laundry down the bonilla and up/down stairs does offer more symptoms with regards to SOB.  Initial Leisure goals met: Has not resumed.  Too cold to want to go out to eat.  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  Initial Progression: Patient has tolerated 30-45 minute of exercise at 2.4-2.8 MET level.  Discussed goal of progressing to 4th MET level in order to allow greater tolerance of higher MET level activity at home.  Has not initiated consistently a home exercise program and is having difficulty with smoking cessation.    Exercise Prescription  Exercise Mode: Treadmill;Bike;Nustep;Arm Erg.;Hallway Walking  Frequency: 2-3 x weekly  Duration: 50 min  Intensity / THR: 20-30 beats above resting heart rate  RPE 11-14  Progression / Met level: 2.8-3.2  Resistive Training?: Yes    Current Exercise (mins/week): 5    Interventions  Home Exercise:  Mode: walking   Frequency: 5-7 days  Duration: 10 min 3x days    Education Material : Educational videos;Provide written material;Individual education and counseling;Offer educational classes    Education Completed  Exercise Education Completed: Signs and Symptoms;Medication review;RPE;Emergency Plan;Home Exercise;Warm up/cool down;FITT Principles;BP/HR Reponse to exercise;Strength training;Benefits of Exercise;End point of exercise            Exercise Follow-up/Discharge  Follow up/Discharge: Reports started walking 5 min at grocery store,but doesn't want to walk halls NUTRITION  Nutrition Assessment: Reassessment    Nutrition Risk Factors:  Nutrition Risk Factors: Dyslipidemia;Overweight  Cholesterol: 134  LDL: 74  HDL: 51  Triglycerides: 44    Nutrition Plan  Interventions  Diet Consult: Declined     Education Completed  Nutrition Education Completed: Low Saturated fat diet;Low sodium diet    Goals  Nutrition Goals (Next 30 days): Patient will follow a low sodium diet;Patient will lose weight;Patient knows appropriate portion size    Goals Met  Nutrition Goals Met: Patient can identify their risk factors for CAD;Patient knows appropriate portion size    Height, Weight, and   "BMI  Weight: 155 lb 9.6 oz (70.6 kg)  Height: 5' (1.524 m)  BMI: 30.39    Nutrition Follow-up  Follow-up/Discharge: Pt declines any discussion w/ RD. \" I eat what I eat\"       Other Risk Factors  Other Risk Factor Assessment: Reassessment    HTN Risk Factor: Hypertension    Pre Exercise BP: 112/74  Post Exercise BP: 126/82    Hypertension Plan  Goals  HTN Goals: Follow low sodium diet;Exercises regularly    Goals Met  HTN Goals Met: Take medication as prescribed    HTN Interventions  HTN Interventions: Therapist/patient discussion;Offer educational classes    HTN Education Completed  HTN Education Completed: Medication review;Risk factor overview    Tobacco Risk Factor: Tobacco    Initial Use:: 1-2 ppd  Current Use:: 1-2 ppd    Tobacco Plan  Tobacco Goals  Tobacco Goals: Patient to reduce tobacco use (see comments for amount)    Goals Met  Tobacco Goals Met: patient not ready to change    Tobacco Interventions  Tobacco Interventions: Therapist/patient discussion    Tobacco Education Completed  Tobacco Education Completed: Health benefits of tobacco cessation;Risk factor overview    Risk Factor Follow-up   Follow-up/Discharge: reports is finally getting closer to wanting to cut back and  quit but not ready to set a date.   PSYCHOSOCIAL     Psychosocial Risk Factor: Stress    Psychosocial Plan  Interventions  Interventions: Offer educational videos and classes;Provide written material    Education Completed  Education Completed: Relaxation/Coping Techniques;S/S of depression;Effects of stress on body    Goals  Goals (Next 30 days): Practicing stress management skills    Goals Met  Goals Met: Identified Support system;Identify stressors    Psychosocial Follow-up  Follow-up/Discharge: feels is doing better with handling grandkids           Patient involved in Goal setting?: Yes    Signature: _____________________________________________________________    Date: __________________    Time: __________________  "

## 2021-06-16 NOTE — TELEPHONE ENCOUNTER
Spoke with patient and relayed below message. She states she is still can feel the tenderness and cyst, She would like referral placed for removal as she is concerned she will lose her breast.   Donte Ruiz CMA

## 2021-06-16 NOTE — TELEPHONE ENCOUNTER
Telephone Encounter by Jenny Stanley RN at 3/20/2020  3:20 PM     Author: Jenny Stanley RN Service: -- Author Type: Registered Nurse    Filed: 3/20/2020  3:25 PM Encounter Date: 3/20/2020 Status: Attested    : Jenny Stanley RN (Registered Nurse) Cosigner: Miri Woodruff MD at 3/20/2020  3:41 PM    Attestation signed by Miri Woodruff MD at 3/20/2020  3:41 PM    Reviewed note and agree with above.                ANTICOAGULATION  MANAGEMENT    Assessment     Today's INR result of 1.9 is Subtherapeutic (goal INR of 2.0-3.0)        Warfarin taken as previously instructed    Decreased greens/vitamin K intake may be affecting INR - had more greens over the last few days, done eating more greens now    No new medication/supplements affecting INR    Continues to tolerate warfarin with no reported s/s of bleeding or thromboembolism     Previous INR was Therapeutic    Plan:     Spoke with Leonela regarding INR result and instructed:     Warfarin Dosing Instructions:  take 6 mg today only then continue current warfarin dose 3 mg daily on Tue, Thu, Sat; and 4.5 mg daily rest of week  (0 % change)    Instructed patient to follow up no later than: 2 weeks    Education provided: importance of consistent vitamin K intake and target INR goal and significance of current INR result    Leonela verbalizes understanding and agrees to warfarin dosing plan.    Instructed to call the [facility] for any changes, questions or concerns. (#[phone])   ?   Jenny Stanley RN    Subjective/Objective:      Leonela Christianson, a 69 y.o. female is on warfarin.     Leonela reports:     Home warfarin dose: verbally confirmed home dose with Leonela and updated on anticoagulation calendar     Missed doses: No     Medication changes:  No     S/S of bleeding or thromboembolism:  No     New Injury or illness:  No     Changes in diet or alcohol consumption:  Yes: more greens over the last few days, will go back to normal  intake now     Upcoming surgery, procedure or cardioversion:  No    Anticoagulation Episode Summary     Current INR goal:   2.0-3.0   TTR:   77.1 % (1 y)   Next INR check:   4/3/2020   INR from last check:   1.90! (3/20/2020)   Weekly max warfarin dose:      Target end date:      INR check location:      Preferred lab:      Send INR reminders to:   COLBY ELLER    Indications    History of pulmonary embolism [Z86.711]           Comments:            Anticoagulation Care Providers     Provider Role Specialty Phone number    Miri Woodruff MD Referring Family Medicine [phone]

## 2021-06-16 NOTE — TELEPHONE ENCOUNTER
Spoke with patient and relayed message below. She asked if she could come back to clinic to have the cysts removed. I advised that I would ask Dr. Guaman what her thoughts on follow up/further management may be first and would then have someone from our team reach back out to her. She has finished the course of abx and continuing the warm compresses. She is in agreement to the above plan. Please advise   Donte Ruiz CMA

## 2021-06-16 NOTE — PROGRESS NOTES
"ITP ASSESSMENT   Assessment Day: 120 Day Discharge  Session Number: 31  Precautions: Standard Cardiac , smoking  Diagnosis: MI;Stent  Risk Stratification: High  Referring Provider: Daniela Mazariegos MD  EXERCISE  Exercise Assessment: Discharge     6 Minute Walk Test   Pre   Pre Exercise HR: 102                  Pre Exercise BP: 122/70    Peak  Peak HR: 125                 Peak BP: 153/76  Peak feet: 1292  Peak O2 SAT: 93  Peak RPE: 13  Peak MPH: 2.45    Symptoms:  Peak Symptoms: Slightly winded    5 mins. Post  5 Min Post HR: 97  5 Min Post BP: 134/82                         Exercise Plan  Goals Next 30 days      Education Goals: All goals in this section met  Education Goals Met: Patient can state cardiac s/s and appropriate emergency response.;Has system for taking medication.;Medication review.                        Goals Met  90 day ADL'S goals met: Patient is attending all of her [email or web address] activities.  90 day Leisure goals met: Patient is able to use Left UE with rare pain in oblique area. Patient is walking 5-10 minutes in hallway when she does not attend rehab.    90 Day Progress: Patient has met all of her goals and has returned to all of her ADL activities.    60 day ADL'S goals met: states still not able to carry light groceries  up stairs without SOB, so has family assist  60 day Leisure goals met: Not able to drive in snow so not getting out as much as she'd like  60 day Work goals met: able to do light laundry without fatigue  60 Day Progression: reports still not too motivated to do any regular walking, till weather improves and able to get outside    30 day ADL'S goals met: carries approx 6-8# laundry bags down [place] to laundry with improving endurance  30 day Leisure goals met: goes out to dinner \"with my kid\" once in a while but not much due to weather  30 day Work goals met: carries 8-10 # balanced grocery load daily with less fatigue  30 Day Progression: pt states is busy with shopping and chores " but will try to start some regular walking.    Initial ADL's goals met: Patient has resumed light housekeeping tasks such as sweeping the floors and doing laundry.  Does note carrying laundry down the [place] and up/down stairs does offer more symptoms with regards to SOB.  Initial Leisure goals met: Has not resumed.  Too cold to want to go out to eat.  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  Initial Progression: Patient has tolerated 30-45 minute of exercise at 2.4-2.8 MET level.  Discussed goal of progressing to 4th MET level in order to allow greater tolerance of higher MET level activity at home.  Has not initiated consistently a home exercise program and is having difficulty with smoking cessation.    Exercise Prescription    Current Exercise (mins/week): 170    Interventions  Home Exercise:  Mode: walking  Frequency: 5-7 days per week  Duration: 10-20 mins/ day    Education Material : Educational videos;Provide written material;Individual education and counseling;Offer educational classes    Education Completed  Exercise Education Completed: Signs and Symptoms;Medication review;RPE;Emergency Plan;Home Exercise;Warm up/cool down;FITT Principles;BP/HR Reponse to exercise;Strength training;Benefits of Exercise;End point of exercise;Cardiac Anatomy;Stretching            Exercise Follow-up/Discharge  Follow up/Discharge: Patient is tolerating 50 minutes of continuous exercise.  She has started walking the halls at home on her days not attending rehab. NUTRITION  Nutrition Assessment: Discharge    Nutrition Risk Factors:  Nutrition Risk Factors: Dyslipidemia;Overweight  Cholesterol: 134  LDL: 74  HDL: 51  Triglycerides: 44    Nutrition Plan  Interventions  Diet Consult: Declined          Education Completed  Nutrition Education Completed: Low Saturated fat diet;Low sodium diet;Risk factor overview    Goals  Nutrition Goals (Next 30 days):  Patient demonstrated understanding of cardiac nutrition, no goals identified for the next 30 days    Goals Met  Nutrition Goals Met: Patient can identify their risk factors for CAD;Patient knows appropriate portion size;Patient follows a low sodium diet;Completed Nutritional Risk Screen;Provided Rate your Plate Survey;Reviewed Dietitian schedule;Patient states following a low saturated fat diet    Height, Weight, and  BMI  Weight: 155 lb (70.3 kg)  Height: 5' (1.524 m)  BMI: 30.27    Nutrition Follow-up  Follow-up/Discharge: Patient demonstrated a verbal understanding of a low sodium diet as well as a low saturated fat diet.       Other Risk Factors  Other Risk Factor Assessment: Discharge    HTN Risk Factor: Hypertension    Pre Exercise BP: 126/72  Post Exercise BP: 114/64    Hypertension Plan  Goals  HTN Goals: Patient demonstrates understanding of HTN, no goals identified for the next 30 days    Goals Met  HTN Goals Met: Take medication as prescribed;Follow low sodium diet;Exercises regularly    HTN Interventions  HTN Interventions: Therapist/patient discussion;Offer educational classes;Offer educational videos    HTN Education Completed  HTN Education Completed: Medication review;Risk factor overview    Tobacco Risk Factor: Tobacco    Initial Use:: 1-2 ppd  Current Use:: 1 ppd    Tobacco Plan  Tobacco Goals  Tobacco Goals: Patient to reduce tobacco use (see comments for amount) (Has currently reduced to 1 ppd and tries to quit every pack.)    Goals Met  Tobacco Goals Met: patient to set quit date (Patient wants to be smoke free by November 2018)    Tobacco Interventions  Tobacco Interventions: Therapist/patient discussion    Tobacco Education Completed  Tobacco Education Completed: Health benefits of tobacco cessation;Risk factor overview    Risk Factor Follow-up   Follow-up/Discharge: Patient states she tries to quit every pack now.  She has set a quit date of November 2018, before she sees Dr. Gonzalez for  follow-up appointment.   PSYCHOSOCIAL  Psychosocial Assessment: Discharge     [facility]: 17    SHARA-D Score: 1    Psychosocial Risk Factor: Stress    Psychosocial Plan  Interventions  Interventions: Offer educational videos and classes;Provide written material    Education Completed  Education Completed: Relaxation/Coping Techniques;S/S of depression;Effects of stress on body    Goals  Goals (Next 30 days): Patient demonstrates understanding of stress, no goals identified for the next 30 days    Goals Met  Goals Met: Identified Support system;Identify stressors;[facility] score    Psychosocial Follow-up  Follow-up/Discharge: Patient is doing well with stress managemnet and has returned to all of her grandchilds activities.  She has increased her exercise at home and is pleased with her progress in rehab. She has shown improvement in both her SHARA-D score and her [facility] Scores.           Patient involved in Goal setting?: Yes    Signature: _____________________________________________________________    Date: __________________    Time: __________________

## 2021-06-17 ENCOUNTER — COMMUNICATION - HEALTHEAST (OUTPATIENT)
Dept: ANTICOAGULATION | Facility: CLINIC | Age: 71
End: 2021-06-17

## 2021-06-17 ENCOUNTER — AMBULATORY - HEALTHEAST (OUTPATIENT)
Dept: LAB | Facility: CLINIC | Age: 71
End: 2021-06-17

## 2021-06-17 DIAGNOSIS — Z86.711 HISTORY OF PULMONARY EMBOLISM: ICD-10-CM

## 2021-06-17 LAB — INR PPP: 2.2 (ref 0.9–1.1)

## 2021-06-17 NOTE — PROGRESS NOTES
[facility]  [address], [address]  [facility] [address]  Dept: [phone]  Dept Fax: [phone]  Primary Provider: Miri Woodruff MD  Pre-op Performing Provider: DEACON COVINGTON      PREOPERATIVE EVALUATION:  Today's date: 5/18/2021    Leonela Christianson is a 70 y.o. female who presents for a preoperative evaluation.    Surgical Information:  Proposed Surgery/ Procedure: cyst removal from right breast   Date of Surgery/ Procedure: 6.8.2021  Time of Surgery/ Procedure: 7am   Hospital/Surgical Facility: [facility]   Surgery Fax Number: Note does not need to be faxed, will be available electronically in Epic.  Primary Physician: Miri Woodruff MD  Type of Anesthesia Anticipated: General      Assessment & Plan      The proposed surgical procedure is considered INTERMEDIATE risk.    Leonela was seen today for pre-op exam, med check and check ears.    Diagnoses and all orders for this visit:    1. Preop general physical exam  INR    Basic Metabolic Panel    HM2(CBC w/o Differential)   2. Pulmonary emphysema, unspecified emphysema type (H)  albuterol (PROAIR HFA) 90 mcg/actuation inhaler   3. DNR (do not resuscitate)     4. DNI (do not intubate)     5. Infective otitis externa, bilateral  ciprofloxacin-hydrocortisone (CIPRO HC) otic suspension   6. On warfarin at home  INR   7. History of MI (myocardial infarction)     8. History of heart artery stent     9. Recurrent deep vein thrombosis (DVT) (H)     10. Coronary atherosclerosis due to lipid rich plaque       Patient is MODERATE risk for the procedure.     Spoke to patient at length about her procedure.  Patient has significant cardiac disease with history of multiple MIs s/p PCI, continued tobacco abuse with COPD/emphysema with GAINES and decreased exercise tolerance and chronic anticoagulation.  Risks of general anesthesia outweigh the benefits of being put under for what is a minimally invasive  procedure and can be done in an outpatient setting based on the last note in the chart by the breast surgeon.  For general anesthesia, patient needs warfarin bridging (spoke to [facility] and they would handle this) and will need to get clearance from cardiology prior to stopping Plavix due to history of restenosis of her stent.  After prolonged discussion, patient seem to be on the fence about doing general anesthesia.  Ultimately decided that she will reversed her decision and opt for the office procedure.  Blood work done today just in case patient changes her mind again.  We attempted to reach out to the breast surgeon's office but were unable to speak to anyone due to long wait times.  I have sent a message to the breast surgeon alerting him to the change in her situation.  I would like to know if they still require a hold on her Plavix and warfarin for the office procedure.    Did tell the patient that if she changed her mind again and wanted to do general anesthesia, she should let us know ASAP so that we can get her in with cardiology for clearance as well as resume discussions with the anticoagulation clinic on how to bridge her most appropriately.  Patient understands.    Addendum: received message back from surgical team. Plavix and warfarin still need to be held. Patient will need clearance from cardiology prior to discontinuation of the plavix and I will message anticoagulation clinic to help with warfarin management. Will have nursing make patient aware that she needs to schedule appt with cards PRIOR to the procedure.     Addendum 5/20 - received communication from patient's cardiologist that she should discontinue the plavix 5 days prior to procedure and continue on aspirin 81 mg daily for the procedure.Is ok for surgery otherwise.  Will reach out to patient and make aware of the recommendations and also made surgical team aware.     Pulmonary nodule-was noted in 2016.  Were stable.  However, given  patient's ongoing GAINES, continued tobacco abuse, would recommend repeat CT chest at some point.  Unfortunately, due to time constraints ran out of time to discuss this.        Risks and Recommendations:  The patient has the following additional risks and recommendations for perioperative complications:   - No identified additional risk factors other than previously addressed    Medication Instructions:  Patient opted not to undergo general anesthesia at this time.  If she changes her mind, she needs to undergo warfarin bridging and needs clearance from cardiology prior to holding of Plavix for the procedure.    RECOMMENDATION:  See discussion above.    80 minutes spent on the date of the encounter doing chart review, history and exam, documentation and further activities per the note      Subjective     HPI related to upcoming procedure:   Patient is a medically complex individual with PMH of MI x2 s/p is PCI, history of recurrent DVT and PE (provoked) on chronic warfarin, tobacco abuse, history of nephrolithiasis, COPD/emphysema, vertigo and pulmonary nodule who presents today for preop for right breast cyst removal.    Was recently diagnosed on imaging.  Saw the breast surgeon who recommended an office procedure for removal of the cyst as it is very superficial.  Patient did not want to experience any pain and opted for the OR instead.    Right breast cyst is table and without any significant pain at this time. She has not had any fevers or chill     Has hx of essential HTN and changed dosing of her BP meds. Her systolics were in the 120s and she thought that was 'too low' for her and decided to take the med every other day.     Cardiac history:first MI in 2012 s/p PCI that re-stenosed --> second MI and needed its opened up. Then in 217 - had another MI. Is on plavix and follows with cards. Hasn't seen them in a year. No chest pain, orthopnea or LE edema.     Is a smoker and contributes to have GAINES and can walk  only about half a block before getting shortness of breath. Can't go up the stairs without wheezing or coughing. Is on controlled inhaler and albuterol. Doesn't follow with pulm.  Smokes a bit less than 1 PPD daily x 50 years. Has hx of pulmonary nodules.     Was started on warfarin in 1984 after she had a miscarriage and sustained DVT.  Shortly afterwards, she underwent hysterectomy 1996 and got another DVT + PE.  Decision was made at that time that she would continue on lifelong anticoagulation.    Has a history of kidney stones and needed to undergo emergent stenting recently but has been doing well fine since then.      [facility]     HPI related to upcoming procedure:     Have you ever had a heart attack or stroke? YES: heart attack Jan/2012- Apr/2012- Nov/2017  Have you ever had surgery on your heart or blood vessels, such as a stent, coronary (heart) bypass, or surgery on an artery in the head, neck, heart, or legs? YES: Stents   Do you have chest pain when you are physically active? No  Do you have a history of heart failure? No  Do you currently have a cold, bronchitis, or symptoms of other respiratory (head and chest) infections? YES: COPD  Do you have a cough, shortness of breath, or wheezing? YES: shortness of breath   Do you or anyone in your family have a history of blood clots? YES: patient- happended 1984/1997  Do you or anyone in your family have a serious bleeding problem, such as long-lasting bleeding after surgeries or cuts? No  Have you ever had anemia or been told to take iron pills? No  Have you had any abnormal blood loss such as black, tarry or bloody stools, or abnormal vaginal bleeding?No  Have you ever had a blood transfusion? No   Are you willing to have a blood transfusion if it is medically needed before, during, or after your surgery? Yes  Have you or anyone in your family ever had problems with anesthesia (sedation for surgery)? No  Do you have sleep apnea, excessive  snoring, or daytime drowsiness? No  Do you have any artifical heart valves or other implanted medical devices, such as a pacemaker, defibrillator, or continuous glucose monitor? No  Do you have any artifical joints? No  Are you allergic to latex? No  Is there any chance that you may be pregnant? No       Health Care Directive:  Patient has a Health Care Directive or Living Will:  NO   Verbally assigned her Medical decision maker:   Shana Islas   Sister   [phone]  Code: DNR/DNI       Preoperative Review of :    reviewed - no record of controlled substances prescribed.    Review of Systems  CONSTITUTIONAL: NEGATIVE for fever, chills, change in weight  INTEGUMENTARY/SKIN: NEGATIVE for worrisome rashes, moles or lesions  EYES: NEGATIVE for vision changes or irritation  ENT/MOUTH: NEGATIVE for ear, mouth and throat problems  RESP: NEGATIVE for significant cough or SOB  BREAST: NEGATIVE for masses, tenderness or discharge  CV: NEGATIVE for chest pain, palpitations or peripheral edema  GI: NEGATIVE for nausea, abdominal pain, heartburn, or change in bowel habits  : NEGATIVE for frequency, dysuria, or hematuria  MUSCULOSKELETAL: NEGATIVE for significant arthralgias or myalgia  NEURO: NEGATIVE for weakness, dizziness or paresthesias  ENDOCRINE: NEGATIVE for temperature intolerance, skin/hair changes  HEME: NEGATIVE for bleeding problems  PSYCHIATRIC: NEGATIVE for changes in mood or affect    Patient Active Problem List    Diagnosis Date Noted     Breast mass, right 05/04/2021     Hypoxia 09/11/2020     Ureteral stone 09/11/2020     Pigmented skin lesion 06/10/2019     History of non-ST elevation myocardial infarction (NSTEMI) 11/18/2017     Coronary atherosclerosis due to lipid rich plaque 11/18/2017     Essential hypertension 04/13/2017     Pulmonary nodule 06/30/2016     Pulmonary emphysema, unspecified emphysema type (H) 06/30/2016     Upper back pain on left side 04/05/2016     History of pulmonary  embolism 10/28/2014     Chronic bronchitis, unspecified chronic bronchitis type (H)      Hyperlipidemia      Nicotine Dependence      Overweight (BMI 25.0-29.9)      Osteoporosis      Hemorrhoids, internal 04/10/2007     Past Medical History:   Diagnosis Date     Acute pyelonephritis      LEELA (acute kidney injury) (H) 9/12/2020     COPD (chronic obstructive pulmonary disease) (H)      Coronary artery disease      Diabetes mellitus (H)      Heart attack (H) 2012, 2019    X3     History of blood clots     PEx2     Hyperlipidemia      Hypertension      Past Surgical History:   Procedure Laterality Date     CARDIAC CATHETERIZATION      Stent     CV CORONARY ANGIOGRAM N/A 11/20/2017    Procedure: Coronary Angiogram;  Surgeon: Daniela Mazariegos MD;  Location: [facility] Cath Lab;  Service:      CV LEFT HEART CATHETERIZATION WO LEFT VETRICULOGRAM Left 11/20/2017    Procedure: Left Heart Catheterization Without Left Ventriculogram;  Surgeon: Daniela Mazariegos MD;  Location: [facility] Cath Lab;  Service:      CYSTOSCOPY  09/29/2020     HYSTERECTOMY      fibroids     OOPHORECTOMY       IN CYSTOSCOPY,INSERT URETERAL STENT Left 9/11/2020    Procedure: CYSTOSCOPY, WITH URETERAL STENT INSERTION;  Surgeon: Sean Schmitt MD;  Location: [facility] OR;  Service: Urology     Current Outpatient Medications   Medication Sig Dispense Refill     albuterol (PROAIR HFA) 90 mcg/actuation inhaler USE 1 TO 2 INHALATIONS EVERY 4 TO 6 HOURS AS NEEDED FOR WHEEZING 25.5 g 3     clopidogreL (PLAVIX) 75 mg tablet TAKE 1 TABLET DAILY 90 tablet 2     cyclobenzaprine (FLEXERIL) 10 MG tablet Take 1 tablet (10 mg total) by mouth every 8 (eight) hours as needed for muscle spasms. 30 tablet 1     losartan-hydrochlorothiazide (HYZAAR) 100-12.5 mg per tablet Take 1 tablet by mouth daily. (Patient taking differently: Take 1 tablet by mouth daily. Taking every other day.) 90 tablet 3     meclizine (ANTIVERT) 25 mg tablet Take 1 tablet (25 mg  total) by mouth 3 (three) times a day as needed for dizziness or nausea. 30 tablet 0     metoprolol succinate (TOPROL-XL) 100 MG 24 hr tablet Take 1 tablet (100 mg total) by mouth at bedtime. 90 tablet 2     nitroglycerin (NITROSTAT) 0.4 MG SL tablet Place 1 tablet (0.4 mg total) under the tongue every 5 (five) minutes as needed for chest pain. 20 tablet 0     rosuvastatin (CRESTOR) 40 MG tablet TAKE 1 TABLET DAILY AFTER LUNCH 90 tablet 2     STIOLTO RESPIMAT 2.5-2.5 mcg/actuation Mist inhaler USE 2 INHALATIONS DAILY 12 g 0     warfarin ANTICOAGULANT (COUMADIN/JANTOVEN) 3 MG tablet Take 1 to 2 tablets (3 to 6 mg) by mouth daily. Adjust dose per INR results as instructed. 120 tablet 3     warfarin ANTICOAGULANT (COUMADIN/JANTOVEN) 6 MG tablet Take 1 tablet (6 mg total) by mouth daily. 30 tablet 0     No current facility-administered medications for this visit.        Allergies   Allergen Reactions     Sulfa (Sulfonamide Antibiotics) Anaphylaxis       Social History     Tobacco Use     Smoking status: Current Every Day Smoker     Packs/day: 0.75     Years: 55.00     Pack years: 41.25     Types: Cigarettes     Smokeless tobacco: Never Used   Substance Use Topics     Alcohol use: No      Family History   Problem Relation Age of Onset     No Medical Problems Mother      No Medical Problems Father      Diabetes Paternal Uncle      Breast cancer Neg Hx      Cancer Neg Hx      Colon cancer Neg Hx      Heart disease Neg Hx      Social History     Substance and Sexual Activity   Drug Use No        Objective     /78 (Patient Site: Right Arm, Patient Position: Sitting, Cuff Size: Adult Regular)   Pulse 84   Ht 5' (1.524 m)   Wt 146 lb (66.2 kg)   SpO2 97%   BMI 28.51 kg/m    Physical Exam    GENERAL APPEARANCE: healthy, alert and no distress     EYES: EOMI, PERRL     HENT: ear canals and TM's normal and nose and mouth without ulcers or lesions (+) Otitis externa bilaterally     NECK: no adenopathy, no asymmetry,  masses, or scars and thyroid normal to palpation     RESP: Poor air entry bilaterally.  Mild end expiratory wheezing and coarse lung sounds throughout all lung fields.  SPO2 98%.  Not in any respiratory distress.  No crackles appreciated.      BREAST: Righ breast: small area at the 6'oclok obelow the right nipple with evidence of recent infeced cyst. No evidence of infection today. Left breast: normal without masses, tenderness or nipple discharge and no palpable axillary masses or adenopathy     CV: regular rates and rhythm, normal S1 S2, no S3 or S4 and no murmur, click or rub     ABDOMEN:  soft, nontender, no HSM or masses and bowel sounds normal     MS: extremities normal- no gross deformities noted, no evidence of inflammation in joints, FROM in all extremities.     SKIN: no suspicious lesions or rashes     NEURO: Normal strength and tone, sensory exam grossly normal, mentation intact and speech normal     PSYCH: mentation appears normal. and affect normal/bright     LYMPHATICS: No cervical adenopathy    Recent Labs   Lab Test 04/29/21  1029 03/29/21  0742 02/15/21  0739 01/05/21  1203 09/21/20  1134 09/21/20  1134   HGB 16.3*  --   --  16.3*  --   --      --   --  230  --   --    INR  --  2.60* 2.00*  --    < > 2.50*   NA  --   --   --  139  --  139   K  --   --   --  4.0  --  4.5   CREATININE  --   --   --  0.90  --  0.95    < > = values in this interval not displayed.        PRE-OP Diagnostics:   Labs pending at this time. Results will be reviewed when available.  No EKG this visit, completed in the last 90 days.    REVISED CARDIAC RISK INDEX (RCRI)   The patient has the following serious cardiovascular risks for perioperative complications:   - Coronary Artery Disease (MI, positive stress test, angina, Qs on EKG) = 1 point    RCRI INTERPRETATION: 1 point: Class II (low risk - 0.9% complication rate)    Signed Electronically by: Tina Guaman DO    Copy of this evaluation report is provided to  requesting physician.

## 2021-06-17 NOTE — TELEPHONE ENCOUNTER
Called and relayed message to patient.     Patient does not take aspirin is she suppose to start this?    Patient questioned if she is suppose to stop or continue warfarin?    She will discontinue the plavix but needs to know about aspirin and warfarin.    Does she still need to see cardiology? Says she has an appt on 6/4

## 2021-06-17 NOTE — PROGRESS NOTES
Rehabilitation Daily Progress     Patient Name: Leonela Christianson  Date: 2021  Visit #: 2  Referral Diagnosis: vertigo  Referring provider: Miri Woodruff MD  Visit Diagnosis:     ICD-10-CM    1. Benign paroxysmal positional vertigo, right  H81.11    2. Unsteadiness on feet  R26.81    3. Decreased activities of daily living (ADL)  Z78.9        Assessment:     Patient is appropriate to continue with skilled occupational therapy intervention, as indicated by initial plan of care. Patient reports significant improvement. She has mild right posterior canal BPPV today and was treated with right Epley maneuver.    Goal Status:  Patient will bend: to dress;without vertigo;in 12 weeks  Patient able to perform bed mobility: without vertigo;in 12 weeks  Patient will turn head: without dizziness;for conversation;in 12 weeks  Patient will look up / down: without vertigo;for drinking;in 12 weeks      Plan / Patient Education:     Start with left hallpike and if negative, proceed to the right hallpike    Subjective:     Pain Ratin    Objective:     Positional Tests:  Hallpike Right:  Abnormal - Nystagmus right torsional, up beating, only a few beats and mild dizziness  Hallpike Left:  Negative  Head Roll Right: NT  Head Roll Left:  NT    Treatment Today:   Patient treated with right Epley maneuver X2 today. Signs and symptoms appear negative on second maneuver.  TREATMENT MINUTES COMMENTS   Evaluation     Self-care/ Home management     Neuromuscular Re-education     Canalith repositioning procedure 10          Total 10    Blank areas are intentional and mean the treatment did not include these items.          Diamante Belle  2021  8:03 AM

## 2021-06-17 NOTE — PATIENT INSTRUCTIONS - HE
Patient Instructions by Miri Woodruff MD at 10/7/2019  8:30 AM     Author: Miri Woodruff MD Service: -- Author Type: Physician    Filed: 10/7/2019  9:04 AM Encounter Date: 10/7/2019 Status: Signed    : Miri Woodruff MD (Physician)         Patient Education     Your Health Risk Assessment indicates you feel you are not in good physical health.    A healthy lifestyle helps keep the body fit and the mind alert. It helps protect you from disease, helps you fight disease, and helps prevent chronic disease (disease that doesn't go away) from getting worse. This is important as you get older and begin to notice twinges in muscles and joints and a decline in the strength and stamina you once took for granted. A healthy lifestyle includes good healthcare, good nutrition, weight control, recreation, and regular exercise. Avoid harmful substances and do what you can to keep safe. Another part of a healthy lifestyle is stay mentally active and socially involved.    Good healthcare     Have a wellness visit every year.     If you have new symptoms, let us know right away. Don't wait until the next checkup.     Take medicines exactly as prescribed and keep your medicines in a safe place. Tell us if your medicine causes problems.   Healthy diet and weight control     Eat 3 or 4 small, nutritious, low-fat, high-fiber meals a day. Include a variety of fruits, vegetables, and whole-grain foods.     Make sure you get enough calcium in your diet. Calcium, vitamin D, and exercise help prevent osteoporosis (bone thinning).     If you live alone, try eating with others when you can. That way you get a good meal and have company while you eat it.     Try to keep a healthy weight. If you eat more calories than your body uses for energy, it will be stored as fat and you will gain weight.     Recreation   Recreation is not limited to sports and team events. It includes any activity that provides relaxation,  interest, enjoyment, and exercise. Recreation provides an outlet for physical, mental, and social energy. It can give a sense of worth and achievement. It can help you stay healthy.       Patient Education   Understanding USDA MyPlate  The USDA (US Department of Agriculture) has guidelines to help you make healthy food choices. These are called MyPlate. MyPlate shows the food groups that make up healthy meals using the image of a place setting. Before you eat, think about the healthiest choices for what to put onto your plate or into your cup or bowl. To learn more about building a healthy plate, visit www.choosemyplate.gov.       The Food Groups    Fruits: Any fruit or 100% fruit juice counts as part of the Fruit Group. Fruits may be fresh, canned, frozen, or dried, and may be whole, cut-up, or pureed. Make half your plate fruits and vegetables.    Vegetables: Any vegetable or 100% vegetable juice counts as a member of the Vegetable Group. Vegetables may be fresh, frozen, canned, or dried. They can be served raw or cooked and may be whole, cut-up, or mashed. Make half your plate fruits and vegetables.     Grains: All foods made from grains are part of the Grains Group. These include wheat, rice, oats, cornmeal, and barley such as bread, pasta, oatmeal, cereal, tortillas, and grits. Grains should be no more than a quarter of your plate. At least half of your grains should be whole grains.    Protein: This group includes meat, poultry, seafood, beans and peas, eggs, processed soy products (like tofu), nuts (including nut butters), and seeds. Make protein choices no more than a quarter of your plate. Meat and poultry choices should be lean or low fat.    Dairy: All fluid milk products and foods made from milk that contain calcium, like yogurt and cheese are part of the Dairy Group. (Foods that have little calcium, such as cream, butter, and cream cheese, are not part of the group.) Most dairy choices should be low-fat  or fat-free.    Oils: These are fats that are liquid at room temperature. They include canola, corn, olive, soybean, and sunflower oil. Foods that are mainly oil include mayonnaise, certain salad dressings, and soft margarines. You should have only 5 to 7 teaspoons of oils a day. You probably already get this much from the food you eat.  Use [employer] to Help Build Your Meals  The SuperTracker can help you plan and track your meals and activity. You can look up individual foods to see or compare their nutritional value. You can get guidelines for what and how much you should eat. You can compare your food choices. And you can assess personal physical activities and see ways you can improve. Go to www.CEED Tech.gov/Concentracker/.    9292-7429 [employer]. [address] [place] [address]. All rights reserved. This information is not intended as a substitute for professional medical care. Always follow your healthcare professional's instructions.           Patient Education   Urinary Incontinence, Female (Adult)  Urinary incontinence means loss of control of the bladder. This problem affects many women, especially as they get older. If you have incontinence, you may be embarrassed to ask for help. But know that this problem can be treated.  Types of Incontinence  There are different types of incontinence. Two of the main types are described here. You can have more than one type.    Stress incontinence. With this type, urine leaks when pressure (stress) is put on the bladder. This may happen when you cough, sneeze, or laugh. Stress incontinence most often occurs because the pelvic floor muscles that support the bladder and urethra are weak. This can happen after pregnancy and vaginal childbirth or a hysterectomy. It can also be due to excess body weight or hormone changes.    Urge incontinence (also called overactive bladder). With this type, a sudden urge to urinate is felt often. This  may happen even though there may not be much urine in the bladder. The need to urinate often during the night is common. Urge incontinence most often occurs because of bladder spasms. This may be due to bladder irritation or infection. Damage to bladder nerves or pelvic muscles, constipation, and certain medicines can also lead to urge incontinence.  Treatment of urinary incontinence depends on the cause. Further evaluation is needed to find the type you have. This will likely include an exam and certain tests. Based on the results, you and your healthcare provider can then plan treatment. Until a diagnosis is made, the home care tips below can help relieve symptoms.  Home care    Do pelvic floor muscle exercises, if they are prescribed. The pelvic floor muscles help support the bladder and urethra. Many women find that their symptoms improve when doing special exercises that strengthen these muscles. To do the exercises contract the muscles you would use to stop your stream of urine, but do this when youre not urinating. Hold for 10 seconds, then relax. Repeat 10 to 20 times in a row, at least 3 times a day. Your provider may give you other instructions for how to do the exercises and how often.    Keep a bladder diary. This helps track how often and how much you urinate over a set period of time. Bring this diary with you to your next visit with the provider. The information can help your provider learn more about your bladder problem.    Lose weight, if advised to by your provider. Excess weight puts pressure on the bladder. Your provider can help you create a weight-loss plan thats right for you. This may include exercising more and making certain diet changes.    Don't consume foods and drinks that may irritate the bladder. These can include alcohol and caffeinated drinks.    Quit smoking. Smoking and other tobacco use can lead to chronic cough that strains the pelvic floor muscles. Smoking may also damage the  bladder and urethra. Talk with your provider about treatments or methods you can use to quit smoking.    If drinking large amounts of fluid causes you to have symptoms, you may be advised to limit your fluid intake. You may also be advised to drink most of your fluids during the day and to limit fluids at night.    If youre worried about urine leakage or accidents, you may wear absorbent pads to catch urine. Change the pads often. This helps reduce discomfort. It may also reduce the risk of skin or bladder infections.  Follow-up care  Follow up with your healthcare provider, or as directed. It may take some to find the right treatment for your problem. Your treatment plan may include special therapies or medicines. Certain procedures or surgery may also be options. Be sure to discuss any questions you have with your provider.  When to seek medical advice  Call the healthcare provider right away if any of these occur:    Fever of 100.4 F (38 C) or higher, or as directed by your provider    Bladder pain or fullness    Abdominal swelling    Nausea or vomiting    Back pain    Weakness, dizziness or fainting  Date Last Reviewed: 10/1/2017    0751-0630 [employer]. [address] [place] [address]. All rights reserved. This information is not intended as a substitute for professional medical care. Always follow your healthcare professional's instructions.     Patient Education   Treating Incontinence in Women: Nonsurgical Methods    The best treatment for you will depend on the type of incontinence you have. Your symptoms, age, and any underlying problems that are found also affect your treatment. While some types of incontinence may eventually require surgery, nonsurgical treatments may be effective in many cases. Nonsurgical treatments include lifestyle changes, muscle-strengthening exercises, and medicines.  Nonsurgical Treatments  Treatment for stress urinary incontinence includes:    Bladder  training    Lifestyle changes such as weight loss and increased activity if incontinence is due to being overweight    Medicines, if bladder training has not helped    Pelvic floor muscle exercises  Lifestyle changes    Losing weight. Excess weight puts extra pressure on the pelvic floor muscles. Exercising and eating right can help you lose weight. This helps other treatments work better.    Making certain diet changes. Some foods may make you need to urinate more, so it may be good to avoid them. These include caffeinated drinks and alcohol. Ask your healthcare provider whether these or other diet changes might be helpful.    Quitting smoking. Smoking can lead to a chronic cough that strains pelvic floor muscles. Smoking may also damage the bladder and urethra.  Pelvic floor muscle exercises  There are exercises you can do to help strengthen your pelvic floor muscles. The pelvic floor muscles act as a sling to help hold the bladder and urethra in place. These muscles also help keep the urethra closed. Weak pelvic floor muscles may allow urine to leak. To strengthen the pelvic floor muscles, do the exercises daily. In a few months, the muscles will be stronger and tighter. This can help prevent urine leakage.  Date Last Reviewed: 1/1/2017 2000-2017 [employer]. [address] [place] [address]. All rights reserved. This information is not intended as a substitute for professional medical care. Always follow your healthcare professional's instructions.     Patient Education     Kegel Exercises  Kegel exercises dont need special clothing or equipment. Theyre easy to learn and simple to do. And if you do them right, no one can tell youre doing them, so they can be done almost anywhere. Your healthcare provider, nurse, or physical therapist can answer any questions you have and help you get started.    A weak pelvic floor  The pelvic floor muscles may weaken due to aging, pregnancy and vaginal  childbirth, injury, surgery, chronic cough, or lack of exercise. If the pelvic floor is weak, your bladder and other pelvic organs may sag out of place. The urethra may also open too easily and allow urine to leak out. Kegel exercises can help you strengthen your pelvic floor muscles. Then they can better support the pelvic organs and control urine flow.  How Kegel exercises are done  Try each of the Kegel exercises described below. When youre doing them, try not to move your leg, buttock, or stomach muscles:    Contract as if you were stopping your urine stream. But do it when youre not urinating.    Tighten your rectum as if trying not to pass gas. Contract your anus, but dont move your buttocks.    You may place a finger or 2 in the vagina and squeeze your finger with your vagina to learn which muscles to tighten.  Try to hold each Kegel for a slow count to 5. You probably wont be able to hold them for that long at first. But keep practicing. It will get easier as your pelvic floor gets stronger. Eventually, special weights that you place in your vagina may be recommended to help make your Kegels even more effective. Visit your healthcare provider if you have difficulties doing Kegel exercises.  Helpful hints  Here are some tips to follow:    Do your Kegels as often as you can. The more you do them, the faster youll feel the results.    Pick an activity you do often as a reminder. For instance, do your Kegels every time you sit down.    Tighten your pelvic floor before you sneeze, get up from a chair, cough, laugh, or lift. This protects your pelvic floor from injury and can help prevent urine leakage.   Date Last Reviewed: 10/1/2017    1929-5232 [employer]. [address] [place] [address], [place] [address]. All rights reserved. This information is not intended as a substitute for professional medical care. Always follow your healthcare professional's instructions.     Patient Education   Understanding  Advance Care Planning  Advance care planning is the process of deciding ones own future medical care. It helps ensure that if you cant speak for yourself, your wishes can still be carried out. The plan is a series of legal documents that note a persons wishes. The documents vary by state. Advance care planning may be done when a person has a serious illness that is expected to get worse. It may be done before major surgery. And it can help you and your family be prepared in case of a major illness or injury. Advance care planning helps with making decisions at these times.       A health care proxy is a person who acts as the voice of a patient when the patient cant speak for himself or herself. The name of this role varies by state. It may be called a Durable Medical Power of  or Durable Power of  for Healthcare. It may be called an agent, surrogate, or advocate. Or it may be called a representative or decision maker. It is an official duty that is identified by a legal document. The document also varies by state.    Why Is Advance Care Planning Important?  If a person communicates their healthcare wishes:    They will be given medical care that matches their values and goals.    Their family members will not be forced to make decisions in a crisis with no guidance.  Creating a Plan  Making an advance care plan is often done in 3 steps:    Thinking about ones wishes. To create an advance care plan, you should think about what kind of medical treatment you would want if you lose the ability to communicate. Are there any situations in which you would refuse or stop treatment? Are there therapies you would want or not want? And whom do you want to make decisions for you? There are many places to learn more about how to plan for your care. Ask your doctor or  for resources.    Picking a health care proxy. This means choosing a trusted person to speak for you only when you cant speak for  yourself. When you cannot make medical decisions, your proxy makes sure the instructions in your advance care plan are followed. A proxy does not make decisions based on his or her own opinions. They must put aside those opinions and values if needed, and carry out your wishes.    Filling out the legal documents. There are several kinds of legal documents for advance care planning. Each one tells health care providers your wishes. The documents may vary by state. They must be signed and may need to be witnessed or notarized. You can cancel or change them whenever you wish. Depending on your state, the documents may include a Healthcare Proxy form, Living Will, Durable Medical Power of , Advance Directive, or others.  The Familys Role  The best help a family can give is to support their loved ones wishes. Open and honest communication is vital. Family should express any concerns they have about the patients choices while the patient can still make decisions.    7583-5760 The [employer]. [address] [place] [address]. All rights reserved. This information is not intended as a substitute for professional medical care. Always follow your healthcare professional's instructions.         Also, [employer] [place] offers a free, downloadable health care directive that allows you to share your treatment choices and personal preferences if you cannot communicate your wishes. It also allows you to appoint another person (called a health care agent) to make health care decisions if you are unable to do so. You can download an advance directive by going here: [email or web address]     Patient Education   Personalized Prevention Plan  You are due for the preventive services outlined below.  Your care team is available to assist you in scheduling these services.  If you have already completed any of these items, please share that information with your care team to  update in your medical record.  Health Maintenance   Topic Date Due   ? HEPATITIS C SCREENING  1950   ? MEDICARE ANNUAL WELLNESS VISIT  05/28/2015   ? PNEUMOCOCCAL IMMUNIZATION 65+ LOW/MEDIUM RISK (1 of 2 - PCV13) 05/28/2015   ? DXA SCAN  05/28/2015   ? ZOSTER VACCINES (2 of 2) 06/07/2017   ? INFLUENZA VACCINE RULE BASED (1) 08/01/2019   ? FALL RISK ASSESSMENT  08/29/2019   ? MAMMOGRAM  09/05/2020   ? COLOGUARD  11/15/2020   ? TD 18+ HE  07/06/2021   ? ADVANCE CARE PLANNING  04/12/2022

## 2021-06-17 NOTE — TELEPHONE ENCOUNTER
Reason for call:  Patient reporting a symptom    Symptom or request: breast is flared up again    Duration (how long have symptoms been present): since Saturday     Have you been treated for this before? Yes    Additional comments: pt is wondering if she should be seen as she might need antibiotics    Phone Number patient can be reached at:  Home number on file [phone] (home)    Best Time:  na    Can we leave a detailed message on this number: Yes    Call taken on 5/24/2021 at 8:27 AM by Shayna Mckeon

## 2021-06-17 NOTE — PROGRESS NOTES
Leonela presents to [facility] for a surgical consult with Dr. Berman  regarding a right breast cyst.  RN assessment and EMR update.  Resp 16   Ht 5' (1.524 m)   Wt 143 lb (64.9 kg)   Breastfeeding No   BMI 27.93 kg/m   Patient met with Dr. Berman .  See dictation for details of visit. She will plan  surgical excision of lesion.  Pre and post op teaching, written and verbal, provided to patient.   Haritha to call her for surgery scheduling.  Follow up pending biopsy results.  RN time 15 mins.

## 2021-06-17 NOTE — TELEPHONE ENCOUNTER
Message from Dr. Guaman:     Please call patient and let her know that:      Surgical team got back to me and they will be reaching out to her to switch the procedure to in-office. They still need the warfarin and plavix to be help. For the warfarin, I have already messaged the anticoagulation clinic and they are going to be providing instructions on how to hold the warfarin safely prior to the procedure and how to resume it. As for the plavix - you need to be seen by cardiology to get their clearance and ok to stop the plavix for a few days prior to the procedure.      Dominique reach out to cardiology to make that appointment. I have also send a message to them and hopefully they will reach out to you      Also let her know that all her bloodwork looked great.

## 2021-06-17 NOTE — TELEPHONE ENCOUNTER
Left message for patient to call back. When she calls back please relay message below. Letter sent as well.

## 2021-06-17 NOTE — TELEPHONE ENCOUNTER
Please call patient and let her know:    Received communication from patient's cardiologist that she should discontinue the plavix 5 days prior to procedure and continue on aspirin 81 mg daily for the procedure.Is ok for surgery otherwise.     If it goes to message, please attempt several times as it is important that patient get these instructions

## 2021-06-17 NOTE — TELEPHONE ENCOUNTER
Ramonita from [facility] IM'd me and send she was sending my message on to the nurse. Will wait for that call back or IM.

## 2021-06-17 NOTE — TELEPHONE ENCOUNTER
Please call patient and let her know that:     Surgical team got back to me and they will be reaching out to her to switch the procedure to in-office. They still need the warfarin and plavix to be help. For the warfarin, I have already messaged the anticoagulation clinic and they are going to be providing instructions on how to hold the warfarin safely prior to the procedure and how to resume it. As for the plavix - you need to be seen by cardiology to get their clearance and ok to stop the plavix for a few days prior to the procedure.     Pleas reach out to cardiology to make that appointment. I have also send a message to them and hopefully they will reach out to you     Also let her know that all her bloodwork looked great.

## 2021-06-17 NOTE — TELEPHONE ENCOUNTER
Telephone Encounter by Hector Holguin RN at 10/6/2020  9:44 AM     Author: Hector Holguin RN Service: -- Author Type: RN, Care Manager    Filed: 10/6/2020  9:59 AM Encounter Date: 10/6/2020 Status: Signed    : Hector Holguin RN (RN, Care Manager)       ANTICOAGULATION  MANAGEMENT    Assessment     Today's INR result of 1.8 is Subtherapeutic (goal INR of 2.0-3.0)        Warfarin taken as previously instructed    No new diet changes affecting INR    No new medication/supplements affecting INR    Continues to tolerate warfarin with no reported s/s of bleeding or thromboembolism     Previous INR was Subtherapeutic    Plan:     Spoke on phone with Leonela regarding INR result and instructed:     Warfarin Dosing Instructions:  Change warfarin dose to 6 mg daily on Sun, Tue, Fri; and 3 mg daily rest of week  (11 % change)    Instructed patient to follow up no later than: 7-10 days.    Education provided: importance of following up for INR monitoring at instructed interval and importance of taking warfarin as instructed    Leonela verbalizes understanding and agrees to warfarin dosing plan.    Instructed to call the [facility] for any changes, questions or concerns. (#[phone])   ?   Hector Holguin RN    Subjective/Objective:      Leonela Christianson, a 70 y.o. female is on warfarin.     Leonela reports:     Home warfarin dose: template incorrect; verbally confirmed home dose with pt and updated on anticoagulation calendar     Missed doses: No     Medication changes:  No     S/S of bleeding or thromboembolism:  No     New Injury or illness:  No     Changes in diet or alcohol consumption:  No     Upcoming surgery, procedure or cardioversion:  No    Anticoagulation Episode Summary     Current INR goal:  2.0-3.0   TTR:  66.1 % (12 mo)   Next INR check:  10/16/2020   INR from last check:  1.80 (10/6/2020)   Weekly max warfarin dose:     Target end date:     INR check location:     Preferred lab:      Send INR reminders to:  COLBY ELLER    Indications    History of pulmonary embolism [Z86.711]           Comments:           Anticoagulation Care Providers     Provider Role Specialty Phone number    Miri Woodruff MD Referring Family Medicine [phone]

## 2021-06-17 NOTE — PROGRESS NOTES
Assessment & Plan     Leonela was seen today for breast problem.    Diagnoses and all orders for this visit:    Cyst of right breast  Sample, unable to express any additional purulence from the wound.  Patient is having some mild bleeding in the area.  No overlying cellulitis.  The cyst seems to have burst on its own over the weekend and has been draining since.  No evidence of active infection today.  We will get a wound culture and start antibiotics if the wound culture grows anything.  Otherwise, would continue conservative management.  Would do warm compresses in the area and Bactroban in the affected area 3 times daily for the next few days.  Her right cyst breast cyst excision has been moved to 6/10.  She has an upcoming appointment with cardiology on 6/4 which I encouraged her to keep.  She has not heard back from anticoagulation clinic regarding her bridge yet.  Encouraged her to give them a call so that she has a plan to bridge her.  -     Culture, Wound  -     mupirocin (BACTROBAN) 2 % ointment; Apply to affected area 3 times daily          20 minutes spent on the date of the encounter doing chart review, history and exam, documentation and further activities per the note       No follow-ups on file.    Tina Guaman DO  [facility]   Leonela Christianson is 70 y.o. and presents today for the following health issues   HPI     Patient is here for recurrence of right breast cyst.  Says that the symptoms started again on Friday.  She started to feel swelling, pain in the area where she has had the periareolar cyst before.  Then, on Saturday, it burst and was draining pus and blood.  Since then, it has decreased significantly in size.  Has continued to ooze and is mostly bleeding today.  Patient has no fevers, chills, nipple discharge associated with it.  Was worried and wanted someone to take a look at it.    Review of Systems  As per HPI     Current Outpatient  Medications on File Prior to Visit   Medication Sig Dispense Refill     albuterol (PROAIR HFA) 90 mcg/actuation inhaler USE 1 TO 2 INHALATIONS EVERY 4 TO 6 HOURS AS NEEDED FOR WHEEZING 25.5 g 3     ciprofloxacin-hydrocortisone (CIPRO HC) otic suspension Administer 3 drops into both ears 2 (two) times a day for 7 days. 10 mL 0     clopidogreL (PLAVIX) 75 mg tablet TAKE 1 TABLET DAILY 90 tablet 2     losartan-hydrochlorothiazide (HYZAAR) 100-12.5 mg per tablet Take 1 tablet by mouth daily. (Patient taking differently: Take 1 tablet by mouth daily. Taking every other day.) 90 tablet 3     meclizine (ANTIVERT) 25 mg tablet Take 1 tablet (25 mg total) by mouth 3 (three) times a day as needed for dizziness or nausea. 30 tablet 0     metoprolol succinate (TOPROL-XL) 100 MG 24 hr tablet Take 1 tablet (100 mg total) by mouth at bedtime. 90 tablet 2     nitroglycerin (NITROSTAT) 0.4 MG SL tablet Place 1 tablet (0.4 mg total) under the tongue every 5 (five) minutes as needed for chest pain. 20 tablet 0     rosuvastatin (CRESTOR) 40 MG tablet TAKE 1 TABLET DAILY AFTER LUNCH 90 tablet 2     STIOLTO RESPIMAT 2.5-2.5 mcg/actuation Mist inhaler USE 2 INHALATIONS DAILY 12 g 0     warfarin ANTICOAGULANT (COUMADIN/JANTOVEN) 3 MG tablet Take 1 to 2 tablets (3 to 6 mg) by mouth daily. Adjust dose per INR results as instructed. 120 tablet 3     warfarin ANTICOAGULANT (COUMADIN/JANTOVEN) 6 MG tablet Take 1 tablet (6 mg total) by mouth daily. 30 tablet 0     No current facility-administered medications on file prior to visit.            Objective    /68 (Patient Site: Right Arm, Patient Position: Sitting, Cuff Size: Adult Regular)   Wt 144 lb (65.3 kg)   BMI 28.12 kg/m    Body mass index is 28.12 kg/m .     Physical Exam  Neuro: NAD  Skin:  Right breast: Cyst that is actively draining serosanguineous fluid at the 6 o'clock position, below the areola.  Tenderness to palpation of the area but no fluctuance or active purulent drainage  noted.  No involvement of the areola or overlying cellulitis.  No axillary lymphadenopathy.  Left breast: Unremarkable

## 2021-06-17 NOTE — PROGRESS NOTES
History:  This is a 70 y.o. female who I'm asked to see (by Dr. Guaman) for evaluation of a right breast cyst.  This was picked up by the patient back in January.  He developed a painful lump just inferior to her right areola back in January.  She developed some redness over the site.  She was placed on antibiotics in February and that helped with her symptoms and it went away.  It then came back in April.  She was placed on antibiotics again.  This lesion then had some purulent bloody drainage.  It has now healed up again, but she is interested in preventing future recurrences.    Allergies:  Sulfa    Past medical history:  COPD  CAD  Osteoporosis  DM  Nephrolithiasis  PE  HTN  Hyperlipidemia    Past surgical history:  Heart catheterization with stent  Hysterectomy with oophorectomy  Cystoscopy with stent    Medications:     albuterol (PROAIR HFA) 90 mcg/actuation inhaler, USE 1 TO 2 INHALATIONS EVERY 4 TO 6 HOURS AS NEEDED FOR WHEEZING, Disp: 25.5 g, Rfl: 3     clopidogreL (PLAVIX) 75 mg tablet, TAKE 1 TABLET DAILY, Disp: 90 tablet, Rfl: 2     cyclobenzaprine (FLEXERIL) 10 MG tablet, Take 1 tablet (10 mg total) by mouth every 8 (eight) hours as needed for muscle spasms., Disp: 30 tablet, Rfl: 1     losartan-hydrochlorothiazide (HYZAAR) 100-12.5 mg per tablet, Take 1 tablet by mouth daily., Disp: 90 tablet, Rfl: 3     meclizine (ANTIVERT) 25 mg tablet, Take 1 tablet (25 mg total) by mouth 3 (three) times a day as needed for dizziness or nausea., Disp: 30 tablet, Rfl: 0     metoprolol succinate (TOPROL-XL) 100 MG 24 hr tablet, Take 1 tablet (100 mg total) by mouth at bedtime., Disp: 90 tablet, Rfl: 2     nitroglycerin (NITROSTAT) 0.4 MG SL tablet, Place 1 tablet (0.4 mg total) under the tongue every 5 (five) minutes as needed for chest pain., Disp: 20 tablet, Rfl: 0     rosuvastatin (CRESTOR) 40 MG tablet, TAKE 1 TABLET DAILY AFTER LUNCH, Disp: 90 tablet, Rfl: 2     STIOLTO RESPIMAT 2.5-2.5 mcg/actuation Mist  inhaler, USE 2 INHALATIONS DAILY, Disp: 12 g, Rfl: 0     warfarin ANTICOAGULANT (COUMADIN/JANTOVEN) 3 MG tablet, Take 1 to 2 tablets (3 to 6 mg) by mouth daily. Adjust dose per INR results as instructed. (Patient taking differently: Take 3 mg by mouth. 6 mg Monday and Friday 3 mg the rest of the day), Disp: 100 tablet, Rfl: 1     warfarin ANTICOAGULANT (COUMADIN/JANTOVEN) 6 MG tablet, Take 1 tablet (6 mg total) by mouth daily., Disp: 30 tablet, Rfl: 0    Family history:  She has no family history of breast cancer.    Social history:  Reports that she has been smoking cigarettes. She has a 41.25 pack-year smoking history. She has never used smokeless tobacco. She reports that she does not drink alcohol or use drugs.    Review of Systems:  General: No complaints or constitutional symptoms  Skin: No complaints or symptoms   Hematologic/Lymphatic: No symptoms or complaints  Psychiatric: No symptoms or complaints  Endocrine: No excessive fatigue, no hypermetabolic symptoms reported  Respiratory: No cough, shortness of breath, or wheezing  Cardiovascular: No chest pain or dyspnea on exertion  Breast: Recurrent right breast cyst infections  Gastrointestinal: No abdominal pain, nausea, diarrhea, or constipation  Musculoskeletal: No recent injuries reported  Neurological: No focal neurologic defects reported.      Physical Exam:  Resp 16   Ht 5' (1.524 m)   Wt 143 lb (64.9 kg)   Breastfeeding No   BMI 27.93 kg/m    General: Alert, cooperative, appears stated age   Skin: Skin color, texture, turgor normal, no rashes or lesions   Lymphatic: No obvious adenopathy, no swelling   Eyes: No scleral icterus, pupils equal  HENT: No traumatic injury to the head or face, no gross abnormalities  Lungs: Normal respiratory effort, breath sounds equal bilaterally  Heart: Regular rate and rhythm  Breasts: Skin darkening to the right breast just inferior to the areola at 6:00.  There appears to be a very small nodule within the skin at  the 6 o'clock position.  There also appears to be a small opening in this area.  There is no discharge or skin breakdown.  No abnormalities on the left.  Abdomen: Soft, non-distended and non-tender to palpation  Neurologic: Grossly intact    Imaging:  Pertinent images personally reviewed by myself and discussed with the patient.  Radiology reports:  EXAM: ULTRASOUND BREAST UNILATERAL RIGHT LIMITED  LOCATION: [facility] SERVICES  [facility]  DATE/TIME: 4/29/2021 9:44 AM     INDICATION: 70-year-old female presents with ongoing irritation/infection involving the right lower breast since January 2021. She initially presented on 1/13/2021 with a palpable lump with associated pain and overlying skin erythema within the right   lower breast. Findings were consistent with an inflamed/infected sebaceous cyst/epidermal inclusion cyst. The patient reports that this same area became inflamed again in February and was placed on antibiotic therapy. Her symptoms improved. This cycle repeated once more in April and she has recently completed a second course of antibiotics.     COMPARISON: 1/13/2021, 9/5/2018, 10/12/2016, 9/25/2015.     ULTRASOUND FINDINGS: Visual inspection of the right breast demonstrates a patch of hyperpigmentation centered at the 6 o'clock position. The skin is nonerythematous and nonthickened.     Targeted right breast ultrasound at the area of hyperpigmentation at the 6 o'clock position, subareolar region demonstrates a 0.2 x 0.3 x 2.1 cm hypoechoic area within the skin. There is no suspicious mass or other sonographic abnormality within the underlying breast parenchyma. These findings are again consistent with a skin-based infection/irritation. Today's findings are much improved compared to prior ultrasound on 1/13/2021.     IMPRESSION:   Hypoechoic area within the skin of the right breast at the 6 o'clock position, retroareolar region correlates with a patch of hyperpigmentation, favoring a  sebaceous cyst/epidermal inclusion cyst. The patient has completed two courses of antibiotics over the last 3-4 months for repeated flareups of this area. This area is much improved and smaller in size compared to 1/13/2021. The patient has an appointment with a breast surgeon scheduled on Monday, 5/3/2021.     ACR BI-RADS Category 2: Benign.    Pathology:  None    IMPRESSION:  Right breast mass.      PLAN:   Discussed the surgical options for treatment of breast masses with excisional biopsy.  With this being superficial, I would feel comfortable performing this in the office under local anesthesia.  She does not feel comfortable with this due to this location and she would actually prefer having this skin and lesion removed in the operating room under local MAC anesthetic.  The risks and benefits of surgery were explained.  Her Plavix and warfarin would have to be held before surgery.  With the anesthesia, she would also need a preoperative physical.  Also talked about expected recovery time.  Further treatment will be dependent upon the final pathology.  We'll schedule a right breast excisional biopsy at her earliest convenience.      Anna Berman DO  General Surgeon  [facility]  Breast [address] [address]  Office: [phone]  Employed by - [facility]

## 2021-06-17 NOTE — TELEPHONE ENCOUNTER
Qiana Mars, PharmD     - Dr. Guaman sent this message to  anticoagulation.     - re:  Patient is scheduled for cyst removal from RIGHT breast on 6/8/2021.     - please advised on warfarin hold and evaluate for possible bridging.

## 2021-06-17 NOTE — TELEPHONE ENCOUNTER
Called and spoke with pt. She understands that the appointment will be switched to in office procedure and the need to follow up with cardiology for the plavix.     She is aware that you sent in the medication for ear drops, but she would still like a referral to ENT.

## 2021-06-17 NOTE — TELEPHONE ENCOUNTER
ANTICOAGULATION  MANAGEMENT    Assessment     Today's INR result of 2.2 is Therapeutic (goal INR of 2.0-3.0)        Warfarin taken as previously instructed    No new diet changes affecting INR    No new medication/supplements affecting INR    Continues to tolerate warfarin with no reported s/s of bleeding or thromboembolism     Previous INR was Therapeutic     Per patient, she is not going to have a surgical procedure on 6/8 , stated that she will have a in office procedure which is not scheduled yet. Instructed to update ACN once scheduled.    Plan:     Spoke on phone with Leonela regarding INR result and instructed:      Warfarin Dosing Instructions:  Continue current warfarin dose    6 mg every Sun, Tue, Fri; 3 mg all other days      (0 % change)    Instructed patient to follow up no later than: 4 weeks    Education provided: importance of therapeutic range, target INR goal and significance of current INR result, importance of following up for INR monitoring at instructed interval and importance of notifying clinic of upcoming surgeries and procedures 2 weeks in advance    Leonela verbalizes understanding and agrees to warfarin dosing plan.    Instructed to call the [facility] for any changes, questions or concerns. (#[phone])   ?   Lola Kelly RN    Subjective/Objective:      Leonela ELLIOT Christianson, a 70 y.o. female is on warfarin. Leonela      Leonela reports:     Home warfarin dose: as updated on anticoagulation calendar per template     Missed doses: No     Medication changes:  No     S/S of bleeding or thromboembolism:  No     New Injury or illness:  No     Changes in diet or alcohol consumption:  No     Upcoming surgery, procedure or cardioversion:  Yes: see above    Anticoagulation Episode Summary     Current INR goal:  2.0-3.0   TTR:  81.7 % (1 y)   Next INR check:  6/15/2021   INR from last check:  2.20 (5/18/2021)   Weekly max warfarin dose:     Target end date:     INR check location:     Preferred  lab:     Send INR reminders to:  COLBY LUNA    Indications    History of pulmonary embolism [Z86.711]           Comments:           Anticoagulation Care Providers     Provider Role Specialty Phone number    Miri Woodruff MD Referring Family Medicine [phone]

## 2021-06-17 NOTE — TELEPHONE ENCOUNTER
Patient should wait till she is finished with her drops and if still an issues, should be seen by PCP to make decision if ENT is appropriate

## 2021-06-17 NOTE — TELEPHONE ENCOUNTER
----- Message from Tina Guaman DO sent at 5/19/2021  7:45 AM CDT -----  Hello,     Patient needs help with warfarin management for an upcoming breast cyst removal in June. During her pre-op yesterday, she changed her mind and will be doing an outpatient procedure. Surgeon still wants warfarin to be stopped prior to procedure. I was wondering if you could help me bridge her.     Thanks,  Tina

## 2021-06-17 NOTE — TELEPHONE ENCOUNTER
Patient should be seen for a physical exam. I don't have any opening. Please her her schedule with a colleague.     Has the anticoagulation clinic gotten back to her?

## 2021-06-17 NOTE — TELEPHONE ENCOUNTER
On behalf of pt called [facility]. Pt has surgery scheduled for 6.8.21, but would like to switch to doing in clinic (day surgery). If this is doable please change appointment as needed.     If we change it to in office do we need to do anything different with meds (Warfarin and Plavix)

## 2021-06-17 NOTE — TELEPHONE ENCOUNTER
----- Message from Tina Guaman DO sent at 4/29/2021  2:02 PM CDT -----  Please call patient and let her know that her ultrasound once again showed a cyst.  Looks improved with the antibiotics that she was placed on.  She has follow-up with the breast surgeon as planned.

## 2021-06-17 NOTE — TELEPHONE ENCOUNTER
Call patient    Yes, start taking ASA 81 daily when you stop the plavix     I reached out to the anticoagulation clinic and hopefully should be reaching out to her soon. Please let me know if she hasn't heard anything by early next week.     Keep the appt with cards

## 2021-06-17 NOTE — TELEPHONE ENCOUNTER
Patient calling for refill on Warfarin. Patient running low.   Rx pended.  Currently taking 6 mg on Sun, Tues & Fri and 3 mg all other days.

## 2021-06-18 NOTE — PATIENT INSTRUCTIONS - HE
Patient Instructions by Alicia Stephens MD at 2/27/2021 10:20 AM     Author: Alicia Stephens MD Service: -- Author Type: Physician    Filed: 2/27/2021 10:41 AM Encounter Date: 2/27/2021 Status: Signed    : Alicia Stephens MD (Physician)       Patient Education     Cellulitis  Cellulitis is an infection of the deep layers of skin. A break in the skin, such as a cut or scratch, can let bacteria under the skin. If the bacteria get to deep layers of the skin, it can be serious. If not treated, cellulitis can get into the bloodstream and lymph nodes. The infection can then spread throughout the body. This causes serious illness.  Cellulitis causes the affected skin to become red, swollen, warm, and sore. The reddened areas have a visible border. An open sore may leak fluid (pus). You may have a fever, chills, and pain.  Cellulitis is treated with antibiotics taken for 7 to 10 days. An open sore may be cleaned and covered with cool wet gauze. Symptoms should get better 1 to 2 days after treatment is started. Make sure to take all the antibiotics for the full number of days until they are gone. Keep taking the medicine even if your symptoms go away.  Home care  Follow these tips:    Limit the use of the part of your body with cellulitis.     If the infection is on your leg, keep your leg raised while sitting. This will help to reduce swelling.    Take all of the antibiotic medicine exactly as directed until it is gone. Do not miss any doses, especially during the first 7 days. Dont stop taking the medicine when your symptoms get better.    Keep the affected area clean and dry.    Wash your hands with soap and warm water before and after touching your skin. Anyone else who touches your skin should also wash his or her hands. Don't share towels.  Follow-up care  Follow up with your healthcare provider, or as advised. If your infection does not go away on the first antibiotic, your  healthcare provider will prescribe a different one.  When to seek medical advice  Call your healthcare provider right away if any of these occur:    Red areas that spread    Swelling or pain that gets worse    Fluid leaking from the skin (pus)    Fever higher of 100.4  F (38.0  C) or higher after 2 days on antibiotics  Date Last Reviewed: 9/1/2016 2000-2017 [employer]. [address] [place] [address]. All rights reserved. This information is not intended as a substitute for professional medical care. Always follow your healthcare professional's instructions.

## 2021-06-18 NOTE — PATIENT INSTRUCTIONS - HE
Patient Instructions by Yonis Majano PA-C at 4/29/2021 10:10 AM     Author: Yonis Majano PA-C Service: -- Author Type: Physician Assistant    Filed: 4/29/2021 11:07 AM Encounter Date: 4/29/2021 Status: Addendum    : Yonis Majano PA-C (Physician Assistant)    Related Notes: Original Note by Yonis Majano PA-C (Physician Assistant) filed at 4/29/2021 11:06 AM         Take Antivert as written  Follow-up with physical therapy.  Return to see your primary care provider for reevaluation and treatment.  Return to walk-in care in the interim if new symptoms or concerns arise        Patient Education     Managing Dizziness (Vertigo) with Medicines    Although medicines can't cure your problem, they can help control symptoms. Your doctor may prescribe medicines for a few weeks and then taper them off. Always take your medicine as prescribed. Never share your medicine with others.  Contact your healthcare provider right away if you have side effects from your medicines.   How medicines can help    Treat infection or inflammation. If you have an infection caused by bacteria, your doctor can prescribe antibiotics.    Limit conflicting balance signals. These medicines are often in pill form.    Ease nausea. Suppositories, pills, or shots can reduce vomiting.    Reduce pressure in the canals. Diuretics can be used to treat Meniere's disease. These medicines help your body get rid of extra fluid.    Ease other symptoms. Other medicines can help ease depression and anxiety caused by living with dizziness or fainting.  Date Last Reviewed: 11/1/2016 2000-2019 [employer]. [address] [place] [address], [place] [address]. All rights reserved. This information is not intended as a substitute for professional medical care. Always follow your healthcare professional's instructions.           Patient Education     Benign Paroxysmal Positional Vertigo    Benign paroxysmal positional vertigo is a common condition. You feel  as if the room is spinning after changing position, moving your head quickly, or even just rolling over in bed.  Vertigo is a false feeling of motion plus disorientation that makes it seem as though the room is spinning. A vertigo attack may cause sudden nausea, vomiting, and heavy sweating. Severe vertigo causes a loss of balance. You may even fall down.  Vertigo is caused by a problem with the inner ear. The inner ear is located behind the middle ear. It is a part of the balance center of the body. It contains small calcium particles within fluid-filled canals (semi-circular canals). These particles can move out of position. This may happen as a result of aging, head injury, or disease of the inner ear. Once that happens, moving your head in certain ways may cause the particles to stimulate the inner ear. This creates the feeling of vertigo.  An episode of vertigo may last seconds, minutes, or hours. Once you are over the first episode of vertigo, it may never return. Sometimes symptoms return off and on for several weeks or longer.  Home care  Follow these guidelines when caring for yourself at home:    Rest quietly in bed if your symptoms are severe. Change position slowly. There is usually 1 position that will feel best. This might be lying on 1 side or lying on your back with your head slightly raised on pillows. Until you have no symptoms, you are at a higher risk of falling. Let someone help you when you get up. Get rid of home hazards such as loose electrical cords and throw rugs. Dont walk in unfamiliar areas that are not lighted. Use night lights in bathrooms and kitchen areas.    Do not drive or work with dangerous machinery for 1 week after symptoms go away. This is in case symptoms return suddenly.    Take medicine as prescribed to relieve your symptoms. Unless another medicine was prescribed for nausea, vomiting, and vertigo, you may use over-the-counter motion sickness medicine. Examples of this  include meclizine and dimenhydrinate.  Follow-up care  Follow up with your healthcare provider, or as directed. Tell your provider about any ringing in your ear or hearing loss.  If you had a CT or MRI scan, a specialist will review it. You will be told of any new findings that may affect your care.  When to seek medical advice  Call your healthcare provider right away if any of these occur:    Vertigo gets worse even after taking prescribed medicine    Repeated vomiting even after taking prescribed medicine    Weakness that gets worse    Fainting    Severe headache or unusual drowsiness or confusion    Weakness of an arm or leg or 1 side of the face    Trouble walking    Trouble with speech or vision    Seizure    Trouble hearing    Fever of 100.4 F (38 C) or higher, or as directed by your healthcare provider    Fast heart rate    Chest pain   Date Last Reviewed: 11/1/2017 2000-2017 [employer]. [address] [place] [address]. All rights reserved. This information is not intended as a substitute for professional medical care. Always follow your healthcare professional's instructions.

## 2021-06-19 NOTE — LETTER
Letter by Miri Woodruff MD at      Author: Miri Woodruff MD Service: -- Author Type: --    Filed:  Encounter Date: 6/11/2019 Status: (Other)         Leonela Christianson  [address]  [facility] [address]             June 11, 2019         Dear Ms. Tristen Christianson,    Below are the results from your recent visit:    Resulted Orders   Lipid Cascade RANDOM   Result Value Ref Range    Cholesterol 160 <=199 mg/dL    Triglycerides 78 <=149 mg/dL    HDL Cholesterol 57 >=50 mg/dL    LDL Calculated 87 <=129 mg/dL    Patient Fasting > 8hrs? No        Your cholesterol is within goal range.  Continue same dose of Crestor and plan recheck in about 1 year.    Please call with questions or contact us using [employer].    Sincerely,        Electronically signed by Miri Woodruff MD

## 2021-06-19 NOTE — LETTER
Letter by Miri Woodruff MD at      Author: Miri Woodruff MD Service: -- Author Type: --    Filed:  Encounter Date: 10/7/2019 Status: Signed         Leonela Christianson  [address]  [place] [address]             October 7, 2019         Dear Ms. Tristen Christianson,    Below are the results from your recent visit:    Resulted Orders   HM2(CBC w/o Differential)   Result Value Ref Range    WBC 7.3 4.0 - 11.0 thou/uL    RBC 4.74 3.80 - 5.40 mill/uL    Hemoglobin 15.4 12.0 - 16.0 g/dL    Hematocrit 46.3 35.0 - 47.0 %    MCV 98 80 - 100 fL    MCH 32.4 27.0 - 34.0 pg    MCHC 33.1 32.0 - 36.0 g/dL    RDW 11.8 11.0 - 14.5 %    Platelets 195 140 - 440 thou/uL    MPV 7.2 7.0 - 10.0 fL       Your blood counts look good.  Let's plan on seeing each other again in 6 months.    Please call with questions or contact us using [employer].    Sincerely,        Electronically signed by Miri Woodruff MD

## 2021-06-20 NOTE — PROGRESS NOTES
"  Assessment/Plan:       1. Pulmonary emphysema, unspecified emphysema type (H)/Chronic bronchitis, unspecified chronic bronchitis type (H)  Patient has previously declined a maintenance inhaler as she had not been feeling short of breath.  Lately she has been feeling more short of breath with exertion and agrees to try a controller inhaler.  Will prescribe a LAMA/LABA combo.  Plan recheck breathing in 1 month.    2. Nicotine Dependence  Again counseled patient on the importance of cessation.  She plans to quit \"cold turkey\" by November of this year for Dr. Gonzalez.  After a discussion of risks and benefits of multiple treatments, she declines Chantix, Zyban, patches, lozenges, gum or any combination thereof.  I have counseled the patient for tobacco cessation and the follow up will occur  at the next visit.    3. Benign paroxysmal positional vertigo, unspecified laterality  Discussed options in this regard, PT versus medication.  Patient declines both for now, reports this seems to be improving.  Follow up as needed.    4. Fatigue, unspecified type  Labs as below are normal.  Patient states she has not been sleeping well of late.  Plan recheck in 1 month.  - Basic Metabolic Panel  - HM2(CBC w/o Differential)    5. Pulmonary embolism (H)  Continues on coumadin lifelong.  Due for INR today.  - INR    6. Visit for screening mammogram  Assisted patient in scheduling screening mammogram today.  - Mammo Screening Bilateral; Future    8. Need for vaccination  - Influenza High Dose, Seasonal 65+ yrs        Subjective:       Leonela Christianson is a 68 y.o. female who presents for \"medication follow-up\".  She would like to discuss bilateral ear pain, specifically in the ear canals, and shortness of breath.  She also mentions dizziness.  In terms of her ears, for an unclear amount of time she has had pain and itching to the ear canals.  She denies any drainage from the ears.  Next, she reports shortness of breath.  We had " "discussed this previously, and patient had refused a daily controller medication for her breathing, because she really didn't feel short of breath.  She says that now with any exertion, she has shortness of breath.  She does not use even an albuterol inhaler at present.  She continues to smoke and reports she needs to quit by November.  She does not want to try Chantix because a coworker told her they developed a tic from taking this.  She has previously used a nicotine patch, but reacted to the adhesive.  She has also tried hypnosis and acupuncture without success.  She currently smokes 15-17 cigarettes per day.  She is notably fatigued during today's examination, and has trouble keeping her eyes open during the interview.  She says that she has not been sleeping well lately due to some life stressors.  She thinks her shortness of breath has been worse for the past 3-4 months.  She has been compliant with her coumadin.  Lastly, in terms of dizziness, she feels unsteady especially when she gets up in the night.  She thinks this has been ongoing for the past \"few months\" as well.  She denies dizziness during the day.    Labs Reviewed:  Lab Results   Component Value Date    WBC 9.7 11/18/2017    HGB 13.9 11/21/2017    HCT 47.2 (H) 11/18/2017     11/21/2017    CHOL 134 12/06/2017    TRIG 44 12/06/2017    HDL 51 12/06/2017    ALT 22 12/17/2015    AST 24 12/17/2015     11/21/2017    K 3.8 11/21/2017     (H) 11/21/2017    CREATININE 0.82 11/21/2017    BUN 13 11/21/2017    CO2 20 (L) 11/21/2017    TSH 1.90 10/10/2016    INR 2.30 (H) 07/26/2018    HGBA1C 6.3 (H) 10/29/2012       The following portions of the patient's history were reviewed and updated as appropriate: allergies, current medications, past medical history, past social history and problem list.      Current Outpatient Prescriptions:      albuterol (PROAIR HFA) 90 mcg/actuation inhaler, Inhale 1-2 puffs every 4 (four) hours as needed for " wheezing. Every 4 to 6 hours as needed, Disp: 1 each, Rfl: 5     clopidogrel (PLAVIX) 75 mg tablet, Take 1 tablet (75 mg total) by mouth daily., Disp: 90 tablet, Rfl: 3     cyclobenzaprine (FLEXERIL) 10 MG tablet, Take 1 tablet (10 mg total) by mouth every 8 (eight) hours as needed for muscle spasms., Disp: 30 tablet, Rfl: 1     lisinopril-hydrochlorothiazide (PRINZIDE,ZESTORETIC) 10-12.5 mg per tablet, TAKE 1 TABLET DAILY, Disp: 90 tablet, Rfl: 2     metoprolol succinate (TOPROL XL) 50 MG 24 hr tablet, Take 1 tablet (50 mg total) by mouth at bedtime., Disp: 90 tablet, Rfl: 3     nitroglycerin (NITROSTAT) 0.4 MG SL tablet, Place 1 tablet (0.4 mg total) under the tongue every 5 (five) minutes as needed for chest pain., Disp: 20 tablet, Rfl: 0     rosuvastatin (CRESTOR) 40 MG tablet, Take 1 tablet (40 mg total) by mouth Daily after lunch., Disp: 90 tablet, Rfl: 3     warfarin (COUMADIN) 3 MG tablet, TAKE ONE AND ONE-HALF TABLETS DAILY AS DIRECTED (DOSE ADJUSTED BASED ON INR), Disp: 126 tablet, Rfl: 1    Review of Systems   A 12 point comprehensive review of systems was negative except as noted.      Objective:      /68 (Patient Site: Right Arm, Patient Position: Sitting, Cuff Size: Adult Regular)  Pulse 98  Resp 20  Ht 5' (1.524 m)  Wt 146 lb (66.2 kg)  SpO2 97%  Breastfeeding? No  BMI 28.51 kg/m2    General appearance: appears stated age, cooperative and fatigued  Head: Normocephalic, without obvious abnormality, atraumatic  Eyes: conjunctivae clear, sclerae anicteric   Ears: normal TMs and ear canals bilaterally  Lungs: diminished breath sounds throughout but no wheeze or rhonchi auscultated  Heart: regular rate and rhythm, S1, S2 normal, no murmur, click, rub or gallop  Extremities: extremities normal, atraumatic, no cyanosis or edema  Neurologic: Grossly normal, cranial nerves grossly intact, very fatigued with slowed mentation but normal speech        Results for orders placed or performed in visit  on 08/29/18   Basic Metabolic Panel   Result Value Ref Range    Sodium 141 136 - 145 mmol/L    Potassium 4.1 3.5 - 5.0 mmol/L    Chloride 106 98 - 107 mmol/L    CO2 23 22 - 31 mmol/L    Anion Gap, Calculation 12 5 - 18 mmol/L    Glucose 103 70 - 125 mg/dL    Calcium 10.1 8.5 - 10.5 mg/dL    BUN 18 8 - 22 mg/dL    Creatinine 1.08 0.60 - 1.10 mg/dL    GFR MDRD Af Amer >60 >60 mL/min/1.73m2    GFR MDRD Non Af Amer 50 (L) >60 mL/min/1.73m2   HM2(CBC w/o Differential)   Result Value Ref Range    WBC 8.5 4.0 - 11.0 thou/uL    RBC 5.05 3.80 - 5.40 mill/uL    Hemoglobin 16.2 (H) 12.0 - 16.0 g/dL    Hematocrit 49.3 (H) 35.0 - 47.0 %    MCV 98 80 - 100 fL    MCH 32.0 27.0 - 34.0 pg    MCHC 32.8 32.0 - 36.0 g/dL    RDW 11.4 11.0 - 14.5 %    Platelets 241 140 - 440 thou/uL    MPV 7.7 7.0 - 10.0 fL   INR   Result Value Ref Range    INR 3.10 (H) 0.90 - 1.10

## 2021-06-20 NOTE — LETTER
Letter by Miri Woodruff MD at      Author: Miri Woodruff MD Service: -- Author Type: --    Filed:  Encounter Date: 9/22/2020 Status: (Other)         Leonela Christianson  [address]  [place] [address]             September 22, 2020         Dear Ms. Tristen Christianson,    Below are the results from your recent visit:    Resulted Orders   Basic Metabolic Panel   Result Value Ref Range    Sodium 139 mmol/L    Potassium 4.5 mmol/L    Chloride 107 mmol/L    CO2 22 mmol/L    Anion Gap, Calculation 10 mmol/L    Glucose 78 mg/dL    Calcium 9.3 mg/dL    BUN 13 mg/dL    Creatinine 0.95 mg/dL    GFR MDRD Af Amer >60 mL/min/1.73m2    GFR MDRD Non Af Amer 58 (L) mL/min/1.73m2       Your kidney function continues to improve.  Blood sugar is normal.    Please call with questions or contact us using [employer].    Sincerely,        Electronically signed by Miri Woodruff MD

## 2021-06-21 NOTE — PROGRESS NOTES
[facility] Note    Assessment/Plan:  Leonela Christianson is a 67 y.o. old female with:  1. CAD s/p PCI - doing well, tolerating her medicaions, plans on stpping tobacco this year.  Would cont plavix long term if continue to smoke.   2. Hx of PE - on warfarin - cont therapy.   3. Right hand numbness - not clear if early carpal tunnel or neuropathy - would defer to Dr. Woodruff for her assessment.        Dr. Bandar Gonzalez  [facility] HEART CARE  [phone]  ______________________________________________________________________     Subjective:     I had the opportunity to see Leonela Christianson at the [facility]. Leonela Christianson is a 67 y.o. female with a known history of tob use with recent MI still using tob sadly.  Tolerating medications, discussed continuing plavix.  No GI bleeding, she has COPD on inhalers as needed.  No syncopal events.  No chest pain/pressure, improvement in dyspnea with inhaler/nebulizer recently prescribed by Dr. Woodruff. She is still using tob.  S/p PE - on warfarin. Grandchild has been trying to convince her to stop tob. No syncopal events. She has reduced her tob use in the evening.  She does report numbness in her right hand, in a glove fashion, worse when she moves her hand or trys to open a can, sometimes better with moving her arm up and around.   ______________________________________________________________________      Review of Systems:                                            Problem List:  Patient Active Problem List   Diagnosis     Hyperlipidemia     Nicotine Dependence     Lateral Epicondylitis     Overweight (BMI 25.0-29.9)     Osteoporosis     Chronic bronchitis, unspecified chronic bronchitis type (H)     Pulmonary embolism (H)     Upper back pain on left side     Lump of skin     Pulmonary nodule     Pulmonary emphysema, unspecified emphysema type (H)     Essential hypertension     NSTEMI (non-ST elevated myocardial infarction)  (H)     Coronary atherosclerosis due to lipid rich plaque     Medical History:  Past Medical History:   Diagnosis Date     COPD (chronic obstructive pulmonary disease) (H)      Heart attack 01/2012     Hyperlipidemia      Hypertension      Pulmonary embolism (H)      Surgical History:  Past Surgical History:   Procedure Laterality Date     CV CORONARY ANGIOGRAM N/A 11/20/2017    Procedure: Coronary Angiogram;  Surgeon: Daniela Mazariegos MD;  Location: [facility] Cath Lab;  Service:      CV LEFT HEART CATHETERIZATION WO LEFT VETRICULOGRAM Left 11/20/2017    Procedure: Left Heart Catheterization Without Left Ventriculogram;  Surgeon: Daniela Mazariegos MD;  Location: [facility] Cath Lab;  Service:      HYSTERECTOMY      fibroids     OOPHORECTOMY       AK TOTAL ABDOM HYSTERECTOMY      Description: Total Abdominal Hysterectomy;  Recorded: 04/19/2012;  Comments: for fibroids     Social History:  No pertinent social hx related to patient's chief complaint other than above in subjective        Family History:  No pertinent family hx related to patient's chief complaint other than above in subjective      Allergies:  Allergies   Allergen Reactions     Sulfa (Sulfonamide Antibiotics) Anaphylaxis       Medications:  Current Outpatient Prescriptions   Medication Sig Dispense Refill     albuterol (PROAIR HFA) 90 mcg/actuation inhaler Inhale 1-2 puffs every 4 (four) hours as needed for wheezing. Every 4 to 6 hours as needed 3 each 1     clopidogrel (PLAVIX) 75 mg tablet Take 1 tablet (75 mg total) by mouth daily. 90 tablet 3     cyclobenzaprine (FLEXERIL) 10 MG tablet Take 1 tablet (10 mg total) by mouth every 8 (eight) hours as needed for muscle spasms. 30 tablet 1     lisinopril-hydrochlorothiazide (PRINZIDE,ZESTORETIC) 10-12.5 mg per tablet TAKE 1 TABLET DAILY 90 tablet 1     metoprolol succinate (TOPROL XL) 50 MG 24 hr tablet Take 1 tablet (50 mg total) by mouth at bedtime. 90 tablet 3     nitroglycerin (NITROSTAT) 0.4 MG SL  tablet Place 1 tablet (0.4 mg total) under the tongue every 5 (five) minutes as needed for chest pain. 20 tablet 0     rosuvastatin (CRESTOR) 40 MG tablet Take 1 tablet (40 mg total) by mouth Daily after lunch. 90 tablet 3     tiotropium-olodaterol 2.5-2.5 mcg/actuation Mist Inhale 2 puffs daily. 12 g 3     warfarin (COUMADIN) 3 MG tablet TAKE ONE AND ONE-HALF TABLETS DAILY AS DIRECTED (DOSE ADJUSTED BASED ON INR) 126 tablet 1     No current facility-administered medications for this visit.        Objective:   Vital signs:  There were no vitals taken for this visit.      Physical Exam:    GENERAL APPEARANCE: Alert, cooperative and in no acute distress.  HEENT: No scleral icterus. No Xanthelasma. Oral mucuos membranes pink and moist.  NECK: JVP<6cm. No Hepatojugular reflux. Thyroid not visualized  CHEST: clear to auscultation  CARDIOVASCULAR: S1, S2 without murmur ,clicks or rubs. Brachial, radial and posterior tibial pulses are intact and symetric. No carotid bruits noted.    ABDOMEN: Nontender. BS+. No bruits.  EXTREMITIES: No cyanosis, clubbing or edema.    Lab Results:  LIPIDS:  Lab Results   Component Value Date    CHOL 134 12/06/2017    CHOL 153 04/12/2017    CHOL 148 12/17/2015     Lab Results   Component Value Date    HDL 51 12/06/2017    HDL 53 04/12/2017    HDL 56 12/17/2015     Lab Results   Component Value Date    LDLCALC 74 12/06/2017    LDLCALC 91 04/12/2017    LDLCALC 82 12/17/2015     Lab Results   Component Value Date    TRIG 44 12/06/2017    TRIG 45 04/12/2017    TRIG 52 12/17/2015     No components found for: CHOLHDL    BMP:  Lab Results   Component Value Date    CREATININE 1.08 08/29/2018    BUN 18 08/29/2018     08/29/2018    K 4.1 08/29/2018     08/29/2018    CO2 23 08/29/2018         Bandar Gonzalez MD  [facility]  [phone]

## 2021-06-21 NOTE — LETTER
Letter by Tina Guaman DO at      Author: Tina Guaman DO Service: -- Author Type: --    Filed:  Encounter Date: 4/29/2021 Status: (Other)         Leonela Ramon Sammy  [address]  [facility] [place] [facility] [facility] [address]             April 29, 2021         Dear Ms. Tristen Christianson,    Below are the results from your recent visit:    Resulted Orders   US Breast Right Limited 1-3 Quadrants    Narrative    EXAM: ULTRASOUND BREAST UNILATERAL RIGHT LIMITED  LOCATION: [facility] OUTPATIENT SERVICES  [facility]  DATE/TIME: 4/29/2021 9:44 AM    INDICATION: 70-year-old female presents with ongoing irritation/infection involving the right lower breast since January 2021. She initially presented on 1/13/2021 with a palpable lump with associated pain and overlying skin erythema within the right   lower breast. Findings were consistent with an inflamed/infected sebaceous cyst/epidermal inclusion cyst. The patient reports that this same area became inflamed again in February and was placed on antibiotic therapy. Her symptoms improved. This cycle   repeated once more in April and she has recently completed a second course of antibiotics.    COMPARISON: 1/13/2021, 9/5/2018, 10/12/2016, 9/25/2015.    ULTRASOUND FINDINGS: Visual inspection of the right breast demonstrates a patch of hyperpigmentation centered at the 6 o'clock position. The skin is nonerythematous and nonthickened.    Targeted right breast ultrasound at the area of hyperpigmentation at the 6 o'clock position, subareolar region demonstrates a 0.2 x 0.3 x 2.1 cm hypoechoic area within the skin. There is no suspicious mass or other sonographic abnormality within the   underlying breast parenchyma. These findings are again consistent with a skin-based infection/irritation. Today's findings are much improved compared to prior ultrasound on 1/13/2021.      Impression    Hypoechoic area within the skin of the right breast at the 6 o'clock position, retroareolar  region correlates with a patch of hyperpigmentation, favoring a sebaceous cyst/epidermal inclusion cyst. The patient has completed two courses of antibiotics over   the last 3-4 months for repeated flareups of this area. This area is much improved and smaller in size compared to 1/13/2021. The patient has an appointment with a breast surgeon scheduled on Monday, 5/3/2021.    ACR BI-RADS Category 2: Benign.    Return to annual screening schedule is recommended (due in January 2022).    I personally discussed the findings and recommendations with the patient at the conclusion of the examination.       ultrasound once again showed a cyst.  Looks improved with the antibiotics that she was placed on.  She has follow-up with the breast surgeon as planned.     Please call with questions or contact us using [employer].    Sincerely,        Electronically signed by Tina Guaman DO

## 2021-06-21 NOTE — LETTER
Letter by Haritha Nunez CMA at      Author: Haritha Nunez CMA Service: -- Author Type: --    Filed:  Encounter Date: 5/4/2021 Status: (Other)       Hugo Gipson,    We've received instruction to get you scheduled for surgery with Dr Anna Berman. We have that arranged as follows:     Surgery Date: Tuesday June 8th  Location: [address], [address] (3rd floor) [facility]  Arrival Time: 7:00 AM (Unless instructed differently by the pre-op call nurse)     Prep Instructions:     1. Please schedule a pre-op physical with your primary care doctor. This may be virtual or face-to-face depending on your doctors preference.    2. PCR-Rated COVID19 testing is required within 4 days of surgery. We have this scheduled for you at [facility] on Friday June 4th at 8:00 AM. If your test is positive, your surgery will be canceled.     3. Nothing to eat or drink for 8 hours before surgery unless instructed differently by the pre-op nurse.    4. No blood thinners including aspirin for one week prior to surgery. Verify this is safe for you with your primary care doctor before stopping.     5. You will need an adult to drive you home and stay with you 24 hours after surgery.     6. At this time, no visitors are allowed at the facility; your  will not be able to wait for you.     7. When you arrive to the surgery center, you will be screened for COVID19 symptoms. If you screen positive, your surgery will need to be postponed for your safety.    8. If the community sees a new surge in COVID19 hospital admissions, your procedure may need to be postponed. We will contact you if this happens.    9. We always encourage you to notify your insurance any time you have something scheduled including surgery. The number is usually right on the back of your insurance card. Please call [facility] Cost of Care at [phone] for the surgeon fees, and [facility] Cost of Care [phone]  for facility fees if you need pricing information.       Call our office if you have any questions! Thank you!     Haritha GARCIA  Surgery Scheduler  [facility]  Surgery [facility]  Direct line: [phone]   Main Line: [phone]  Direct Fax: [phone]

## 2021-06-21 NOTE — LETTER
Letter by Miri Woodruff MD at      Author: Miri Woodruff MD Service: -- Author Type: --    Filed:  Encounter Date: 1/5/2021 Status: (Other)         Leonela Christianson  [address] [place] [id number]             January 5, 2021         Dear Ms. Tristen Christianson,    Below are the results from your recent visit:    Resulted Orders   HM2(CBC w/o Differential)   Result Value Ref Range    WBC 8.4 4.0 - 11.0 thou/uL    RBC 5.16 3.80 - 5.40 mill/uL    Hemoglobin 16.3 (H) 12.0 - 16.0 g/dL    Hematocrit 49.5 (H) 35.0 - 47.0 %    MCV 96 80 - 100 fL    MCH 31.5 27.0 - 34.0 pg    MCHC 32.9 32.0 - 36.0 g/dL    RDW 12.7 11.0 - 14.5 %    Platelets 230 140 - 440 thou/uL    MPV 6.9 (L) 7.0 - 10.0 fL   Lipid Cascade RANDOM   Result Value Ref Range    Cholesterol 158 <=199 mg/dL    Triglycerides 96 <=149 mg/dL    HDL Cholesterol 57 >=50 mg/dL    LDL Calculated 82 <=129 mg/dL    Patient Fasting > 8hrs? Yes    Basic Metabolic Panel   Result Value Ref Range    Sodium 139 136 - 145 mmol/L    Potassium 4.0 3.5 - 5.0 mmol/L    Chloride 108 (H) 98 - 107 mmol/L    CO2 19 (L) 22 - 31 mmol/L    Anion Gap, Calculation 12 5 - 18 mmol/L    Glucose 84 70 - 125 mg/dL    Calcium 9.0 8.5 - 10.5 mg/dL    BUN 14 8 - 28 mg/dL    Creatinine 0.90 0.60 - 1.10 mg/dL    GFR MDRD Af Amer >60 >60 mL/min/1.73m2    GFR MDRD Non Af Amer >60 >60 mL/min/1.73m2    Narrative    Fasting Glucose reference range is 70-99 mg/dL per  American Diabetes Association (ADA) guidelines.       Your labs overall look good.  Cholesterol is very close to goal, continue to try to eat real food like fruits and vegetables whenever possible and avoid processed food when you can.  Blood counts look good, kidney function is normal off the water pill.  Plan follow-up in 6 months.    Please call with questions or contact us using [employer].    Sincerely,        Electronically signed by Miri Woodruff MD

## 2021-06-22 NOTE — TELEPHONE ENCOUNTER
ANTICOAGULATION  MANAGEMENT    Assessment     Today's INR result of 1.7 is Subtherapeutic (goal INR of 2.0-3.0)        Warfarin taken as previously instructed    Increased greens/vitamin K intake may be affecting INR - eating more doris greens and plans to keep eating them until they are gone    No new medication/supplements affecting INR    Continues to tolerate warfarin with no reported s/s of bleeding or thromboembolism     Previous INR was Therapeutic    Plan:     Spoke with Lenoela regarding INR result and instructed:     Warfarin Dosing Instructions:  Change warfarin dose to 3 mg daily on Tue, Sat; and 4.5 mg daily rest of week  (5.6 % change)    Instructed patient to follow up no later than: 2 weeks    Education provided: importance of consistent vitamin K intake, target INR goal and significance of current INR result, importance of following up for INR monitoring at instructed interval and importance of taking warfarin as instructed    Leonela verbalizes understanding and agrees to warfarin dosing plan.    Instructed to call the [facility] for any changes, questions or concerns. (#[phone])   ?   Jenny Stanley RN    Subjective/Objective:      Leonela Christianson, a 68 y.o. female is on warfarin.     Leonela reports:     Home warfarin dose: verbally confirmed home dose with Leonela and updated on anticoagulation calendar     Missed doses: No     Medication changes:  No     S/S of bleeding or thromboembolism:  No     New Injury or illness:  No     Changes in diet or alcohol consumption:  Yes: eating more doris greens and plans to keep eating them until they are gone     Upcoming surgery, procedure or cardioversion:  No    Anticoagulation Episode Summary     Current INR goal:   2.0-3.0   TTR:   80.4 % (3.9 y)   Next INR check:   1/17/2019   INR from last check:   1.70! (1/3/2019)   Weekly max warfarin dose:      Target end date:      INR check location:      Preferred lab:      Send INR reminders to:    ANTICOAGULATION POOL B (MPW,HUG,STW,RVL,OAK,RLN)    Indications    Pulmonary embolism (H) [I26.99]           Comments:            Anticoagulation Care Providers     Provider Role Specialty Phone number    Miri Woodruff MD Referring [facility] [phone]

## 2021-06-23 NOTE — TELEPHONE ENCOUNTER
ANTICOAGULATION  MANAGEMENT    Assessment     Today's INR result of 1.9 is Subtherapeutic (goal INR of 2.0-3.0)        Warfarin taken as previously instructed    Increased greens/vitamin K intake may be affecting INR - Ate more broccoli than usual    No new medication/supplements affecting INR    Continues to tolerate warfarin with no reported s/s of bleeding or thromboembolism     Previous INR was Therapeutic    Plan:     Spoke with Leonela regarding INR result and instructed:     Warfarin Dosing Instructions:  Take a one time boost of 4.5 mg today only then continue current warfarin dose 3 mg daily on Tue, Thu, Sat; and 4.5 mg daily rest of week  (0 % change)    Instructed patient to follow up no later than: 2 weeks    Education provided: importance of consistent vitamin K intake, target INR goal and significance of current INR result, importance of following up for INR monitoring at instructed interval, importance of taking warfarin as instructed and importance of notifying clinic for changes in medications    Leonela verbalizes understanding and agrees to warfarin dosing plan.    Instructed to call the [facility] for any changes, questions or concerns. (#[phone])   ?   Jenny Stanley RN    Subjective/Objective:      Leonela Christianson, a 68 y.o. female is on warfarin.     Leonela reports:     Home warfarin dose: verbally confirmed home dose with Leonela and updated on anticoagulation calendar     Missed doses: No     Medication changes:  No     S/S of bleeding or thromboembolism:  No     New Injury or illness:  No     Changes in diet or alcohol consumption:  Yes: ate more broccoli than usual     Upcoming surgery, procedure or cardioversion:  No    Anticoagulation Episode Summary     Current INR goal:   2.0-3.0   TTR:   80.1 % (4 y)   Next INR check:   2/19/2019   INR from last check:   1.90! (2/5/2019)   Weekly max warfarin dose:      Target end date:      INR check location:      Preferred lab:      Send INR  reminders to:   ANTICOAGULATION POOL B (MPW,HUG,STW,RVL,OAK,RLN)    Indications    Pulmonary embolism (H) [I26.99]           Comments:            Anticoagulation Care Providers     Provider Role Specialty Phone number    Miri Woodruff MD Referring Family Medicine [phone]

## 2021-06-23 NOTE — TELEPHONE ENCOUNTER
ANTICOAGULATION  MANAGEMENT    Assessment     Today's INR result of 2.4 is Therapeutic (goal INR of 2.0-3.0)        Less warfarin taken than instructed which may be affecting INR    Decreased greens/vitamin K intake may be affecting INR - back to her normal amount of greens after the holidays    No new medication/supplements affecting INR    Continues to tolerate warfarin with no reported s/s of bleeding or thromboembolism     Previous INR was Subtherapeutic    Plan:     Spoke with Leonela regarding INR result and instructed:     Warfarin Dosing Instructions:  Change warfarin dose to 3 mg daily on Tue, Thu, Sat; and 4.5 mg daily rest of week  (0 % change from what pt took in the last week)    Instructed patient to follow up no later than: 2 weeks    Education provided: target INR goal and significance of current INR result, importance of following up for INR monitoring at instructed interval and importance of taking warfarin as instructed    Leonela verbalizes understanding and agrees to warfarin dosing plan.    Instructed to call the [facility] for any changes, questions or concerns. (#[phone])   ?   Jenny Stanley RN    Subjective/Objective:      Leonela Christianson, a 68 y.o. female is on warfarin.     Leonela reports:     Home warfarin dose: verbally confirmed home dose with Leonela and updated on anticoagulation calendar     Missed doses: No     Medication changes:  No     S/S of bleeding or thromboembolism:  No     New Injury or illness:  No     Changes in diet or alcohol consumption:  No, back to her normal intake of greens     Upcoming surgery, procedure or cardioversion:  No    Anticoagulation Episode Summary     Current INR goal:   2.0-3.0   TTR:   80.1 % (4 y)   Next INR check:   1/31/2019   INR from last check:   2.40 (1/17/2019)   Weekly max warfarin dose:      Target end date:      INR check location:      Preferred lab:      Send INR reminders to:   ANTICOAGULATION POOL B (MPW,MOOKIEG,STW,RVL,OAK,RLN)     Indications    Pulmonary embolism (H) [I26.99]           Comments:            Anticoagulation Care Providers     Provider Role Specialty Phone number    Miri Woodruff MD Referring Family Medicine [phone]

## 2021-06-23 NOTE — TELEPHONE ENCOUNTER
Patient Returning Call  Reason for call:  INR instructions   Information relayed to patient:  no  Patient has additional questions:  Yes  If YES, what are your questions/concerns:  Results/dosing  Okay to leave a detailed message?: Yes

## 2021-06-24 NOTE — TELEPHONE ENCOUNTER
"Who is calling:  Patient Leonela   Reason for Call:  Loenela is feeling a little \"under the weather\" and wondering what she can take with her Warfarin.  Patient's symptoms are cough, chills, body ache.  Patient has Tylenol and is wondering if she can take that with her Warfarin. Patient says that the tylenol bottle says not to take if on warfarin.  -declined nurse triage  Date of last appointment with primary care: 02-19-19  Okay to leave a detailed message: Yes      "

## 2021-06-24 NOTE — TELEPHONE ENCOUNTER
ANTICOAGULATION  MANAGEMENT    Assessment     Today's INR result of 2.4 is Therapeutic (goal INR of 2.0-3.0)        Warfarin taken as previously instructed    No new diet changes affecting INR    No new medication/supplements affecting INR    Continues to tolerate warfarin with no reported s/s of bleeding or thromboembolism     Previous INR was Subtherapeutic    Plan:     Spoke with Leonela regarding INR result and instructed:     Warfarin Dosing Instructions:  Continue current warfarin dose 3 mg daily on Tue, Thu, Sat; and 4.5 mg daily rest of week  (0 % change)    Instructed patient to follow up no later than: 2-3 weeks    Education provided: target INR goal and significance of current INR result and importance of notifying clinic for changes in medications    Instructed to call the [facility] for any changes, questions or concerns. (#[phone])   ?   Jenny Stanley RN    Subjective/Objective:      Leonela Christianson, a 68 y.o. female is on warfarin.     Leonela reports:     Home warfarin dose: as updated on anticoagulation calendar per template     Missed doses: No     Medication changes:  No     S/S of bleeding or thromboembolism:  No     New Injury or illness:  No     Changes in diet or alcohol consumption:  No     Upcoming surgery, procedure or cardioversion:  No    Anticoagulation Episode Summary     Current INR goal:   2.0-3.0   TTR:   80.1 % (4 y)   Next INR check:   3/12/2019   INR from last check:   2.40 (2/19/2019)   Weekly max warfarin dose:      Target end date:      INR check location:      Preferred lab:      Send INR reminders to:   ANTICOAGULATION POOL B (MPW,HUG,STW,RVL,OAK,RLN)    Indications    Pulmonary embolism (H) [I26.99]           Comments:            Anticoagulation Care Providers     Provider Role Specialty Phone number    Miri Woodruff MD Referring Family Medicine [phone]

## 2021-06-24 NOTE — TELEPHONE ENCOUNTER
Refill Approved    Rx renewed per Medication Renewal Policy. Medication was last renewed on 10/15/2018     >> note to pharmacy to alert pt that they are due for OV for monitoring of this medication          Gold Lopez, [facility] Triage/Med Refill 3/7/2019     Requested Prescriptions   Pending Prescriptions Disp Refills     PROAIR HFA 90 mcg/actuation inhaler [Pharmacy Med Name: PROAIR HFA INH 8.5GM W/COUNT 90MCG] 25.5 g 1     Sig: USE 1 TO 2 INHALATIONS EVERY 4 TO 6 HOURS AS NEEDED FOR WHEEZING    Albuterol/Levalbuterol Refill Protocol Passed - 3/6/2019  6:21 PM       Passed - PCP or prescribing provider visit in last year    Last office visit with prescriber/PCP: 8/29/2018 Miri Woodruff MD OR same dept: 8/29/2018 Miri Woodruff MD OR same specialty: 8/29/2018 Miri Woodruff MD Last physical: Visit date not found       Next appt within 3 mo: Visit date not found  Next physical within 3 mo: Visit date not found  Prescriber OR PCP: Miri Woodruff MD  Last diagnosis associated with med order: 1. Pulmonary emphysema, unspecified emphysema type (H)  - PROAIR HFA 90 mcg/actuation inhaler [Pharmacy Med Name: PROAIR HFA INH 8.5GM W/COUNT 90MCG]; USE 1 TO 2 INHALATIONS EVERY 4 TO 6 HOURS AS NEEDED FOR WHEEZING  Dispense: 25.5 g; Refill: 1    If protocol passes may refill for 6 months if within 3 months of last provider visit (or a total of 9 months). If patient requesting >1 inhaler per month refill x 6 months and have patient make appointment with provider.

## 2021-06-24 NOTE — TELEPHONE ENCOUNTER
ACN called and spoke with patient. Patient is having cold/ flu like symptoms. ACN advised that patient can take Tylenol for her symptoms, but try not to take more than 2,000 mg of Tylenol a day since it can interact with warfarin and increase risks of bleeding.     ACN also reminded pt that she is due for an INR check. Reminded pt that being sick can affect the INR also. Pt said she'll call in to make INR appt once she starts feeling better.

## 2021-06-25 NOTE — TELEPHONE ENCOUNTER
Hey,     That should be fine to do a 4 day hold. Please call patient and let her know.     Tina Guaman, DO

## 2021-06-25 NOTE — TELEPHONE ENCOUNTER
ANTICOAGULATION  MANAGEMENT    Assessment     Today's INR result of 2.4 is Therapeutic (goal INR of 2.0-3.0)        Warfarin taken as previously instructed    No new diet changes affecting INR    No new medication/supplements affecting INR    Continues to tolerate warfarin with no reported s/s of bleeding or thromboembolism     Previous INR was Therapeutic    Plan:     Left a detailed message for Leonela regarding INR result and instructed:     Warfarin Dosing Instructions:  Continue current warfarin dose    3 mg on Tue, Thu, Sat; 4.5 mg all other days       Instructed patient to follow up no later than: 4 weeks    Education provided: importance of therapeutic range    Instructed to call the [facility] for any changes, questions or concerns. (#[phone])   ?   Yuliet Cardenas RN    Subjective/Objective:      Leonela Christianson, a 68 y.o. female is on warfarin.     Leonela reports:     Home warfarin dose: as updated on anticoagulation calendar per template     Missed doses: No     Medication changes:  No     S/S of bleeding or thromboembolism:  No     New Injury or illness:  No     Changes in diet or alcohol consumption:  No     Upcoming surgery, procedure or cardioversion:  No    Anticoagulation Episode Summary     Current INR goal:   2.0-3.0   TTR:   80.5 % (4.1 y)   Next INR check:   4/18/2019   INR from last check:   2.10 (3/21/2019)   Weekly max warfarin dose:      Target end date:      INR check location:      Preferred lab:      Send INR reminders to:   ANTICOAGULATION POOL B (MPW,HUG,STW,RVL,OAK,RLN)    Indications    Pulmonary embolism (H) [I26.99]           Comments:            Anticoagulation Care Providers     Provider Role Specialty Phone number    Miri Woodruff MD Referring Family Medicine [phone]

## 2021-06-25 NOTE — TELEPHONE ENCOUNTER
Who is calling:  Leonela  Reason for Call:  Patient called stating she is having a cyst removed from her breast on 6/10 and needs instructions.  Date of last appointment with primary care: 5/25/2021  Okay to leave a detailed message: Yes

## 2021-06-25 NOTE — TELEPHONE ENCOUNTER
ANTICOAGULATION  MANAGEMENT    Assessment     Today's INR result of 2.2 is Therapeutic (goal INR of 2.0-3.0)        Warfarin taken as previously instructed    No new diet changes affecting INR    No new medication/supplements affecting INR    Continues to tolerate warfarin with no reported s/s of bleeding or thromboembolism     Previous INR was Therapeutic     Patient procedure for 6/10/21 was cancelled.  Patient did take booster dose as directed    Plan:     Spoke on phone with Leonela regarding INR result and instructed:      Warfarin Dosing Instructions:  Continue current warfarin dose 6 mg daily on Sun/Tue/Fri; and 3 mg daily rest of week  (0 % change)    Instructed patient to follow up no later than: 2 weeks    Education provided: importance of therapeutic range, target INR goal and significance of current INR result, importance of following up for INR monitoring at instructed interval and importance of taking warfarin as instructed    Leonela verbalizes understanding and agrees to warfarin dosing plan.    Instructed to call the [facility] for any changes, questions or concerns. (#[phone])   ?   Ashly Becerra RN    Subjective/Objective:      Leonela Christianson, a 71 y.o. female is on warfarin. Leonela Gipson reports:     Home warfarin dose: verbally confirmed home dose with Leonela and updated on anticoagulation calendar     Missed doses: No     Medication changes:  No     S/S of bleeding or thromboembolism:  No     New Injury or illness:  No     Changes in diet or alcohol consumption:  No     Upcoming surgery, procedure or cardioversion:  No    Anticoagulation Episode Summary     Current INR goal:  2.0-3.0   TTR:  85.6 % (12 mo)   Next INR check:  7/1/2021   INR from last check:  2.20 (6/17/2021)   Weekly max warfarin dose:     Target end date:     INR check location:     Preferred lab:     [facility]    Indications    History of pulmonary embolism [Z86.711]            Comments:           Anticoagulation Care Providers     Provider Role Specialty Phone number    Miri Woodruff MD Referring Family Medicine [phone]

## 2021-06-25 NOTE — TELEPHONE ENCOUNTER
FYI - Status Update  Who is Calling: Patient  Update: Returned call, ACN not available for transfer at this time. Patient requests you call her back at HOME number [phone] before 2:00 pm if you can, as she needs to leave for another appointment at that time.  Okay to leave a detailed message?:  No

## 2021-06-25 NOTE — TELEPHONE ENCOUNTER
ANTICOAGULATION  MANAGEMENT PROGRAM    Leonela Christianson is overdue for INR check.  Reminder call made.    Spoke with Leonela and scheduled INR appointment on 3/21 .    Jenny Stanley, RN

## 2021-06-25 NOTE — TELEPHONE ENCOUNTER
RN cannot approve Refill Request    RN can NOT refill this medication med is not covered by policy/route to provider. Last office visit: 1/21/2021 Miri Woodruff MD Last Physical: 10/7/2019 Last MTM visit: Visit date not found Last visit same specialty: 5/25/2021 Tina Guaman DO.  Next visit within 3 mo: Visit date not found  Next physical within 3 mo: Visit date not found      Ivette Amador, [facility] Connection Triage/Med Refill 6/8/2021    Requested Prescriptions   Pending Prescriptions Disp Refills     STIOLTO RESPIMAT 2.5-2.5 mcg/actuation Mist inhaler [Pharmacy Med Name: STIOLTO RESPIMAT INH [id number] 2.5/2.5] 12 g 3     Sig: USE 2 INHALATIONS DAILY       There is no refill protocol information for this order

## 2021-06-25 NOTE — PROGRESS NOTES
Progress Notes by Huyen Orozco PA-C at 12/4/2017  3:30 PM     Author: Huyen Orozco PA-C Service: -- Author Type: Physician Assistant    Filed: 12/4/2017  4:12 PM Encounter Date: 12/4/2017 Status: Signed    : Huyen Orozco PA-C (Physician Assistant)           Click to link to [facility] Heart [facility] HEART [facility] NOTE      Assessment/Recommendations   1.  Coronary artery disease - s/p DANA on November 20th to proximal circumflex:  Leonela Christianson will continue on dual antiplatelet therapy with aspirin 81 mg until December 20 and clopidogrel 75 mg for 1 year.  We discussed the importance of antiplatelet therapy and talking with her cardiologist prior to stopping these medications for any reason.  Puncture site is soft, nontender and healing well.      Risk factor modification and lifestyle management topics were discussed including managing comorbidities, weight loss, heart healthy diet, exercise and smoking cessation.  Cardiac rehab has been ordered.      2.  Dyslipidemia: Leonela Christianson is on high intensity statin therapy with rosuvastatin 40 mg daily.  Her last lipid profile was in December 2015 at which time her LDL was 82.  There was mention of checking it last January but I do not see that this was done.  Will ask her to get lipid panel prior to seeing Dr. Gonzalez in January.  We discussed a diet low in saturated fat, weight loss, and exercise along with medication for better control of cholesterol.     3.  Hypertension: Her blood pressure is well controlled today taking metoprolol succinate 50 mg daily and lisinopril/hydrochlorothiazide 10/12.5 mg daily.    4.  History of PE: Patient is anticoagulated with Coumadin.  She is currently on triple therapy and has had one nosebleed.  Aspirin will be stopped at the end of December and she will continue Plavix with her Coumadin for a full year after stent placement.    5. Tobacco abuse: Patient shows no  interest in stopping smoking.  She refused smoking cessation assistance today.     She will see Dr. Gonzalez on January 17, 2018     History of Present Illness    Leonela Christianson is seen at [facility] for post coronary intervention follow up.  This is a 67-year-old -American female with history of STEMI in 2012 at which time she had a bare metal stent placed in her mid/distal RCA.  Her coronary artery disease risk factors include hypertension, obesity, dyslipidemia, smoking, physical inactivity and unhealthy diet.  Her past medical history is also significant for COPD.    She presented to the emergency department on November 18 with bilateral jaw pain and was admitted with a non-STEMI.  She went to the cardiac catheterization lab on November 20 and via the right radial approach had angiogram.  This showed a patent mid/distal RCA bare-metal stent.  There was a new lesion in her proximal circumflex and a drug-eluting stent was placed.  She was started on dual antiplatelet therapy with aspirin and clopidogrel.  She was also continued on her Coumadin which she is on for history of PE.    The patient states that since her procedure she is still feeling a bit worn out but is otherwise okay.  She never had any trouble with her breathing and is still not experiencing shortness of breath.  She has had no recurrent anginal symptoms.  She has started cardiac rehab.  She is still smoking and so she does not want any help to quit.  She otherwise denies lightheadedness, dyspnea on exertion, orthopnea, PND, palpitations, abdominal fullness/bloating and lower extremity edema.      Review of Systems:   12 point review of systems was reviewed and was negative except for those noted in the HPI    ECHO (personnaly reviewed): Done on November 18 showing EF 58%     Physical Examination Review of Systems   Vitals:    12/04/17 1525   BP: 116/72   Pulse: (!) 104   Resp: 20   Temp: 98.2  F (36.8  C)     Body mass index  is 30.08 kg/(m^2).  Wt Readings from Last 3 Encounters:   12/04/17 154 lb (69.9 kg)   11/30/17 154 lb (69.9 kg)   11/27/17 150 lb 9.6 oz (68.3 kg)       General Appearance:   no distress, normal body habitus   ENT/Mouth: membranes moist, no oral lesions or bleeding gums.      EYES:  no scleral icterus, normal conjunctivae   Neck: no carotid bruits or thyromegaly   Chest/Lungs:   lungs are clear to auscultation, no rales or wheezing   Cardiovascular:   Tachycardic. Normal first and second heart sounds with no murmurs, rubs, or gallops; the carotid, radial and posterior tibial pulses are intact,  edema bilaterally    Abdomen:  no organomegaly, masses, bruits, or tenderness; bowel sounds are present   Extremities: no cyanosis or clubbing.  Puncture site at right wrist is healed   Skin: no xanthelasma, warm.    Neurologic: normal gait, normal  bilateral, no tremors     Psychiatric: alert and oriented x3, calm     General: WNL  Eyes: WNL  Ears/Nose/Throat: WNL  Lungs: WNL  Heart: Shortness of Breath with activity  Stomach: WNL  Bladder: WNL  Muscle/Joints: WNL  Skin: WNL  Nervous System: WNL  Mental Health: WNL     Blood: Easy Bruising     Medical History  Surgical History Family History Social History   Past Medical History:   Diagnosis Date   ? COPD (chronic obstructive pulmonary disease)    ? Heart attack 01/2012   ? Hyperlipidemia    ? Hypertension    ? Pulmonary embolism     Past Surgical History:   Procedure Laterality Date   ? CV CORONARY ANGIOGRAM N/A 11/20/2017    Procedure: Coronary Angiogram;  Surgeon: Daniela Mazariegos MD;  Location: [facility] Cath Lab;  Service:    ? CV LEFT HEART CATHETERIZATION WO LEFT VETRICULOGRAM Left 11/20/2017    Procedure: Left Heart Catheterization Without Left Ventriculogram;  Surgeon: Daniela Mazariegos MD;  Location: [facility] Cath Lab;  Service:    ? HYSTERECTOMY      fibroids   ? OOPHORECTOMY     ? DE TOTAL ABDOM HYSTERECTOMY      Description: Total Abdominal Hysterectomy;   Recorded: 04/19/2012;  Comments: for fibroids    Family History   Problem Relation Age of Onset   ? No Medical Problems Mother    ? No Medical Problems Father    ? Diabetes Paternal Uncle    ? Breast cancer Neg Hx    ? Cancer Neg Hx    ? Colon cancer Neg Hx    ? Heart disease Neg Hx     Social History     Social History   ? Marital status:      Spouse name: N/A   ? Number of children: N/A   ? Years of education: N/A     Occupational History   ? Not on file.     Social History Main Topics   ? Smoking status: Current Every Day Smoker     Packs/day: 0.75     Years: 55.00     Types: Cigarettes   ? Smokeless tobacco: Never Used   ? Alcohol use No   ? Drug use: No   ? Sexual activity: Not Currently     Partners: Male     Other Topics Concern   ? Not on file     Social History Narrative          Medications  Allergies   Current Outpatient Prescriptions   Medication Sig Dispense Refill   ? albuterol (PROAIR HFA) 90 mcg/actuation inhaler Inhale 1-2 puffs every 4 (four) hours as needed for wheezing. Every 4 to 6 hours as needed 1 each 5   ? aspirin 81 mg chewable tablet Chew 1 tablet (81 mg total) daily. 30 tablet 0   ? clopidogrel (PLAVIX) 75 mg tablet Take 1 tablet (75 mg total) by mouth daily. 90 tablet 3   ? cyclobenzaprine (FLEXERIL) 10 MG tablet Take 1 tablet (10 mg total) by mouth every 8 (eight) hours as needed for muscle spasms. 30 tablet 1   ? lisinopril-hydrochlorothiazide (PRINZIDE,ZESTORETIC) 10-12.5 mg per tablet Take 1 tablet by mouth daily with supper.     ? metoprolol succinate (TOPROL XL) 50 MG 24 hr tablet Take 1 tablet (50 mg total) by mouth at bedtime. 90 tablet 1   ? nitroglycerin (NITROSTAT) 0.4 MG SL tablet Place 1 tablet (0.4 mg total) under the tongue every 5 (five) minutes as needed for chest pain. 20 tablet 0   ? rosuvastatin (CRESTOR) 40 MG tablet Take 40 mg by mouth Daily after lunch.     ? warfarin (COUMADIN) 3 MG tablet Take 4.5 mg by mouth See Admin Instructions. Taking 4.5mg (1 and 1/2   Of 3 mg) daily at 1100       No current facility-administered medications for this visit.       Allergies   Allergen Reactions   ? Sulfa (Sulfonamide Antibiotics) Anaphylaxis         Lab Results    Chemistry/lipid CBC Cardiac Enzymes/BNP/TSH/INR   Lab Results   Component Value Date    CHOL 153 04/12/2017    HDL 53 04/12/2017    LDLCALC 91 04/12/2017    TRIG 45 04/12/2017    CREATININE 0.82 11/21/2017    BUN 13 11/21/2017    K 3.8 11/21/2017     11/21/2017     (H) 11/21/2017    CO2 20 (L) 11/21/2017    Lab Results   Component Value Date    WBC 9.7 11/18/2017    HGB 13.9 11/21/2017    HCT 47.2 (H) 11/18/2017    MCV 96 11/18/2017     11/21/2017    Lab Results   Component Value Date    TROPONINI 0.29 11/21/2017    TSH 1.90 10/10/2016    INR 2.20 (H) 11/27/2017          25 minutes were spent with the patient with greater than 50% spent on education and counseling.    Huyen Gonzalez PA-C, MPAS  Structural Heart Program  [facility]

## 2021-06-25 NOTE — TELEPHONE ENCOUNTER
FYI - Status Update  Who is Calling: Patient  Update: Patient is wanting to speak to an INR nurse in regards to an upcoming procedure that she has and if she is suppose to stop taking her warfarin or not.   Okay to leave a detailed message?:  Yes

## 2021-06-25 NOTE — TELEPHONE ENCOUNTER
ACN called and spoke with patient and she reported that she is scheduled for right breast cyst removal/biopsy  on 6/10. She stated that Dr. Anna Berman ( surgeon ) deferred plan to PCP.     Patient made aware that ACN will consult [facility] Pharmacist to review her chart for warfarin interruption/bridge plan and will have Dr Guaman's approval.    Patient is aware that she will be contacted once plan is in place.    Lola Kelly RN

## 2021-06-25 NOTE — TELEPHONE ENCOUNTER
"Called patient to review hold and bridge plan.  Reviewed days to hold warfarin and when when to restart and when advised patient of when to start lovenox injections patient asked \"what is that\".  ACN started to explain the lovenox injections and patient replied, \"nope, no.  I'm not doing that.  The last time did those lovenox injections I bled all over the place and couldn't get it to stop.  I tried putting something on it because I thought it stopped, I went to my detention party and had blood all over my clothes, so I went to the ER\".    Routing to Pharm D. For review and advise as appropriate.    Ashly Bragg, RN, BSN  Anticoagulation Clinic    "

## 2021-06-25 NOTE — TELEPHONE ENCOUNTER
FYI - Status Update  Who is Calling: Patient  Update: Patient returned call. ACN not available at the time of the call. Please call the patient back to discuss what they need to do with their warfarin, thank you.  Okay to leave a detailed message?:  Yes

## 2021-06-25 NOTE — TELEPHONE ENCOUNTER
Anticoagulation    Patient refusal/concerns about bleeding with Lovenox noted (see RN note below) and reasonable consideration.  No noted hx of lovenox on HE medication list to track event further.    Leonela with no known high risk thrombophilia identified (moderate risk due to recurrence of VTE). Clotting history is remote. Patient should be ambulatory with procedure and it is not a high clotting risk procedure as a biopsy.     Likely reasonable to support patient's preference toward no bridge. Alternatively if Dr. Grove strongly advised bridging could offer patient  monitoring lovenox levels with Anti-xa to help ensure she's in range or even coverage with prophylaxis dose lovenox instead of therapeutic to offer some protection.    Will confer with Dr. Perfecto Mars, PharmD

## 2021-06-25 NOTE — TELEPHONE ENCOUNTER
Returned call to Leonela and reviewed instructions for 5/27 encounter reviewing 4 day hold starting 6/6 and post procedure instructions. To resume warfarin 9mg x1 on 6/10, then 6mg Sun, Tues, Fri, 3mg all other days. Reviewed monitoring for s/s of clotting as INR will be low.     Isela Martines RN

## 2021-06-26 NOTE — PROGRESS NOTES
Patient presented for right breast sebaceous cyst excision.  She has been holding her Plavix and warfarin.  She states that this area has healed up and she does not feel anything there anymore.  She is overall very concerned about getting any local anesthesia and worried about sitting still.  She has been wearing sports bras and she thinks this is helping.  It helps minimize trauma and keep her skin dry.  After palpation of her right breast, there is no residual cyst palpable.  I think we can hold off on excising any lesion at this time.  She can resume her Plavix and warfarin.  She will call the office if she ever notices return of the cyst.    Anna Berman DO  General Surgeon  [facility]  Breast [address] [address]  Office: [phone]  Employed by - [facility]

## 2021-06-26 NOTE — PROGRESS NOTES
"Progress Notes by Lola Burton RN at 2/2/2018 10:00 AM     Author: Lola Burton RN Service: -- Author Type: Registered Nurse    Filed: 2/2/2018 10:34 AM Encounter Date: 2/2/2018 Status: Signed    : Lola Burton RN (Registered Nurse)         LIZ-10: Screening :  A placebo-controlled, double-blind, randomized trial to evaluate the effect of 300 mg of Inclisiran Sodium given as Subcutaneous Injections in subjects with Atherosclerotic Cardiovascular Disease(ASCVD) and elevated Low-Density Liprotein Cholesterol(LDL-C)   Subject seen in clinic today for study education visit.    Met with patient briefly today to discuss her possible participation in the LIZ-10 clinical trial.  I provided the consent form to her, and discussed possible side effects of the medication, as this is what she indicated she wanted to know right away.  Patient then told me she is \"very afraid of needles\", and \"I don't like getting shots\". She informed me she had a bad experience with giving herself Lovenox injections.  She indicated to me that she really was not interested in being in this study, but she would be happy to be considered for future studies that do not involve injections.  She kept of copy of the consent for her files, and I thanked her for her time.     Lola Burton RN  Clinical Trials Nurse       "

## 2021-06-26 NOTE — PATIENT INSTRUCTIONS - HE
5 days prior to surgery:   Hold clopidogrel   Start aspirin 162mg daily    4 days prior to surgery   Hold warfarin    After surgery restart clopidogrel and warfarin    3 days after surgery stop aspirin    Plan with surgery using conscious sedation

## 2021-06-28 NOTE — PROGRESS NOTES
Progress Notes by Bandar Gonzalez MD at 11/6/2019  8:50 AM     Author: Bandar Gonzalez MD Service: -- Author Type: Physician    Filed: 11/6/2019  9:14 AM Encounter Date: 11/6/2019 Status: Signed    : Bandar Gonzalez MD (Physician)         Thank you, Dr. Woodruff, for asking the [facility] team to see Ms. Leonela Christianson to evaluate       Assessment/Recommendations   Assessment/Plan:  1. Dyspnea - most likley related to COPD but will obtain echo to screen for pulm HTN/RV function given hx of PE as well  2. CAD s/p PCI - on clopidogrel given still using tob and rosuvastatin/metoprolol  3. HTN - well controlled on medication         History of Present Illness/Subjective    Ms. Leonela Christianson is a 69 y.o. female with hx of CAD s/p PCI, PE on warfarin, HTN , COPD, still using tob with complaints of tingling in her lright arm with certain movements.  Trying to lower tob, < 1ppd, caring for her father with dementia.  No chest pain/pressure, but every once in a great blue moon has discomfort random, no PND, orthopnea, using inhalers for dyspnea during the day.  She is on rosuvastatin LDL 87 HD 57.  No diabetes. No change in exertional tolerance but chornic dyspnea.          Physical Examination Review of Systems   Vitals:    11/06/19 0842   BP: 110/70   Pulse: 92   Resp: 16     Body mass index is 29.89 kg/m .  Wt Readings from Last 3 Encounters:   11/06/19 148 lb (67.1 kg)   10/07/19 146 lb 1.6 oz (66.3 kg)   06/10/19 144 lb (65.3 kg)     [unfilled]  General Appearance:   no distress, normal body habitus   ENT/Mouth: membranes moist, no oral lesions or bleeding gums.      EYES:  no scleral icterus, normal conjunctivae   Neck: no carotid bruits or thyromegaly   Chest/Lungs:   lungs are clear to auscultation, no rales or wheezing,  sternal scar, equal chest wall expansion    Cardiovascular:   Regular. Normal first and second heart sounds with no murmurs, rubs, or  gallops; the carotid, radial and posterior tibial pulses are intact, Jugular venous pressure , edema bilaterally    Abdomen:  no organomegaly, masses, bruits, or tenderness; bowel sounds are present   Extremities: no cyanosis or clubbing   Skin: no xanthelasma, warm.    Neurologic: normal  bilateral, no tremors     Psychiatric: alert and oriented x3, calm     Review of Systems - 12 points nega other than above      Medical History  Surgical History Family History Social History   Past Medical History:   Diagnosis Date   ? COPD (chronic obstructive pulmonary disease) (H)    ? Heart attack (H) 01/2012   ? Hyperlipidemia    ? Hypertension    ? Pulmonary embolism (H)     Past Surgical History:   Procedure Laterality Date   ? CV CORONARY ANGIOGRAM N/A 11/20/2017    Procedure: Coronary Angiogram;  Surgeon: Daniela Mazariegos MD;  Location: [facility] Cath Lab;  Service:    ? CV LEFT HEART CATHETERIZATION WO LEFT VETRICULOGRAM Left 11/20/2017    Procedure: Left Heart Catheterization Without Left Ventriculogram;  Surgeon: Daniela Mazariegos MD;  Location: [facility] Cath Lab;  Service:    ? HYSTERECTOMY      fibroids   ? OOPHORECTOMY     ? UT TOTAL ABDOM HYSTERECTOMY      Description: Total Abdominal Hysterectomy;  Recorded: 04/19/2012;  Comments: for fibroids    Family History   Problem Relation Age of Onset   ? No Medical Problems Mother    ? No Medical Problems Father    ? Diabetes Paternal Uncle    ? Breast cancer Neg Hx    ? Cancer Neg Hx    ? Colon cancer Neg Hx    ? Heart disease Neg Hx     Social History     Socioeconomic History   ? Marital status:      Spouse name: Not on file   ? Number of children: Not on file   ? Years of education: Not on file   ? Highest education level: Not on file   Occupational History   ? Not on file   Social Needs   ? Financial resource strain: Not on file   ? Food insecurity:     Worry: Not on file     Inability: Not on file   ? Transportation needs:     Medical: Not on file      Non-medical: Not on file   Tobacco Use   ? Smoking status: Current Every Day Smoker     Packs/day: 0.75     Years: 55.00     Pack years: 41.25     Types: Cigarettes   ? Smokeless tobacco: Never Used   Substance and Sexual Activity   ? Alcohol use: No   ? Drug use: No   ? Sexual activity: Not Currently     Partners: Male   Lifestyle   ? Physical activity:     Days per week: Not on file     Minutes per session: Not on file   ? Stress: Not on file   Relationships   ? Social connections:     Talks on phone: Not on file     Gets together: Not on file     Attends Confucianist service: Not on file     Active member of club or organization: Not on file     Attends meetings of clubs or organizations: Not on file     Relationship status: Not on file   ? Intimate partner violence:     Fear of current or ex partner: Not on file     Emotionally abused: Not on file     Physically abused: Not on file     Forced sexual activity: Not on file   Other Topics Concern   ? Not on file   Social History Narrative   ? Not on file          Medications  Allergies   Scheduled Meds:  Continuous Infusions:  PRN Meds:. Allergies   Allergen Reactions   ? Sulfa (Sulfonamide Antibiotics) Anaphylaxis         Lab Results    Chemistry/lipid CBC Cardiac Enzymes/BNP/TSH/INR   Lab Results   Component Value Date    CHOL 160 06/10/2019    HDL 57 06/10/2019    LDLCALC 87 06/10/2019    TRIG 78 06/10/2019    CREATININE 0.98 07/22/2019    BUN 13 07/22/2019    K 4.5 07/22/2019     07/22/2019     07/22/2019    CO2 25 07/22/2019    Lab Results   Component Value Date    WBC 7.3 10/07/2019    HGB 15.4 10/07/2019    HCT 46.3 10/07/2019    MCV 98 10/07/2019     10/07/2019    Lab Results   Component Value Date    TROPONINI 0.29 11/21/2017    TSH 1.90 10/10/2016    INR 2.20 (H) 10/31/2019              Bandar Gonzalez MD  Interventional Cardiology  [facility]

## 2021-06-29 NOTE — PROGRESS NOTES
Progress Notes by Jenny Stanley RN at 10/20/2020  3:47 PM     Author: Jenny Stanley RN Service: -- Author Type: Registered Nurse    Filed: 10/20/2020  3:49 PM Encounter Date: 10/20/2020 Status: Attested    : Jenny Stanley RN (Registered Nurse) Cosigner: Miri Woodruff MD at 10/20/2020  4:32 PM    Attestation signed by Miri Woodruff MD at 10/20/2020  4:32 PM    Reviewed and agree with plan of care.                Anticoagulation Annual Referral Renewal Review    Leonela Christianson's chart reviewed for annual renewal of referral to anticoagulation monitoring.        Criteria for anticoagulation nurse and/or pharmacist renewal met   Warfarin indication: PE Yes , DVT/PE with previous provider documentation patient to be on extended anticoagulation; see encounter 3/10/17   Current with INR monitoring/compliant Yes Yes   Date of last office visit 9/21/2020 Yes, had office visit within last year   Time in Therapeutic Range (TTR) 64.3 % Yes, TTR > 60%       Leonela Christianson met all criteria for anticoagulation management program initiated renewal.  New INR standing orders and anticoagulation referral renewal placed.      Jenny Stanley RN  3:47 PM

## 2021-06-29 NOTE — PROGRESS NOTES
"Progress Notes by Bandar Gonzalez MD at 5/21/2020 10:10 AM     Author: Bandar Gonzalez MD Service: -- Author Type: Physician    Filed: 5/22/2020 11:19 AM Encounter Date: 5/21/2020 Status: Signed    : Bandar Gonzalez MD (Physician)           The patient has been notified of following:     \"This telephone visit will be conducted via a call between you and your physician/provider. We have found that certain health care needs can be provided without the need for a physical exam.  This service lets us provide the care you need with a phone conversation.  If a prescription is necessary we can send it directly to your pharmacy.  If lab work is needed we can place an order for that and you can then stop by our lab to have the test done at a later time. If during the course of the call the physician/provider feels a telephone visit is not appropriate, you will not be charged for this service.\" Verbal consent has been obtained for this service by care team member:         HEART CARE PHONE ENCOUNTER        The patient has chosen to have the visit conducted as a telephone visit, to reduce risk of exposure given the current status of Coronavirus in our community. This telephone visit is being conducted via a call between the patient and physician/provider. Health care needs are being provided without a physical exam.     Assessment/Recommendations   Assessment:    1.  CAD s/p PCI - doing well, try to stop tob  2. HTN - great 111/73  3. Palpitations - had episode but passed, staying on warfarin for prevention of PE        Follow Up Plan: Follow up in  1 year  I have reviewed the note as documented.  This accurately captures the substance of my conversation with the patient.    Total time of call between patient and provider was 10 minutes via phone   Start Time:11:20  Stop Time:11:30       History of Present Illness/Subjective    Leonela Christianson is a 69 y.o. female who is being evaluated via a " billable telephone visit.    CAD s/p PCI, HTN, PE on warfarin, normal echo in 2017, taking care of her 93 yo father.  No chest pain/pressure  No chest pain/pressure, she still getting around, dyspnea stable, still smoking,   I have reviewed and updated the patient's Past Medical History, Social History, Family History and Medication List.     Physical Examination not performed given phone encounter Review of Systems                                                Medical History  Surgical History Family History Social History   Past Medical History:   Diagnosis Date   ? COPD (chronic obstructive pulmonary disease) (H)    ? Heart attack (H) 01/2012   ? Hyperlipidemia    ? Hypertension    ? Pulmonary embolism (H)     Past Surgical History:   Procedure Laterality Date   ? CV CORONARY ANGIOGRAM N/A 11/20/2017    Procedure: Coronary Angiogram;  Surgeon: Daniela Mazariegos MD;  Location: [facility];  Service:    ? CV LEFT HEART CATHETERIZATION WO LEFT VETRICULOGRAM Left 11/20/2017    Procedure: Left Heart Catheterization Without Left Ventriculogram;  Surgeon: Daniela Mazariegos MD;  Location: [facility];  Service:    ? HYSTERECTOMY      fibroids   ? OOPHORECTOMY     ? KS TOTAL ABDOM HYSTERECTOMY      Description: Total Abdominal Hysterectomy;  Recorded: 04/19/2012;  Comments: for fibroids    Family History   Problem Relation Age of Onset   ? No Medical Problems Mother    ? No Medical Problems Father    ? Diabetes Paternal Uncle    ? Breast cancer Neg Hx    ? Cancer Neg Hx    ? Colon cancer Neg Hx    ? Heart disease Neg Hx     Social History     Socioeconomic History   ? Marital status:      Spouse name: Not on file   ? Number of children: Not on file   ? Years of education: Not on file   ? Highest education level: Not on file   Occupational History   ? Not on file   Social Needs   ? Financial resource strain: Not on file   ? Food insecurity     Worry: Not on file     Inability: Not on file   ?  Transportation needs     Medical: Not on file     Non-medical: Not on file   Tobacco Use   ? Smoking status: Current Every Day Smoker     Packs/day: 0.75     Years: 55.00     Pack years: 41.25     Types: Cigarettes   ? Smokeless tobacco: Never Used   Substance and Sexual Activity   ? Alcohol use: No   ? Drug use: No   ? Sexual activity: Not Currently     Partners: Male   Lifestyle   ? Physical activity     Days per week: Not on file     Minutes per session: Not on file   ? Stress: Not on file   Relationships   ? Social connections     Talks on phone: Not on file     Gets together: Not on file     Attends [employer] service: Not on file     Active member of club or organization: Not on file     Attends meetings of clubs or organizations: Not on file     Relationship status: Not on file   ? Intimate partner violence     Fear of current or ex partner: Not on file     Emotionally abused: Not on file     Physically abused: Not on file     Forced sexual activity: Not on file   Other Topics Concern   ? Not on file   Social History Narrative   ? Not on file          Medications  Allergies   Current Outpatient Medications   Medication Sig Dispense Refill   ? calcium-vitamin D (CALCIUM-VITAMIN D) 500 mg(1,250mg) -200 unit per tablet Take 1 tablet by mouth 2 (two) times a day with meals. 180 tablet 3   ? clopidogrel (PLAVIX) 75 mg tablet TAKE 1 TABLET DAILY 90 tablet 2   ? hydroCHLOROthiazide (MICROZIDE) 12.5 mg capsule Take 1 capsule (12.5 mg total) by mouth daily. 14 capsule 0   ? losartan (COZAAR) 50 MG tablet Take 1 tablet (50 mg total) by mouth daily. 14 tablet 0   ? metoprolol succinate (TOPROL-XL) 100 MG 24 hr tablet Take 1 tablet (100 mg total) by mouth at bedtime. 90 tablet 3   ? nitroglycerin (NITROSTAT) 0.4 MG SL tablet Place 1 tablet (0.4 mg total) under the tongue every 5 (five) minutes as needed for chest pain. 20 tablet 0   ? rosuvastatin (CRESTOR) 40 MG tablet TAKE 1 TABLET DAILY AFTER LUNCH 90 tablet 2   ?  STIOLTO RESPIMAT 2.5-2.5 mcg/actuation Mist inhaler USE 2 INHALATIONS DAILY 12 g 4   ? warfarin ANTICOAGULANT (COUMADIN/JANTOVEN) 3 MG tablet Take 1-2 tablets (3-6 mg total) by mouth daily. Adjust dose per INR result as directed 140 tablet 1   ? albuterol (PROAIR HFA) 90 mcg/actuation inhaler USE 1 TO 2 INHALATIONS EVERY 4 TO 6 HOURS AS NEEDED FOR WHEEZING (NEED APPOINTMENT FOR FOLLOW UP MONITORING FOR THIS MEDICATION) 25.5 g 2   ? cyclobenzaprine (FLEXERIL) 10 MG tablet Take 1 tablet (10 mg total) by mouth every 8 (eight) hours as needed for muscle spasms. 30 tablet 1   ? doxycycline (MONODOX) 100 MG capsule Take 1 capsule (100 mg total) by mouth 2 (two) times a day. 20 capsule 0   ? predniSONE (DELTASONE) 20 MG tablet Take 2 tabs daily for 4 days, then 1 tab daily for 4 days then 0.5 tab daily for 4 days. 14 tablet 0     No current facility-administered medications for this visit.     Allergies   Allergen Reactions   ? Sulfa (Sulfonamide Antibiotics) Anaphylaxis         Lab Results    Chemistry/lipid CBC Cardiac Enzymes/BNP/TSH/INR   Lab Results   Component Value Date    CHOL 160 06/10/2019    HDL 57 06/10/2019    LDLCALC 87 06/10/2019    TRIG 78 06/10/2019    CREATININE 0.98 07/22/2019    BUN 13 07/22/2019    K 4.5 07/22/2019     07/22/2019     07/22/2019    CO2 25 07/22/2019    Lab Results   Component Value Date    WBC 7.3 10/07/2019    HGB 15.4 10/07/2019    HCT 46.3 10/07/2019    MCV 98 10/07/2019     10/07/2019    Lab Results   Component Value Date    TROPONINI 0.29 11/21/2017    TSH 1.90 10/10/2016    INR 2.80 (H) 05/06/2020        Bandar Gonzalez

## 2021-06-30 NOTE — PROGRESS NOTES
Progress Notes by Yonis Majano PA-C at 4/29/2021 10:10 AM     Author: Yonis Majano PA-C Service: -- Author Type: Physician Assistant    Filed: 4/29/2021  4:22 PM Encounter Date: 4/29/2021 Status: Signed    : Yonis Majano PA-C (Physician Assistant)       Chief Complaint   Patient presents with   ? Dizziness     x 1 month, more worse when laying on Rt side, would like ears checked, no headaches or fever         Clinical Decision Making:  I had a conversation with the patient stating that she had nystagmus and reproducible vertigo.  Vertigo was precipitated by turning the head to the right.  She does have a history of cardiac problems but does not have any cardiac symptoms currently.  I reviewed the patient's cardiac symptoms, EKG and labs.  There were no problematic findings.  Patient will be treated symptomatically with hydration and use of the meclizine.  Referral to PT OT for vestibular rehab for the right-sided BPPV.  Questions were answered to patient's satisfaction before discharge.    At the end of the encounter, I discussed results, diagnosis, medications. Discussed red flags for immediate return to clinic/ER, as well as indications for follow up if no improvement. Patient understood and agreed to plan. Patient was stable for discharge.      1. Benign paroxysmal positional vertigo of right ear  Urinalysis-UC if Indicated    HM1(CBC and Differential)    Electrocardiogram Perform and Read    Ambulatory referral    meclizine (ANTIVERT) 25 mg tablet         Patient Instructions       Take Antivert as written  Follow-up with physical therapy.  Return to see your primary care provider for reevaluation and treatment.  Return to walk-in care in the interim if new symptoms or concerns arise        Patient Education     Managing Dizziness (Vertigo) with Medicines    Although medicines can't cure your problem, they can help control symptoms. Your doctor may prescribe medicines for a few weeks and then taper them  off. Always take your medicine as prescribed. Never share your medicine with others.  Contact your healthcare provider right away if you have side effects from your medicines.   How medicines can help    Treat infection or inflammation. If you have an infection caused by bacteria, your doctor can prescribe antibiotics.    Limit conflicting balance signals. These medicines are often in pill form.    Ease nausea. Suppositories, pills, or shots can reduce vomiting.    Reduce pressure in the canals. Diuretics can be used to treat Meniere's disease. These medicines help your body get rid of extra fluid.    Ease other symptoms. Other medicines can help ease depression and anxiety caused by living with dizziness or fainting.  Date Last Reviewed: 11/1/2016 2000-2019 [employer]. [address] [place] [address], [place] [address]. All rights reserved. This information is not intended as a substitute for professional medical care. Always follow your healthcare professional's instructions.           Patient Education     Benign Paroxysmal Positional Vertigo    Benign paroxysmal positional vertigo is a common condition. You feel as if the room is spinning after changing position, moving your head quickly, or even just rolling over in bed.  Vertigo is a false feeling of motion plus disorientation that makes it seem as though the room is spinning. A vertigo attack may cause sudden nausea, vomiting, and heavy sweating. Severe vertigo causes a loss of balance. You may even fall down.  Vertigo is caused by a problem with the inner ear. The inner ear is located behind the middle ear. It is a part of the balance center of the body. It contains small calcium particles within fluid-filled canals (semi-circular canals). These particles can move out of position. This may happen as a result of aging, head injury, or disease of the inner ear. Once that happens, moving your head in certain ways may cause the particles to stimulate  the inner ear. This creates the feeling of vertigo.  An episode of vertigo may last seconds, minutes, or hours. Once you are over the first episode of vertigo, it may never return. Sometimes symptoms return off and on for several weeks or longer.  Home care  Follow these guidelines when caring for yourself at home:    Rest quietly in bed if your symptoms are severe. Change position slowly. There is usually 1 position that will feel best. This might be lying on 1 side or lying on your back with your head slightly raised on pillows. Until you have no symptoms, you are at a higher risk of falling. Let someone help you when you get up. Get rid of home hazards such as loose electrical cords and throw rugs. Dont walk in unfamiliar areas that are not lighted. Use night lights in bathrooms and kitchen areas.    Do not drive or work with dangerous machinery for 1 week after symptoms go away. This is in case symptoms return suddenly.    Take medicine as prescribed to relieve your symptoms. Unless another medicine was prescribed for nausea, vomiting, and vertigo, you may use over-the-counter motion sickness medicine. Examples of this include meclizine and dimenhydrinate.  Follow-up care  Follow up with your healthcare provider, or as directed. Tell your provider about any ringing in your ear or hearing loss.  If you had a CT or MRI scan, a specialist will review it. You will be told of any new findings that may affect your care.  When to seek medical advice  Call your healthcare provider right away if any of these occur:    Vertigo gets worse even after taking prescribed medicine    Repeated vomiting even after taking prescribed medicine    Weakness that gets worse    Fainting    Severe headache or unusual drowsiness or confusion    Weakness of an arm or leg or 1 side of the face    Trouble walking    Trouble with speech or vision    Seizure    Trouble hearing    Fever of 100.4 F (38 C) or higher, or as directed by your  healthcare provider    Fast heart rate    Chest pain   Date Last Reviewed: 11/1/2017 2000-2017 [employer]. [address] [place] [address], [place] [address]. All rights reserved. This information is not intended as a substitute for professional medical care. Always follow your healthcare professional's instructions.                HPI:  Leonela Christianson is a 70 y.o. female who has a significant cardiac history to include hyperlipidemia, hypertension, emphysema, coronary artery atherosclerosis with stenting, history of pulmonary embolism, who presents today complaining of vertigo symptoms to include positional change and turning her head to the right side causing dizziness and vertigo.  She is able to continue with her activities of daily living and does not have any chest arm or jaw pain, shortness of breath, syncope, presyncope, heart palpitations, swelling of the legs either at the thighs of the calves no pain with ambulation no pleuritic chest pain or cough or dyspnea on exertion nausea vomiting or diaphoresis.  Patient has had previous episodes of vertigo she states that it does feel similar to that.    History obtained from chart review and the patient.    Problem List:  2020-09: LEELA (acute kidney injury) (H)  2020-09: Hypoxia  2020-09: Ureteral stone  2019-06: Pigmented skin lesion  2017-11: Coronary artery disease due to lipid rich plaque  2017-11: Jaw pain  2017-11: History of non-ST elevation myocardial infarction (NSTEMI)  2017-11: Coronary atherosclerosis due to lipid rich plaque  2017-04: Essential hypertension  2016-06: Pulmonary nodule  2016-06: Pulmonary emphysema, unspecified emphysema type (H)  2016-04: Eyelid edema, unspecified laterality  2016-04: Upper back pain on left side  2016-04: Lump of skin  2014-10: History of pulmonary embolism  2007-04: Hemorrhoids, internal  Hyperlipidemia  Nicotine Dependence  Lateral epicondylitis  Overweight (BMI 25.0-29.9)  Osteoporosis  Sprain Of  The Right Ankle  Upper Back Pain (Between Shoulder Blades)  Benign Essential Hypertension  Cough  Chronic bronchitis, unspecified chronic bronchitis type (H)  Equivalent angina (H)  Shortness of breath  Pure hypercholesterolemia  Acute pyelonephritis      Past Medical History:   Diagnosis Date   ? Acute pyelonephritis    ? LEELA (acute kidney injury) (H) 9/12/2020   ? COPD (chronic obstructive pulmonary disease) (H)    ? Coronary artery disease    ? Diabetes mellitus (H)    ? Heart attack (H) 2012, 2019    X3   ? History of blood clots     PEx2   ? Hyperlipidemia    ? Hypertension        Social History     Tobacco Use   ? Smoking status: Current Every Day Smoker     Packs/day: 0.75     Years: 55.00     Pack years: 41.25     Types: Cigarettes   ? Smokeless tobacco: Never Used   Substance Use Topics   ? Alcohol use: No       Review of Systems  As above in HPI otherwise negative.    Vitals:    04/29/21 1012   BP: 120/79   Pulse: 83   Resp: 12   Temp: 98  F (36.7  C)   TempSrc: Oral   SpO2: 96%   Weight: 143 lb 3.2 oz (65 kg)       Physical Exam    General: Patient is resting comfortably no acute distress is afebrile  HEENT: Head is normocephalic atraumatic   eyes are PERRL EOMI sclera anicteric slight nystagmus noted on the medication.  TMs are clear bilaterally no erythema or effusion.  Throat is clear  No cervical lymphadenopathy present  LUNGS: Clear to auscultation bilaterally  HEART: Regular rate and rhythm  Skin: Without rash non-diaphoretic  Neuro: There is both direct and consensual stimulation to light.  Patient has no focal neurologic deficits.  Romberg is negative.  Anger to nose and heel-to-shin testing is intact strength is 5-5 and equal bilaterally in upper and lower extremities.  She is alert and oriented.  Is answering questions interacting with provider very well.      Labs:  Recent Results (from the past 72 hour(s))   HM1 (CBC with Diff)   Result Value Ref Range    WBC 9.1 4.0 - 11.0 thou/uL    RBC 5.14  3.80 - 5.40 mill/uL    Hemoglobin 16.3 (H) 12.0 - 16.0 g/dL    Hematocrit 48.5 (H) 35.0 - 47.0 %    MCV 94 80 - 100 fL    MCH 31.7 27.0 - 34.0 pg    MCHC 33.6 32.0 - 36.0 g/dL    RDW 13.5 11.0 - 14.5 %    Platelets 228 140 - 440 thou/uL    MPV 8.6 7.0 - 10.0 fL    Neutrophils % 66 50 - 70 %    Lymphocytes % 23 20 - 40 %    Monocytes % 8 2 - 10 %    Eosinophils % 1 0 - 6 %    Basophils % 1 0 - 2 %    Immature Granulocyte % 1 (H) <=0 %    Neutrophils Absolute 6.0 2.0 - 7.7 thou/uL    Lymphocytes Absolute 2.1 0.8 - 4.4 thou/uL    Monocytes Absolute 0.8 0.0 - 0.9 thou/uL    Eosinophils Absolute 0.1 0.0 - 0.4 thou/uL    Basophils Absolute 0.1 0.0 - 0.2 thou/uL    Immature Granulocyte Absolute 0.1 (H) <=0.0 thou/uL   Urinalysis-UC if Indicated   Result Value Ref Range    Color, UA Yellow Colorless, Yellow, Straw, Light Yellow    Clarity, UA Clear Clear    Glucose, UA Negative Negative    Protein, UA Negative Negative    Bilirubin, UA Negative Negative    Urobilinogen, UA 0.2 E.U./dL 0.2 E.U./dL, 1.0 E.U./dL    pH, UA 5.5 5.0 - 8.0    Blood, UA Negative Negative    Ketones, UA Negative Negative    Nitrite, UA Negative Negative    Leukocytes, UA Negative Negative    Specific Gravity, UA 1.025 1.005 - 1.030   Electrocardiogram Perform and Read   Result Value Ref Range    SYSTOLIC BLOOD PRESSURE      DIASTOLIC BLOOD PRESSURE      VENTRICULAR RATE 81 BPM    ATRIAL RATE 81 BPM    P-R INTERVAL 146 ms    QRS DURATION 72 ms    Q-T INTERVAL 358 ms    QTC CALCULATION (BEZET) 415 ms    P Axis 76 degrees    R AXIS 64 degrees    T AXIS 64 degrees    MUSE DIAGNOSIS       Normal sinus rhythm  Right atrial enlargement  Borderline ECG  When compared with ECG of 11-SEP-2020 20:59,  No significant change was found

## 2021-06-30 NOTE — PROGRESS NOTES
Progress Notes by Diamante Belle OT at 5/5/2021  7:00 AM     Author: Diamante Belle OT Service: -- Author Type: Occupational Therapist    Filed: 5/5/2021  7:52 AM Encounter Date: 5/5/2021 Status: Attested    : Diamante Belle OT (Occupational Therapist) Cosigner: Miri Woodruff MD at 5/5/2021  7:56 AM    Attestation signed by Miri Woodruff MD at 5/5/2021  7:56 AM    Reviewed and agree with plan of care.                    Rehabilitation Certification Request    May 5, 2021      Patient: Leonela Christianson  MR Number: [id number]  YOB: 1950  Date of Visit: 5/5/2021      Dear Dr. Miri Woodruff:    Thank you for this referral.   We are seeing Leonela Christianson in Occupational Therapy for vertigo.    Medicare and/or Medicaid requires physician review and approval of the treatment plan. Please review the plan of care and verify that you agree with the therapy plan of care by co-signing this note.      Plan of Care  Authorization / Certification Start Date: 05/05/21  Authorization / Certification End Date: 08/03/21  Authorization / Certification Number of Visits: 12  Communication with: Referral Source  Patient Related Instruction: Nature of Condition;Treatment plan and rationale;Basis of treatment;Expected outcome  Times per Week: 1  Number of Weeks: 12  Number of Visits: 12  Select Plan of Care: Select  Neuromuscular Reeducation: vestibular  Functional Training (ADL's): compensatory training  Canolith Repostioning: .      Goals:  Patient will bend: to dress;without vertigo;in 12 weeks  Patient able to perform bed mobility: without vertigo;in 12 weeks  Patient will turn head: without dizziness;for conversation;in 12 weeks  Patient will look up / down: without vertigo;for drinking;in 12 weeks        If you have any questions or concerns, please don't hesitate to call.    Sincerely,      Diamante Belle OT        Physician recommendation:                                 ___ Follow therapist's recommendation                                                                                                    ___ Modify therapy                                                                                                      Physician Signature:_____________________                                                                                                                                        Date:___________________________    *Physician co-signature indicates they certify the need for these services furnished within this plan and while under their care.         Vestibular Initial Evaluation    Patient Name: Leonela Christianson  Date of evaluation: 5/5/2021  Referral Diagnosis: vertigo  Referring provider: Miri Woodruff MD  Visit Diagnosis:     ICD-10-CM    1. Benign paroxysmal positional vertigo, right  H81.11    2. Unsteadiness on feet  R26.81    3. Decreased activities of daily living (ADL)  Z78.9        Assessment:      Patient has positive right Kathi - Hallpike for right posterior canal BPPV and was treated with right Epley maneuver today.    Goals:  Patient will bend: to dress;without vertigo;in 12 weeks  Patient able to perform bed mobility: without vertigo;in 12 weeks  Patient will turn head: without dizziness;for conversation;in 12 weeks  Patient will look up / down: without vertigo;for drinking;in 12 weeks      Patient's expectations/goals are realistic.    Barriers to Learning or Achieving Goals:  No Barriers.       Plan / Patient Instructions:        Plan of Care:   Authorization / Certification Start Date: 05/05/21  Authorization / Certification End Date: 08/03/21  Authorization / Certification Number of Visits: 12  Communication with: Referral Source  Patient Related Instruction: Nature of Condition;Treatment plan and rationale;Basis of treatment;Expected outcome  Times per Week: 1  Number of Weeks: 12  Number of Visits: 12  Select Plan of Care:  Select  Neuromuscular Reeducation: vestibular  Functional Training (ADL's): compensatory training  Canolith Repostioning: .      POC and pathology of condition were reviewed with patient.  Pt. is in agreement with the Plan of Care. Treatment techniques, plan of care, and goals were discussed with the patient.  The patient agrees to the plan as outlined.  The plan of care is dynamic and will be modified on an ongoing basis.       Subjective:         History of Present Illness:    Leonela is a 70 y.o. female who presents to therapy today with complaints of vertigo with rolling to the right in bed. She also reports unsteadiness and bilateral mild ear pain and itching. Patient had sudden onset of symptoms in 2021. Symptoms are not improving. She has history of similar symptoms 2-3 times over the last 5 years. Patient denies hearing changes. Functional limitations are described as occurring with balance, bending, bed mobility, head turns, looking up or down.    Pain Ratin         Objective:      Note: Items left blank indicates the item was not performed or not indicated at the time of the evaluation.    Patient Outcome Measures:   Dizziness Handicap Inventory  Score in %: 14     Vestibular Disorder Examination  1. Benign paroxysmal positional vertigo, right     2. Unsteadiness on feet     3. Decreased activities of daily living (ADL)         Precautions/Restrictions: None    Posture Observation: General standing posture is normal.    ROM:  Not Tested    Strength: Not Tested    Sensation: patient has numbness and tingling in hand    Functional Mobility: good      Modified CTSIB: NT  Normal Surface Eyes Open-  Normal Surface Eyes Closed-  Perturbed Surface Eyes Open-  Perturbed Surface Eyes Closed-    The remainder of the tests are performed in room light.    Oculomotor Assessment: NT  Spontaneous Nystagmus with fixation:   Spontaneous Nystagmus without fixation:  Gaze-evoked nystagmus:  Smooth  pursuit:  Saccades:  VOR to slow movement:   Rapid Head Shake Test:  VOR Head Thrust:  Static Visual Acuity:  Dynamic Visual Acuity:    Positional Tests:  Hallpike Right:  Abnormal - Nystagmus right torsional, up beating, 2 second latency and fatigues in 60 seconds  Hallpike Left:  NT  Head Roll Right: NT  Head Roll Left:  NT    Plan for next visit: repeat positional tests. Start with left Hallpike!    Treatment Today: Patient has right posterior canal BPPV and was treated with right Epley maneuver X2.  TREATMENT MINUTES COMMENTS   Evaluation 20    Self-care/ Home management     Neuromuscular Re-education     Canalith repositioning procedure 10          Total 30    Blank areas are intentional and mean the treatment did not include these items.     GOALS AND PLAN OF CARE WERE ESTABLISHED IN COOPERATION WITH THE PATIENT    OT Evaluation Code: (Please list factors)   Comorbidities:   Patient Active Problem List   Diagnosis   ? Hyperlipidemia   ? Nicotine Dependence   ? Overweight (BMI 25.0-29.9)   ? Osteoporosis   ? Chronic bronchitis, unspecified chronic bronchitis type (H)   ? History of pulmonary embolism   ? Upper back pain on left side   ? Pulmonary nodule   ? Pulmonary emphysema, unspecified emphysema type (H)   ? Essential hypertension   ? History of non-ST elevation myocardial infarction (NSTEMI)   ? Coronary atherosclerosis due to lipid rich plaque   ? Pigmented skin lesion   ? Hypoxia   ? Ureteral stone   ? Hemorrhoids, internal   ? Breast mass, right       Profile/History Review: Brief    Need for eval modification: No    # Treatment options: Limited    Clinical Decision Making:  Low      Occupational Profile/ Medical and Therapy History and Comorbidities Occupational Performance Clinical Decision Making   (Complexity)   brief history with review of medical/therapy records related to the presenting problem.  No comorbidities 1-3 Performance deficits that result in activity limitations and/or participation  restrictions.    No Assessment Modification  Low complexity, which includes  problem-focused assessments, and consideration of a limited number of treatment options.      expanded review of medical/therapy records and additional review of physical, cognitive and psychosocial history.    May have comorbidities 3-5 Performance deficits that result in activity limitations and/or participation restrictions.    Minimal to moderate modification of assessment Moderate complexity, which includes analysis of data from detailed assessments, and consideration of several treatment options.         Review of medical/therapy records and extensive additional review of physical, cognitive and psychosocial history.  Comorbidities affect occupational performance 5 or more Performance deficits that result in activity limitations and/or participation restrictions.    Significant modification of assessment High complexity, analysis of  Occupational profile and data,  Comprehensive assessments, multiple treatment options.            Diamante Belle  5/5/2021  7:05 AM

## 2021-07-01 ENCOUNTER — AMBULATORY - HEALTHEAST (OUTPATIENT)
Dept: LAB | Facility: CLINIC | Age: 71
End: 2021-07-01

## 2021-07-01 ENCOUNTER — COMMUNICATION - HEALTHEAST (OUTPATIENT)
Dept: ANTICOAGULATION | Facility: CLINIC | Age: 71
End: 2021-07-01

## 2021-07-01 DIAGNOSIS — Z86.711 HISTORY OF PULMONARY EMBOLISM: ICD-10-CM

## 2021-07-01 LAB — INR PPP: 2.7 (ref 0.9–1.1)

## 2021-07-03 NOTE — ADDENDUM NOTE
Addendum Note by Mustapha Enriquez RN at 4/17/2017 10:39 AM     Author: Mustapha Enriquez RN Service: -- Author Type: Registered Nurse    Filed: 4/17/2017 10:39 AM Encounter Date: 3/10/2017 Status: Signed    : Mustapha Enriquez RN (Registered Nurse)    Addended by: MUSTAPHA ENRIQUEZ on: 4/17/2017 10:39 AM        Modules accepted: Orders

## 2021-07-03 NOTE — ADDENDUM NOTE
Addendum Note by Miri Woodruff MD at 1/8/2020  9:40 AM     Author: Miri Woodruff MD Service: -- Author Type: Physician    Filed: 1/8/2020  9:40 AM Encounter Date: 1/6/2020 Status: Signed    : Miri Woodruff MD (Physician)    Addended by: MIRI WOODRUFF on: 1/8/2020 09:40 AM        Modules accepted: Orders

## 2021-07-03 NOTE — ADDENDUM NOTE
Addendum Note by Miri Woodruff MD at 10/7/2019  8:30 AM     Author: Miri Woodruff MD Service: -- Author Type: Physician    Filed: 10/7/2019 11:48 AM Encounter Date: 10/7/2019 Status: Signed    : Miri Woodruff MD (Physician)    Addended by: MIRI WOODRUFF on: 10/7/2019 11:48 AM        Modules accepted: Orders

## 2021-07-04 NOTE — PROGRESS NOTES
Progress Notes by Bandar Gonzalez MD at 6/4/2021  3:10 PM     Author: Bandar Gonzalez MD Service: -- Author Type: Physician    Filed: 6/4/2021  3:29 PM Encounter Date: 6/4/2021 Status: Signed    : Bandar Gonzalez MD (Physician)         Thank you, Dr. Woodruff, for asking the [facility] team to see Ms. Leonela Christianson to evaluate       Assessment/Recommendations   Assessment/Plan:  1. CAD on statin/plavix - encouraged tob cessation  2. Pre op - local anesthesia and conscious sedation, hold plavix/warfarin as directed with asp as bridge, risk for DVT, encourage tob cessation prior to surgery to avoid prothrombotic effect    9months     History of Present Illness/Subjective    Ms. Leonela Christianson is a 71 y.o. female with CAD, HTN, palpitations, PE, COPD, still smoking, no chest pain/pressure, no use of NTG, on clopidogrel, warfarin, rosuvastatin, losartan/HCTZ, metoprolol.  No syncopal spells, no PND/orthopnea, edema.  Plans for surgery to remove cyst in right breast via local.  She can not toelrate lovenox due to bleeding.  We discussed risks including MI or DVT/PE and she understands.           Physical Examination Review of Systems   There were no vitals filed for this visit.  There is no height or weight on file to calculate BMI.  Wt Readings from Last 3 Encounters:   05/25/21 144 lb (65.3 kg)   05/18/21 146 lb (66.2 kg)   05/03/21 143 lb (64.9 kg)     [unfilled]  General Appearance:   no distress, normal body habitus   ENT/Mouth: membranes moist, no oral lesions or bleeding gums.      EYES:  no scleral icterus, normal conjunctivae   Neck: no carotid bruits or thyromegaly   Chest/Lungs:   lungs are clear to auscultation, no rales or wheezing,  sternal scar, equal chest wall expansion    Cardiovascular:   Regular. Normal first and second heart sounds with no murmurs, rubs, or gallops; the carotid, radial and posterior tibial pulses are intact, Jugular venous  pressure , edema bilaterally    Abdomen:  no organomegaly, masses, bruits, or tenderness; bowel sounds are present   Extremities: no cyanosis or clubbing   Skin: no xanthelasma, warm.    Neurologic: normal  bilateral, no tremors     Psychiatric: alert and oriented x3, calm     Review of Systems - 12 points nega other than above      Medical History  Surgical History Family History Social History   Past Medical History:   Diagnosis Date   ? Acute pyelonephritis    ? LEELA (acute kidney injury) (H) 9/12/2020   ? COPD (chronic obstructive pulmonary disease) (H)    ? Coronary artery disease    ? Diabetes mellitus (H)    ? Heart attack (H) 2012, 2019    X3   ? History of blood clots     PEx2   ? Hyperlipidemia    ? Hypertension     Past Surgical History:   Procedure Laterality Date   ? CARDIAC CATHETERIZATION      Stent   ? CV CORONARY ANGIOGRAM N/A 11/20/2017    Procedure: Coronary Angiogram;  Surgeon: Daniela Mazariegos MD;  Location: [facility];  Service:    ? CV LEFT HEART CATHETERIZATION WO LEFT VETRICULOGRAM Left 11/20/2017    Procedure: Left Heart Catheterization Without Left Ventriculogram;  Surgeon: Daniela Mazariegos MD;  Location: [facility];  Service:    ? CYSTOSCOPY  09/29/2020   ? HYSTERECTOMY      fibroids   ? OOPHORECTOMY     ? OR CYSTOSCOPY,INSERT URETERAL STENT Left 9/11/2020    Procedure: CYSTOSCOPY, WITH URETERAL STENT INSERTION;  Surgeon: Sean Schmitt MD;  Location: [facility] OR;  Service: Urology    Family History   Problem Relation Age of Onset   ? No Medical Problems Mother    ? No Medical Problems Father    ? Diabetes Paternal Uncle    ? Breast cancer Neg Hx    ? Cancer Neg Hx    ? Colon cancer Neg Hx    ? Heart disease Neg Hx     Social History     Socioeconomic History   ? Marital status:      Spouse name: Not on file   ? Number of children: Not on file   ? Years of education: Not on file   ? Highest education level: Not on file   Occupational History   ?  Not on file   Social Needs   ? Financial resource strain: Not on file   ? Food insecurity     Worry: Not on file     Inability: Not on file   ? Transportation needs     Medical: Not on file     Non-medical: Not on file   Tobacco Use   ? Smoking status: Current Every Day Smoker     Packs/day: 0.75     Years: 55.00     Pack years: 41.25     Types: Cigarettes   ? Smokeless tobacco: Never Used   Substance and Sexual Activity   ? Alcohol use: No   ? Drug use: No   ? Sexual activity: Not Currently     Partners: Male   Lifestyle   ? Physical activity     Days per week: Not on file     Minutes per session: Not on file   ? Stress: Not on file   Relationships   ? Social connections     Talks on phone: Not on file     Gets together: Not on file     Attends Alevism service: Not on file     Active member of club or organization: Not on file     Attends meetings of clubs or organizations: Not on file     Relationship status: Not on file   ? Intimate partner violence     Fear of current or ex partner: Not on file     Emotionally abused: Not on file     Physically abused: Not on file     Forced sexual activity: Not on file   Other Topics Concern   ? Not on file   Social History Narrative   ? Not on file          Medications  Allergies   Scheduled Meds:  Continuous Infusions:  PRN Meds:. Allergies   Allergen Reactions   ? Sulfa (Sulfonamide Antibiotics) Anaphylaxis         Lab Results    Chemistry/lipid CBC Cardiac Enzymes/BNP/TSH/INR   Lab Results   Component Value Date    CHOL 158 01/05/2021    HDL 57 01/05/2021    LDLCALC 82 01/05/2021    TRIG 96 01/05/2021    CREATININE 0.87 05/18/2021    BUN 14 05/18/2021    K 4.4 05/18/2021     05/18/2021     05/18/2021    CO2 25 05/18/2021    Lab Results   Component Value Date    WBC 7.1 05/18/2021    HGB 15.8 05/18/2021    HCT 46.7 05/18/2021    MCV 95 05/18/2021     05/18/2021    Lab Results   Component Value Date    TROPONINI 0.02 09/11/2020    BNP <10 09/11/2020     TSH 1.90 10/10/2016    INR 2.20 (H) 05/18/2021              Bandar Gonzalez MD  Interventional Cardiology  [facility]

## 2021-07-04 NOTE — ADDENDUM NOTE
Addendum Note by Lombardi, Susan L, RN at 5/3/2021 10:35 AM     Author: Lombardi, Susan L, RN Service: -- Author Type: RN, Care Manager    Filed: 5/3/2021  3:18 PM Date of Service: 5/3/2021 10:35 AM Status: Signed    : Lombardi, Susan L, RN (RN, Care Manager)    Encounter addended by: Lombardi, Susan L, RN on: 5/3/2021  3:18 PM      Actions taken: Clinical Note Signed, Charge Capture section accepted

## 2021-07-04 NOTE — TELEPHONE ENCOUNTER
Telephone Encounter by Qiana Mars, PharmD at 5/27/2021  9:25 PM     Author: Qiana Mars, PharmD Service: -- Author Type: Pharmacist    Filed: 5/27/2021 10:27 PM Encounter Date: 5/27/2021 Status: Signed    : Qiana Mars, PharmD (Pharmacist)       JEROD-PROCEDURAL ANTICOAGULATION  MANAGEMENT    Assessment     Warfarin interruption plan for Breast Biopsy on 6/10/21    Hx of recurrent PE      Risk stratification for thromboembolism: at least moderate. (2012 Chest guidelines/2018 ELVIN guidleines)      Hx of PE x 2  (Mid 1980's and Mid 1990's)    per 1/2012 scanned records from [place] in allscripts:1986 after miscarriage and 1996 s/p hysterectomy      Per 11/8/12 patient intake form 1984 & 1994        Hx of STEMI 1/2012-BMS with in-stent restenosis; NSTEMI 11/2017 s/p stent      Hold with Bridge requested by Dr. Guaman at 5/18 pre-op    Plan       Pre-Procedure:    Hold warfarin until after procedure starting: Saturday 6/5     Lovenox 50 mg subq Q 12 hrs (0.75 mg/kg Q 12 hr for CrCl 30-60 ml/min per  [facility] protocol)     Start Lovenox: Monday 6/7 AM    Last dose of Lovenox prior to procedure: Wed 6/9 AM       Post-Procedure:    Resume warfarin dose if okay with provider doing procedure on night of procedure, 6/10 PM: 9 mg x 1 the resume 6 mg on Sun, Tue, Fri; 3 mg rest of week    Resume Lovenox ~ 24 hrs post procedure when okay with provider doing procedure. Continue until INR >= 2 per protocol    Recheck INR 4-6 days after resuming warfarin   ?   Qiana Mars, PharmD  [facility] Anticoagulation    Subjective/Objective:      Leonela Christianson, a 70 y.o. female    Goal INR Range: 2-3    Pertinent History:    Excerpt:      Wt Readings from Last 3 Encounters:   05/25/21 144 lb (65.3 kg)   05/18/21 146 lb (66.2 kg)   05/03/21 143 lb (64.9 kg)      Ideal body weight: 45.5 kg (100 lb 4.9 oz)  Adjusted ideal body weight: 53.4 kg (117 lb 12.6 oz)     Estimated body mass index is 28.12 kg/m  as  calculated from the following:    Height as of 5/18/21: 5' (1.524 m).    Weight as of 5/25/21: 144 lb (65.3 kg).    Lab Results   Component Value Date    INR 2.20 (H) 05/18/2021    INR 2.60 (H) 03/29/2021    INR 2.00 (H) 02/15/2021     Lab Results   Component Value Date    HGB 15.8 05/18/2021    HCT 46.7 05/18/2021     05/18/2021     Lab Results   Component Value Date    CREATININE 0.87 05/18/2021    CREATININE 0.90 01/05/2021    CREATININE 0.95 09/21/2020      Estimated Creatinine Clearance: 50.7 mL/min (by C-G formula based on SCr of 0.87 mg/dL).

## 2021-07-04 NOTE — TELEPHONE ENCOUNTER
Telephone Encounter by Isela Martines RN at 7/1/2021  9:14 AM     Author: Isela Martines RN Service: -- Author Type: Registered Nurse    Filed: 7/1/2021  9:23 AM Encounter Date: 7/1/2021 Status: Signed    : Isela Martines RN (Registered Nurse)       ANTICOAGULATION MANAGEMENT     Leonela Christianson 71 y.o., female is on warfarin with Therapeutic INR result (goal range 2.0-3.0)    Recent labs: (last 7 days)     07/01/21  0753   INR 2.70*       ASSESSMENT     Source: Patient/Caregiver Call and Template      Warfarin dosing taken: Warfarin taken as instructed    Diet: No new diet changes affecting INR    Illness, Injury or hospitalization: No    Medication changes: None    Signs or symptoms of bleeding or clotting: No    Previous INR: therapeutic last visit; previously outside of goal range    Additional findings: None     PLAN     Recommended plan for no diet, medication or health factor changes affecting INR:     Dosing instructions: Continue your current warfarin dose 6 mg daily on Sun, Tues, Fri; and 3 mg daily rest of week (0% change)    Follow up no later than: 3 weeks     Telephone call with Leonela who verbalizes understanding and agrees to plan    Patient elected to schedule next visit 7/22/21    Education provided: target INR goal and significance of current INR result and importance of following up for INR monitoring at instructed interval    Plan made per [facility] anticoagulation protocol    Isela Martines  Anticoagulation Clinic   [phone]    Anticoagulation Episode Summary     Current INR goal:  2.0-3.0   TTR:  89.6 % (12 mo)   Next INR check:  7/22/2021   INR from last check:  2.70 (7/1/2021)   Weekly max warfarin dose:     Target end date:     INR check location:     Preferred lab:     Send INR reminders to:  COLBY LUNA    Indications    History of pulmonary embolism [Z86.711]           Comments:           Anticoagulation Care Providers     Provider Role Specialty Phone number     Miri Woodruff MD [facility] [phone]

## 2021-07-06 VITALS — SYSTOLIC BLOOD PRESSURE: 118 MMHG | DIASTOLIC BLOOD PRESSURE: 68 MMHG | BODY MASS INDEX: 28.12 KG/M2 | WEIGHT: 144 LBS

## 2021-07-06 VITALS — DIASTOLIC BLOOD PRESSURE: 68 MMHG | SYSTOLIC BLOOD PRESSURE: 110 MMHG

## 2021-07-14 PROBLEM — N17.9 AKI (ACUTE KIDNEY INJURY) (H): Status: RESOLVED | Noted: 2020-09-12 | Resolved: 2021-01-05

## 2021-07-14 PROBLEM — I25.10 CORONARY ARTERY DISEASE DUE TO LIPID RICH PLAQUE: Status: RESOLVED | Noted: 2017-11-19 | Resolved: 2017-11-27

## 2021-07-14 PROBLEM — I25.83 CORONARY ARTERY DISEASE DUE TO LIPID RICH PLAQUE: Status: RESOLVED | Noted: 2017-11-19 | Resolved: 2017-11-27

## 2021-07-21 DIAGNOSIS — I26.99 PULMONARY EMBOLISM (H): Primary | ICD-10-CM

## 2021-07-22 ENCOUNTER — ANTICOAGULATION THERAPY VISIT (OUTPATIENT)
Dept: ANTICOAGULATION | Facility: CLINIC | Age: 71
End: 2021-07-22

## 2021-07-22 ENCOUNTER — LAB (OUTPATIENT)
Dept: LAB | Facility: CLINIC | Age: 71
End: 2021-07-22
Payer: MEDICARE

## 2021-07-22 ENCOUNTER — RECORDS - HEALTHEAST (OUTPATIENT)
Dept: FAMILY MEDICINE | Facility: CLINIC | Age: 71
End: 2021-07-22

## 2021-07-22 DIAGNOSIS — Z12.31 OTHER SCREENING MAMMOGRAM: ICD-10-CM

## 2021-07-22 DIAGNOSIS — I26.99 PULMONARY EMBOLISM (H): ICD-10-CM

## 2021-07-22 DIAGNOSIS — Z86.711 HISTORY OF PULMONARY EMBOLISM: Primary | ICD-10-CM

## 2021-07-22 LAB — INR BLD: 3.4 (ref 0.9–1.1)

## 2021-07-22 PROCEDURE — 36416 COLLJ CAPILLARY BLOOD SPEC: CPT

## 2021-07-22 PROCEDURE — 85610 PROTHROMBIN TIME: CPT

## 2021-07-22 NOTE — PROGRESS NOTES
ANTICOAGULATION MANAGEMENT     Leonela Christianson 71 year old female is on warfarin with supratherapeutic INR result. (Goal INR 2.0-3.0)    Recent labs: (last 7 days)     07/22/21  0747   INR 3.4*       ASSESSMENT     Source(s): Patient/Caregiver Call and Template       Warfarin doses taken: Warfarin taken as instructed    Diet: No new diet changes identified    New illness, injury, or hospitalization: No    Medication/supplement changes: None noted    Signs or symptoms of bleeding or clotting: Yes: Pt reports she has been bruising easily and that they have been popping up randomly.     Previous INR: Therapeutic last 2(+) visits    Additional findings: None     PLAN     Recommended plan for no diet, medication or health factor changes affecting INR     Dosing Instructions:  Decrease your warfarin dose (10% change) with next INR in 2 weeks       Summary  As of 7/22/2021    Full warfarin instructions:  6 mg every Tue, Fri; 3 mg all other days   Next INR check:  8/5/2021             Telephone call with Leonela who verbalizes understanding and agrees to plan    Patient elected to schedule next visit 8/5/21    Education provided: Target INR goal and significance of current INR result, Monitoring for bleeding signs and symptoms and When to seek medical attention/emergency care    Plan made per [facility] anticoagulation protocol    Isela Martines RN  Anticoagulation Clinic  7/22/2021    _______________________________________________________________________     Anticoagulation Episode Summary     Current INR goal:  2.0-3.0   TTR:  88.5 % (1 y)   Target end date:     Send INR reminders to:  COLBY LUNA    Indications    History of pulmonary embolism [Z86.711]           Comments:           Anticoagulation Care Providers     Provider Role Specialty Phone number    Miri Woodruff Referring Family Medicine [phone]

## 2021-08-03 DIAGNOSIS — I10 HTN (HYPERTENSION): ICD-10-CM

## 2021-08-05 ENCOUNTER — ANTICOAGULATION THERAPY VISIT (OUTPATIENT)
Dept: ANTICOAGULATION | Facility: CLINIC | Age: 71
End: 2021-08-05

## 2021-08-05 ENCOUNTER — LAB (OUTPATIENT)
Dept: LAB | Facility: CLINIC | Age: 71
End: 2021-08-05
Payer: MEDICARE

## 2021-08-05 DIAGNOSIS — Z86.711 HISTORY OF PULMONARY EMBOLISM: Primary | ICD-10-CM

## 2021-08-05 DIAGNOSIS — E78.5 HYPERLIPIDEMIA: ICD-10-CM

## 2021-08-05 DIAGNOSIS — I26.99 PULMONARY EMBOLISM (H): ICD-10-CM

## 2021-08-05 LAB — INR BLD: 2.6 (ref 0.9–1.1)

## 2021-08-05 PROCEDURE — 85610 PROTHROMBIN TIME: CPT

## 2021-08-05 PROCEDURE — 36416 COLLJ CAPILLARY BLOOD SPEC: CPT

## 2021-08-05 RX ORDER — ROSUVASTATIN CALCIUM 40 MG/1
40 TABLET, COATED ORAL DAILY
Qty: 90 TABLET | Refills: 1 | Status: SHIPPED | OUTPATIENT
Start: 2021-08-05 | End: 2022-02-01

## 2021-08-05 NOTE — PROGRESS NOTES
ANTICOAGULATION MANAGEMENT     Leonela Christianson 71 year old female is on warfarin with therapeutic INR result. (Goal INR 2.0-3.0)    Recent labs: (last 7 days)     08/05/21  0741   INR 2.6*       ASSESSMENT     Source(s): Chart Review, Patient/Caregiver Call and Template       Warfarin doses taken: Warfarin taken as instructed    Diet: No new diet changes identified    New illness, injury, or hospitalization: No    Medication/supplement changes: None noted    Signs or symptoms of bleeding or clotting: No    Previous INR: Supratherapeutic    Additional findings: None     PLAN     Recommended plan for no diet, medication or health factor changes affecting INR     Dosing Instructions: Continue your current warfarin dose with next INR in 3 weeks       Summary  As of 8/5/2021    Full warfarin instructions:  6 mg every Tue, Fri; 3 mg all other days   Next INR check:  8/26/2021             Telephone call with Leonela who verbalizes understanding and agrees to plan    Patient elected to schedule next visit 8/26/21    Education provided: Target INR goal and significance of current INR result    Plan made per [facility] anticoagulation protocol    Isela Martines RN  Anticoagulation Clinic  8/5/2021    _______________________________________________________________________     Anticoagulation Episode Summary     Current INR goal:  2.0-3.0   TTR:  86.6 % (1 y)   Target end date:     Send INR reminders to:  COLBY LUNA    Indications    History of pulmonary embolism [Z86.711]           Comments:           Anticoagulation Care Providers     Provider Role Specialty Phone number    Miri Woodruff MD Referring Family Medicine [phone]

## 2021-08-06 RX ORDER — METOPROLOL SUCCINATE 100 MG/1
TABLET, EXTENDED RELEASE ORAL
Qty: 90 TABLET | Refills: 2 | Status: SHIPPED | OUTPATIENT
Start: 2021-08-06 | End: 2022-06-30

## 2021-08-06 NOTE — TELEPHONE ENCOUNTER
"Last Written Prescription Date:  11/29/20  Last Fill Quantity: 90,  # refills: 2   Last office visit provider:  5/25/21     Requested Prescriptions   Pending Prescriptions Disp Refills     metoprolol succinate ER (TOPROL-XL) 100 MG 24 hr tablet [Pharmacy Med Name: METOPROLOL SUCCINATE ER TABS 100MG] 90 tablet 3     Sig: TAKE 1 TABLET AT BEDTIME       Beta-Blockers Protocol Passed - 8/3/2021  2:06 AM        Passed - Blood pressure under 140/90 in past 12 months     BP Readings from Last 3 Encounters:   06/10/21 110/68   05/25/21 118/68   05/18/21 138/78                 Passed - Patient is age 6 or older        Passed - Recent (12 mo) or future (30 days) visit within the authorizing provider's specialty     Patient has had an office visit with the authorizing provider or a provider within the authorizing providers department within the previous 12 mos or has a future within next 30 days. See \"Patient Info\" tab in inbasket, or \"Choose Columns\" in Meds & Orders section of the refill encounter.              Passed - Medication is active on med list             Rahat Lara RN 08/06/21 8:15 AM  "

## 2021-08-26 ENCOUNTER — LAB (OUTPATIENT)
Dept: LAB | Facility: CLINIC | Age: 71
End: 2021-08-26
Payer: MEDICARE

## 2021-08-26 ENCOUNTER — ANTICOAGULATION THERAPY VISIT (OUTPATIENT)
Dept: FAMILY MEDICINE | Facility: CLINIC | Age: 71
End: 2021-08-26

## 2021-08-26 DIAGNOSIS — Z86.711 HISTORY OF PULMONARY EMBOLISM: Primary | ICD-10-CM

## 2021-08-26 DIAGNOSIS — I26.99 PULMONARY EMBOLISM (H): ICD-10-CM

## 2021-08-26 LAB — INR BLD: 2.1 (ref 0.9–1.1)

## 2021-08-26 PROCEDURE — 36416 COLLJ CAPILLARY BLOOD SPEC: CPT

## 2021-08-26 PROCEDURE — 85610 PROTHROMBIN TIME: CPT

## 2021-08-26 NOTE — PROGRESS NOTES
ANTICOAGULATION MANAGEMENT     Leonela Christianson 71 year old female is on warfarin with therapeutic INR result. (Goal INR 2.0-3.0)    Recent labs: (last 7 days)     08/26/21  0746   INR 2.1*       ASSESSMENT     Source(s): Chart Review and Template       Warfarin doses taken: Warfarin taken as instructed    Diet: No new diet changes identified    New illness, injury, or hospitalization: No    Medication/supplement changes: None noted    Signs or symptoms of bleeding or clotting: No    Previous INR: Therapeutic last 2(+) visits    Additional findings: None     PLAN     Recommended plan for no diet, medication or health factor changes affecting INR     Dosing Instructions: Continue your current warfarin dose with next INR in 4 weeks       Summary  As of 8/26/2021    Full warfarin instructions:  6 mg every Tue, Fri; 3 mg all other days   Next INR check:  9/23/2021             Telephone call with Leonela who verbalizes understanding and agrees to plan    Lab visit scheduled    Education provided: Goal range and significance of current result    Plan made per [facility] anticoagulation protocol    Khalida Radford RN  Anticoagulation Clinic  8/26/2021    _______________________________________________________________________     Anticoagulation Episode Summary     Current INR goal:  2.0-3.0   TTR:  86.5 % (1 y)   Target end date:     Send INR reminders to:  COLBY LUNA    Indications    History of pulmonary embolism [Z86.711]           Comments:           Anticoagulation Care Providers     Provider Role Specialty Phone number    Miri Woodruff MD Referring Family Medicine [phone]

## 2021-09-09 ENCOUNTER — OFFICE VISIT (OUTPATIENT)
Dept: FAMILY MEDICINE | Facility: CLINIC | Age: 71
End: 2021-09-09
Payer: MEDICARE

## 2021-09-09 VITALS
HEART RATE: 97 BPM | WEIGHT: 137.13 LBS | BODY MASS INDEX: 26.92 KG/M2 | SYSTOLIC BLOOD PRESSURE: 128 MMHG | DIASTOLIC BLOOD PRESSURE: 71 MMHG | HEIGHT: 60 IN

## 2021-09-09 DIAGNOSIS — F17.200 TOBACCO USE DISORDER: ICD-10-CM

## 2021-09-09 DIAGNOSIS — Z23 NEED FOR PROPHYLACTIC VACCINATION AND INOCULATION AGAINST INFLUENZA: ICD-10-CM

## 2021-09-09 DIAGNOSIS — J42 CHRONIC BRONCHITIS, UNSPECIFIED CHRONIC BRONCHITIS TYPE (H): ICD-10-CM

## 2021-09-09 DIAGNOSIS — I10 ESSENTIAL HYPERTENSION: ICD-10-CM

## 2021-09-09 DIAGNOSIS — J43.9 PULMONARY EMPHYSEMA, UNSPECIFIED EMPHYSEMA TYPE (H): Chronic | ICD-10-CM

## 2021-09-09 DIAGNOSIS — M81.0 OSTEOPOROSIS WITHOUT CURRENT PATHOLOGICAL FRACTURE, UNSPECIFIED OSTEOPOROSIS TYPE: ICD-10-CM

## 2021-09-09 DIAGNOSIS — R45.4 IRRITABILITY AND ANGER: ICD-10-CM

## 2021-09-09 DIAGNOSIS — Z00.00 ENCOUNTER FOR MEDICARE ANNUAL WELLNESS EXAM: Primary | ICD-10-CM

## 2021-09-09 DIAGNOSIS — Z87.891 PERSONAL HISTORY OF TOBACCO USE: ICD-10-CM

## 2021-09-09 DIAGNOSIS — F17.210 CIGARETTE NICOTINE DEPENDENCE WITHOUT COMPLICATION: ICD-10-CM

## 2021-09-09 DIAGNOSIS — E78.5 HYPERLIPIDEMIA, UNSPECIFIED HYPERLIPIDEMIA TYPE: Chronic | ICD-10-CM

## 2021-09-09 DIAGNOSIS — R53.82 CHRONIC FATIGUE: ICD-10-CM

## 2021-09-09 LAB
ALBUMIN SERPL-MCNC: 4.2 G/DL (ref 3.5–5)
ALP SERPL-CCNC: 68 U/L (ref 45–120)
ALT SERPL W P-5'-P-CCNC: 16 U/L (ref 0–45)
ANION GAP SERPL CALCULATED.3IONS-SCNC: 11 MMOL/L (ref 5–18)
AST SERPL W P-5'-P-CCNC: 22 U/L (ref 0–40)
BASOPHILS # BLD AUTO: 0.1 10E3/UL (ref 0–0.2)
BASOPHILS NFR BLD AUTO: 1 %
BILIRUB SERPL-MCNC: 0.7 MG/DL (ref 0–1)
BUN SERPL-MCNC: 14 MG/DL (ref 8–28)
CALCIUM SERPL-MCNC: 9.9 MG/DL (ref 8.5–10.5)
CHLORIDE BLD-SCNC: 102 MMOL/L (ref 98–107)
CHOLEST SERPL-MCNC: 130 MG/DL
CO2 SERPL-SCNC: 24 MMOL/L (ref 22–31)
CREAT SERPL-MCNC: 1.02 MG/DL (ref 0.6–1.1)
EOSINOPHIL # BLD AUTO: 0.1 10E3/UL (ref 0–0.7)
EOSINOPHIL NFR BLD AUTO: 1 %
ERYTHROCYTE [DISTWIDTH] IN BLOOD BY AUTOMATED COUNT: 13.5 % (ref 10–15)
FASTING STATUS PATIENT QL REPORTED: NO
GFR SERPL CREATININE-BSD FRML MDRD: 55 ML/MIN/1.73M2
GLUCOSE BLD-MCNC: 101 MG/DL (ref 70–125)
HCT VFR BLD AUTO: 49.8 % (ref 35–47)
HDLC SERPL-MCNC: 47 MG/DL
HGB BLD-MCNC: 16.6 G/DL (ref 11.7–15.7)
IMM GRANULOCYTES # BLD: 0 10E3/UL
IMM GRANULOCYTES NFR BLD: 0 %
LDLC SERPL CALC-MCNC: 73 MG/DL
LYMPHOCYTES # BLD AUTO: 1.7 10E3/UL (ref 0.8–5.3)
LYMPHOCYTES NFR BLD AUTO: 22 %
MCH RBC QN AUTO: 31.1 PG (ref 26.5–33)
MCHC RBC AUTO-ENTMCNC: 33.3 G/DL (ref 31.5–36.5)
MCV RBC AUTO: 93 FL (ref 78–100)
MONOCYTES # BLD AUTO: 0.6 10E3/UL (ref 0–1.3)
MONOCYTES NFR BLD AUTO: 8 %
NEUTROPHILS # BLD AUTO: 5.4 10E3/UL (ref 1.6–8.3)
NEUTROPHILS NFR BLD AUTO: 69 %
PLATELET # BLD AUTO: 220 10E3/UL (ref 150–450)
POTASSIUM BLD-SCNC: 3.8 MMOL/L (ref 3.5–5)
PROT SERPL-MCNC: 7.3 G/DL (ref 6–8)
RBC # BLD AUTO: 5.33 10E6/UL (ref 3.8–5.2)
SODIUM SERPL-SCNC: 137 MMOL/L (ref 136–145)
TRIGL SERPL-MCNC: 52 MG/DL
TSH SERPL DL<=0.005 MIU/L-ACNC: 1.39 UIU/ML (ref 0.3–5)
WBC # BLD AUTO: 7.8 10E3/UL (ref 4–11)

## 2021-09-09 PROCEDURE — 80053 COMPREHEN METABOLIC PANEL: CPT | Performed by: FAMILY MEDICINE

## 2021-09-09 PROCEDURE — G0439 PPPS, SUBSEQ VISIT: HCPCS | Performed by: FAMILY MEDICINE

## 2021-09-09 PROCEDURE — 90662 IIV NO PRSV INCREASED AG IM: CPT | Performed by: FAMILY MEDICINE

## 2021-09-09 PROCEDURE — 84443 ASSAY THYROID STIM HORMONE: CPT | Performed by: FAMILY MEDICINE

## 2021-09-09 PROCEDURE — 99214 OFFICE O/P EST MOD 30 MIN: CPT | Mod: 25 | Performed by: FAMILY MEDICINE

## 2021-09-09 PROCEDURE — G0008 ADMIN INFLUENZA VIRUS VAC: HCPCS | Performed by: FAMILY MEDICINE

## 2021-09-09 PROCEDURE — 80061 LIPID PANEL: CPT | Performed by: FAMILY MEDICINE

## 2021-09-09 PROCEDURE — 36415 COLL VENOUS BLD VENIPUNCTURE: CPT | Performed by: FAMILY MEDICINE

## 2021-09-09 PROCEDURE — 85025 COMPLETE CBC W/AUTO DIFF WBC: CPT | Performed by: FAMILY MEDICINE

## 2021-09-09 PROCEDURE — G0296 VISIT TO DETERM LDCT ELIG: HCPCS | Performed by: FAMILY MEDICINE

## 2021-09-09 ASSESSMENT — ACTIVITIES OF DAILY LIVING (ADL): CURRENT_FUNCTION: HOUSEWORK REQUIRES ASSISTANCE

## 2021-09-09 ASSESSMENT — MIFFLIN-ST. JEOR: SCORE: 1058.49

## 2021-09-09 NOTE — PROGRESS NOTES
"SUBJECTIVE:   Leonela Christianson is a 71 year old female who presents for Preventive Visit.      Patient has been advised of split billing requirements and indicates understanding: Yes   Are you in the first 12 months of your Medicare coverage?  No    Healthy Habits:     In general, how would you rate your overall health?  Fair    Frequency of exercise:  None    Do you usually eat at least 4 servings of fruit and vegetables a day, include whole grains    & fiber and avoid regularly eating high fat or \"junk\" foods?  No    Taking medications regularly:  Yes    Medication side effects:  None    Ability to successfully perform activities of daily living:  Housework requires assistance    Home Safety:  No safety concerns identified    Hearing Impairment:  No hearing concerns    In the past 6 months, have you been bothered by leaking of urine?  No    In general, how would you rate your overall mental or emotional health?  Fair      PHQ-2 Total Score: 2    Additional concerns today:  No    Do you feel safe in your environment? Yes    Have you ever done Advance Care Planning? (For example, a Health Directive, POLST, or a discussion with a medical provider or your loved ones about your wishes): No, advance care planning information given to patient to review.  Patient plans to discuss their wishes with loved ones or provider.        Fall risk  Fallen 2 or more times in the past year?: No  Any fall with injury in the past year?: No    Cognitive Screening   1) Repeat 3 items (Leader, Season, Table)    2) Clock draw: NORMAL  3) 3 item recall: Recalls 3 objects  Results: 3 items recalled: COGNITIVE IMPAIRMENT LESS LIKELY    Mini-CogTM Copyright DAVY Alexander. Licensed by the author for use in [facility]; reprinted with permission (escobar@.[facility]). All rights reserved.      Do you have sleep apnea, excessive snoring or daytime drowsiness?: yes    Reviewed and updated as needed this visit by clinical staff  Tobacco   " Meds              Reviewed and updated as needed this visit by Provider  Tobacco  Allergies  Meds  Problems  Med Hx  Surg Hx  Fam Hx  Soc Hx         Social History     Tobacco Use     Smoking status: Current Every Day Smoker     Packs/day: 0.75     Years: 55.00     Pack years: 41.25     Types: Cigarettes     Smokeless tobacco: Never Used   Substance Use Topics     Alcohol use: No       Alcohol Use 9/9/2021   Prescreen: >3 drinks/day or >7 drinks/week? Not Applicable       PROBLEMS TO ADD ON...  1. Feeling fatigued.  Sleeps from 9 PM to 4 AM, then lays in bed until around 6 AM.  Usually wakes feeling rested.  Naps during the day.  Unsure if she snores.  Doesn't wake with a headache.  2. Feeling very irritable.  Describes how she has a dog in her living area, had gotten a letter from a therapist online about an emotional support certification.  Will be moving out of her currently living situation soon, moving into a family member's house.      Current providers sharing in care for this patient include:   Patient Care Team:  Miri Woodruff MD as PCP - General  Miri Woodruff MD as Assigned PCP  Bandar Gonzalez MD as Assigned Heart and Vascular Provider    The following health maintenance items are reviewed in Epic and correct as of today:  Health Maintenance Due   Topic Date Due     ANNUAL REVIEW OF HM ORDERS  Never done     COPD ACTION PLAN  Never done     Pneumococcal Vaccine: 65+ Years (1 of 2 - PPSV23) Never done     HEPATITIS C SCREENING  Never done     ZOSTER IMMUNIZATION (2 of 2) 06/07/2017     MEDICARE ANNUAL WELLNESS VISIT  10/07/2020     DTAP/TDAP/TD IMMUNIZATION (2 - Td or Tdap) 07/06/2021     INFLUENZA VACCINE (1) 09/01/2021     BP Readings from Last 3 Encounters:   09/09/21 128/71   06/10/21 110/68   05/25/21 118/68    Wt Readings from Last 3 Encounters:   09/09/21 62.2 kg (137 lb 2 oz)   05/25/21 65.3 kg (144 lb)   05/18/21 66.2 kg (146 lb)                  Patient Active  Problem List   Diagnosis     Hyperlipidemia     Nicotine Dependence     Overweight (BMI 25.0-29.9)     Osteoporosis     Chronic bronchitis, unspecified chronic bronchitis type (H)     History of pulmonary embolism     Upper back pain on left side     Pulmonary nodule     Pulmonary emphysema, unspecified emphysema type (H)     Essential hypertension     History of non-ST elevation myocardial infarction (NSTEMI)     Coronary atherosclerosis due to lipid rich plaque     Pigmented skin lesion     Hypoxia     Ureteral stone     Hemorrhoids, internal     Breast mass, right     H/O: hysterectomy     Nicotine dependence     Irritability and anger     Chronic fatigue     Past Surgical History:   Procedure Laterality Date     CARDIAC CATHETERIZATION      Stent     CV CORONARY ANGIOGRAM N/A 11/20/2017    Procedure: Coronary Angiogram;  Surgeon: Daniela Mazariegos MD;  Location: [facility] Cath Lab;  Service:      CV LEFT HEART CATHETERIZATION WITHOUT LEFT VENTRICULOGRAM Left 11/20/2017    Procedure: Left Heart Catheterization Without Left Ventriculogram;  Surgeon: Daniela Mazariegos MD;  Location: [facility] Cath Lab;  Service:      CYSTOSCOPY  09/29/2020     HC CYSTOSCOPY,INSERT URETERAL STENT Left 9/11/2020    Procedure: CYSTOSCOPY, WITH URETERAL STENT INSERTION;  Surgeon: Sean Schmitt MD;  Location: [facility] OR;  Service: Urology     HYSTERECTOMY      fibroids     OOPHORECTOMY         Social History     Tobacco Use     Smoking status: Current Every Day Smoker     Packs/day: 0.75     Years: 55.00     Pack years: 41.25     Types: Cigarettes     Start date: 1/1/1963     Smokeless tobacco: Never Used   Substance Use Topics     Alcohol use: No     Family History   Problem Relation Age of Onset     No Known Problems Mother      No Known Problems Father      Diabetes Paternal Uncle      Diabetes Sister      Breast Cancer No family hx of      Cancer No family hx of      Colon Cancer No family hx of      Heart Disease  No family hx of      Coronary Artery Disease No family hx of          Current Outpatient Medications   Medication Sig Dispense Refill     albuterol (PROAIR HFA) 90 mcg/actuation inhaler [ALBUTEROL (PROAIR HFA) 90 MCG/ACTUATION INHALER] USE 1 TO 2 INHALATIONS EVERY 4 TO 6 HOURS AS NEEDED FOR WHEEZING 25.5 g 3     clopidogrel (PLAVIX) 75 MG tablet TAKE 1 TABLET DAILY 90 tablet 1     losartan-hydrochlorothiazide (HYZAAR) 100-12.5 mg per tablet [LOSARTAN-HYDROCHLOROTHIAZIDE (HYZAAR) 100-12.5 MG PER TABLET] Take 1 tablet by mouth daily. 90 tablet 3     metoprolol succinate ER (TOPROL-XL) 100 MG 24 hr tablet TAKE 1 TABLET AT BEDTIME 90 tablet 2     nitroglycerin (NITROSTAT) 0.4 MG SL tablet [NITROGLYCERIN (NITROSTAT) 0.4 MG SL TABLET] Place 1 tablet (0.4 mg total) under the tongue every 5 (five) minutes as needed for chest pain. 20 tablet 11     rosuvastatin (CRESTOR) 40 MG tablet Take 1 tablet (40 mg) by mouth daily 90 tablet 1     STIOLTO RESPIMAT 2.5-2.5 mcg/actuation Mist inhaler [STIOLTO RESPIMAT 2.5-2.5 MCG/ACTUATION MIST INHALER] USE 2 INHALATIONS DAILY 12 g 3     warfarin ANTICOAGULANT (COUMADIN/JANTOVEN) 3 MG tablet [WARFARIN ANTICOAGULANT (COUMADIN/JANTOVEN) 3 MG TABLET] Take 3 mg by mouth See Admin Instructions. Adjust dose based on INR results as directed.       ciprofloxacin-hydrocortisone (CIPRO HC) otic suspension [CIPROFLOXACIN-HYDROCORTISONE (CIPRO HC) OTIC SUSPENSION]  (Patient not taking: Reported on 9/9/2021)       mupirocin (BACTROBAN) 2 % ointment [MUPIROCIN (BACTROBAN) 2 % OINTMENT]  (Patient not taking: Reported on 9/9/2021)       Allergies   Allergen Reactions     Sulfa (Sulfonamide Antibiotics) [Sulfa Drugs] Anaphylaxis     Pneumonia Vaccine:Adults age 65+ who have not received previous Pneumovax (PPSV23) or PCV13 as an adult: Should first be given PCV13 AND then should be given PPSV23 6-12 months after PCV13 - patient refuses    Review of Systems  Constitutional, HEENT, cardiovascular,  pulmonary, GI, , musculoskeletal, neuro, skin, endocrine and psych systems are negative, except as otherwise noted.    OBJECTIVE:   /71 (BP Location: Left arm, Patient Position: Sitting, Cuff Size: Adult Regular)   Pulse 97   Ht 1.524 m (5')   Wt 62.2 kg (137 lb 2 oz)   Breastfeeding No   BMI 26.78 kg/m   Estimated body mass index is 26.78 kg/m  as calculated from the following:    Height as of this encounter: 1.524 m (5').    Weight as of this encounter: 62.2 kg (137 lb 2 oz).  Physical Exam  GENERAL APPEARANCE: healthy, alert and no distress  EYES: Eyes grossly normal to inspection, PERRL and conjunctivae and sclerae normal  HENT: ear canals and TM's normal, nose and mouth without ulcers or lesions, oropharynx clear and oral mucous membranes moist  NECK: no adenopathy, no asymmetry, masses, or scars and thyroid normal to palpation  RESP: lungs clear to auscultation - no rales, rhonchi or wheezes  BREAST: normal without masses, tenderness or nipple discharge and no palpable axillary masses or adenopathy  CV: regular rate and rhythm, normal S1 S2, no S3 or S4, no murmur, click or rub, no peripheral edema and peripheral pulses strong  ABDOMEN: soft, nontender, no hepatosplenomegaly, no masses and bowel sounds normal  MS: no musculoskeletal defects are noted and gait is age appropriate without ataxia  SKIN: no suspicious lesions or rashes  NEURO: Normal strength and tone, sensory exam grossly normal, mentation intact and speech normal  PSYCH: mentation appears normal and affect normal/bright    Diagnostic Test Results:  Labs reviewed in Epic  Results for orders placed or performed in visit on 09/09/21   Comprehensive metabolic panel (BMP + Alb, Alk Phos, ALT, AST, Total. Bili, TP)     Status: Abnormal   Result Value Ref Range    Sodium 137 136 - 145 mmol/L    Potassium 3.8 3.5 - 5.0 mmol/L    Chloride 102 98 - 107 mmol/L    Carbon Dioxide (CO2) 24 22 - 31 mmol/L    Anion Gap 11 5 - 18 mmol/L    Urea  Nitrogen 14 8 - 28 mg/dL    Creatinine 1.02 0.60 - 1.10 mg/dL    Calcium 9.9 8.5 - 10.5 mg/dL    Glucose 101 70 - 125 mg/dL    Alkaline Phosphatase 68 45 - 120 U/L    AST 22 0 - 40 U/L    ALT 16 0 - 45 U/L    Protein Total 7.3 6.0 - 8.0 g/dL    Albumin 4.2 3.5 - 5.0 g/dL    Bilirubin Total 0.7 0.0 - 1.0 mg/dL    GFR Estimate 55 (L) >60 mL/min/1.73m2   CBC with platelets and differential     Status: Abnormal    Narrative    The following orders were created for panel order CBC with platelets and differential.  Procedure                               Abnormality         Status                     ---------                               -----------         ------                     CBC with platelets and d...[[id number]]  Abnormal            Final result                 Please view results for these tests on the individual orders.   Lipid panel reflex to direct LDL Fasting     Status: Abnormal   Result Value Ref Range    Cholesterol 130 <=199 mg/dL    Triglycerides 52 <=149 mg/dL    Direct Measure HDL 47 (L) >=50 mg/dL    LDL Cholesterol Calculated 73 <=129 mg/dL    Patient Fasting > 8hrs? No    TSH with free T4 reflex     Status: Normal   Result Value Ref Range    TSH 1.39 0.30 - 5.00 uIU/mL   CBC with platelets and differential     Status: Abnormal   Result Value Ref Range    WBC Count 7.8 4.0 - 11.0 10e3/uL    RBC Count 5.33 (H) 3.80 - 5.20 10e6/uL    Hemoglobin 16.6 (H) 11.7 - 15.7 g/dL    Hematocrit 49.8 (H) 35.0 - 47.0 %    MCV 93 78 - 100 fL    MCH 31.1 26.5 - 33.0 pg    MCHC 33.3 31.5 - 36.5 g/dL    RDW 13.5 10.0 - 15.0 %    Platelet Count 220 150 - 450 10e3/uL    % Neutrophils 69 %    % Lymphocytes 22 %    % Monocytes 8 %    % Eosinophils 1 %    % Basophils 1 %    % Immature Granulocytes 0 %    Absolute Neutrophils 5.4 1.6 - 8.3 10e3/uL    Absolute Lymphocytes 1.7 0.8 - 5.3 10e3/uL    Absolute Monocytes 0.6 0.0 - 1.3 10e3/uL    Absolute Eosinophils 0.1 0.0 - 0.7 10e3/uL    Absolute Basophils 0.1 0.0 - 0.2  10e3/uL    Absolute Immature Granulocytes 0.0 <=0.0 10e3/uL       ASSESSMENT / PLAN:   1. Encounter for Medicare annual wellness exam  Routine Medicare wellness visit, udpated in EMR.  Labs updated as below.  Mammogram is up to date, as is colon cancer screening.  Patient declines bisphosphonate for bone density, so will not repeat this, but emphasized calcium and vitamin D.  Immunizations are up to date with the exception of Td and PPSV23, patient refuses these.  See below for problem-specific plans.  Plan repeat physical in 1 year.  - Comprehensive metabolic panel (BMP + Alb, Alk Phos, ALT, AST, Total. Bili, TP); Future  - CBC with platelets and differential; Future  - Lipid panel reflex to direct LDL Fasting; Future  - TSH with free T4 reflex; Future    2. Pulmonary emphysema, unspecified emphysema type (H)  3. Chronic bronchitis, unspecified chronic bronchitis type (H)  Patient feels that her breathing has been stable on her current inhaler.  She does not follow regularly with pulmonology.  Discussed smoking cessation as below.    4. Essential hypertension  Well-controlled on current regimen.  Labs updated as below.  - Comprehensive metabolic panel (BMP + Alb, Alk Phos, ALT, AST, Total. Bili, TP); Future  - CBC with platelets and differential; Future  - Lipid panel reflex to direct LDL Fasting; Future    5. Hyperlipidemia, unspecified hyperlipidemia type  On max-dose rosuvastatin and tolerates this well.  Slightly above goal range today, but patient is pre-contemplative about lifestyle changes like vegetable-rich diet or exercise.  - Comprehensive metabolic panel (BMP + Alb, Alk Phos, ALT, AST, Total. Bili, TP); Future  - Lipid panel reflex to direct LDL Fasting; Future    6. Nicotine Dependence  7. Cigarette nicotine dependence without complication  Patient is pre-contemplative about cessation.  She does desire low-dose screening chest CT for lung cancer.  This was ordered today.  She declines assistance with  cessation with medication or other resources today.    8. Chronic fatigue  Thyroid hormone level and other labs normal, no cause found here.  Would recommend a sleep study in further workup, however patient declines treatment so will hold off for now.  - TSH with free T4 reflex; Future    9. Osteoporosis without current pathological fracture, unspecified osteoporosis type  Patient declines treatment with a bisphosphonate.  Discussed at some length today calcium and vitamin D intake along with weight-bearing exercise like walking.    10. Irritability and anger  Patient would like to talk with a therapist about techniques to diffuse her anger and irritability.  She does not wish to try any medication for her mood.    11. Need for prophylactic vaccination and inoculation against influenza  - INFLUENZA, QUAD, HIGH DOSE, PF, 65YR + (FLUZONE HD)      Patient has been advised of split billing requirements and indicates understanding: Yes  COUNSELING:  Reviewed preventive health counseling, as reflected in patient instructions  Special attention given to:       Regular exercise       Healthy diet/nutrition       Osteoporosis prevention/bone health       Consider lung cancer screening for ages 55-80 years and 30 pack-year smoking history    Estimated body mass index is 26.78 kg/m  as calculated from the following:    Height as of this encounter: 1.524 m (5').    Weight as of this encounter: 62.2 kg (137 lb 2 oz).    Weight management plan: Discussed healthy diet and exercise guidelines    She reports that she has been smoking cigarettes. She has a 41.25 pack-year smoking history. She has never used smokeless tobacco.  Tobacco Cessation Action Plan:   Information offered: Patient not interested at this time      Appropriate preventive services were discussed with this patient, including applicable screening as appropriate for cardiovascular disease, diabetes, osteopenia/osteoporosis, and glaucoma.  As appropriate for  age/gender, discussed screening for colorectal cancer, prostate cancer, breast cancer, and cervical cancer. Checklist reviewing preventive services available has been given to the patient.    Reviewed patients plan of care and provided an AVS. The Basic Care Plan (routine screening as documented in Health Maintenance) for Leonela meets the Care Plan requirement. This Care Plan has been established and reviewed with the Patient.    Counseling Resources:  ATP IV Guidelines  Pooled Cohorts Equation Calculator  Breast Cancer Risk Calculator  Breast Cancer: Medication to Reduce Risk  FRAX Risk Assessment  ICSI Preventive Guidelines  Dietary Guidelines for Americans, 2010  [employer] MyPlate  ASA Prophylaxis  Lung CA Screening    Miri Woodruff MD  [facility]    Identified Health Risks:    The patient was provided with suggestions to help her develop a healthy physical lifestyle.  She is at risk for lack of exercise and has been provided with information to increase physical activity for the benefit of her well-being.  The patient was counseled and encouraged to consider modifying their diet and eating habits. She was provided with information on recommended healthy diet options.  The patient was provided with suggestions to help her develop a healthy emotional lifestyle.  Lung Cancer Screening Shared Decision Making Visit     Leonela Christianson is eligible for lung cancer screening on the basis of the information provided in my signed lung cancer screening order.     I have discussed with patient the risks and benefits of screening for lung cancer with low-dose CT.     The risks include:  radiation exposure: one low dose chest CT has as much ionizing radiation as about 15 chest x-rays or 6 months of background radiation living in [place]    false positives: 96% of positive findings/nodules are NOT cancer, but some might still require additional diagnostic evaluation, including  biopsy  over-diagnosis: some slow growing cancers that might never have been clinically significant will be detected and treated unnecessarily     The benefit of early detection of lung cancer is contingent upon adherence to annual screening or more frequent follow up if indicated.     Furthermore, reaping the benefits of screening requires Leonela Christianson to be willing and physically able to undergo diagnostic procedures, if indicated. Although no specific guide is available for determining severity of comorbidities, it is reasonable to withhold screening in patients who have greater mortality risk from other diseases.     We did discuss that the only way to prevent lung cancer is to not smoke. Smoking cessation counseling was given, duration < 3 minutes.      I did not offer risk estimation using a calculator such as this one:    ShouldIScreen

## 2021-09-09 NOTE — LETTER
September 9, 2021      Leonela Ramon Sammy  [address]  [facility] [address]        Dear Ms.Adams Christianson,    We are writing to inform you of your test results.    Your labs show slightly reduced kidney function and your blood counts would indicate that you are slightly dehydrated.  Try to drink at least 6-8 glasses of water daily.  Your cholesterol looks okay, thyroid hormone level is normal.  No cause of your fatigue is found here.    Resulted Orders   Comprehensive metabolic panel (BMP + Alb, Alk Phos, ALT, AST, Total. Bili, TP)   Result Value Ref Range    Sodium 137 136 - 145 mmol/L    Potassium 3.8 3.5 - 5.0 mmol/L    Chloride 102 98 - 107 mmol/L    Carbon Dioxide (CO2) 24 22 - 31 mmol/L    Anion Gap 11 5 - 18 mmol/L    Urea Nitrogen 14 8 - 28 mg/dL    Creatinine 1.02 0.60 - 1.10 mg/dL    Calcium 9.9 8.5 - 10.5 mg/dL    Glucose 101 70 - 125 mg/dL    Alkaline Phosphatase 68 45 - 120 U/L    AST 22 0 - 40 U/L    ALT 16 0 - 45 U/L    Protein Total 7.3 6.0 - 8.0 g/dL    Albumin 4.2 3.5 - 5.0 g/dL    Bilirubin Total 0.7 0.0 - 1.0 mg/dL    GFR Estimate 55 (L) >60 mL/min/1.73m2      Comment:      As of July 11, 2021, eGFR is calculated by the CKD-EPI creatinine equation, without race adjustment. eGFR can be influenced by muscle mass, exercise, and diet. The reported eGFR is an estimation only and is only applicable if the renal function is stable.   Lipid panel reflex to direct LDL Fasting   Result Value Ref Range    Cholesterol 130 <=199 mg/dL    Triglycerides 52 <=149 mg/dL    Direct Measure HDL 47 (L) >=50 mg/dL      Comment:      HDL Cholesterol Reference Range:     0-2 years:   No reference ranges established for patients under 2 years old  at [employer] for lipid analytes.    2-8 years:  Greater than 45 mg/dL     18 years and older:   Female: Greater than or equal to 50 mg/dL   Male:   Greater than or equal to 40 mg/dL    LDL Cholesterol Calculated 73 <=129 mg/dL    Patient Fasting > 8hrs?  No    TSH with free T4 reflex   Result Value Ref Range    TSH 1.39 0.30 - 5.00 uIU/mL   CBC with platelets and differential   Result Value Ref Range    WBC Count 7.8 4.0 - 11.0 10e3/uL    RBC Count 5.33 (H) 3.80 - 5.20 10e6/uL    Hemoglobin 16.6 (H) 11.7 - 15.7 g/dL    Hematocrit 49.8 (H) 35.0 - 47.0 %    MCV 93 78 - 100 fL    MCH 31.1 26.5 - 33.0 pg    MCHC 33.3 31.5 - 36.5 g/dL    RDW 13.5 10.0 - 15.0 %    Platelet Count 220 150 - 450 10e3/uL    % Neutrophils 69 %    % Lymphocytes 22 %    % Monocytes 8 %    % Eosinophils 1 %    % Basophils 1 %    % Immature Granulocytes 0 %    Absolute Neutrophils 5.4 1.6 - 8.3 10e3/uL    Absolute Lymphocytes 1.7 0.8 - 5.3 10e3/uL    Absolute Monocytes 0.6 0.0 - 1.3 10e3/uL    Absolute Eosinophils 0.1 0.0 - 0.7 10e3/uL    Absolute Basophils 0.1 0.0 - 0.2 10e3/uL    Absolute Immature Granulocytes 0.0 <=0.0 10e3/uL       If you have any questions or concerns, please call the clinic at the number listed above.       Sincerely,      Miri Woodruff MD

## 2021-09-09 NOTE — PATIENT INSTRUCTIONS
For bone health, you should get at least 1000 mg of calcium and 800 units of vitamin D daily with diet or exercise.  In terms of calcium, this can be as simple as a chewable TUMS twice daily.        Patient Education   Personalized Prevention Plan  You are due for the preventive services outlined below.  Your care team is available to assist you in scheduling these services.  If you have already completed any of these items, please share that information with your care team to update in your medical record.  Health Maintenance Due   Topic Date Due     ANNUAL REVIEW OF HM ORDERS  Never done     COPD Action Plan  Never done     Pneumococcal Vaccine (1 of 2 - PPSV23) Never done     Hepatitis C Screening  Never done     Zoster (Shingles) Vaccine (2 of 2) 06/07/2017     Diptheria Tetanus Pertussis (DTAP/TDAP/TD) Vaccine (2 - Td or Tdap) 07/06/2021     Flu Vaccine (1) 09/01/2021     Your Health Risk Assessment indicates you feel you are not in good health    A healthy lifestyle helps keep the body fit and the mind alert. It helps protect you from disease, helps you fight disease, and helps prevent chronic disease (disease that doesn't go away) from getting worse. This is important as you get older and begin to notice twinges in muscles and joints and a decline in the strength and stamina you once took for granted. A healthy lifestyle includes good healthcare, good nutrition, weight control, recreation, and regular exercise. Avoid harmful substances and do what you can to keep safe. Another part of a healthy lifestyle is stay mentally active and socially involved.    Good healthcare     Have a wellness visit every year.     If you have new symptoms, let us know right away. Don't wait until the next checkup.     Take medicines exactly as prescribed and keep your medicines in a safe place. Tell us if your medicine causes problems.   Healthy diet and weight control     Eat 3 or 4 small, nutritious, low-fat, high-fiber meals a  day. Include a variety of fruits, vegetables, and whole-grain foods.     Make sure you get enough calcium in your diet. Calcium, vitamin D, and exercise help prevent osteoporosis (bone thinning).     If you live alone, try eating with others when you can. That way you get a good meal and have company while you eat it.     Try to keep a healthy weight. If you eat more calories than your body uses for energy, it will be stored as fat and you will gain weight.     Recreation   Recreation is not limited to sports and team events. It includes any activity that provides relaxation, interest, enjoyment, and exercise. Recreation provides an outlet for physical, mental, and social energy. It can give a sense of worth and achievement. It can help you stay healthy.    Mental Exercise and Social Involvement  Mental and emotional health is as important as physical health. Keep in touch with friends and family. Stay as active as possible. Continue to learn and challenge yourself.   Things you can do to stay mentally active are:    Learn something new, like a foreign language or musical instrument.     Play SCRABBLE or do crossword puzzles. If you cannot find people to play these games with you at home, you can play them with others on your computer through the Internet.     Join a games club--anything from card games to chess or checkers or lawn bowling.     Start a new hobby.     Go back to school.     Volunteer.     Read.   Keep up with world events.    Exercise for a Healthier Heart  You may wonder how you can improve the health of your heart. If you re thinking about exercise, you re on the right track. You don t need to become an athlete. But you do need a certain amount of brisk exercise to help strengthen your heart. If you have been diagnosed with a heart condition, your healthcare provider may advise exercise to help stabilize your condition. To help make exercise a habit, choose safe, fun activities.      Exercise with  a friend. When activity is fun, you're more likely to stick with it.   Before you start  Check with your healthcare provider before starting an exercise program. This is especially important if you have not been active for a while. It's also important if you have a long-term (chronic) health problem such as heart disease, diabetes, or obesity. Or if you are at high risk for having these problems.   Why exercise?  Exercising regularly offers many healthy rewards. It can help you do all of the following:     Improve your blood cholesterol level to help prevent further heart trouble    Lower your blood pressure to help prevent a stroke or heart attack    Control diabetes, or reduce your risk of getting this disease    Improve your heart and lung function    Reach and stay at a healthy weight    Make your muscles stronger so you can stay active    Prevent falls and fractures by slowing the loss of bone mass (osteoporosis)    Manage stress better    Reduce your blood pressure    Improve your sense of self and your body image  Exercise tips      Ease into your routine. Set small goals. Then build on them. If you are not sure what your activity level should be, talk with your healthcare provider first before starting an exercise routine.    Exercise on most days. Aim for a total of 150 minutes (2 hours and 30 minutes) or more of moderate-intensity aerobic activity each week. Or 75 minutes (1 hour and 15 minutes) or more of vigorous-intensity aerobic activity each week. Or try for a combination of both. Moderate activity means that you breathe heavier and your heart rate increases but you can still talk. Think about doing 40 minutes of moderate exercise, 3 to 4 times a week. For best results, activity should last for about 40 minutes to lower blood pressure and cholesterol. It's OK to work up to the 40-minute period over time. Examples of moderate-intensity activity are walking 1 mile in 15 minutes. Or doing 30 to 45  minutes of yard work.    Step up your daily activity level.  Along with your exercise program, try being more active the whole day. Walk instead of drive. Or park further away so that you take more steps each day. Do more household tasks or yard work. You may not be able to meet the advised mount of physical activity. But doing some moderate- or vigorous-intensity aerobic activity can help reduce your risk for heart disease. Your healthcare provider can help you figure out what is best for you.    Choose 1 or more activities you enjoy.  Walking is one of the easiest things you can do. You can also try swimming, riding a bike, dancing, or taking an exercise class.    When to call your healthcare provider  Call your healthcare provider if you have any of these:     Chest pain or feel dizzy or lightheaded    Burning, tightness, pressure, or heaviness in your chest, neck, shoulders, back, or arms    Abnormal shortness of breath    More joint or muscle pain    A very fast or irregular heartbeat (palpitations)  [employer] last reviewed this educational content on 7/1/2019 2000-2021 [employer]. All rights reserved. This information is not intended as a substitute for professional medical care. Always follow your healthcare professional's instructions.          Understanding USDA MyPlate  The USDA has guidelines to help you make healthy food choices. These are called MyPlate. MyPlate shows the food groups that make up healthy meals using the image of a place setting. Before you eat, think about the healthiest choices for what to put on your plate or in your cup or bowl. To learn more about building a healthy plate, visit www.choosemyplate.gov.    The food groups    Fruits. Any fruit or 100% fruit juice counts as part of the Fruit Group. Fruits may be fresh, canned, frozen, or dried, and may be whole, cut-up, or pureed. Make 1/2 of your plate fruits and vegetables.    Vegetables. Any vegetable or 100%  vegetable juice counts as a member of the Vegetable Group. Vegetables may be fresh, frozen, canned, or dried. They can be served raw or cooked and may be whole, cut-up, or mashed. Make 1/2 of your plate fruits and vegetables.    Grains. All foods made from grains are part of the Grains Group. These include wheat, rice, oats, cornmeal, and barley. Grains are often used to make foods such as bread, pasta, oatmeal, cereal, tortillas, and grits. Grains should be no more than 1/4 of your plate. At least half of your grains should be whole grains.    Protein. This group includes meat, poultry, seafood, beans and peas, eggs, processed soy products (such as tofu), nuts (including nut butters), and seeds. Make protein choices no more than 1/4 of your plate. Meat and poultry choices should be lean or low fat.    Dairy. The Dairy Group includes all fluid milk products and foods made from milk that contain calcium, such as yogurt and cheese. (Foods that have little calcium, such as cream, butter, and cream cheese, are not part of this group.) Most dairy choices should be low-fat or fat-free.    Oils. Oils aren't a food group, but they do contain essential nutrients. However it's important to watch your intake of oils. These are fats that are liquid at room temperature. They include canola, corn, olive, soybean, vegetable, and sunflower oil. Foods that are mainly oil include mayonnaise, certain salad dressings, and soft margarines. You likely already get your daily oil allowance from the foods you eat.  Things to limit  Eating healthy also means limiting these things in your diet:       Salt (sodium). Many processed foods have a lot of sodium. To keep sodium intake down, eat fresh vegetables, meats, poultry, and seafood when possible. Purchase low-sodium, reduced-sodium, or no-salt-added food products at the store. And don't add salt to your meals at home. Instead, season them with herbs and spices such as dill, oregano, cumin,  and paprika. Or try adding flavor with lemon or lime zest and juice.    Saturated fat. Saturated fats are most often found in animal products such as beef, pork, and chicken. They are often solid at room temperature, such as butter. To reduce your saturated fat intake, choose leaner cuts of meat and poultry. And try healthier cooking methods such as grilling, broiling, roasting, or baking. For a simple lower-fat swap, use plain nonfat yogurt instead of mayonnaise when making potato salad or macaroni salad.    Added sugars. These are sugars added to foods. They are in foods such as ice cream, candy, soda, fruit drinks, sports drinks, energy drinks, cookies, pastries, jams, and syrups. Cut down on added sugars by sharing sweet treats with a family member or friend. You can also choose fruit for dessert, and drink water or other unsweetened beverages.     Note last reviewed this educational content on 6/1/2020 2000-2021 [employer]. All rights reserved. This information is not intended as a substitute for professional medical care. Always follow your healthcare professional's instructions.    I will help arrange a screening CT scan for lung cancer.  Radiology will reach out to you to help schedule this.  I will also arrange a visit with a therapist and you will be hearing from them by phone to schedule this as well.    Your Health Risk Assessment indicates you feel you are not in good emotional health.    Recreation   Recreation is not limited to sports and team events. It includes any activity that provides relaxation, interest, enjoyment, and exercise. Recreation provides an outlet for physical, mental, and social energy. It can give a sense of worth and achievement. It can help you stay healthy.    Mental Exercise and Social Involvement  Mental and emotional health is as important as physical health. Keep in touch with friends and family. Stay as active as possible. Continue to learn and  challenge yourself.   Things you can do to stay mentally active are:    Learn something new, like a foreign language or musical instrument.     Play SCRABBLE or do crossword puzzles. If you cannot find people to play these games with you at home, you can play them with others on your computer through the Internet.     Join a games club--anything from card games to chess or checkers or lawn bowling.     Start a new hobby.     Go back to school.     Volunteer.     Read.   Keep up with world events.     Lung Cancer Screening   Frequently Asked Questions  If you are at high-risk for lung cancer, getting screened with low-dose computed tomography (LDCT) every year can help save your life. This handout offers answers to some of the most common questions about lung cancer screening. If you have other questions, please call [phone] [[email or web address]]ancer ([phone]).     What is it?  Lung cancer screening uses special X-ray technology to create an image of your lung tissue. The exam is quick and easy and takes less than 10 seconds. We don t give you any medicine or use any needles. You can eat before and after the exam. You don t need to change your clothes as long as the clothing on your chest doesn t contain metal. But, you do need to be able to hold your breath for at least 6 seconds during the exam.    What is the goal of lung cancer screening?  The goal of lung cancer screening is to save lives. Many times, lung cancer is not found until a person starts having physical symptoms. Lung cancer screening can help detect lung cancer in the earliest stages when it may be easier to treat.    Who should be screened for lung cancer?  We suggest lung cancer screening for anyone who is at high-risk for lung cancer. You are in the high-risk group if you:      are between the ages of 55 and 79, and    have smoked at least 1 pack of cigarettes a day for 30 or more years, and    still smoke or have quit within the past 15 years.    However,  if you have a new cough or shortness of breath, you should talk to your doctor before being screened.    Some national lung health advocacy groups also recommend screening for people ages 50 to 79 who have smoked an average of 1 pack of cigarettes a day for 20 years. They must also have at least 1 other risk factor for lung cancer, not including exposure to secondhand smoke. Other risk factors are having had cancer in the past, emphysema, pulmonary fibrosis, COPD, a family history of lung cancer, or exposure to certain materials such as arsenic, asbestos, beryllium, cadmium, chromium, diesel fumes, nickel, radon or silica. Your care team can help you know if you have one of these risk factors.     Why does it matter if I have symptoms?  Certain symptoms can be a sign that you have a condition in your lungs that should be checked and treated by your doctor. These symptoms include fever, chest pain, a new or changing cough, shortness of breath that you have never felt before, coughing up blood or unexplained weight loss. Having any of these symptoms can greatly affect the results of lung cancer screening.       Should all smokers get an LDCT lung cancer screening exam?  It depends. Lung cancer screening is for a very specific group of men and women who have a history of heavy smoking over a long period of time (see  Who should be screened for lung cancer  above).  I am in the high-risk group, but have been diagnosed with cancer in the past. Is LDCT lung cancer screening right for me?  In some cases, you should not have LDCT lung screening, such as when your doctor is already following your cancer with CT scan studies. Your doctor will help you decide if LDCT lung screening is right for you.  Do I need to have a screening exam every year?  Yes. If you are in the high-risk group described earlier, you should get an LDCT lung cancer screening exam every year until you are 79, or are no longer willing or able to undergo  screening and possible procedures to diagnose and treat lung cancer.  How effective is LDCT at preventing death from lung cancer?  Studies have shown that LDCT lung cancer screening can lower the risk of death from lung cancer by 20 percent in people who are at high-risk.  What are the risks?  There are some risks and limitations of LDCT lung cancer screening. We want to make sure you understand the risks and benefits, so please let us know if you have any questions. Your doctor may want to talk with you more about these risks.    Radiation exposure: As with any exam that uses radiation, there is a very small increased risk of cancer. The amount of radiation in LDCT is small--about the same amount a person would get from a mammogram. Your doctor orders the exam when he or she feels the potential benefits outweigh the risks.    False negatives: No test is perfect, including LDCT. It is possible that you may have a medical condition, including lung cancer, that is not found during your exam. This is called a false negative result.    False positives and more testing: LDCT very often finds something in the lung that could be cancer, but in fact is not. This is called a false positive result. False positive tests often cause anxiety. To make sure these findings are not cancer, you may need to have more tests. These tests will be done only if you give us permission. Sometimes patients need a treatment that can have side effects, such as a biopsy. For more information on false positives, see  What can I expect from the results?     Findings not related to lung cancer: Your LDCT exam also takes pictures of areas of your body next to your lungs. In a very small number of cases, the CT scan will show an abnormal finding in one of these areas, such as your kidneys, adrenal glands, liver or thyroid. This finding may not be serious, but you may need more tests. Your doctor can help you decide what other tests you may need, if  any.  What can I expect from the results?  About 1 out of 4 LDCT exams will find something that may need more tests. Most of the time, these findings are lung nodules. Lung nodules are very small collections of tissue in the lung. These nodules are very common, and the vast majority--more than 97 percent--are not cancer (benign). Most are normal lymph nodes or small areas of scarring from past infections.  But, if a small lung nodule is found to be cancer, the cancer can be cured more than 90 percent of the time. To know if the nodule is cancer, we may need to get more images before your next yearly screening exam. If the nodule has suspicious features (for example, it is large, has an odd shape or grows over time), we will refer you to a specialist for further testing.  Will my doctor also get the results?  Yes. Your doctor will get a copy of your results.  Is it okay to keep smoking now that there s a cancer screening exam?  No. Tobacco is one of the strongest cancer-causing agents. It causes not only lung cancer, but other cancers and cardiovascular (heart) diseases as well. The damage caused by smoking builds over time. This means that the longer you smoke, the higher your risk of disease. While it is never too late to quit, the sooner you quit, the better.  Where can I find help to quit smoking?  The best way to prevent lung cancer is to stop smoking. If you have already quit smoking, congratulations and keep it up! For help on quitting smoking, please call [employer] at 8-759-655-NGES ([phone]) or the American [employer] Society at 1-675.948.9752 to find local resources near you.  One-on-one health coaching:  If you d prefer to work individually with a health care provider on tobacco cessation, we offer:      Medication Therapy Management:  Our specially trained pharmacists work closely with you and your doctor to help you quit smoking.  Call [phone] or [phone] (toll free).     Can Do: Health coaching offered  by [facility].  [email or web address]

## 2021-09-12 PROBLEM — R53.82 CHRONIC FATIGUE: Status: ACTIVE | Noted: 2021-09-12

## 2021-09-12 PROBLEM — R45.4 IRRITABILITY AND ANGER: Status: ACTIVE | Noted: 2021-09-12

## 2021-09-23 ENCOUNTER — LAB (OUTPATIENT)
Dept: LAB | Facility: CLINIC | Age: 71
End: 2021-09-23
Payer: MEDICARE

## 2021-09-23 ENCOUNTER — ANTICOAGULATION THERAPY VISIT (OUTPATIENT)
Dept: ANTICOAGULATION | Facility: CLINIC | Age: 71
End: 2021-09-23

## 2021-09-23 DIAGNOSIS — I26.99 PULMONARY EMBOLISM (H): ICD-10-CM

## 2021-09-23 DIAGNOSIS — Z86.711 HISTORY OF PULMONARY EMBOLISM: Primary | ICD-10-CM

## 2021-09-23 LAB — INR BLD: 2.6 (ref 0.9–1.1)

## 2021-09-23 PROCEDURE — 85610 PROTHROMBIN TIME: CPT | Performed by: FAMILY MEDICINE

## 2021-09-24 ENCOUNTER — HOSPITAL ENCOUNTER (OUTPATIENT)
Dept: CT IMAGING | Facility: HOSPITAL | Age: 71
Discharge: HOME | End: 2021-09-24
Attending: FAMILY MEDICINE | Admitting: FAMILY MEDICINE
Payer: MEDICARE

## 2021-09-24 ENCOUNTER — TELEPHONE (OUTPATIENT)
Dept: FAMILY MEDICINE | Facility: CLINIC | Age: 71
End: 2021-09-24

## 2021-09-24 DIAGNOSIS — F17.200 TOBACCO USE DISORDER: ICD-10-CM

## 2021-09-24 DIAGNOSIS — F17.210 CIGARETTE NICOTINE DEPENDENCE WITHOUT COMPLICATION: ICD-10-CM

## 2021-09-24 PROCEDURE — 71271 CT THORAX LUNG CANCER SCR C-: CPT | Mod: ME

## 2021-09-24 NOTE — TELEPHONE ENCOUNTER
----- Message from Miri Woodruff MD sent at 9/24/2021  9:46 AM CDT -----  Please call patient:  The chest CT does not show lung cancer.  It does show severe emphysema.  It is still important to continue efforts at quitting smoking.  We will discuss repeating this yearly.  ELLIOT Woodruff MD

## 2021-09-24 NOTE — TELEPHONE ENCOUNTER
Spoke to pt and relayed results. Patient states understanding. She was also requesting her lab results. I do not see any notes on the results from 9/9

## 2021-10-28 ENCOUNTER — ANTICOAGULATION THERAPY VISIT (OUTPATIENT)
Dept: ANTICOAGULATION | Facility: CLINIC | Age: 71
End: 2021-10-28

## 2021-10-28 ENCOUNTER — LAB (OUTPATIENT)
Dept: LAB | Facility: CLINIC | Age: 71
End: 2021-10-28
Payer: MEDICARE

## 2021-10-28 DIAGNOSIS — Z79.01 LONG TERM (CURRENT) USE OF ANTICOAGULANTS: ICD-10-CM

## 2021-10-28 DIAGNOSIS — Z86.711 HISTORY OF PULMONARY EMBOLISM: Primary | ICD-10-CM

## 2021-10-28 DIAGNOSIS — I26.99 PULMONARY EMBOLISM (H): ICD-10-CM

## 2021-10-28 LAB — INR BLD: 2.8 (ref 0.9–1.1)

## 2021-10-28 PROCEDURE — 36416 COLLJ CAPILLARY BLOOD SPEC: CPT

## 2021-10-28 PROCEDURE — 85610 PROTHROMBIN TIME: CPT

## 2021-10-28 NOTE — PROGRESS NOTES
ANTICOAGULATION MANAGEMENT     Leonela Christianson 71 year old female is on warfarin with therapeutic INR result. (Goal INR 2.0-3.0)    Recent labs: (last 7 days)     10/28/21  1330   INR 2.8*       ASSESSMENT     Source(s): Chart Review, Patient/Caregiver Call and Template       Warfarin doses taken: Less warfarin taken than planned which may be affecting INR    However, she reported taking correct warfarin dose.    Diet: No new diet changes identified    New illness, injury, or hospitalization: Yes   9/24/21 - chest CT does not show lung cancer.  It does show severe emphysema.   It is still important to continue efforts at quitting smoking.   Currently everyday smoker.    Medication/supplement changes: None noted    Signs or symptoms of bleeding or clotting: No    Previous INR: Therapeutic last 2(+) visits    Additional findings:  Yes.  Scheduled for Covid-19 booster shot on 11/5/21.     PLAN     Recommended plan for no diet, medication or health factor changes affecting INR     Dosing Instructions:  (3mg tabs)    Continue your current warfarin dose with next INR in 4 weeks       Summary  As of 10/28/2021    Full warfarin instructions:  6 mg every Tue, Fri; 3 mg all other days   Next INR check:  11/25/2021             Telephone call with Leonela ([phone]) who verbalizes understanding and agrees to plan     Lab visit scheduled - INR on 12/2/21 @ [facility]    Education provided: Importance of consistent vitamin K intake and Goal range and significance of current result    Plan made per [facility] anticoagulation protocol    Jeanne Olsen, RN  Anticoagulation Clinic  10/28/2021    _______________________________________________________________________     Anticoagulation Episode Summary     Current INR goal:  2.0-3.0   TTR:  94.8 % (1 y)   Target end date:  Indefinite   Send INR reminders to:  COL[facility] WALDRON    Indications    History of pulmonary embolism [Z86.711]  Long term (current) use of anticoagulants  [Z79.01]           Comments:           Anticoagulation Care Providers     Provider Role Specialty Phone number    Miri Woodruff MD Referring Family Medicine [phone]

## 2021-10-28 NOTE — PROGRESS NOTES
Anticoagulation Management    Unable to reach Leonela today.    Today's INR result of 2.8 is therapeutic (goal INR of 2.0-3.0).  Result received from: Clinic Lab    Follow up required to confirm warfarin dose taken and assess for changes    - wrote wrong warfarin dosing on template.    Left message to continue current dose of warfarin 3 mg tonight.      Anticoagulation clinic to follow up    Jeanne Olsen RN

## 2021-11-05 ENCOUNTER — IMMUNIZATION (OUTPATIENT)
Dept: NURSING | Facility: CLINIC | Age: 71
End: 2021-11-05
Payer: MEDICARE

## 2021-11-05 PROCEDURE — 0004A PR COVID VAC PFIZER DIL RECON 30 MCG/0.3 ML IM: CPT

## 2021-11-05 PROCEDURE — 91300 PR COVID VAC PFIZER DIL RECON 30 MCG/0.3 ML IM: CPT

## 2021-12-02 ENCOUNTER — ANTICOAGULATION THERAPY VISIT (OUTPATIENT)
Dept: ANTICOAGULATION | Facility: CLINIC | Age: 71
End: 2021-12-02

## 2021-12-02 ENCOUNTER — LAB (OUTPATIENT)
Dept: LAB | Facility: CLINIC | Age: 71
End: 2021-12-02
Payer: MEDICARE

## 2021-12-02 DIAGNOSIS — I26.99 PULMONARY EMBOLISM (H): ICD-10-CM

## 2021-12-02 DIAGNOSIS — Z79.01 LONG TERM (CURRENT) USE OF ANTICOAGULANTS: ICD-10-CM

## 2021-12-02 DIAGNOSIS — Z86.711 HISTORY OF PULMONARY EMBOLISM: Primary | ICD-10-CM

## 2021-12-02 LAB — INR BLD: 2.5 (ref 0.9–1.1)

## 2021-12-02 PROCEDURE — 85610 PROTHROMBIN TIME: CPT

## 2021-12-02 PROCEDURE — 36416 COLLJ CAPILLARY BLOOD SPEC: CPT

## 2021-12-02 NOTE — PROGRESS NOTES
ANTICOAGULATION MANAGEMENT     Leonela Christianson 71 year old female is on warfarin with therapeutic INR result. (Goal INR 2.0-3.0)    Recent labs: (last 7 days)     12/02/21  0745   INR 2.5*       ASSESSMENT     Source(s): Chart Review, Patient/Caregiver Call and Template       Warfarin doses taken: Warfarin taken as instructed    Diet: No new diet changes identified    New illness, injury, or hospitalization: No    Medication/supplement changes: None noted    Signs or symptoms of bleeding or clotting: No    Previous INR: Therapeutic last 2(+) visits    Additional findings: None     PLAN     Recommended plan for no diet, medication or health factor changes affecting INR     Dosing Instructions: Continue your current warfarin dose with next INR in 6 weeks       Summary  As of 12/2/2021    Full warfarin instructions:  6 mg every Tue, Fri; 3 mg all other days   Next INR check:  1/13/2022             Spoke with Leonela on telephone and she was given dosing instructions and follow up date.     Lab visit scheduled    Education provided: Goal range and significance of current result and Importance of therapeutic range    Plan made per [facility] anticoagulation protocol    Bradly Shepard RN  Anticoagulation Clinic  12/2/2021    _______________________________________________________________________     Anticoagulation Episode Summary     Current INR goal:  2.0-3.0   TTR:  94.8 % (1 y)   Target end date:  Indefinite   Send INR reminders to:  [facility]    Indications    History of pulmonary embolism [Z86.711]  Long term (current) use of anticoagulants [Z79.01]           Comments:           Anticoagulation Care Providers     Provider Role Specialty Phone number    Miri Woodruff MD Referring Family Medicine [phone]

## 2022-01-13 ENCOUNTER — NURSE TRIAGE (OUTPATIENT)
Dept: NURSING | Facility: CLINIC | Age: 72
End: 2022-01-13

## 2022-01-13 ENCOUNTER — LAB (OUTPATIENT)
Dept: LAB | Facility: CLINIC | Age: 72
End: 2022-01-13
Payer: MEDICARE

## 2022-01-13 ENCOUNTER — TELEPHONE (OUTPATIENT)
Dept: LAB | Facility: CLINIC | Age: 72
End: 2022-01-13

## 2022-01-13 ENCOUNTER — ANTICOAGULATION THERAPY VISIT (OUTPATIENT)
Dept: ANTICOAGULATION | Facility: CLINIC | Age: 72
End: 2022-01-13

## 2022-01-13 DIAGNOSIS — Z79.01 LONG TERM (CURRENT) USE OF ANTICOAGULANTS: ICD-10-CM

## 2022-01-13 DIAGNOSIS — I26.99 PULMONARY EMBOLISM (H): ICD-10-CM

## 2022-01-13 DIAGNOSIS — Z20.822 EXPOSURE TO 2019 NOVEL CORONAVIRUS: Primary | ICD-10-CM

## 2022-01-13 DIAGNOSIS — Z53.9 ERRONEOUS ENCOUNTER--DISREGARD: Primary | ICD-10-CM

## 2022-01-13 DIAGNOSIS — Z86.711 HISTORY OF PULMONARY EMBOLISM: Primary | ICD-10-CM

## 2022-01-13 LAB
INR BLD: 1.9 (ref 0.9–1.1)
SARS-COV-2 RNA RESP QL NAA+PROBE: POSITIVE

## 2022-01-13 PROCEDURE — U0005 INFEC AGEN DETEC AMPLI PROBE: HCPCS

## 2022-01-13 PROCEDURE — U0003 INFECTIOUS AGENT DETECTION BY NUCLEIC ACID (DNA OR RNA); SEVERE ACUTE RESPIRATORY SYNDROME CORONAVIRUS 2 (SARS-COV-2) (CORONAVIRUS DISEASE [COVID-19]), AMPLIFIED PROBE TECHNIQUE, MAKING USE OF HIGH THROUGHPUT TECHNOLOGIES AS DESCRIBED BY CMS-2020-01-R: HCPCS

## 2022-01-13 PROCEDURE — 36416 COLLJ CAPILLARY BLOOD SPEC: CPT

## 2022-01-13 PROCEDURE — 85610 PROTHROMBIN TIME: CPT

## 2022-01-13 NOTE — PROGRESS NOTES
ANTICOAGULATION MANAGEMENT     Leonela Christianson 71 year old female is on warfarin with subtherapeutic INR result. (Goal INR 2.0-3.0)    Recent labs: (last 7 days)     01/13/22  0742   INR 1.9*       ASSESSMENT     Source(s): Chart Review and Patient/Caregiver Call       Warfarin doses taken: Warfarin taken as instructed    Diet: Increased greens/vitamin K in diet; plans to resume previous intake    - reported eating more Vitamin-K foods during the Holidays.    New illness, injury, or hospitalization: No    Medication/supplement changes: None noted    Signs or symptoms of bleeding or clotting: No    Previous INR: Therapeutic last 5 visits.    Additional findings: None     PLAN     Recommended plan for temporary change(s) affecting INR     Dosing Instructions:   (Takes @ 11a.  3mg tabs)   - Already took her 3mg warfarin dose this morning.   - Continue your current warfarin dose with next INR in 2-3 weeks       Summary  As of 1/13/2022    Full warfarin instructions:  6 mg every Tue, Fri; 3 mg all other days   Next INR check:  2/3/2022             Telephone call with  Leonela ([phone]) who verbalizes understanding and agrees to plan    Lab visit scheduled - INR on 2/3/22 @ [facility].   - wanted to come in 3 wks. Not 2 wks.    Education provided: Importance of consistent vitamin K intake, Impact of vitamin K foods on INR, Goal range and significance of current result, Monitoring for clotting signs and symptoms and When to seek medical attention/emergency care    Plan made per [facility] anticoagulation protocol    Jeanne Olsen, RN  Anticoagulation Clinic  1/13/2022    _______________________________________________________________________     Anticoagulation Episode Summary     Current INR goal:  2.0-3.0   TTR:  92.9 % (1 y)   Target end date:  Indefinite   Send INR reminders to:  [facility]    Indications    History of pulmonary embolism [Z86.711]  Long term (current) use of anticoagulants [Z79.01]            Comments:           Anticoagulation Care Providers     Provider Role Specialty Phone number    Miri Woodruff MD Referring Family Medicine [phone]

## 2022-01-13 NOTE — TELEPHONE ENCOUNTER
"Coronavirus (COVID-19) Notification    Caller Name (Patient, parent, daughter/son, grandparent, etc)  Coronavirus (COVID-19) Notification    Caller Name (Patient, parent, daughter/son, grandparent, etc)  The patient      Reason for call  Notify of Positive Coronavirus (COVID-19) lab results, assess symptoms,  review [facility] recommendations    Lab Result    Lab test:  2019-nCoV rRt-PCR or SARS-CoV-2 PCR    Oropharyngeal AND/OR nasopharyngeal swabs is POSITIVE for 2019-nCoV RNA/SARS-COV-2 PCR (COVID-19 virus)    RN Recommendations/Instructions per [facility] Coronavirus COVID-19 recommendations    Brief introduction script  Introduce self then review script:  \"I am calling on behalf of [employer].  We were notified that your Coronavirus test (COVID-19) for was POSITIVE for the virus.  I have some information to relay to you but first I wanted to mention that the [place] Dept of Health will be contacting you shortly [it's possible MD already called Patient] to talk to you more about how you are feeling and other people you have had contact with who might now also have the virus.  Also, [facility] is Partnering with the [facility] for Covid-19 research, you may be contacted directly by research staff.\"    Assessment (Inquire about Patient's current symptoms)   Assessment   Current Symptoms at time of phone call: (if no symptoms, document No symptoms] None   Symptoms onset (if applicable) 4 days ago     If at time of call, Patients symptoms hare worsened, the Patient should contact 911 or have someone drive them to Emergency Dept promptly:      If Patient calling 911, inform 911 personal that you have tested positive for the Coronavirus (COVID-19).  Place mask on and await 911 to arrive.    If Emergency Dept, If possible, please have another adult drive you to the Emergency Dept but you need to wear mask when in contact with other people.      Monoclonal Antibody Administration    You may " be eligible to receive a new treatment with a monoclonal antibody for preventing hospitalization in patients at high risk for complications from COVID-19.   This medication is still experimental and available on a limited basis; it is given through an IV and must be given at an infusion center. Please note that not all people who are eligible will receive the medication since it is in limited supply.     Are you interested in being considered for this medication?  No.   Does the patient fit the criteria: Patient declined    Review information with Patient    Your result was positive. This means you have COVID-19 (coronavirus).  We have sent you a letter that reviews the information that I'll be reviewing with you now.    How can I protect others?    If you have symptoms: stay home and away from others (self-isolate) until:    You've had no fever--and no medicine that reduces fever--for 1 full day (24 hours). And       Your other symptoms have gotten better. For example, your cough or breathing has improved. And     At least 10 days have passed since your symptoms started. (If you've been told by a doctor that you have a weak immune system, wait 20 days.)     If you don't have symptoms: Stay home and away from others (self-isolate) until at least 10 days have passed since your first positive COVID-19 test. (Date test collected)    During this time:    Stay in your own room, including for meals. Use your own bathroom if you can.    Stay away from others in your home. No hugging, kissing or shaking hands. No visitors.     Don't go to work, school or anywhere else.     Clean  high touch  surfaces often (doorknobs, counters, handles, etc.). Use a household cleaning spray or wipes. You'll find a full list on the EPA website at www.epa.gov/pesticide-registration/list-n-disinfectants-use-against-sars-cov-2.     Cover your mouth and nose with a mask, tissue or other face covering to avoid spreading germs.    Wash your hands  and face often with soap and water.    Make a list of people you have been in close contact with recently, even if either of you wore a face covering.   - Start your list from 2 days before you became ill or had a positive test.  - Include anyone that was within 6 feet of you for a cumulative total of 15 minutes or more in 24 hours. (Example: if you sat next to Yonis for 5 minutes in the morning and 10 minutes in the afternoon, then you were in close contact for 15 minutes total that day. Yonis would be added to your list.)    A public health worker will call or text you. It is important that you answer. They will ask you questions about possible exposures to COVID-19, such as people you have been in direct contact with and places you have visited.    Tell the people on your list that you have COVID-19; they should stay away from others for 14 days starting from the last time they were in contact with you (unless you are told something different from a public health worker).     Caregivers in these groups are at risk for severe illness due to COVID-19:  o People 65 years and older  o People who live in a nursing home or long-term care facility  o People with chronic disease (lung, heart, cancer, diabetes, kidney, liver, immunologic)  o People who have a weakened immune system, including those who:  - Are in cancer treatment  - Take medicine that weakens the immune system, such as corticosteroids  - Had a bone marrow or organ transplant  - Have an immune deficiency  - Have poorly controlled HIV or AIDS  - Are obese (body mass index of 40 or higher)  - Smoke regularly    Caregivers should wear gloves while washing dishes, handling laundry and cleaning bedrooms and bathrooms.    Wash and dry laundry with special caution. Don't shake dirty laundry, and use the warmest water setting you can.    If you have a weakened immune system, ask your doctor about other actions you should take.    For more tips, go to  www.cdc.gov/coronavirus/2019-ncov/downloads/10Things.pdf.    You should not go back to work until you meet the guidelines above for ending your home isolation. You don't need to be retested for COVID-19 before going back to work--studies show that you won't spread the virus if it's been at least 10 days since your symptoms started (or 20 days, if you have a weak immune system).    Employers: This document serves as formal notice of your employee's medical guidelines for going back to work. They must meet the above guidelines before going back to work in person.    How can I take care of myself?    1. Get lots of rest. Drink extra fluids (unless a doctor has told you not to).    2. Take Tylenol (acetaminophen) for fever or pain. If you have liver or kidney problems, ask your family doctor if it's okay to take Tylenol.     Take either:     650 mg (two 325 mg pills) every 4 to 6 hours, or     1,000 mg (two 500 mg pills) every 8 hours as needed.     Note: Don't take more than 3,000 mg in one day. Acetaminophen is found in many medicines (both prescribed and over-the-counter medicines). Read all labels to be sure you don't take too much.    For children, check the Tylenol bottle for the right dose (based on their age or weight).    3. If you have other health problems (like cancer, heart failure, an organ transplant or severe kidney disease): Call your specialty clinic if you don't feel better in the next 2 days.    4. Know when to call 911: Emergency warning signs include:    Trouble breathing or shortness of breath    Pain or pressure in the chest that doesn't go away    Feeling confused like you haven't felt before, or not being able to wake up    Bluish-colored lips or face    5. Sign up for [employer]. We know it's scary to hear that you have COVID-19. We want to track your symptoms to make sure you're okay over the next 2 weeks. Please look for an email from [employer]--this is a free, online program that we'll  "use to keep in touch. To sign up, follow the link in the email. Learn more at [email or web address].    Where can I get more information?    [facility]: [email or web address]    Coronavirus Basics: www.health.[facility].[place]/diseases/coronavirus/basics.html    What to Do If You're Sick: www.cdc.gov/coronavirus/2019-ncov/about/steps-when-sick.html    Ending Home Isolation: www.cdc.gov/coronavirus/2019-ncov/hcp/disposition-in-home-patients.html     Caring for Someone with COVID-19: www.cdc.gov/coronavirus/2019-ncov/if-you-are-sick/care-for-someone.html     [facility] clinical trials (COVID-19 research studies): clinicalaffairs.[facility]-clinical-trials     A Positive COVID-19 letter will be sent via [employer] or the mail. (Exception, no letters sent to Presurgerical/Preprocedure Patients)    Maria Castellano LPN      Reason for call  Notify of Positive Coronavirus (COVID-19) lab results, assess symptoms,  review [facility] recommendations    Lab Result    Lab test:  2019-nCoV rRt-PCR or SARS-CoV-2 PCR    Oropharyngeal AND/OR nasopharyngeal swabs is POSITIVE for 2019-nCoV RNA/SARS-COV-2 PCR (COVID-19 virus)    RN Recommendations/Instructions per [facility] Coronavirus COVID-19 recommendations    Brief introduction script  Introduce self then review script:  \"I am calling on behalf of [employer].  We were notified that your Coronavirus test (COVID-19) for was POSITIVE for the virus.  I have some information to relay to you but first I wanted to mention that the [place] Dept of Health will be contacting you shortly [it's possible MD already called Patient] to talk to you more about how you are feeling and other people you have had contact with who might now also have the virus.  Also, [facility] is Partnering with the [facility] for Covid-19 research, you may be contacted directly by research staff.\"    Assessment (Inquire about Patient's current symptoms)   " Assessment   Current Symptoms at time of phone call: (if no symptoms, document No symptoms] None   Symptoms onset (if applicable) 4 days ago     If at time of call, Patients symptoms hare worsened, the Patient should contact 911 or have someone drive them to Emergency Dept promptly:      If Patient calling 911, inform 911 personal that you have tested positive for the Coronavirus (COVID-19).  Place mask on and await 911 to arrive.    If Emergency Dept, If possible, please have another adult drive you to the Emergency Dept but you need to wear mask when in contact with other people.      Monoclonal Antibody Administration    You may be eligible to receive a new treatment with a monoclonal antibody for preventing hospitalization in patients at high risk for complications from COVID-19.   This medication is still experimental and available on a limited basis; it is given through an IV and must be given at an infusion center. Please note that not all people who are eligible will receive the medication since it is in limited supply.     Are you interested in being considered for this medication?  No.   Does the patient fit the criteria: Patient declined    Review information with Patient    Your result was positive. This means you have COVID-19 (coronavirus).  We have sent you a letter that reviews the information that I'll be reviewing with you now.    How can I protect others?    If you have symptoms: stay home and away from others (self-isolate) until:    You've had no fever--and no medicine that reduces fever--for 1 full day (24 hours). And       Your other symptoms have gotten better. For example, your cough or breathing has improved. And     At least 10 days have passed since your symptoms started. (If you've been told by a doctor that you have a weak immune system, wait 20 days.)     If you don't have symptoms: Stay home and away from others (self-isolate) until at least 10 days have passed since your first  positive COVID-19 test. (Date test collected)    During this time:    Stay in your own room, including for meals. Use your own bathroom if you can.    Stay away from others in your home. No hugging, kissing or shaking hands. No visitors.     Don't go to work, school or anywhere else.     Clean  high touch  surfaces often (doorknobs, counters, handles, etc.). Use a household cleaning spray or wipes. You'll find a full list on the EPA website at www.epa.gov/pesticide-registration/list-n-disinfectants-use-against-sars-cov-2.     Cover your mouth and nose with a mask, tissue or other face covering to avoid spreading germs.    Wash your hands and face often with soap and water.    Make a list of people you have been in close contact with recently, even if either of you wore a face covering.   - Start your list from 2 days before you became ill or had a positive test.  - Include anyone that was within 6 feet of you for a cumulative total of 15 minutes or more in 24 hours. (Example: if you sat next to Yonis for 5 minutes in the morning and 10 minutes in the afternoon, then you were in close contact for 15 minutes total that day. Yonis would be added to your list.)    A public health worker will call or text you. It is important that you answer. They will ask you questions about possible exposures to COVID-19, such as people you have been in direct contact with and places you have visited.    Tell the people on your list that you have COVID-19; they should stay away from others for 14 days starting from the last time they were in contact with you (unless you are told something different from a public health worker).     Caregivers in these groups are at risk for severe illness due to COVID-19:  o People 65 years and older  o People who live in a nursing home or long-term care facility  o People with chronic disease (lung, heart, cancer, diabetes, kidney, liver, immunologic)  o People who have a weakened immune system,  including those who:  - Are in cancer treatment  - Take medicine that weakens the immune system, such as corticosteroids  - Had a bone marrow or organ transplant  - Have an immune deficiency  - Have poorly controlled HIV or AIDS  - Are obese (body mass index of 40 or higher)  - Smoke regularly    Caregivers should wear gloves while washing dishes, handling laundry and cleaning bedrooms and bathrooms.    Wash and dry laundry with special caution. Don't shake dirty laundry, and use the warmest water setting you can.    If you have a weakened immune system, ask your doctor about other actions you should take.    For more tips, go to www.cdc.gov/coronavirus/2019-ncov/downloads/10Things.pdf.    You should not go back to work until you meet the guidelines above for ending your home isolation. You don't need to be retested for COVID-19 before going back to work--studies show that you won't spread the virus if it's been at least 10 days since your symptoms started (or 20 days, if you have a weak immune system).    Employers: This document serves as formal notice of your employee's medical guidelines for going back to work. They must meet the above guidelines before going back to work in person.    How can I take care of myself?    6. Get lots of rest. Drink extra fluids (unless a doctor has told you not to).    7. Take Tylenol (acetaminophen) for fever or pain. If you have liver or kidney problems, ask your family doctor if it's okay to take Tylenol.     Take either:     650 mg (two 325 mg pills) every 4 to 6 hours, or     1,000 mg (two 500 mg pills) every 8 hours as needed.     Note: Don't take more than 3,000 mg in one day. Acetaminophen is found in many medicines (both prescribed and over-the-counter medicines). Read all labels to be sure you don't take too much.    For children, check the Tylenol bottle for the right dose (based on their age or weight).    8. If you have other health problems (like cancer, heart  failure, an organ transplant or severe kidney disease): Call your specialty clinic if you don't feel better in the next 2 days.    9. Know when to call 911: Emergency warning signs include:    Trouble breathing or shortness of breath    Pain or pressure in the chest that doesn't go away    Feeling confused like you haven't felt before, or not being able to wake up    Bluish-colored lips or face    10. Sign up for [email or web address]. We know it's scary to hear that you have COVID-19. We want to track your symptoms to make sure you're okay over the next 2 weeks. Please look for an email from [email or web address]--this is a free, online program that we'll use to keep in touch. To sign up, follow the link in the email. Learn more at [email or web address].    Where can I get more information?    [facility]: [email or web address]    Coronavirus Basics: www.health.[facility].[place]./diseases/coronavirus/basics.html    What to Do If You're Sick: www.cdc.gov/coronavirus/2019-ncov/about/steps-when-sick.html    Ending Home Isolation: www.cdc.gov/coronavirus/2019-ncov/hcp/disposition-in-home-patients.html     Caring for Someone with COVID-19: www.cdc.gov/coronavirus/2019-ncov/if-you-are-sick/care-for-someone.html     [facility] clinical trials (COVID-19 research studies): clinicalaffairs.[facility]/n-clinical-trials     A Positive COVID-19 letter will be sent via [employer] or the mail. (Exception, no letters sent to Presurgerical/Preprocedure Patients)    Maria Castellano LPN

## 2022-01-13 NOTE — TELEPHONE ENCOUNTER
Patient took 2 losartan pills instead of 1.  Patient will call poison control center to ask if there is anything she should do.  Toll free number given to patient.    Ashly Pickett RN   01/13/22 5:29 PM  [facility] Nurse Advisor    Reason for Disposition    MORE THAN A DOUBLE DOSE of a prescription or over-the-counter (OTC) drug    Additional Information    Negative: Drug overdose and triager unable to answer question    Negative: Caller requesting information unrelated to medicine    Negative: Caller requesting a prescription for Strep throat and has a positive culture result    Negative: Rash while taking a medication or within 3 days of stopping it    Negative: Immunization reaction suspected    Negative: [1] Asthma and [2] having symptoms of asthma (cough, wheezing, etc.)    Negative: [1] Influenza symptoms AND [2] anti-viral med prescription request, such as Tamiflu    Negative: [1] Symptom of illness (e.g., headache, abdominal pain, earache, vomiting) AND [2] more than mild    Protocols used: MEDICATION QUESTION CALL-A-

## 2022-01-28 NOTE — TELEPHONE ENCOUNTER
Your Child's Health  Six-Month-Old Visit      Gem Hernandez  January 28, 2022    Visit Vitals  Pulse 136   Temp 98.7 °F (37.1 °C) (Temporal)   Resp 36   Ht 26.25\" (66.7 cm)   Wt 7.116 kg (15 lb 11 oz)   HC 43.3 cm (17.03\")   BMI 16.01 kg/m²     Weight: 15.69 lbs    YOUR CHILD'S 6 MONTH OLD VISIT    Key points at this age…    • Car seat safety is critical! Make sure your baby is safely and properly riding in a rear-facing car seat.    • Continue to breast feed your baby at this age if you are able. If breastfeeding, you will need to make sure they are getting some extra iron by this age.     • Thinking about safety in your home is essential as your baby will be rolling, scooting and crawling in the next few months.    NUTRITION:    • Continue to breastfeed if you can--it is still the best choice until one year of age. If you are not breastfeeding, your baby should stay on iron-fortified formula until one year of age.  •  babies may need a little extra iron after age 4 to 6 months. Usually starting some baby foods (especially infant cereals and strained meats which are high in iron) can meet this iron need. Two servings of one of these iron rich foods will typically meet daily iron needs. (Switching from a pure vitamin D supplement to one with iron OR a multivitamin with iron drop can also ensure your baby is getting enough iron, but most full term babies will be able to get enough iron as they start food. It is, however, important to continue a vitamin D supplement for as long as you are primarily breastfeeding.)   • Your baby may be ready for finger foods soon. Offer small bits of soft foods (nothing much bigger than a Cheerio ®) to avoid choking.  • Foods that are important to avoid completely in the first year of life are chunky things that your baby could choke on (like grapes, popcorn, nuts, hot dogs, raw carrots or celery) and honey (which has very rarely been associated with a dangerous  ANTICOAGULATION  MANAGEMENT    Assessment     Today's INR result of 2.8 is Therapeutic (goal INR of 2.0-3.0)        Warfarin taken as previously instructed    No new diet changes affecting INR    No new medication/supplements affecting INR    Continues to tolerate warfarin with no reported s/s of bleeding or thromboembolism     Previous INR was Subtherapeutic    Plan:     Spoke with Leonela regarding INR result and instructed:     Warfarin Dosing Instructions:  Continue current warfarin dose 6 mg daily on Mon, Wed, Fri; and 3 mg daily rest of week  (0 % change)    Instructed patient to follow up no later than: 3 weeks    Education provided: target INR goal and significance of current INR result, importance of following up for INR monitoring at instructed interval, importance of taking warfarin as instructed, importance of notifying clinic for changes in medications and importance of notifying clinic for diarrhea, nausea/vomiting, reduced intake and/or illness    Leonela verbalizes understanding and agrees to warfarin dosing plan.    Instructed to call the [facility] for any changes, questions or concerns. (#[phone])   ?   Jenny Stanley RN    Subjective/Objective:      Leonela Christianson, a 69 y.o. female is on warfarin.     Leonela reports:     Home warfarin dose: verbally confirmed home dose with Leonela and updated on anticoagulation calendar     Missed doses: No     Medication changes:  No     S/S of bleeding or thromboembolism:  No     New Injury or illness:  No     Changes in diet or alcohol consumption:  No     Upcoming surgery, procedure or cardioversion:  No    Anticoagulation Episode Summary     Current INR goal:   2.0-3.0   TTR:   78.7 % (1 y)   Next INR check:   5/27/2020   INR from last check:   2.80 (5/6/2020)   Weekly max warfarin dose:      Target end date:      INR check location:      Preferred lab:      Send INR reminders to:   [facility]    Indications    History of pulmonary embolism  [Z69.975]           Comments:            Anticoagulation Care Providers     Provider Role Specialty Phone number    Miri Woodruff MD Referring Family Medicine [phone]         infection).    Recommendations for offering babies what were once considered potential “allergenic foods” (meaning they might increase the risk of food allergies) have changed. Careful scientific studies have shown that children are NOT more likely to develop food allergies when they are exposed to these foods early. Once your baby is tolerating some of the more traditional ‘first foods’ (cereals, veggies, strained meats, fruits, etc.), it is okay to try some of the foods that were previously avoided in the first year (or longer). Specifically, after 4 to 6 months of age, it is safe to offer:  o Small amounts of cheese and yogurt (but still wait until one year to offer cow’s milk for them to drink)  o Small amounts of egg, either cooked (scrambled, hard boiled, etc.) or in baked goods  o Small amounts of nut butters (peanut butter, tree nut butter). However, you should NOT offer nut products IF THERE IS A SIBLING WITH A PEANUT ALLERGY. (If that is the case, talk to your doctor about whether an allergy evaluation is recommended.) Do not offer whole nuts--your baby could choke on these.   o Small amounts of fish- salmon, shrimp and canned light tuna are generally safe choices to feed infants and children 1 to 2 times per week. Avoid fish that may contain high levels of mercury (shark, swordfish, herson mackerel).  Search online for “eating fish in [place]” for information about choosing fish. Another source for questions is the Center for Food Safety: 1-567.255.5214.    Juice is NOT an important part of your baby’s diet. Juice has a large amount of added sugar which is bad for baby’s teeth and may put them at risk for becoming overweight. Eating fruit is much healthier than drinking it! The American Academy of Pediatrics recommends that children under one year of age should NOT drink any juice because of these potential health problems.       DENTAL CARE:   • Most babies start teething between 4 and 9 months (but up  to 12 months is normal). Teething children may have no symptoms at all, or they may experience drooling, crabbiness, or changes in eating. Babies may seem warm when they are fussing, but teething does not cause true fevers--if your teething baby has a fever, it is probably due to some other cause.   • Babies should have their gums wiped clean with a clean cloth 1 to 2 times a day. As soon as teeth erupt, they should be brushed twice a day with a tiny smear (the size of a grain of rice!) of regular (fluoridated) toothpaste.  • Fluoride is very important. The easiest way to get this is to make sure your baby gets some tap water (in their formula preparation or in cooking). If you have well water, you should have it tested for fluoride content to determine whether your child might need fluoride supplements.   • Don’t share utensils or clean things off in your mouth before putting them in your baby’s mouth. This transfers germs to their mouth and can increase their risk of decay.     DEVELOPMENT / BEHAVIOR: Your baby will soon be sitting up by themselves. And in the next few months, most babies will start getting onto their hands and knees, followed by rocking back and forth, and then crawling (some babies crawl backward before they go forward and some never crawl until after they walk!).  They enjoy babbling in a back and forth “conversation” with you and will soon be repeating sounds (\"anne marie,\" \"mama,\" or \"baba\").  At this age, a baby grabs everything, and it all goes into their mouth, so you should be very careful to protect your baby from choking and poisoning. Some babies at this age can sleep all night, often ten to twelve hours.  Most babies take at least one nap a day, but some don't nap at all.    Read books! Even if it is only for a few minutes at a time, reading will benefit your child’s language skills and brain development. Skip the TV and videos (they have not been proven to help anything!) and  a  book instead. If you show a lot of interest in your cell phone, your baby probably will also-- so try to minimize your phone use when interacting with your baby.       ACCIDENT PREVENTION:  1.  Falls:  Use hudson on stairways and at the entrances to rooms that cannot be child-proofed.  2.  Burns: Prevent scald burns by adjusting your hot-water heater temperature to 120-125°F. Since your baby will be moving around on their own soon, make sure to block them from any access to hot items or surfaces (irons, radiators, ovens). Make sure smoke detectors and carbon monoxide detectors are installed and properly working.   3.  Walker injuries:  Walkers are actually dangerous for babies (because of the risk of falling); they are not recommended at any age. Stationary saucers and/or jumpers are safer, but it is important to make sure your baby is appropriately strapped in (and always supervised!).  4.  Electrical shocks:  Protect Gem from access to electrical cords and outlets by using safety plugs in sockets and blocking outlets with furniture whenever possible.  5.  Poisoning:  Remove the following items from reach or place them up high out of reach or in a locked cabinet:     Batteries (button batteries, in particular, are VERY dangerous if swallowed)  Cleaning products     Kerosene (lamp oil)     Paint     Pesticides     Purses (because they often contain medicines and choking hazards)     Plants      Alcohol   Medicines, including vitamins. Always keep and use the original safety caps that come with medications; do not transfer medicines to non-child-proofed or unlabeled containers. Be especially careful when around older persons (either in their home or in yours) because they may be in the habit of keeping their medicines in open view or in non-childproofed pill containers.     Call the Poison Center at 1-520.253.6676 for any known or suspected poisoning. (Put this number in your cell phone if it’s not already  there!)      CAR SAFETY:  An approved rear-facing car seat is required by [place] law for Gem. It should always be in the back-seat. If you are not sure the car seat is installed or adjusted correctly, a couple helpful web sites are www.safercar.gov and the [place] DOT website (search for “car seats”). Remember that the seat straps need to be very snug to protect your baby in case of an accident (so no thick coats or snowsuits while riding in the car during the winter!). Use your child's car seat even for short trips (most accidents occur close to home!) and don't forget to wear your own seat belt.      SMOKING:    Do not let anyone smoke around your baby in the house OR in the car.   Exposure to cigarette smoke will increase your baby’s risk of SIDS (crib death), asthma, ear infections, and respiratory infections (pneumonia).    SUN AND WATER SAFETY: Try to protect your baby from being in direct sun as much as you can. Always try to keep them in the shade and use protective clothing (including hats and sunglasses) when you are out. After age 6 months, it is safe to use infant sunscreen (choose one with an SPF of 15 or higher). Apply carefully according to directions, and always apply at least 20 to 30 minutes before going out in the sun. Also, never leave your baby alone in or around water (pool, sink, tub), even for a moment.  Wearing life jackets and “swimmies” will not protect your baby from drowning!     LEAD EXPOSURE:  If there is any lead in your home your baby will be more at risk for lead poisoning in the next few months since they will be moving around more on their own. Lead poisoning can have a harmful and irreversible effect on your child’s behavior, intelligence and learning potential, so it is very important to prevent and screen for lead exposure. Let us know if any of the following apply:  1.  Your home (or any place that your baby spends time) was built before 1978 and has peeling or chipping  paint. If you live in an older home, find out if it has lead pipes.   2.  There is another child in your home being followed or treated for a high lead level.   3.  Someone in your home (or another caretaker for Gem) has a job or hobby involving lead (soldering, battery plants, recycling, casting, stained glass, etc.).  4.  Lead can also be found in pottery, pewter, and folk medicines.    If you have questions about older neighborhoods in [place] that might have lead connecting (or service) pipes, do an internet search for \"[place] lead awareness and drinking water safety\". From this web page, you can search for your address to find out if your home was built with lead service lines. There are also helpful hints regarding water safety on this site.     MEDICATION FOR FEVER OR PAIN:   Acetaminophen liquid (e.g., Tylenol or Tempra) may be given every four hours as needed for pain or fever.  Be sure to check which product CONCENTRATION you are using.    INFANT/CHILDREN’S Tylenol/Acetaminophen  (160 MG/5 mL)  Child’s Weight: Dose:  12 - 17 pounds:   80 mg (2.5 mL  (1/2 Teaspoon))  18 - 23 pounds:   120 mg (3.75 mL (3/4 Teaspoon))    Beginning at 6 months of age, Infant's or Children's Ibuprofen (e.g., Advil or Motrin) may be given every six hours as needed for pain or fever.    INFANT Ibuprofen (50 mg/1.25 ml)  liquid (for example Advil or Motrin) may be given every 6 hours as needed for pain or fever.    Child’s Weight: Dose:  13 - 17 pounds:   60 - 80 mg (1.5 - 2.0 mL ( 1/3 - 2/5 Teaspoon))  18 - 23 pounds:   80 - 100 mg (2.0 - 2.5 mL (2/5 - 1/2 Teaspoon))      CHILDREN'S Ibuprofen (100 mg/5 mL) liquid (for example Advil or Motrin) may be given every 6 hours as needed for pain or fever.    Child’s Weight: Dose:  13 - 17 pounds:   60 - 80 mg (3.0 - 4.0 mL (3/5 - 4/5 Teaspoon))  18 - 23 pounds:   80 - 100 mg (4.0 - 5.0 mL (4/5 - 1 Teaspoon))    If Gem is outside these weight ranges, call your pediatrician's office  for advice.     Most Recent Immunizations   Administered Date(s) Administered   • DTaP/Hep B/IPV 2021   • Hep B, adolescent or pediatric 2021   • Hib (PRP-OMP) 2021   • Pneumococcal Conjugate 13 valent 2021   • Rotavirus - pentavalent 2021   Pended Date(s) Pended   • DTaP/Hep B/IPV 01/28/2022   • Pneumococcal Conjugate 13 valent 01/28/2022   • Rotavirus - pentavalent 01/28/2022       If Gem develops any of the following reactions within 72 hours after an immunization, notify your pediatrician by calling the pediatric phone nurse:  1. A temperature of 105 degrees or above.  2. More than 3 hours of continuous crying.  3. A shrill, high-pitched cry.  4. A pale, limp spell.  5. A seizure or fainting spell. In this case, you should call 911 or go immediately to the emergency room.      NEXT VISIT: NINE MONTHS OF AGE    Thank you for entrusting your care to [facility].    Also, check out “Children’s Health” on the [facility] Blog for updates on timely topics regarding children’s health!

## 2022-02-03 ENCOUNTER — ANTICOAGULATION THERAPY VISIT (OUTPATIENT)
Dept: ANTICOAGULATION | Facility: CLINIC | Age: 72
End: 2022-02-03

## 2022-02-03 ENCOUNTER — LAB (OUTPATIENT)
Dept: LAB | Facility: CLINIC | Age: 72
End: 2022-02-03
Payer: MEDICARE

## 2022-02-03 DIAGNOSIS — Z86.711 HISTORY OF PULMONARY EMBOLISM: Primary | ICD-10-CM

## 2022-02-03 DIAGNOSIS — Z79.01 LONG TERM (CURRENT) USE OF ANTICOAGULANTS: ICD-10-CM

## 2022-02-03 DIAGNOSIS — I26.99 PULMONARY EMBOLISM (H): ICD-10-CM

## 2022-02-03 LAB — INR BLD: 1.9 (ref 0.9–1.1)

## 2022-02-03 PROCEDURE — 85610 PROTHROMBIN TIME: CPT

## 2022-02-03 PROCEDURE — 36416 COLLJ CAPILLARY BLOOD SPEC: CPT

## 2022-02-03 NOTE — PROGRESS NOTES
ANTICOAGULATION MANAGEMENT     Leonela Christianson 71 year old female is on warfarin with subtherapeutic INR result. (Goal INR 2.0-3.0)    Recent labs: (last 7 days)     02/03/22  0745   INR 1.9*       ASSESSMENT     Source(s): Chart Review, Patient/Caregiver Call and Template       Warfarin doses taken: Warfarin taken as instructed    Diet: Yes.   Increased greens/vitamin K in diet; ongoing change.   Reported, she will continue to eat healthy.    New illness, injury, or hospitalization: Yes:    Tested positive for COVID-19 on 1/13/22.  (was exposed on 1/6/22)    Medication/supplement changes: None noted    Signs or symptoms of bleeding or clotting: No    Previous INR: Subtherapeutic at 1.9 on 1/13/22, reported eating more Vitamin-K.    Additional findings: None     PLAN     Recommended plan for ongoing change(s) affecting INR     Dosing Instructions:   (3mg tabs)     Increase your warfarin dose (11.1% change) with next INR in 2-3 weeks       Summary  As of 2/3/2022    Full warfarin instructions:  6 mg every Tue, Thu, Sat; 3 mg all other days   Next INR check:  2/24/2022             Telephone call with  Leonela ([phone]) who verbalizes understanding and agrees to plan    Lab visit scheduled - INR on 2/24/22 @ [facility]    Education provided: Importance of consistent vitamin K intake, Impact of vitamin K foods on INR and Goal range and significance of current result    Plan made per [facility] anticoagulation protocol    Jeanne Olsen, RN  Anticoagulation Clinic  2/3/2022    _______________________________________________________________________     Anticoagulation Episode Summary     Current INR goal:  2.0-3.0   TTR:  87.1 % (1 y)   Target end date:  Indefinite   Send INR reminders to:  [facility]    Indications    History of pulmonary embolism [Z86.711]  Long term (current) use of anticoagulants [Z79.01]           Comments:           Anticoagulation Care Providers     Provider Role Specialty Phone  number    Miri Woodruff MD [facility] [phone]

## 2022-02-22 DIAGNOSIS — Z86.711 HISTORY OF PULMONARY EMBOLISM: Primary | ICD-10-CM

## 2022-02-22 DIAGNOSIS — I10 ESSENTIAL HYPERTENSION: ICD-10-CM

## 2022-02-22 DIAGNOSIS — Z79.01 LONG TERM (CURRENT) USE OF ANTICOAGULANTS: ICD-10-CM

## 2022-02-22 RX ORDER — LOSARTAN POTASSIUM AND HYDROCHLOROTHIAZIDE 12.5; 1 MG/1; MG/1
TABLET ORAL
Qty: 90 TABLET | Refills: 1 | Status: SHIPPED | OUTPATIENT
Start: 2022-02-22 | End: 2022-06-30

## 2022-02-22 RX ORDER — WARFARIN SODIUM 3 MG/1
TABLET ORAL
Qty: 120 TABLET | Refills: 5 | Status: SHIPPED | OUTPATIENT
Start: 2022-02-22 | End: 2022-09-22

## 2022-02-22 NOTE — TELEPHONE ENCOUNTER
"  Last Written Prescription Date:  2/2/2021  Last Fill Quantity: 90,  # refills: 3   Last office visit provider:  6/4/2021     Requested Prescriptions   Pending Prescriptions Disp Refills     losartan-hydrochlorothiazide (HYZAAR) 100-12.5 MG tablet [Pharmacy Med Name: LOSARTAN/HCTZ TABS 100/12H] 90 tablet 3     Sig: TAKE 1 TABLET DAILY       Angiotensin-II Receptors Passed - 2/22/2022  8:34 AM        Passed - Last blood pressure under 140/90 in past 12 months     BP Readings from Last 3 Encounters:   09/09/21 128/71   06/10/21 110/68   05/25/21 118/68                 Passed - Recent (12 mo) or future (30 days) visit within the authorizing provider's specialty     Patient has had an office visit with the authorizing provider or a provider within the authorizing providers department within the previous 12 mos or has a future within next 30 days. See \"Patient Info\" tab in inbasket, or \"Choose Columns\" in Meds & Orders section of the refill encounter.              Passed - Medication is active on med list        Passed - Patient is age 18 or older        Passed - No active pregnancy on record        Passed - Normal serum creatinine on file in past 12 months     Recent Labs   Lab Test 09/09/21  1155   CR 1.02       Ok to refill medication if creatinine is low          Passed - Normal serum potassium on file in past 12 months     Recent Labs   Lab Test 09/09/21  1155   POTASSIUM 3.8                    Passed - No positive pregnancy test in past 12 months       Diuretics (Including Combos) Protocol Passed - 2/22/2022  8:34 AM        Passed - Blood pressure under 140/90 in past 12 months     BP Readings from Last 3 Encounters:   09/09/21 128/71   06/10/21 110/68   05/25/21 118/68                 Passed - Recent (12 mo) or future (30 days) visit within the authorizing provider's specialty     Patient has had an office visit with the authorizing provider or a provider within the authorizing providers department within the " "previous 12 mos or has a future within next 30 days. See \"Patient Info\" tab in inbasket, or \"Choose Columns\" in Meds & Orders section of the refill encounter.              Passed - Medication is active on med list        Passed - Patient is age 18 or older        Passed - No active pregancy on record        Passed - Normal serum creatinine on file in past 12 months     Recent Labs   Lab Test 09/09/21  1155   CR 1.02              Passed - Normal serum potassium on file in past 12 months     Recent Labs   Lab Test 09/09/21  1155   POTASSIUM 3.8                    Passed - Normal serum sodium on file in past 12 months     Recent Labs   Lab Test 09/09/21  1155                 Passed - No positive pregnancy test in past 12 months        Routing refill request to provider for review/approval because:  Labs out of range:  INR    Last Written Prescription Date:  UNKNOWN/HISTORICAL. [facility] chart reviewed as well and it is unclear.  Last Fill Quantity: unknown,  # refills: unknown   Last office visit provider:  6/4/2021       Found in chart on 2/3/2021:      Recommended plan for ongoing change(s) affecting INR      Dosing Instructions:   (3mg tabs)                Increase your warfarin dose (11.1% change) with next INR in 2-3 weeks             Summary  As of 2/3/2022     Full warfarin instructions:  6 mg every Tue, Thu, Sat; 3 mg all other days   Next INR check:  2/24/2022               warfarin ANTICOAGULANT (COUMADIN) 3 MG tablet [Pharmacy Med Name: WARFARIN TABS 3MG] 120 tablet 5     Sig: TAKE 1 TO 2 TABLETS DAILY, ADJUST DOSE PER INR RESULTS AS INSTRUCTED       Vitamin K Antagonists Failed - 2/22/2022  8:34 AM        Failed - INR is within goal in the past 6 weeks     Confirm INR is within goal in the past 6 weeks.     Recent Labs   Lab Test 02/03/22  0745   INR 1.9*                       Passed - Recent (12 mo) or future (30 days) visit within the authorizing provider's specialty     Patient has had an office " "visit with the authorizing provider or a provider within the authorizing providers department within the previous 12 mos or has a future within next 30 days. See \"Patient Info\" tab in inbasket, or \"Choose Columns\" in Meds & Orders section of the refill encounter.              Passed - Medication is active on med list        Passed - Patient is 18 years of age or older        Passed - Patient is not pregnant        Passed - No positive pregnancy on file in past 12 months             Daniela Solis RN 02/22/22 8:36 AM  "

## 2022-02-24 ENCOUNTER — LAB (OUTPATIENT)
Dept: LAB | Facility: CLINIC | Age: 72
End: 2022-02-24
Payer: MEDICARE

## 2022-02-24 ENCOUNTER — ANTICOAGULATION THERAPY VISIT (OUTPATIENT)
Dept: ANTICOAGULATION | Facility: CLINIC | Age: 72
End: 2022-02-24

## 2022-02-24 DIAGNOSIS — Z79.01 LONG TERM (CURRENT) USE OF ANTICOAGULANTS: ICD-10-CM

## 2022-02-24 DIAGNOSIS — Z86.711 HISTORY OF PULMONARY EMBOLISM: Primary | ICD-10-CM

## 2022-02-24 DIAGNOSIS — I26.99 PULMONARY EMBOLISM (H): ICD-10-CM

## 2022-02-24 LAB — INR BLD: 2.8 (ref 0.9–1.1)

## 2022-02-24 PROCEDURE — 85610 PROTHROMBIN TIME: CPT

## 2022-02-24 PROCEDURE — 36416 COLLJ CAPILLARY BLOOD SPEC: CPT

## 2022-02-24 NOTE — PROGRESS NOTES
ANTICOAGULATION MANAGEMENT     Leonela Christianson 71 year old female is on warfarin with therapeutic INR result. (Goal INR 2.0-3.0)    Recent labs: (last 7 days)     02/24/22  0744   INR 2.8*       ASSESSMENT     Source(s): Chart Review, Patient/Caregiver Call and Template       Warfarin doses taken: Warfarin taken differently, but did not change total weekly dose    Diet: No new diet changes identified    New illness, injury, or hospitalization: No   Fully recovered from COVID-19    Medication/supplement changes: None noted    Signs or symptoms of bleeding or clotting: No    Previous INR: Subtherapeutic last 2 INR results.    Additional findings: None     PLAN     Recommended plan for no diet, medication or health factor changes affecting INR     Dosing Instructions:   (3mg tabs)    Continue your current warfarin dose with next INR in 2 weeks       Summary  As of 2/24/2022    Full warfarin instructions:  6 mg every Tue, Thu, Sat; 3 mg all other days   Next INR check:  3/17/2022             Telephone call with  Leonela ([phone]) who verbalizes understanding and agrees to plan    Lab visit [facility] - [facility] 3[facility]17/22 [facility]    Education provided: Importance of consistent vitamin K intake and Goal range and significance of current result    Plan made per [facility] anticoagulation protocol    Jeanne Olsen, RN  Anticoagulation Clinic  2/24/2022    _______________________________________________________________________     Anticoagulation Episode Summary     Current INR goal:  2.0-3.0   TTR:  86.5 % (1 y)   Target end date:  Indefinite   Send INR reminders to:  [facility]    Indications    History of pulmonary embolism [Z86.711]  Long term (current) use of anticoagulants [Z79.01]           Comments:           Anticoagulation Care Providers     Provider Role Specialty Phone number    Miri Woodruff MD Referring Family Medicine [phone]

## 2022-03-15 ENCOUNTER — ANCILLARY PROCEDURE (OUTPATIENT)
Dept: MAMMOGRAPHY | Facility: CLINIC | Age: 72
End: 2022-03-15
Attending: FAMILY MEDICINE
Payer: MEDICARE

## 2022-03-15 DIAGNOSIS — Z12.31 VISIT FOR SCREENING MAMMOGRAM: ICD-10-CM

## 2022-03-15 PROCEDURE — 77067 SCR MAMMO BI INCL CAD: CPT

## 2022-03-17 ENCOUNTER — LAB (OUTPATIENT)
Dept: LAB | Facility: CLINIC | Age: 72
End: 2022-03-17
Payer: MEDICARE

## 2022-03-17 ENCOUNTER — ANTICOAGULATION THERAPY VISIT (OUTPATIENT)
Dept: ANTICOAGULATION | Facility: CLINIC | Age: 72
End: 2022-03-17

## 2022-03-17 DIAGNOSIS — Z79.01 LONG TERM (CURRENT) USE OF ANTICOAGULANTS: ICD-10-CM

## 2022-03-17 DIAGNOSIS — Z86.711 HISTORY OF PULMONARY EMBOLISM: Primary | ICD-10-CM

## 2022-03-17 DIAGNOSIS — I26.99 PULMONARY EMBOLISM (H): ICD-10-CM

## 2022-03-17 LAB — INR BLD: 2.6 (ref 0.9–1.1)

## 2022-03-17 PROCEDURE — 85610 PROTHROMBIN TIME: CPT

## 2022-03-17 PROCEDURE — 36415 COLL VENOUS BLD VENIPUNCTURE: CPT

## 2022-03-17 NOTE — PROGRESS NOTES
At 1.9ANTICOAGULATION MANAGEMENT     Leonela ZARATE Tristen Sammy 71 year old female is on warfarin with therapeutic INR result. (Goal INR 2.0-3.0)    Recent labs: (last 7 days)     03/17/22  0742   INR 2.6*       ASSESSMENT       Source(s): Chart Review, Patient/Caregiver Call and Template       Warfarin doses taken: Warfarin taken differently, but did not change total weekly dose    Amended anticoagulation calendar    Diet: No new diet changes identified    New illness, injury, or hospitalization: No   Reported no after effect from Covid in Jan 2022.    Medication/supplement changes: None noted    Signs or symptoms of bleeding or clotting: No    Previous INR: Therapeutic last visit at 2.8; previously outside of goal range at 1.9    Additional findings: None       PLAN     Recommended plan for no diet, medication or health factor changes affecting INR     Dosing Instructions:   (3mg tabs)    Continue your current warfarin dose with next INR in 2 weeks       Summary  As of 3/17/2022    Full warfarin instructions:  6 mg every Sun, Tue, Thu; 3 mg all other days   Next INR check:  3/31/2022             Telephone call with  Leonela ([phone]) who verbalizes understanding and agrees to plan    Check at provider office visit - INR on 3/29/22 during OV with Dr. Conde d/t red blotches on legs.    Education provided: Importance of consistent vitamin K intake and Goal range and significance of current result    Plan made per [facility] anticoagulation protocol    Jeanne Olsen, RN  Anticoagulation Clinic  3/17/2022    _______________________________________________________________________     Anticoagulation Episode Summary     Current INR goal:  2.0-3.0   TTR:  86.5 % (1 y)   Target end date:  Indefinite   [facility]  [facility]    Indications    History of pulmonary embolism [Z86.711]  Long term (current) use of anticoagulants [Z79.01]           Comments:           Anticoagulation Care Providers      Provider Role Specialty Phone number    Miri Woodruff MD Referring Family Medicine [phone]

## 2022-03-28 ENCOUNTER — OFFICE VISIT (OUTPATIENT)
Dept: FAMILY MEDICINE | Facility: CLINIC | Age: 72
End: 2022-03-28
Payer: MEDICARE

## 2022-03-28 ENCOUNTER — ANTICOAGULATION THERAPY VISIT (OUTPATIENT)
Dept: ANTICOAGULATION | Facility: CLINIC | Age: 72
End: 2022-03-28

## 2022-03-28 VITALS
HEIGHT: 60 IN | SYSTOLIC BLOOD PRESSURE: 127 MMHG | HEART RATE: 79 BPM | BODY MASS INDEX: 27.05 KG/M2 | DIASTOLIC BLOOD PRESSURE: 76 MMHG | WEIGHT: 137.8 LBS | OXYGEN SATURATION: 97 %

## 2022-03-28 DIAGNOSIS — Z79.01 LONG TERM (CURRENT) USE OF ANTICOAGULANTS: ICD-10-CM

## 2022-03-28 DIAGNOSIS — I26.99 PULMONARY EMBOLISM, UNSPECIFIED CHRONICITY, UNSPECIFIED PULMONARY EMBOLISM TYPE, UNSPECIFIED WHETHER ACUTE COR PULMONALE PRESENT (H): ICD-10-CM

## 2022-03-28 DIAGNOSIS — J43.9 PULMONARY EMPHYSEMA, UNSPECIFIED EMPHYSEMA TYPE (H): ICD-10-CM

## 2022-03-28 DIAGNOSIS — Z87.891 PERSONAL HISTORY OF TOBACCO USE: ICD-10-CM

## 2022-03-28 DIAGNOSIS — Z86.711 HISTORY OF PULMONARY EMBOLISM: Primary | ICD-10-CM

## 2022-03-28 DIAGNOSIS — I82.409 RECURRENT DEEP VEIN THROMBOSIS (DVT) (H): ICD-10-CM

## 2022-03-28 DIAGNOSIS — R21 RASH AND NONSPECIFIC SKIN ERUPTION: Primary | ICD-10-CM

## 2022-03-28 LAB — INR BLD: 2.7 (ref 0.9–1.1)

## 2022-03-28 PROCEDURE — 85610 PROTHROMBIN TIME: CPT | Performed by: FAMILY MEDICINE

## 2022-03-28 PROCEDURE — 99214 OFFICE O/P EST MOD 30 MIN: CPT | Performed by: FAMILY MEDICINE

## 2022-03-28 PROCEDURE — 36416 COLLJ CAPILLARY BLOOD SPEC: CPT | Performed by: FAMILY MEDICINE

## 2022-03-28 NOTE — PATIENT INSTRUCTIONS
The Benefits of Living Smoke Free  What do you want to gain from quitting? Check off some reasons to quit.  Health Benefits  ___ Reduce my risk of lung cancer, heart disease, chronic lung disease  ___ Have fewer wrinkles and softer skin  ___ Improve my sense of taste and smell  ___ For pregnant women--reduce the risk of having a miscarriage, stillbirth, premature birth, or low-birth-weight baby  Personal Benefits  ___ Feel more in control of my life  ___ Have better-smelling hair, breath, clothes, home, and car  ___ Save time by not having to take smoke breaks, buy cigarettes, or hunt for a light  ___ Have whiter teeth  Family Benefits  ___ Reduce my children s respiratory tract infections  ___ Set a good example for my children  ___ Reduce my family s cancer risk  Financial Benefits  ___ Save hundreds of dollars each year that would be spent on cigarettes  ___ Save money on medical bills  ___ Save on life, health, and car insurance premiums    Those Dollars Add Up!  Cigarettes are expensive, and getting more expensive all the time. Do you realize how much money you are spending on cigarettes per year? What is the average amount you spend on a pack of cigarettes? What is the average number of packs that you smoke per day? Using your answers to these questions, fill in this formula to help you find out:  ($ _____ per pack) ×  ( _____ number of packs per day) × (365 days) =  $ _____ yearly cost of smoking  Besides tobacco, there are other costs, including extra cleaning bills and replacement costs for clothing and furniture; medical expenses for smoking-related illnesses; and higher health, life, and car insurance premiums.    Cigars and Pipes Count Too!  Cigars and pipes are also dangerous. So are smokeless (chewing) tobacco and snuff. All of these products contain nicotine, a highly addictive substance that has harmful effects on your body. Quitting smoking means giving up all tobacco products.      [id number] Javi  [employer], [address] [place] [address], [place] [address]. All rights reserved. This information is not intended as a substitute for professional medical care. Always follow your healthcare professional's instructions.

## 2022-03-28 NOTE — PROGRESS NOTES
ANTICOAGULATION MANAGEMENT     Leonela Christianson 71 year old female is on warfarin with therapeutic INR result. (Goal INR 2.0-3.0)    Recent labs: (last 7 days)     03/28/22  1047   INR 2.7*       ASSESSMENT       Source(s): Chart Review, Patient/Caregiver Call and Template       Warfarin doses taken: Warfarin taken as instructed    Diet: No new diet changes identified    New illness, injury, or hospitalization: No    Medication/supplement changes: None noted    Signs or symptoms of bleeding or clotting: No    Previous INR: Therapeutic last 2 INR results.    Additional findings: None       PLAN     Recommended plan for no diet, medication or health factor changes affecting INR     Dosing Instructions: Continue your current warfarin dose with next INR in 4 weeks       Summary  As of 3/28/2022    Full warfarin instructions:  6 mg every Sun, Tue, Thu; 3 mg all other days   Next INR check:  4/25/2022             Telephone call with  Leonela ([phone]) who verbalizes understanding and agrees to plan    Lab visit scheduled - INR on 4/25/22 @ [facility]    Education provided: Importance of consistent vitamin K intake and Goal range and significance of current result    Plan made per [facility] anticoagulation protocol    Jeanne Olsen, RN  Anticoagulation Clinic  3/28/2022    _______________________________________________________________________     Anticoagulation Episode Summary     Current INR goal:  2.0-3.0   TTR:  86.5 % (1 y)   Target end date:  Indefinite   Send INR reminders to:  [facility]    Indications    History of pulmonary embolism [Z86.711]  Long term (current) use of anticoagulants [Z79.01]           Comments:           Anticoagulation Care Providers     Provider Role Specialty Phone number    Miri Woodruff MD Referring Family Medicine [phone]

## 2022-03-28 NOTE — PROGRESS NOTES
Assessment & Plan     ICD-10-CM    1. Rash and nonspecific skin eruption  R21    2. Recurrent deep vein thrombosis (DVT) (H)  I82.409    3. Pulmonary emphysema, unspecified emphysema type (H)  J43.9    4. Pulmonary embolism (H)  I26.99 INR point of care   5. Personal history of tobacco use  Z87.891      Medication making: Patient is here today for rash to thighs.  Evaluation shows some postinflammatory hyperpigmentation versus old bruises.  These are very faint and at this time I do not think they are concerning.  This is discussed with the patient.  Discussed with the patient that she is on Coumadin which can result into bruising.  Best practice advisory mentions DVT-she is on Coumadin.  Best practice advisory mentions pulmonary embolus-she is on Coumadin.  Respiratory advisory mentions pulmonary emphysema.  She is on albuterol inhaler as needed.  Patient education material regarding smoking cessation provided today , Patient is aware regarding need to quit smoking.       BMI:   Estimated body mass index is 26.91 kg/m  as calculated from the following:    Height as of this encounter: 1.524 m (5').    Weight as of this encounter: 62.5 kg (137 lb 12.8 oz).       MEDICATIONS:  Continue current medications without change  See Patient Instructions    Return in about 6 months (around 9/28/2022) for Routine preventive.    Yris Conde MD  [facility]    Subjective    Chief Complaint   Patient presents with     Derm Problem     Bilateral upper legs red blotches, this morning they don't look so bad. Pt made this appt 3 weeks ago. They did not itch. No new soaps or lotions.      Musculoskeletal Problem     Right leg pain over the weekend, acted like it didn't wanna work. Seems better today.        Leonela is a 71 year old who presents for the following health issues     HPI: Patient is here for concerns about skin pigmentation on her thighs.    Patient Active Problem List   Diagnosis      Hyperlipidemia     Nicotine Dependence     Overweight (BMI 25.0-29.9)     Osteoporosis     Chronic bronchitis, unspecified chronic bronchitis type (H)     History of pulmonary embolism     Upper back pain on left side     Pulmonary nodule     Pulmonary emphysema, unspecified emphysema type (H)     Essential hypertension     History of non-ST elevation myocardial infarction (NSTEMI)     Coronary atherosclerosis due to lipid rich plaque     Pigmented skin lesion     Hypoxia     Ureteral stone     Hemorrhoids, internal     Breast mass, right     H/O: hysterectomy     Nicotine dependence     Irritability and anger     Chronic fatigue     Long term (current) use of anticoagulants     Recurrent deep vein thrombosis (DVT) (H)         Review of Systems         Objective    /76   Pulse 79   Ht 1.524 m (5')   Wt 62.5 kg (137 lb 12.8 oz)   SpO2 97%   BMI 26.91 kg/m    Body mass index is 26.91 kg/m .  Physical Exam

## 2022-04-14 ENCOUNTER — TELEPHONE (OUTPATIENT)
Dept: FAMILY MEDICINE | Facility: CLINIC | Age: 72
End: 2022-04-14
Payer: MEDICARE

## 2022-04-14 ENCOUNTER — VIRTUAL VISIT (OUTPATIENT)
Dept: FAMILY MEDICINE | Facility: CLINIC | Age: 72
End: 2022-04-14
Payer: MEDICARE

## 2022-04-14 DIAGNOSIS — J01.10 ACUTE NON-RECURRENT FRONTAL SINUSITIS: Primary | ICD-10-CM

## 2022-04-14 PROCEDURE — 99213 OFFICE O/P EST LOW 20 MIN: CPT | Mod: 95 | Performed by: FAMILY MEDICINE

## 2022-04-14 NOTE — TELEPHONE ENCOUNTER
If she does not have my chart, she will need to schedule some other type of visit, virtual or telephone visit with a provider to discuss her symptoms.

## 2022-04-14 NOTE — PROGRESS NOTES
Leonela is a 71 year old who is being evaluated via a billable telephone visit.      What phone number would you like to be contacted at? [phone]  How would you like to obtain your AVS? Mail a copy    Assessment & Plan     1. Acute non-recurrent frontal sinusitis  - amoxicillin-clavulanate (AUGMENTIN) 875-125 MG tablet; Take 1 tablet by mouth 2 times daily for 7 days  Dispense: 14 tablet; Refill: 0     Despite symptoms less than 10 days, opted to proceed with antibiotic coverage due to Leonela's increased use of albuterol inhaler and her history of emphysema. As she isn't complaining of respiratory symptoms (other than productive cough), deferring prednisone burst today as I do not think she is in a COPD exacerbation. If symptoms are not improving, she needs an in person exam for a physical exam and vitals check.    As covid is still circulating in the community, I did recommend home testing to rule this out despite previous infection. She is approximately 3 months out from previous infection.    Return in about 5 months (around 9/9/2022) for Physical Exam.    Merlene Willard, [facility]    Subjective   Leonela is a 71 year old who presents for the following health issues     Chief Complaints and History of Present Illnesses   Patient presents with     Cough     X 4 days     Nasal Congestion     Headache        HPI     Dealing with rhinorrhea/congestion. Increased cough with yellow sputum.   Also complaining of a mild headache, discomfort in her head.     Recovered from COVID, was positive back in January. At that time, rhinorrhea and fatigue.     Headache is near eyebrow level close to the nose. Not worse with bending forward, only intermittent.     Drinking hot water with lemon and honey. Sucking on Ricola natural cough drops. Eating some comfort foods.     Hx of bronchitis. Has been using albuterol inhaler some.     No sick contacts.     Review of Systems   No fevers or chills.    No sore throat or ear pain.   Coughing attack yesterday, harder to catch her breath. Otherwise no shortness of breath or chest pain.         Objective    Vitals - Patient Reported  Temperature (Patient Reported): 97.3  F (36.3  C)        Physical Exam   Unable to perform comprehensive exam due to telephone visit.  Leonela is a pleasant elderly female, in no acute distress. No cough, does not sound congested, respirations unlabored.         Phone call duration: 12 minutes

## 2022-04-14 NOTE — TELEPHONE ENCOUNTER
Reason for call:  Patient reporting a symptom    Symptom or request: productive cough, yellowish sputum, congestion, head feels weird but not a headache    Duration (how long have symptoms been present):   4/12    Have you been treated for this before? No    Additional comments: patient would like antibiotic.  She does not have [employer]    Phone Number patient can be reached at:  Home number on file [phone] (home)    Best Time:  any    Can we leave a detailed message on this number:  YES    Call taken on 4/14/2022 at 8:24 AM by DAYNA CASTELAN

## 2022-04-20 ENCOUNTER — NURSE TRIAGE (OUTPATIENT)
Dept: NURSING | Facility: CLINIC | Age: 72
End: 2022-04-20
Payer: MEDICARE

## 2022-04-20 NOTE — TELEPHONE ENCOUNTER
Patient calling in today stating she fell in her house on Monday large egg on the right ankle. Slipped in the doorway. Front of the leg is sore, was swollen. Able to walk. Used ice, alcohol and elevation.   Per RN protocol patient should be seen today or tomorrow.   Home care suggestions reviewed with caller per RN protocol.   WIC/Urgent Care hours and location reviewed.   Treva Nash RN, BSN Care Connection Triage Nurse    Reason for Disposition    High-risk adult (e.g., age > 60, osteoporosis, chronic steroid use)    Additional Information    Negative: Major bleeding (actively dripping or spurting) that can't be stopped    Negative: Amputation or bone sticking through the skin    Negative: Looks like a dislocated joint (crooked or deformed)    Negative: Serious injury with multiple fractures (broken bones)    Negative: Sounds like a life-threatening emergency to the triager    Negative: Wound looks infected    Negative: Caused by an animal bite    Negative: Puncture wound of foot    Negative: Toe injury is the main symptom    Negative: Cast problems or questions    Negative: Bullet, stabbed by knife or other serious penetrating wound    Negative: Can't stand (bear weight) or walk (e.g., 4 steps)    Negative: Skin is split open or gaping (length > 1/2 inch or 12 mm)    Negative: Bleeding won't stop after 10 minutes of direct pressure (using correct technique)    Negative: Dirt in the wound and not removed after 15 minutes of scrubbing    Negative: Numbness (new loss of sensation) of toe(s)    Negative: Looks infected (e.g., spreading redness, pus, red streak)    Negative: Sounds like a serious injury to the triager    Negative: SEVERE pain (e.g., excruciating)    Negative: A 'snap' or 'pop' was heard at the time of injury    Negative: Large swelling or bruise and size > palm of person's hand    Negative: No prior tetanus shots (or is not fully vaccinated) and any wound (e.g., cut or scrape)    Negative: HIV  positive or severe immunodeficiency (severely weak immune system) and DIRTY cut    Negative: Patient wants to be seen    Negative: Has diabetes (diabetes mellitus) and any bruising or wound    Protocols used: ANKLE AND FOOT INJURY-A-OH

## 2022-04-25 ENCOUNTER — ANTICOAGULATION THERAPY VISIT (OUTPATIENT)
Dept: ANTICOAGULATION | Facility: CLINIC | Age: 72
End: 2022-04-25

## 2022-04-25 ENCOUNTER — LAB (OUTPATIENT)
Dept: LAB | Facility: CLINIC | Age: 72
End: 2022-04-25
Payer: MEDICARE

## 2022-04-25 DIAGNOSIS — Z86.711 HISTORY OF PULMONARY EMBOLISM: Primary | ICD-10-CM

## 2022-04-25 DIAGNOSIS — I26.99 PULMONARY EMBOLISM, UNSPECIFIED CHRONICITY, UNSPECIFIED PULMONARY EMBOLISM TYPE, UNSPECIFIED WHETHER ACUTE COR PULMONALE PRESENT (H): ICD-10-CM

## 2022-04-25 DIAGNOSIS — Z79.01 LONG TERM (CURRENT) USE OF ANTICOAGULANTS: ICD-10-CM

## 2022-04-25 LAB — INR BLD: 2.5 (ref 0.9–1.1)

## 2022-04-25 PROCEDURE — 85610 PROTHROMBIN TIME: CPT

## 2022-04-25 PROCEDURE — 36416 COLLJ CAPILLARY BLOOD SPEC: CPT

## 2022-04-25 NOTE — PROGRESS NOTES
ANTICOAGULATION MANAGEMENT     Leonela Christianson 71 year old female is on warfarin with therapeutic INR result. (Goal INR 2.0-3.0)    Recent labs: (last 7 days)     04/25/22  0742   INR 2.5*       ASSESSMENT       Source(s): Chart Review, Patient/Caregiver Call and Template       Warfarin doses taken: Warfarin taken as instructed    Diet: No new diet changes identified    New illness, injury, or hospitalization: Yes:   Currently wearing an ankle brace.  Reported no pain when walking.   Reported, sprained right ankle.  She was leaning forward to  something, and she slipped and fell.- on 4/18/22.  She did report to Urgent Care on 4/20    Medication/supplement changes: None noted    Not taking any pains meds for sprained ankle.    Signs or symptoms of bleeding or clotting: No    Previous INR: Therapeutic last 3 visits.    Additional findings: None       PLAN     Recommended plan for temporary change(s) affecting INR     Dosing Instructions:   (3mg tabs)    continue your current warfarin dose with next INR in 4 weeks       Summary  As of 4/25/2022    Full warfarin instructions:  6 mg every Sun, Tue, Thu; 3 mg all other days   Next INR check:  5/23/2022             Telephone call with  Leonela ([phone]) who verbalizes understanding and agrees to plan    Lab visit scheduled - INR on 5/23/22 @ [facility].    Education provided: Importance of consistent vitamin K intake and Goal range and significance of current result    Plan made per [facility] anticoagulation protocol    Jeanne Olsen, RN  Anticoagulation Clinic  4/25/2022    _______________________________________________________________________     Anticoagulation Episode Summary     Current INR goal:  2.0-3.0   TTR:  86.5 % (1 y)   Target end date:  Indefinite   Send INR reminders to:  [facility]    Indications    History of pulmonary embolism [Z86.711]  Long term (current) use of anticoagulants [Z79.01]           Comments:            Anticoagulation Care Providers     Provider Role Specialty Phone number    Miri Woodruff MD Referring Family Medicine [phone]

## 2022-05-02 ENCOUNTER — HOSPITAL ENCOUNTER (OUTPATIENT)
Dept: RADIOLOGY | Facility: HOSPITAL | Age: 72
Discharge: HOME | End: 2022-05-02
Attending: FAMILY MEDICINE
Payer: MEDICARE

## 2022-05-02 ENCOUNTER — OFFICE VISIT (OUTPATIENT)
Dept: FAMILY MEDICINE | Facility: CLINIC | Age: 72
End: 2022-05-02
Payer: MEDICARE

## 2022-05-02 VITALS
TEMPERATURE: 98.1 F | WEIGHT: 135.9 LBS | DIASTOLIC BLOOD PRESSURE: 88 MMHG | SYSTOLIC BLOOD PRESSURE: 149 MMHG | BODY MASS INDEX: 26.54 KG/M2 | HEART RATE: 84 BPM | OXYGEN SATURATION: 97 % | RESPIRATION RATE: 22 BRPM

## 2022-05-02 DIAGNOSIS — S99.911A ANKLE INJURY, RIGHT, INITIAL ENCOUNTER: ICD-10-CM

## 2022-05-02 DIAGNOSIS — M25.571 PAIN IN JOINT, ANKLE AND FOOT, RIGHT: ICD-10-CM

## 2022-05-02 DIAGNOSIS — M25.571 PAIN IN JOINT, ANKLE AND FOOT, RIGHT: Primary | ICD-10-CM

## 2022-05-02 PROCEDURE — 73630 X-RAY EXAM OF FOOT: CPT | Mod: RT

## 2022-05-02 PROCEDURE — 73610 X-RAY EXAM OF ANKLE: CPT | Mod: RT

## 2022-05-02 PROCEDURE — 99214 OFFICE O/P EST MOD 30 MIN: CPT | Performed by: FAMILY MEDICINE

## 2022-05-02 RX ORDER — CLOPIDOGREL BISULFATE 75 MG/1
1 TABLET ORAL DAILY
COMMUNITY
Start: 2021-08-31 | End: 2022-05-02

## 2022-05-02 NOTE — PROGRESS NOTES
Assessment & Plan     Pain in joint, ankle and foot, right  - XR Ankle Right G/E 3 Views  - XR Foot Right G/E 3 Views    Ankle injury, right, initial encounter  - XR Ankle Right G/E 3 Views  - XR Foot Right G/E 3 Views       Due to imaging being off site our staff directed her to imaging across the street at [place] -- no fractures per my read. Exact details of her fall are uncertain but there is suggestion a lateral ankle sprain occurred. Given her concerns for ongoing ankle pain and swelling I gave her a referral to see sports medicine I suspect she may suffered significant damage to her lateral ankle ligaments -- at the appt can also discuss the outside /imaging interpretation of possible benign growth/tumor ( endochondroma) and if MRI is warranted. Patient advised to continue use of ACE wrap for support/comfort.     Okay to call mobile phone per patient with updates if needed    Jim Tolentino MD   [facility]    Nalini Gipson is a 71 year old female who presents to clinic today for the following health issues:  Chief Complaint   Patient presents with     Pain     Rt ankle swollen x 4/18/2022. She fell down. Some time painful when walking or standing and states pain radiates towards legs. Pt states possible fluid in swelling area. No bruising.     HPI    Patient went to [facility] weeks ago--xrays had been done and then she was placed in a boot. She eventually moved to an ACE bandage as was not tolerating the boot. She is not taking anything for pain.   No pain at rest.     Some days are better than others.   She comes in today with concerns about pain that radiates upwards additionally there is ongoing swelling.   She did not re-injure the ankle.   No previous of fractures/gouts.   No hx of gout.       Patient Active Problem List    Diagnosis Date Noted     Recurrent deep vein thrombosis (DVT) (H) 03/28/2022     Priority: Medium     Long term (current) use of anticoagulants  10/28/2021     Priority: Medium     Irritability and anger 09/12/2021     Priority: Medium     Chronic fatigue 09/12/2021     Priority: Medium     Nicotine dependence 09/09/2021     Priority: Medium     Formatting of this note might be different from the original.  Created by Conversion       H/O: hysterectomy 05/18/2021     Priority: Medium     Formatting of this note might be different from the original.  for fibroids         Breast mass, right 05/04/2021     Priority: Medium     Added automatically from request for surgery [id number]         Chronic bronchitis, unspecified chronic bronchitis type (H)      Priority: Medium     Created by Conversion         Hypoxia 09/11/2020     Priority: Medium     Ureteral stone 09/11/2020     Priority: Medium     Added automatically from request for surgery [id number]         Overweight (BMI 25.0-29.9)      Priority: Medium     Created by Conversion  IMO SNOMED LOAD SPRING 2020 [.6/.18/.2020 9:04 PM]  Mery Coxban:           Pigmented skin lesion 06/10/2019     Priority: Medium     left dorsal wrist, 4x2 mm         Hyperlipidemia      Priority: Medium     Created by Conversion         Nicotine Dependence      Priority: Medium     Created by Conversion         History of non-ST elevation myocardial infarction (NSTEMI) 11/18/2017     Priority: Medium     Coronary atherosclerosis due to lipid rich plaque 11/18/2017     Priority: Medium     Added automatically from request for surgery [id number]         Essential hypertension 04/13/2017     Priority: Medium     Osteoporosis      Priority: Medium     Created by Conversion  Replacement Utility updated for latest IMO load         Pulmonary nodule 06/30/2016     Priority: Medium     Pulmonary emphysema, unspecified emphysema type (H) 06/30/2016     Priority: Medium     Upper back pain on left side 04/05/2016     Priority: Medium     History of pulmonary embolism 10/28/2014     Priority: Medium     Hemorrhoids, internal 04/10/2007     Priority:  Medium       Current Outpatient Medications   Medication     albuterol (PROAIR HFA) 90 mcg/actuation inhaler     clopidogrel (PLAVIX) 75 MG tablet     losartan-hydrochlorothiazide (HYZAAR) 100-12.5 MG tablet     metoprolol succinate ER (TOPROL-XL) 100 MG 24 hr tablet     rosuvastatin (CRESTOR) 40 MG tablet     STIOLTO RESPIMAT 2.5-2.5 mcg/actuation Mist inhaler     warfarin ANTICOAGULANT (COUMADIN) 3 MG tablet     nitroglycerin (NITROSTAT) 0.4 MG SL tablet     No current facility-administered medications for this visit.         Objective    BP (!) 149/88 (BP Location: Right arm, Patient Position: Sitting, Cuff Size: Adult Regular)   Pulse 84   Temp 98.1  F (36.7  C) (Oral)   Resp 22   Wt 61.6 kg (135 lb 14.4 oz)   SpO2 97%   BMI 26.54 kg/m    Physical Exam     R ankle:  Discomfort along ATFL , some pain with resisted eversion, plantar and dorsiflexion without weakness, mild swelling of lateral ankle.   R foot: R 5th metatarsal discomfort  R leg: no swelling above the ankle, lb's negative    XRAY ankle/foot per my read: no acute fractures    No results found for any visits on 05/02/22.          The use of Dragon/[employer] dictation services may have been used to construct the content in this note; any grammatical or spelling errors are non-intentional. Please contact the author of this note directly if you are in need of any clarification.

## 2022-05-02 NOTE — PATIENT INSTRUCTIONS
Continue the ACE wrap or boot for comfort      I will call you with the results of your xray      Call [phone] to schedule an appointment with the Sports medicine specialists

## 2022-05-02 NOTE — PROGRESS NOTES
Assessment & Plan     There are no diagnoses linked to this encounter.       Jim Tolentino MD   [facility]    Nalini Gipson is a 71 year old female who presents to clinic today for the following health issues:  Chief Complaint   Patient presents with     Pain     HPI    ***        Patient Active Problem List    Diagnosis Date Noted     Recurrent deep vein thrombosis (DVT) (H) 03/28/2022     Priority: Medium     Long term (current) use of anticoagulants 10/28/2021     Priority: Medium     Irritability and anger 09/12/2021     Priority: Medium     Chronic fatigue 09/12/2021     Priority: Medium     Nicotine dependence 09/09/2021     Priority: Medium     Formatting of this note might be different from the original.  Created by Conversion       H/O: hysterectomy 05/18/2021     Priority: Medium     Formatting of this note might be different from the original.  for fibroids         Breast mass, right 05/04/2021     Priority: Medium     Added automatically from request for surgery [id number]         Chronic bronchitis, unspecified chronic bronchitis type (H)      Priority: Medium     Created by Conversion         Hypoxia 09/11/2020     Priority: Medium     Ureteral stone 09/11/2020     Priority: Medium     Added automatically from request for surgery [id number]         Overweight (BMI 25.0-29.9)      Priority: Medium     Created by Conversion  IMO SNOMED LOAD SPRING 2020 [.6/.18/.2020 9:04 PM]  Anjel Cox:           Pigmented skin lesion 06/10/2019     Priority: Medium     left dorsal wrist, 4x2 mm         Hyperlipidemia      Priority: Medium     Created by Conversion         Nicotine Dependence      Priority: Medium     Created by Conversion         History of non-ST elevation myocardial infarction (NSTEMI) 11/18/2017     Priority: Medium     Coronary atherosclerosis due to lipid rich plaque 11/18/2017     Priority: Medium     Added automatically from request for surgery [id number]         Essential  hypertension 04/13/2017     Priority: Medium     Osteoporosis      Priority: Medium     Created by Conversion  Replacement Utility updated for latest IMO load         Pulmonary nodule 06/30/2016     Priority: Medium     Pulmonary emphysema, unspecified emphysema type (H) 06/30/2016     Priority: Medium     Upper back pain on left side 04/05/2016     Priority: Medium     History of pulmonary embolism 10/28/2014     Priority: Medium     Hemorrhoids, internal 04/10/2007     Priority: Medium       Current Outpatient Medications   Medication     albuterol (PROAIR HFA) 90 mcg/actuation inhaler     clopidogrel (PLAVIX) 75 MG tablet     losartan-hydrochlorothiazide (HYZAAR) 100-12.5 MG tablet     metoprolol succinate ER (TOPROL-XL) 100 MG 24 hr tablet     nitroglycerin (NITROSTAT) 0.4 MG SL tablet     rosuvastatin (CRESTOR) 40 MG tablet     STIOLTO RESPIMAT 2.5-2.5 mcg/actuation Mist inhaler     warfarin ANTICOAGULANT (COUMADIN) 3 MG tablet     No current facility-administered medications for this visit.         Objective    There were no vitals taken for this visit.  Physical Exam     {Exam List :[id number]}    No results found for any visits on 05/02/22.                  The use of Dragon/Kandu dictation services may have been used to construct the content in this note; any grammatical or spelling errors are non-intentional. Please contact the author of this note directly if you are in need of any clarification.

## 2022-05-03 NOTE — TELEPHONE ENCOUNTER
DIAGNOSIS: Right ankle pain/ XR   APPOINTMENT DATE: 5.9.22   NOTES STATUS DETAILS   OFFICE NOTE from referring provider Internal 5.2.[facility]   OFFICE NOTE from other specialist Care Everywhere 4.20.22 [facility]   MEDICATION LIST Internal    XRAYS (IMAGES & REPORTS) PACS 5.2.22 R foot, R ankle  4.20.22 R ankle, [facility]     Action 5.3.22 8:36 AM [facility]   Action Taken Faxed request to [facility] for image [phone]     Action 5.6.22 7:40 AM [facility]   Action Taken Faxed 2nd request

## 2022-05-09 ENCOUNTER — PRE VISIT (OUTPATIENT)
Dept: ORTHOPEDICS | Facility: CLINIC | Age: 72
End: 2022-05-09
Payer: MEDICARE

## 2022-05-09 ENCOUNTER — OFFICE VISIT (OUTPATIENT)
Dept: ORTHOPEDICS | Facility: CLINIC | Age: 72
End: 2022-05-09
Payer: MEDICARE

## 2022-05-09 VITALS — BODY MASS INDEX: 26.5 KG/M2 | HEIGHT: 60 IN | WEIGHT: 135 LBS

## 2022-05-09 DIAGNOSIS — S93.491A SPRAIN OF ANTERIOR TALOFIBULAR LIGAMENT OF RIGHT ANKLE, INITIAL ENCOUNTER: ICD-10-CM

## 2022-05-09 DIAGNOSIS — M76.71 PERONEAL TENDINITIS OF RIGHT LOWER EXTREMITY: Primary | ICD-10-CM

## 2022-05-09 PROCEDURE — 99203 OFFICE O/P NEW LOW 30 MIN: CPT | Performed by: FAMILY MEDICINE

## 2022-05-09 NOTE — PROGRESS NOTES
Sports Medicine Clinic Visit    PCP: Miri Woodruff ELLIOT Christianson is a 71 year old female who is seen  as self referral presenting with right ankle pain    Injury: Pt notes inverting her ankle on 4/18/22    Location of Pain: right lateral ankle  Duration of Pain: 3 week(s)  Rating of Pain: 4/10  Pain is better with: rest  Pain is worse with: weight bearing for longer periods  Additional Features: swelling  Treatment so far consists of: Rest  Prior History of related problems: none    Ht 1.524 m (5')   Wt 61.2 kg (135 lb)   BMI 26.37 kg/m      Right ankle injury 4/18/22, three weeks ago.  Mechanism of Injury: Slipped and fell when she was leaning forward to  something.  Pt on Plavix.  Patient was given a cam walker boot to use for comfort but has not been wearing it because she finds it uncomfortable.  Patient has been using an Ace wrap at home.      Images below reviewed by me:  EXAM: XR ANKLE RIGHT G/E 3 VIEWS, XR FOOT RIGHT G/E 3 VIEWS  LOCATION: [facility]  DATE/TIME: 5/2/2022 11:37 AM     INDICATION: Previous x-ray 4/20/22 ongoing pain after a fall. Interpretation outside facility reports possible endochondroma.  COMPARISON: None.                                                                      IMPRESSION:   No fracture. The ankle mortise is intact. Mild soft tissue swelling along the lateral aspect of the ankle. Advanced degenerative changes at the first MTP joint. Small calcifications in the distal portion of the tibial shaft likely reflect an underlying   Enchondroma.        XR ANKLE 3 VIEWS RIGHT  4/20/22  [facility]    Impression    No acute fracture. Small chronic avulsion fracture fragments along the medial aspect of the neck of the talus. Ankle mortise is intact. Mild soft tissue swelling along the lateral aspect of the ankle. Small calcifications in the distal tibial diaphysis may represent an underlying enchondroma.        PMH:  Past Medical History:    Diagnosis Date     Acute pyelonephritis      LEELA (acute kidney injury) (H) 9/12/2020     COPD (chronic obstructive pulmonary disease) (H)      Coronary artery disease      Diabetes mellitus (H)      Heart attack (H) 2012, 2019    X3     History of blood clots     PEx2     Hyperlipidemia      Hypertension      Myocardial infarction (H) 5/9/2012    Formatting of this note might be different from the original. Inferior, 1/2012 Formatting of this note might be different from the original. 4/2012      Pulmonary embolism (H) 4/10/2007    Formatting of this note might be different from the original. Two episodes over the last 10 years.  On lifelong coumadin therapy        Active problem list:  Patient Active Problem List   Diagnosis     Hyperlipidemia     Nicotine Dependence     Overweight (BMI 25.0-29.9)     Osteoporosis     Chronic bronchitis, unspecified chronic bronchitis type (H)     History of pulmonary embolism     Upper back pain on left side     Pulmonary nodule     Pulmonary emphysema, unspecified emphysema type (H)     Essential hypertension     History of non-ST elevation myocardial infarction (NSTEMI)     Coronary atherosclerosis due to lipid rich plaque     Pigmented skin lesion     Hypoxia     Ureteral stone     Hemorrhoids, internal     Breast mass, right     H/O: hysterectomy     Nicotine dependence     Irritability and anger     Chronic fatigue     Long term (current) use of anticoagulants     Recurrent deep vein thrombosis (DVT) (H)       FH:  Family History   Problem Relation Age of Onset     No Known Problems Mother      No Known Problems Father      Diabetes Paternal Uncle      Diabetes Sister      Breast Cancer No family hx of      Cancer No family hx of      Colon Cancer No family hx of      Heart Disease No family hx of      Coronary Artery Disease No family hx of        SH:  Social History     Socioeconomic History     Marital status:      Spouse name: Not on file     Number of children:  Not on file     Years of education: Not on file     Highest education level: Not on file   Occupational History     Not on file   Tobacco Use     Smoking status: Current Every Day Smoker     Packs/day: 0.75     Years: 55.00     Pack years: 41.25     Types: Cigarettes     Start date: 1/1/1963     Smokeless tobacco: Never Used   Vaping Use     Vaping Use: Never used   Substance and Sexual Activity     Alcohol use: No     Drug use: No     Sexual activity: Not Currently     Partners: Male   Other Topics Concern     Not on file   Social History Narrative    She lives with her 40-year-old son and 14-year-old grandson.  She used to work in [employer].     Social Determinants of Health     Financial Resource Strain: Not on file   Food Insecurity: Not on file   Transportation Needs: Not on file   Physical Activity: Not on file   Stress: Not on file   Social Connections: Not on file   Intimate Partner Violence: Not on file   Housing Stability: Not on file       MEDS:  See EMR, reviewed  ALL:  See EMR, reviewed    REVIEW OF SYSTEMS:  CONSTITUTIONAL:NEGATIVE for fever, chills, change in weight  INTEGUMENTARY/SKIN: NEGATIVE for worrisome rashes, moles or lesions  EYES: NEGATIVE for vision changes or irritation  ENT/MOUTH: NEGATIVE for ear, mouth and throat problems  RESP:NEGATIVE for significant cough or SOB  BREAST: NEGATIVE for masses, tenderness or discharge  CV: NEGATIVE for chest pain, palpitations or peripheral edema  GI: NEGATIVE for nausea, abdominal pain, heartburn, or change in bowel habits  :NEGATIVE for frequency, dysuria, or hematuria  :NEGATIVE for frequency, dysuria, or hematuria  NEURO: NEGATIVE for weakness, dizziness or paresthesias  ENDOCRINE: NEGATIVE for temperature intolerance, skin/hair changes  HEME/ALLERGY/IMMUNE: NEGATIVE for bleeding problems  PSYCHIATRIC: NEGATIVE for changes in mood or affect    Objective: I do not appreciate any swelling or discoloration about the right ankle.  She  points to the lateral ankle as the area of discomfort.  She is tender along the course of the peroneal tendon.  Plantarflexion and eversion strength is intact with no deficit.  Nontender the proximal fifth metatarsal, navicular, area of Lisfranc.  She is nontender over the bony distal fibula or distal tibia.  Nontender at the deltoid ligament.  Mildly tender at the anterior talofibular, calcaneofibular, and posterior talofibular ligament.  Nontender over the syndesmosis.  Appropriate conversation and affect.    Assessment: Lateral ankle sprain x3 weeks.  Peroneal tendinitis.    Plan: I explained to her the usefulness of the cam walker boot in the setting of her irritated peroneal tendon.  I told her to be reasonable to rely on the cam walker boot for 10 days, to assist with some rest for the peroneal tendon.  Patient is considering this but not certain that she wants to return to the cam walker boot as she dislikes it.  She was given Tubigrip to use as a wrap and a lace up ankle brace to use alternately.  She was taught some range of motion exercises for the ankle.  She will look for improvements over the next 4 weeks and follow-up if this is not the case.

## 2022-05-09 NOTE — LETTER
5/9/2022      RE: Leonela Christianson  [address] [address]     Dear Colleague,    Thank you for referring your patient, Leonela Christianson, to the [facility] [place]. Please see a copy of my visit note below.    Sports Medicine Clinic Visit    PCP: Miri Woodruff    Leonela Christianson is a 71 year old female who is seen  as self referral presenting with right ankle pain    Injury: Pt notes inverting her ankle on 4/18/22    Location of Pain: right lateral ankle  Duration of Pain: 3 week(s)  Rating of Pain: 4/10  Pain is better with: rest  Pain is worse with: weight bearing for longer periods  Additional Features: swelling  Treatment so far consists of: Rest  Prior History of related problems: none    Ht 1.524 m (5')   Wt 61.2 kg (135 lb)   BMI 26.37 kg/m      Right ankle injury 4/18/22, three weeks ago.  Mechanism of Injury: Slipped and fell when she was leaning forward to  something.  Pt on Plavix.  Patient was given a cam walker boot to use for comfort but has not been wearing it because she finds it uncomfortable.  Patient has been using an Ace wrap at home.      Images below reviewed by me:  EXAM: XR ANKLE RIGHT G/E 3 VIEWS, XR FOOT RIGHT G/E 3 VIEWS  LOCATION: [facility]  DATE/TIME: 5/2/2022 11:37 AM     INDICATION: Previous x-ray 4/20/22 ongoing pain after a fall. Interpretation outside facility reports possible endochondroma.  COMPARISON: None.                                                                      IMPRESSION:   No fracture. The ankle mortise is intact. Mild soft tissue swelling along the lateral aspect of the ankle. Advanced degenerative changes at the first MTP joint. Small calcifications in the distal portion of the tibial shaft likely reflect an underlying   Enchondroma.        XR ANKLE 3 VIEWS RIGHT  4/20/22  [facility]    Impression    No acute fracture. Small chronic avulsion fracture  fragments along the medial aspect of the neck of the talus. Ankle mortise is intact. Mild soft tissue swelling along the lateral aspect of the ankle. Small calcifications in the distal tibial diaphysis may represent an underlying enchondroma.        PMH:  Past Medical History:   Diagnosis Date     Acute pyelonephritis      LEELA (acute kidney injury) (H) 9/12/2020     COPD (chronic obstructive pulmonary disease) (H)      Coronary artery disease      Diabetes mellitus (H)      Heart attack (H) 2012, 2019    X3     History of blood clots     PEx2     Hyperlipidemia      Hypertension      Myocardial infarction (H) 5/9/2012    Formatting of this note might be different from the original. Inferior, 1/2012 Formatting of this note might be different from the original. 4/2012      Pulmonary embolism (H) 4/10/2007    Formatting of this note might be different from the original. Two episodes over the last 10 years.  On lifelong coumadin therapy        Active problem list:  Patient Active Problem List   Diagnosis     Hyperlipidemia     Nicotine Dependence     Overweight (BMI 25.0-29.9)     Osteoporosis     Chronic bronchitis, unspecified chronic bronchitis type (H)     History of pulmonary embolism     Upper back pain on left side     Pulmonary nodule     Pulmonary emphysema, unspecified emphysema type (H)     Essential hypertension     History of non-ST elevation myocardial infarction (NSTEMI)     Coronary atherosclerosis due to lipid rich plaque     Pigmented skin lesion     Hypoxia     Ureteral stone     Hemorrhoids, internal     Breast mass, right     H/O: hysterectomy     Nicotine dependence     Irritability and anger     Chronic fatigue     Long term (current) use of anticoagulants     Recurrent deep vein thrombosis (DVT) (H)       FH:  Family History   Problem Relation Age of Onset     No Known Problems Mother      No Known Problems Father      Diabetes Paternal Uncle      Diabetes Sister      Breast Cancer No family hx  of      Cancer No family hx of      Colon Cancer No family hx of      Heart Disease No family hx of      Coronary Artery Disease No family hx of        SH:  Social History     Socioeconomic History     Marital status:      Spouse name: Not on file     Number of children: Not on file     Years of education: Not on file     Highest education level: Not on file   Occupational History     Not on file   Tobacco Use     Smoking status: Current Every Day Smoker     Packs/day: 0.75     Years: 55.00     Pack years: 41.25     Types: Cigarettes     Start date: 1/1/1963     Smokeless tobacco: Never Used   Vaping Use     Vaping Use: Never used   Substance and Sexual Activity     Alcohol use: No     Drug use: No     Sexual activity: Not Currently     Partners: Male   Other Topics Concern     Not on file   Social History Narrative    She lives with her 40-year-old son and 14-year-old grandson.  She used to work in [employer] department.     Social Determinants of Health     Financial Resource Strain: Not on file   Food Insecurity: Not on file   Transportation Needs: Not on file   Physical Activity: Not on file   Stress: Not on file   Social Connections: Not on file   Intimate Partner Violence: Not on file   Housing Stability: Not on file       MEDS:  See EMR, reviewed  ALL:  See EMR, reviewed    REVIEW OF SYSTEMS:  CONSTITUTIONAL:NEGATIVE for fever, chills, change in weight  INTEGUMENTARY/SKIN: NEGATIVE for worrisome rashes, moles or lesions  EYES: NEGATIVE for vision changes or irritation  ENT/MOUTH: NEGATIVE for ear, mouth and throat problems  RESP:NEGATIVE for significant cough or SOB  BREAST: NEGATIVE for masses, tenderness or discharge  CV: NEGATIVE for chest pain, palpitations or peripheral edema  GI: NEGATIVE for nausea, abdominal pain, heartburn, or change in bowel habits  :NEGATIVE for frequency, dysuria, or hematuria  :NEGATIVE for frequency, dysuria, or hematuria  NEURO: NEGATIVE for weakness, dizziness or  paresthesias  ENDOCRINE: NEGATIVE for temperature intolerance, skin/hair changes  HEME/ALLERGY/IMMUNE: NEGATIVE for bleeding problems  PSYCHIATRIC: NEGATIVE for changes in mood or affect    Objective: I do not appreciate any swelling or discoloration about the right ankle.  She points to the lateral ankle as the area of discomfort.  She is tender along the course of the peroneal tendon.  Plantarflexion and eversion strength is intact with no deficit.  Nontender the proximal fifth metatarsal, navicular, area of Lisfranc.  She is nontender over the bony distal fibula or distal tibia.  Nontender at the deltoid ligament.  Mildly tender at the anterior talofibular, calcaneofibular, and posterior talofibular ligament.  Nontender over the syndesmosis.  Appropriate conversation and affect.    Assessment: Lateral ankle sprain x3 weeks.  Peroneal tendinitis.    Plan: I explained to her the usefulness of the cam walker boot in the setting of her irritated peroneal tendon.  I told her to be reasonable to rely on the cam walker boot for 10 days, to assist with some rest for the peroneal tendon.  Patient is considering this but not certain that she wants to return to the cam walker boot as she dislikes it.  She was given Tubigrip to use as a wrap and a lace up ankle brace to use alternately.  She was taught some range of motion exercises for the ankle.  She will look for improvements over the next 4 weeks and follow-up if this is not the case.        Again, thank you for allowing me to participate in the care of your patient.      Sincerely,    Micah Goodson MD

## 2022-05-23 ENCOUNTER — ANTICOAGULATION THERAPY VISIT (OUTPATIENT)
Dept: ANTICOAGULATION | Facility: CLINIC | Age: 72
End: 2022-05-23

## 2022-05-23 ENCOUNTER — LAB (OUTPATIENT)
Dept: LAB | Facility: CLINIC | Age: 72
End: 2022-05-23
Payer: MEDICARE

## 2022-05-23 DIAGNOSIS — Z86.711 HISTORY OF PULMONARY EMBOLISM: Primary | ICD-10-CM

## 2022-05-23 DIAGNOSIS — I26.99 PULMONARY EMBOLISM, UNSPECIFIED CHRONICITY, UNSPECIFIED PULMONARY EMBOLISM TYPE, UNSPECIFIED WHETHER ACUTE COR PULMONALE PRESENT (H): ICD-10-CM

## 2022-05-23 DIAGNOSIS — Z79.01 LONG TERM (CURRENT) USE OF ANTICOAGULANTS: ICD-10-CM

## 2022-05-23 LAB — INR BLD: 3.1 (ref 0.9–1.1)

## 2022-05-23 PROCEDURE — 36416 COLLJ CAPILLARY BLOOD SPEC: CPT

## 2022-05-23 PROCEDURE — 85610 PROTHROMBIN TIME: CPT

## 2022-05-23 NOTE — PROGRESS NOTES
ANTICOAGULATION MANAGEMENT     Leonela Christianson 71 year old female is on warfarin with supratherapeutic INR result. (Goal INR 2.0-3.0)    Recent labs: (last 7 days)     05/23/22  0743   INR 3.1*       ASSESSMENT       Source(s): Chart Review and Patient/Caregiver Call       Warfarin doses taken: Warfarin taken as instructed    Diet: Decreased greens/vitamin K in diet; plans to resume previous intake    New illness, injury, or hospitalization: Yes:    She did report an injury in 4/20/22 and hurt right ankle; reported x-ray showed no ankle FX.    Medication/supplement changes: None noted    Signs or symptoms of bleeding or clotting: No    Previous INR: Therapeutic last 4 visits    Additional findings: None       PLAN     Recommended plan for temporary change(s) affecting INR     Dosing Instructions: continue your current warfarin dose with next INR in 2 weeks       Summary  As of 5/23/2022    Full warfarin instructions:  6 mg every Sun, Tue, Thu; 3 mg all other days   Next INR check:  6/6/2022             Telephone call with  Leonela ([phone]) who verbalizes understanding and agrees to plan    [facility]    Education provided: Importance of consistent vitamin K intake and Goal range and significance of current result    Plan made per [facility] anticoagulation protocol    Jeanne Olsen, RN  Anticoagulation Clinic  5/23/2022    _______________________________________________________________________     Anticoagulation Episode Summary     Current INR goal:  2.0-3.0   TTR:  85.2 % (1 y)   Target end date:  Indefinite   Send INR reminders to:  [facility]    Indications    History of pulmonary embolism [Z86.711]  Long term (current) use of anticoagulants [Z79.01]           Comments:           Anticoagulation Care Providers     Provider Role Specialty Phone number    Miri Woodruff MD Referring Family Medicine [phone]

## 2022-05-26 ENCOUNTER — TELEPHONE (OUTPATIENT)
Dept: CARDIOLOGY | Facility: CLINIC | Age: 72
End: 2022-05-26
Payer: MEDICARE

## 2022-05-26 DIAGNOSIS — I21.4 NSTEMI (NON-ST ELEVATED MYOCARDIAL INFARCTION) (H): ICD-10-CM

## 2022-05-26 RX ORDER — CLOPIDOGREL BISULFATE 75 MG/1
75 TABLET ORAL DAILY
Qty: 30 TABLET | Refills: 0 | Status: SHIPPED | OUTPATIENT
Start: 2022-05-26 | End: 2022-06-30

## 2022-05-26 NOTE — TELEPHONE ENCOUNTER
[facility] Call Center    Phone Message    May a detailed message be left on voicemail: yes     Reason for Call: Medication Refill Request    Has the patient contacted the pharmacy for the refill? Yes   Name of medication being requested: Plavix  Provider who prescribed the medication: Carlos  Pharmacy: [facility] on [address] [place] [address] [facility] [phone]  Date medication is needed: Plavix 5 -10 day supply While the prescription comes from [facility]     Please call pt to discuss. Thank you       Action Taken: Other: cardio    Travel Screening: Not Applicable

## 2022-05-26 NOTE — TELEPHONE ENCOUNTER
Noted Plavix Rx refill sent to [facility] 5-23-22 - follow-up sched on 6-30-22 - 30 day supply sent to local pharmacy.  mg

## 2022-05-31 DIAGNOSIS — J43.9 PULMONARY EMPHYSEMA, UNSPECIFIED EMPHYSEMA TYPE (H): ICD-10-CM

## 2022-05-31 NOTE — TELEPHONE ENCOUNTER
Referred to PCP or Dr Guaman for refills of albuterol inhaler.  Asked for call back to [phone] with questions.  -raiza    === Pt returned call, angry that rx won't be filled by Dr Gonzalez.  Explained that PCP previously has filled rx and med is not related to her heart.  Pt aware and voiced anger and said she is asking because Dr Gonzalez is a nice man.  Again referred to PCP.  teresa

## 2022-05-31 NOTE — TELEPHONE ENCOUNTER
[facility] Call Center    Phone Message    May a detailed message be left on voicemail: yes     Reason for Call: Medication Refill Request    Has the patient contacted the pharmacy for the refill? Yes   Name of medication being requested: albuterol (PROAIR HFA) 90 mcg/actuation inhaler  Provider who prescribed the medication: Requesting from Dr. Gonzalez   Pharmacy: [facility] - [address]  Date medication is needed: 5/31/22      Action Taken: Other: Cardiology    Travel Screening: Not Applicable

## 2022-06-01 RX ORDER — ALBUTEROL SULFATE 90 UG/1
AEROSOL, METERED RESPIRATORY (INHALATION)
Qty: 25.5 G | Refills: 3 | Status: SHIPPED | OUTPATIENT
Start: 2022-06-01 | End: 2022-06-30

## 2022-06-01 NOTE — TELEPHONE ENCOUNTER
Patient calling stating that she is out of her albuterol and needs it filled asap.    Patient would like a call back once it is filled.

## 2022-06-06 ENCOUNTER — ANTICOAGULATION THERAPY VISIT (OUTPATIENT)
Dept: ANTICOAGULATION | Facility: CLINIC | Age: 72
End: 2022-06-06

## 2022-06-06 ENCOUNTER — LAB (OUTPATIENT)
Dept: LAB | Facility: CLINIC | Age: 72
End: 2022-06-06
Payer: MEDICARE

## 2022-06-06 DIAGNOSIS — Z79.01 LONG TERM (CURRENT) USE OF ANTICOAGULANTS: ICD-10-CM

## 2022-06-06 DIAGNOSIS — I26.99 PULMONARY EMBOLISM, UNSPECIFIED CHRONICITY, UNSPECIFIED PULMONARY EMBOLISM TYPE, UNSPECIFIED WHETHER ACUTE COR PULMONALE PRESENT (H): ICD-10-CM

## 2022-06-06 DIAGNOSIS — Z86.711 HISTORY OF PULMONARY EMBOLISM: Primary | ICD-10-CM

## 2022-06-06 LAB — INR BLD: 3.5 (ref 0.9–1.1)

## 2022-06-06 PROCEDURE — 85610 PROTHROMBIN TIME: CPT | Performed by: FAMILY MEDICINE

## 2022-06-06 NOTE — PROGRESS NOTES
ANTICOAGULATION MANAGEMENT     Leonela Christianson 72 year old female is on warfarin with supratherapeutic INR result. (Goal INR 2.0-3.0)    Recent labs: (last 7 days)     06/06/22  0747   INR 3.5*       ASSESSMENT       Source(s): Chart Review, Patient/Caregiver Call and Template       Warfarin doses taken: Warfarin taken as instructed    Diet: Decreased greens/vitamin K in diet; ongoing change    New illness, injury, or hospitalization: No    Medication/supplement changes: None noted    Signs or symptoms of bleeding or clotting: No    Previous INR: Supratherapeutic at 3.1 on 5/23/22.    Additional findings: None       PLAN     Recommended plan for ongoing change(s) affecting INR     Dosing Instructions:   (3mg tabs)    partial hold then decrease your warfarin dose (10% change) with next INR in 1-2 weeks       Summary  As of 6/6/2022    Full warfarin instructions:  6/6: 1.5 mg; Otherwise 6 mg every Sun, Thu; 3 mg all other days   Next INR check:  6/20/2022             Telephone call with  Leonela ([phone]) who verbalizes understanding and agrees to plan    Lab visit scheduled - INR on 6/20/22 @ [facility].    Education provided: Importance of consistent vitamin K intake, Impact of vitamin K foods on INR, Goal range and significance of current result and Monitoring for bleeding signs and symptoms    Plan made per [facility] anticoagulation protocol    Jeanne Olsen, RN  Anticoagulation Clinic  6/6/2022    _______________________________________________________________________     Anticoagulation Episode Summary     Current INR goal:  2.0-3.0   TTR:  81.4 % (1 y)   Target end date:  Indefinite   Send INR reminders to:  [facility]    Indications    History of pulmonary embolism [Z86.711]  Long term (current) use of anticoagulants [Z79.01]           Comments:           Anticoagulation Care Providers     Provider Role Specialty Phone number    Miri Woodruff MD Referring Family Medicine [phone]

## 2022-06-08 ENCOUNTER — TELEPHONE (OUTPATIENT)
Dept: CARDIOLOGY | Facility: CLINIC | Age: 72
End: 2022-06-08
Payer: MEDICARE

## 2022-06-08 NOTE — TELEPHONE ENCOUNTER
Called patient to review recent elevated blood pressure reading.  Per [place] Community Measures guidelines, patients blood pressure is out of parameters and recheck blood pressure is recommended.    Last Blood Pressure: 149/88  Last Heart Rate: 84  Date: 5/2/22  Location: PCP      Patient's next appointment is with [facility] 6/30/22   CRISTINA Hutton

## 2022-06-20 ENCOUNTER — LAB (OUTPATIENT)
Dept: LAB | Facility: CLINIC | Age: 72
End: 2022-06-20
Payer: MEDICARE

## 2022-06-20 ENCOUNTER — ANTICOAGULATION THERAPY VISIT (OUTPATIENT)
Dept: ANTICOAGULATION | Facility: CLINIC | Age: 72
End: 2022-06-20

## 2022-06-20 DIAGNOSIS — Z86.711 HISTORY OF PULMONARY EMBOLISM: Primary | ICD-10-CM

## 2022-06-20 DIAGNOSIS — Z79.01 LONG TERM (CURRENT) USE OF ANTICOAGULANTS: ICD-10-CM

## 2022-06-20 DIAGNOSIS — I26.99 PULMONARY EMBOLISM, UNSPECIFIED CHRONICITY, UNSPECIFIED PULMONARY EMBOLISM TYPE, UNSPECIFIED WHETHER ACUTE COR PULMONALE PRESENT (H): ICD-10-CM

## 2022-06-20 LAB — INR BLD: 2.8 (ref 0.9–1.1)

## 2022-06-20 PROCEDURE — 36416 COLLJ CAPILLARY BLOOD SPEC: CPT

## 2022-06-20 PROCEDURE — 85610 PROTHROMBIN TIME: CPT

## 2022-06-20 NOTE — PROGRESS NOTES
ANTICOAGULATION MANAGEMENT     Leonela Christianson 72 year old female is on warfarin with therapeutic INR result. (Goal INR 2.0-3.0)    Recent labs: (last 7 days)     06/20/22  0749   INR 2.8*       ASSESSMENT       Source(s): Chart Review, Patient/Caregiver Call and Template       Warfarin doses taken: Warfarin taken as instructed    Diet: No new diet changes identified    New illness, injury, or hospitalization: No    Medication/supplement changes: None noted    Signs or symptoms of bleeding or clotting: No    Previous INR: Supratherapeutic last 2 INR results.    Additional findings: None       PLAN     Recommended plan for no diet, medication or health factor changes affecting INR     Dosing Instructions:    continue your current warfarin dose with next INR in 1-2 weeks       Summary  As of 6/20/2022    Full warfarin instructions:  6 mg every Sun, Thu; 3 mg all other days   Next INR check:  7/5/2022             Telephone call with  Leonela ([phone]) who verbalizes understanding and agrees to plan    Lab visit scheduled - IN[facility] on 7/5/22 @ [facility]    Education provided: Importance of consistent vitamin K intake and Goal range and significance of current result    Plan made per [facility] anticoagulation protocol    Jeanne Olsen, RN  Anticoagulation Clinic  6/20/2022    _______________________________________________________________________     Anticoagulation Episode Summary     Current INR goal:  2.0-3.0   TTR:  78.6 % (1 y)   Target end date:  Indefinite   Send INR reminders to:  [facility]    Indications    History of pulmonary embolism [Z86.711]  Long term (current) use of anticoagulants [Z79.01]           Comments:           Anticoagulation Care Providers     Provider Role Specialty Phone number    Miri Woodruff MD Referring Family Medicine [phone]

## 2022-06-23 ENCOUNTER — DOCUMENTATION ONLY (OUTPATIENT)
Dept: FAMILY MEDICINE | Facility: CLINIC | Age: 72
End: 2022-06-23

## 2022-06-23 DIAGNOSIS — R21 RASH AND NONSPECIFIC SKIN ERUPTION: Primary | ICD-10-CM

## 2022-06-23 DIAGNOSIS — Z79.01 LONG TERM (CURRENT) USE OF ANTICOAGULANTS: ICD-10-CM

## 2022-06-23 NOTE — PROGRESS NOTES
ANTICOAGULATION CLINIC REFERRAL RENEWAL REQUEST       An annual renewal order is required for all patients referred to [facility] Anticoagulation [facility].?  Please review and sign the pended referral order for Leonela Christianson.       ANTICOAGULATION SUMMARY      Warfarin indication(s)   PE x2    Mechanical heart valve present?  NO       Current goal range   INR: 2.0-3.0     Goal appropriate for indication? Goal INR 2-3, standard for indication(s) above     Time in Therapeutic Range (TTR)  (Goal > 60%) 78.6 %       Office visit with referring provider's group within last year Yes on 4/14/2022       Jeanne Olsen RN  [facility] Anticoagulation Clinic

## 2022-06-30 ENCOUNTER — OFFICE VISIT (OUTPATIENT)
Dept: CARDIOLOGY | Facility: CLINIC | Age: 72
End: 2022-06-30
Payer: MEDICARE

## 2022-06-30 VITALS
WEIGHT: 135.8 LBS | BODY MASS INDEX: 26.66 KG/M2 | HEART RATE: 76 BPM | HEIGHT: 60 IN | RESPIRATION RATE: 20 BRPM | SYSTOLIC BLOOD PRESSURE: 116 MMHG | DIASTOLIC BLOOD PRESSURE: 72 MMHG

## 2022-06-30 DIAGNOSIS — I10 ESSENTIAL HYPERTENSION: ICD-10-CM

## 2022-06-30 DIAGNOSIS — Z86.711 HISTORY OF PULMONARY EMBOLISM: ICD-10-CM

## 2022-06-30 DIAGNOSIS — J43.9 PULMONARY EMPHYSEMA, UNSPECIFIED EMPHYSEMA TYPE (H): ICD-10-CM

## 2022-06-30 DIAGNOSIS — E78.5 HYPERLIPIDEMIA LDL GOAL <70: Primary | ICD-10-CM

## 2022-06-30 DIAGNOSIS — I21.4 NSTEMI (NON-ST ELEVATED MYOCARDIAL INFARCTION) (H): ICD-10-CM

## 2022-06-30 LAB
ALBUMIN SERPL-MCNC: 4.1 G/DL (ref 3.5–5)
ALP SERPL-CCNC: 75 U/L (ref 45–120)
ALT SERPL W P-5'-P-CCNC: 18 U/L (ref 0–45)
ANION GAP SERPL CALCULATED.3IONS-SCNC: 10 MMOL/L (ref 5–18)
AST SERPL W P-5'-P-CCNC: 20 U/L (ref 0–40)
BILIRUB SERPL-MCNC: 0.5 MG/DL (ref 0–1)
BUN SERPL-MCNC: 12 MG/DL (ref 8–28)
CALCIUM SERPL-MCNC: 9.6 MG/DL (ref 8.5–10.5)
CHLORIDE BLD-SCNC: 105 MMOL/L (ref 98–107)
CHOLEST SERPL-MCNC: 147 MG/DL
CO2 SERPL-SCNC: 25 MMOL/L (ref 22–31)
CREAT SERPL-MCNC: 0.96 MG/DL (ref 0.6–1.1)
ERYTHROCYTE [DISTWIDTH] IN BLOOD BY AUTOMATED COUNT: 13.6 % (ref 10–15)
FASTING STATUS PATIENT QL REPORTED: YES
GFR SERPL CREATININE-BSD FRML MDRD: 63 ML/MIN/1.73M2
GLUCOSE BLD-MCNC: 93 MG/DL (ref 70–125)
HCT VFR BLD AUTO: 49.8 % (ref 35–47)
HDLC SERPL-MCNC: 58 MG/DL
HGB BLD-MCNC: 16.4 G/DL (ref 11.7–15.7)
LDLC SERPL CALC-MCNC: 81 MG/DL
MCH RBC QN AUTO: 31.7 PG (ref 26.5–33)
MCHC RBC AUTO-ENTMCNC: 32.9 G/DL (ref 31.5–36.5)
MCV RBC AUTO: 96 FL (ref 78–100)
PLATELET # BLD AUTO: 219 10E3/UL (ref 150–450)
POTASSIUM BLD-SCNC: 4.4 MMOL/L (ref 3.5–5)
PROT SERPL-MCNC: 7.3 G/DL (ref 6–8)
RBC # BLD AUTO: 5.17 10E6/UL (ref 3.8–5.2)
SODIUM SERPL-SCNC: 140 MMOL/L (ref 136–145)
TRIGL SERPL-MCNC: 38 MG/DL
WBC # BLD AUTO: 6.3 10E3/UL (ref 4–11)

## 2022-06-30 PROCEDURE — 80061 LIPID PANEL: CPT | Performed by: INTERNAL MEDICINE

## 2022-06-30 PROCEDURE — 36415 COLL VENOUS BLD VENIPUNCTURE: CPT | Performed by: INTERNAL MEDICINE

## 2022-06-30 PROCEDURE — 85027 COMPLETE CBC AUTOMATED: CPT | Performed by: INTERNAL MEDICINE

## 2022-06-30 PROCEDURE — 80053 COMPREHEN METABOLIC PANEL: CPT | Performed by: INTERNAL MEDICINE

## 2022-06-30 PROCEDURE — 99214 OFFICE O/P EST MOD 30 MIN: CPT | Performed by: INTERNAL MEDICINE

## 2022-06-30 RX ORDER — METOPROLOL SUCCINATE 100 MG/1
100 TABLET, EXTENDED RELEASE ORAL AT BEDTIME
Qty: 90 TABLET | Refills: 11 | Status: SHIPPED | OUTPATIENT
Start: 2022-06-30 | End: 2023-07-06

## 2022-06-30 RX ORDER — NITROGLYCERIN 0.4 MG/1
0.4 TABLET SUBLINGUAL EVERY 5 MIN PRN
Qty: 20 TABLET | Refills: 11 | Status: SHIPPED | OUTPATIENT
Start: 2022-06-30

## 2022-06-30 RX ORDER — ROSUVASTATIN CALCIUM 40 MG/1
40 TABLET, COATED ORAL DAILY
Qty: 90 TABLET | Refills: 11 | Status: SHIPPED | OUTPATIENT
Start: 2022-06-30 | End: 2023-07-06

## 2022-06-30 RX ORDER — TIOTROPIUM BROMIDE AND OLODATEROL 3.124; 2.736 UG/1; UG/1
2 SPRAY, METERED RESPIRATORY (INHALATION) DAILY
Qty: 12 G | Refills: 11 | Status: SHIPPED | OUTPATIENT
Start: 2022-06-30 | End: 2023-07-06

## 2022-06-30 RX ORDER — ALBUTEROL SULFATE 90 UG/1
AEROSOL, METERED RESPIRATORY (INHALATION)
Qty: 25.5 G | Refills: 3 | Status: SHIPPED | OUTPATIENT
Start: 2022-06-30 | End: 2023-04-18

## 2022-06-30 RX ORDER — CLOPIDOGREL BISULFATE 75 MG/1
75 TABLET ORAL DAILY
Qty: 30 TABLET | Refills: 0 | Status: SHIPPED | OUTPATIENT
Start: 2022-06-30 | End: 2022-08-16

## 2022-06-30 RX ORDER — LOSARTAN POTASSIUM AND HYDROCHLOROTHIAZIDE 12.5; 1 MG/1; MG/1
1 TABLET ORAL DAILY
Qty: 90 TABLET | Refills: 11 | Status: SHIPPED | OUTPATIENT
Start: 2022-06-30 | End: 2023-07-06

## 2022-06-30 NOTE — LETTER
6/30/2022    Miri Woodruff MD  [address]  [facility] [address]    RE: Leonela Christianson       Dear Colleague,     I had the pleasure of seeing Leonela Christianson in the [facility].    Thank you, Dr. Gonzalez, for asking the [facility] team to see Ms. Leonela Christianson to evaluate       Assessment/Recommendations   Assessment/Plan:  1. CAD rare chest pain, otherwise no change, cont medications, check labs today and encourage tob cessation   2. PE followed by Dr. Woodruff and warfarin clinic for INR  3. CV prevention as above, cont clopidogrel unless stopping tob, check lipids goal LDL <70    Follow up one year     History of Present Illness/Subjective    Ms. Leonela Christianson is a 72 year old female with PE on warfarin, COPD, HT, CAD still using tob, last lipids 9/21 with LDL 73, she knows she needs to stop tob, she has chronic dyspnea no change, no PND/orthopnea/edema, intermittent chest pain no change, on clopidogrel and warfarin, rare bleeding from hemorrhoids.         Physical Examination Review of Systems   /72 (BP Location: Right arm, Patient Position: Sitting, Cuff Size: Adult Regular)   Pulse 76   Resp 20   Ht 1.524 m (5')   Wt 61.6 kg (135 lb 12.8 oz)   BMI 26.52 kg/m    Body mass index is 26.52 kg/m .  Wt Readings from Last 3 Encounters:   06/30/22 61.6 kg (135 lb 12.8 oz)   05/09/22 61.2 kg (135 lb)   05/02/22 61.6 kg (135 lb 14.4 oz)     [unfilled]  General Appearance:   no distress, normal body habitus   ENT/Mouth: membranes moist, no oral lesions or bleeding gums.      EYES:  no scleral icterus, normal conjunctivae   Neck: no carotid bruits or thyromegaly   Chest/Lungs:   lungs are clear to auscultation, no rales or wheezing,  sternal scar, equal chest wall expansion    Cardiovascular:   Regular. Normal first and second heart sounds with no murmurs, rubs, or gallops; the carotid, radial and posterior tibial pulses are intact, Jugular  venous pressure , edema bilaterally    Abdomen:  no organomegaly, masses, bruits, or tenderness; bowel sounds are present   Extremities: no cyanosis or clubbing   Skin: no xanthelasma, warm.    Neurologic: normal  bilateral, no tremors     Psychiatric: alert and oriented x3, calm     Review of Systems - 12 points nega other than above      Medical History  Surgical History Family History Social History   Past Medical History:   Diagnosis Date     Acute pyelonephritis      LEELA (acute kidney injury) (H) 9/12/2020     COPD (chronic obstructive pulmonary disease) (H)      Coronary artery disease      Diabetes mellitus (H)      Heart attack (H) 2012, 2019    X3     History of blood clots     PEx2     Hyperlipidemia      Hypertension      Myocardial infarction (H) 5/9/2012    Formatting of this note might be different from the original. Inferior, 1/2012 Formatting of this note might be different from the original. 4/2012      Pulmonary embolism (H) 4/10/2007    Formatting of this note might be different from the original. Two episodes over the last 10 years.  On lifelong coumadin therapy     Past Surgical History:   Procedure Laterality Date     CARDIAC CATHETERIZATION      Stent     CV CORONARY ANGIOGRAM N/A 11/20/2017    Procedure: Coronary Angiogram;  Surgeon: Daniela Mazariegos MD;  Location: [facility] Cath Lab;  Service:      CV LEFT HEART CATHETERIZATION WITHOUT LEFT VENTRICULOGRAM Left 11/20/2017    Procedure: Left Heart Catheterization Without Left Ventriculogram;  Surgeon: Daniela Mazariegos MD;  Location: [facility] Cath Lab;  Service:      CYSTOSCOPY  09/29/2020     HC CYSTOSCOPY,INSERT URETERAL STENT Left 9/11/2020    Procedure: CYSTOSCOPY, WITH URETERAL STENT INSERTION;  Surgeon: Sean Schmitt MD;  Location: [facility] OR;  Service: Urology     HYSTERECTOMY      fibroids     OOPHORECTOMY      Family History   Problem Relation Age of Onset     No Known Problems Mother      No Known Problems  Father      Diabetes Paternal Uncle      Diabetes Sister      Breast Cancer No family hx of      Cancer No family hx of      Colon Cancer No family hx of      Heart Disease No family hx of      Coronary Artery Disease No family hx of     Social History     Socioeconomic History     Marital status:      Spouse name: Not on file     Number of children: Not on file     Years of education: Not on file     Highest education level: Not on file   Occupational History     Not on file   Tobacco Use     Smoking status: Current Every Day Smoker     Packs/day: 0.75     Years: 55.00     Pack years: 41.25     Types: Cigarettes     Start date: 1/1/1963     Smokeless tobacco: Never Used   Vaping Use     Vaping Use: Never used   Substance and Sexual Activity     Alcohol use: No     Drug use: No     Sexual activity: Not Currently     Partners: Male   Other Topics Concern     Not on file   Social History Narrative    She lives with her 40-year-old son and 14-year-old grandson.  She used to work in [employer] department.     Social Determinants of Health     Financial Resource Strain: Not on file   Food Insecurity: Not on file   Transportation Needs: Not on file   Physical Activity: Not on file   Stress: Not on file   Social Connections: Not on file   Intimate Partner Violence: Not on file   Housing Stability: Not on file          Medications  Allergies   Scheduled Meds:  Continuous Infusions:  PRN Meds:. Allergies   Allergen Reactions     Sulfa (Sulfonamide Antibiotics) [Sulfa Drugs] Anaphylaxis         Lab Results    Chemistry/lipid CBC Cardiac Enzymes/BNP/TSH/INR   Lab Results   Component Value Date    CHOL 130 09/09/2021    HDL 47 (L) 09/09/2021    TRIG 52 09/09/2021    BUN 14 09/09/2021     09/09/2021    CO2 24 09/09/2021    Lab Results   Component Value Date    WBC 7.8 09/09/2021    HGB 16.6 (H) 09/09/2021    HCT 49.8 (H) 09/09/2021    MCV 93 09/09/2021     09/09/2021    Lab Results   Component Value Date     TROPONINI 0.02 09/11/2020    BNP <10 09/11/2020    TSH 1.39 09/09/2021    INR 2.8 (H) 06/20/2022              Bandar Gonzalez MD  Interventional Cardiology  [facility]      Thank you for allowing me to participate in the care of your patient.      Sincerely,     Bandar Gonzalez MD     [facility]  cc:   Bandar Gonzalez MD  [address] [place] [id number]

## 2022-06-30 NOTE — PROGRESS NOTES
Thank you, Dr. Gonzalez, for asking the [facility] Heart Care team to see Ms. Leonela Christianson to evaluate       Assessment/Recommendations   Assessment/Plan:  1. CAD rare chest pain, otherwise no change, cont medications, check labs today and encourage tob cessation   2. PE followed by Dr. Woodruff and warfarin clinic for INR  3. CV prevention as above, cont clopidogrel unless stopping tob, check lipids goal LDL <70    Follow up one year     History of Present Illness/Subjective    Ms. Leonela Christianson is a 72 year old female with PE on warfarin, COPD, HT, CAD still using tob, last lipids 9/21 with LDL 73, she knows she needs to stop tob, she has chronic dyspnea no change, no PND/orthopnea/edema, intermittent chest pain no change, on clopidogrel and warfarin, rare bleeding from hemorrhoids.         Physical Examination Review of Systems   /72 (BP Location: Right arm, Patient Position: Sitting, Cuff Size: Adult Regular)   Pulse 76   Resp 20   Ht 1.524 m (5')   Wt 61.6 kg (135 lb 12.8 oz)   BMI 26.52 kg/m    Body mass index is 26.52 kg/m .  Wt Readings from Last 3 Encounters:   06/30/22 61.6 kg (135 lb 12.8 oz)   05/09/22 61.2 kg (135 lb)   05/02/22 61.6 kg (135 lb 14.4 oz)     [unfilled]  General Appearance:   no distress, normal body habitus   ENT/Mouth: membranes moist, no oral lesions or bleeding gums.      EYES:  no scleral icterus, normal conjunctivae   Neck: no carotid bruits or thyromegaly   Chest/Lungs:   lungs are clear to auscultation, no rales or wheezing,  sternal scar, equal chest wall expansion    Cardiovascular:   Regular. Normal first and second heart sounds with no murmurs, rubs, or gallops; the carotid, radial and posterior tibial pulses are intact, Jugular venous pressure , edema bilaterally    Abdomen:  no organomegaly, masses, bruits, or tenderness; bowel sounds are present   Extremities: no cyanosis or clubbing   Skin: no xanthelasma, warm.    Neurologic: normal   bilateral, no tremors     Psychiatric: alert and oriented x3, calm     Review of Systems - 12 points nega other than above      Medical History  Surgical History Family History Social History   Past Medical History:   Diagnosis Date     Acute pyelonephritis      LEELA (acute kidney injury) (H) 9/12/2020     COPD (chronic obstructive pulmonary disease) (H)      Coronary artery disease      Diabetes mellitus (H)      Heart attack (H) 2012, 2019    X3     History of blood clots     PEx2     Hyperlipidemia      Hypertension      Myocardial infarction (H) 5/9/2012    Formatting of this note might be different from the original. Inferior, 1/2012 Formatting of this note might be different from the original. 4/2012      Pulmonary embolism (H) 4/10/2007    Formatting of this note might be different from the original. Two episodes over the last 10 years.  On lifelong coumadin therapy     Past Surgical History:   Procedure Laterality Date     CARDIAC CATHETERIZATION      Stent     CV CORONARY ANGIOGRAM N/A 11/20/2017    Procedure: Coronary Angiogram;  Surgeon: Daniela Mazariegos MD;  Location: [facility] Cath Lab;  Service:      CV LEFT HEART CATHETERIZATION WITHOUT LEFT VENTRICULOGRAM Left 11/20/2017    Procedure: Left Heart Catheterization Without Left Ventriculogram;  Surgeon: Daniela Mazariegos MD;  Location: [facility] Cath Lab;  Service:      CYSTOSCOPY  09/29/2020     HC CYSTOSCOPY,INSERT URETERAL STENT Left 9/11/2020    Procedure: CYSTOSCOPY, WITH URETERAL STENT INSERTION;  Surgeon: Sean Schmitt MD;  Location: [facility];  Service: Urology     HYSTERECTOMY      fibroids     OOPHORECTOMY      Family History   Problem Relation Age of Onset     No Known Problems Mother      No Known Problems Father      Diabetes Paternal Uncle      Diabetes Sister      Breast Cancer No family hx of      Cancer No family hx of      Colon Cancer No family hx of      Heart Disease No family hx of      Coronary Artery Disease No  family hx of     Social History     Socioeconomic History     Marital status:      Spouse name: Not on file     Number of children: Not on file     Years of education: Not on file     Highest education level: Not on file   Occupational History     Not on file   Tobacco Use     Smoking status: Current Every Day Smoker     Packs/day: 0.75     Years: 55.00     Pack years: 41.25     Types: Cigarettes     Start date: 1/1/1963     Smokeless tobacco: Never Used   Vaping Use     Vaping Use: Never used   Substance and Sexual Activity     Alcohol use: No     Drug use: No     Sexual activity: Not Currently     Partners: Male   Other Topics Concern     Not on file   Social History Narrative    She lives with her 40-year-old son and 14-year-old grandson.  She used to work in [employer] department.     Social Determinants of Health     Financial Resource Strain: Not on file   Food Insecurity: Not on file   Transportation Needs: Not on file   Physical Activity: Not on file   Stress: Not on file   Social Connections: Not on file   Intimate Partner Violence: Not on file   Housing Stability: Not on file          Medications  Allergies   Scheduled Meds:  Continuous Infusions:  PRN Meds:. Allergies   Allergen Reactions     Sulfa (Sulfonamide Antibiotics) [Sulfa Drugs] Anaphylaxis         Lab Results    Chemistry/lipid CBC Cardiac Enzymes/BNP/TSH/INR   Lab Results   Component Value Date    CHOL 130 09/09/2021    HDL 47 (L) 09/09/2021    TRIG 52 09/09/2021    BUN 14 09/09/2021     09/09/2021    CO2 24 09/09/2021    Lab Results   Component Value Date    WBC 7.8 09/09/2021    HGB 16.6 (H) 09/09/2021    HCT 49.8 (H) 09/09/2021    MCV 93 09/09/2021     09/09/2021    Lab Results   Component Value Date    TROPONINI 0.02 09/11/2020    BNP <10 09/11/2020    TSH 1.39 09/09/2021    INR 2.8 (H) 06/20/2022              Bandar Gonzalez MD  Interventional Cardiology  [facility]

## 2022-07-05 ENCOUNTER — LAB (OUTPATIENT)
Dept: LAB | Facility: CLINIC | Age: 72
End: 2022-07-05
Payer: MEDICARE

## 2022-07-05 ENCOUNTER — ANTICOAGULATION THERAPY VISIT (OUTPATIENT)
Dept: ANTICOAGULATION | Facility: CLINIC | Age: 72
End: 2022-07-05

## 2022-07-05 DIAGNOSIS — Z79.01 LONG TERM (CURRENT) USE OF ANTICOAGULANTS: ICD-10-CM

## 2022-07-05 DIAGNOSIS — R21 RASH AND NONSPECIFIC SKIN ERUPTION: ICD-10-CM

## 2022-07-05 DIAGNOSIS — Z86.711 HISTORY OF PULMONARY EMBOLISM: Primary | ICD-10-CM

## 2022-07-05 DIAGNOSIS — Z11.59 NEED FOR HEPATITIS C SCREENING TEST: Primary | ICD-10-CM

## 2022-07-05 LAB — INR BLD: 2.3 (ref 0.9–1.1)

## 2022-07-05 PROCEDURE — 36415 COLL VENOUS BLD VENIPUNCTURE: CPT | Performed by: FAMILY MEDICINE

## 2022-07-05 PROCEDURE — 85610 PROTHROMBIN TIME: CPT | Performed by: FAMILY MEDICINE

## 2022-07-05 NOTE — PROGRESS NOTES
ANTICOAGULATION MANAGEMENT     Leonela Christianson 72 year old female is on warfarin with therapeutic INR result. (Goal INR 2.0-3.0)    Recent labs: (last 7 days)     07/05/22  0743   INR 2.3*       ASSESSMENT       Source(s): Chart Review, Patient/Caregiver Call and Template       Warfarin doses taken: Warfarin taken differently, but did not change total weekly dose    Took her 6mg on Sun/Tues.    Diet: No new diet changes identified    New illness, injury, or hospitalization: No    Medication/supplement changes: None noted    Signs or symptoms of bleeding or clotting: No    Previous INR: Therapeutic last visit at 2.8; previously outside of goal range at 3.5    Additional findings: None       PLAN     Recommended plan for no diet, medication or health factor changes affecting INR     Dosing Instructions:    continue your current warfarin dose with next INR in 3 weeks       Summary  As of 7/5/2022    Full warfarin instructions:  6 mg every Sun, Wed; 3 mg all other days   Next INR check:  7/26/2022             Telephone call with  Leonela ([phone]) who verbalizes understanding and agrees to plan   - advised to spread out her 6mg warfarin dose, preferably on Sun/Wed.   - update anticoagulation tracking calendar.     Lab visit scheduled - INR on 7/26/22 @ [facility].    Education provided: Importance of consistent vitamin K intake and Goal range and significance of current result    Plan made per [facility] anticoagulation protocol    Jeanne Olsen RN  Anticoagulation Clinic  7/5/2022    _______________________________________________________________________     Anticoagulation Episode Summary     Current INR goal:  2.0-3.0   TTR:  78.6 % (1 y)   Target end date:  Indefinite   Send INR reminders to:  [facility]    Indications    History of pulmonary embolism [Z86.711]  Long term (current) use of anticoagulants [Z79.01]  Rash and nonspecific skin eruption [R21]           Comments:           Anticoagulation  Care Providers     Provider Role Specialty Phone number    Miri Woodruff MD Referring Family Medicine [phone]

## 2022-08-02 ENCOUNTER — TELEPHONE (OUTPATIENT)
Dept: ANTICOAGULATION | Facility: CLINIC | Age: 72
End: 2022-08-02

## 2022-08-02 NOTE — TELEPHONE ENCOUNTER
ANTICOAGULATION     Leonela Christianson is overdue for INR check.      Spoke with Leonela and scheduled lab appointment on 8/4    Jeanne Olsen RN

## 2022-08-04 ENCOUNTER — ANTICOAGULATION THERAPY VISIT (OUTPATIENT)
Dept: ANTICOAGULATION | Facility: CLINIC | Age: 72
End: 2022-08-04

## 2022-08-04 ENCOUNTER — LAB (OUTPATIENT)
Dept: LAB | Facility: CLINIC | Age: 72
End: 2022-08-04
Payer: MEDICARE

## 2022-08-04 DIAGNOSIS — R21 RASH AND NONSPECIFIC SKIN ERUPTION: ICD-10-CM

## 2022-08-04 DIAGNOSIS — Z79.01 LONG TERM (CURRENT) USE OF ANTICOAGULANTS: ICD-10-CM

## 2022-08-04 DIAGNOSIS — Z86.711 HISTORY OF PULMONARY EMBOLISM: Primary | ICD-10-CM

## 2022-08-04 LAB — INR BLD: 2.6 (ref 0.9–1.1)

## 2022-08-04 PROCEDURE — 85610 PROTHROMBIN TIME: CPT | Performed by: FAMILY MEDICINE

## 2022-08-04 PROCEDURE — 36416 COLLJ CAPILLARY BLOOD SPEC: CPT

## 2022-08-04 NOTE — PROGRESS NOTES
ANTICOAGULATION MANAGEMENT     Leonela Christianson 72 year old female is on warfarin with therapeutic INR result. (Goal INR 2.0-3.0)    Recent labs: (last 7 days)     08/04/22  0750   INR 2.6*       ASSESSMENT       Source(s): Chart Review, Patient/Caregiver Call and Template       Warfarin doses taken: Warfarin taken as instructed    Diet: No new diet changes identified    New illness, injury, or hospitalization: No    Medication/supplement changes: None noted    Signs or symptoms of bleeding or clotting: No    Previous INR: Therapeutic last 2 visits    Additional findings: None       PLAN     Recommended plan for no diet, medication or health factor changes affecting INR     Dosing Instructions:    Continue your current warfarin dose with next INR in 4 weeks       Summary  As of 8/4/2022    Full warfarin instructions:  6 mg every Sun, Wed; 3 mg all other days   Next INR check:  9/1/2022             Detailed voice message left for  Leonela ([phone]) with dosing instructions and follow up date.     Contact [phone] to schedule and with any changes, questions or concerns.     Education provided: Please call back if any changes to your diet, medications or how you've been taking warfarin, Importance of consistent vitamin K intake and Goal range and significance of current result    Plan made per [facility] anticoagulation protocol    Jeanne Olsen, RN  Anticoagulation Clinic  8/4/2022    _______________________________________________________________________     Anticoagulation Episode Summary     Current INR goal:  2.0-3.0   TTR:  83.8 % (1 y)   Target end date:  Indefinite   Send INR reminders to:  [facility]    Indications    History of pulmonary embolism [Z86.711]  Long term (current) use of anticoagulants [Z79.01]  Rash and nonspecific skin eruption [R21]           Comments:           Anticoagulation Care Providers     Provider Role Specialty Phone number    Miri Woodruff MD Referring  Family Medicine [phone]

## 2022-08-16 DIAGNOSIS — I21.4 NSTEMI (NON-ST ELEVATED MYOCARDIAL INFARCTION) (H): ICD-10-CM

## 2022-08-16 RX ORDER — CLOPIDOGREL BISULFATE 75 MG/1
TABLET ORAL
Qty: 30 TABLET | Refills: 11 | Status: SHIPPED | OUTPATIENT
Start: 2022-08-16 | End: 2022-09-22

## 2022-09-08 ENCOUNTER — TELEPHONE (OUTPATIENT)
Dept: ANTICOAGULATION | Facility: CLINIC | Age: 72
End: 2022-09-08

## 2022-09-08 NOTE — TELEPHONE ENCOUNTER
ANTICOAGULATION     Leonela Christianson is overdue for INR check.      Spoke with Leonela and scheduled lab appointment on 9/12    Caleb Queen RN

## 2022-09-12 ENCOUNTER — LAB (OUTPATIENT)
Dept: LAB | Facility: CLINIC | Age: 72
End: 2022-09-12
Payer: MEDICARE

## 2022-09-12 ENCOUNTER — ANTICOAGULATION THERAPY VISIT (OUTPATIENT)
Dept: ANTICOAGULATION | Facility: CLINIC | Age: 72
End: 2022-09-12

## 2022-09-12 DIAGNOSIS — R21 RASH AND NONSPECIFIC SKIN ERUPTION: ICD-10-CM

## 2022-09-12 DIAGNOSIS — Z79.01 LONG TERM (CURRENT) USE OF ANTICOAGULANTS: ICD-10-CM

## 2022-09-12 DIAGNOSIS — Z86.711 HISTORY OF PULMONARY EMBOLISM: Primary | ICD-10-CM

## 2022-09-12 LAB — INR BLD: 2.6 (ref 0.9–1.1)

## 2022-09-12 PROCEDURE — 36416 COLLJ CAPILLARY BLOOD SPEC: CPT

## 2022-09-12 PROCEDURE — 85610 PROTHROMBIN TIME: CPT

## 2022-09-12 NOTE — PROGRESS NOTES
ANTICOAGULATION MANAGEMENT     Leonela Christianson 72 year old female is on warfarin with therapeutic INR result. (Goal INR 2.0-3.0)    Recent labs: (last 7 days)     09/12/22  0732   INR 2.6*       ASSESSMENT       Source(s): Chart Review, Patient/Caregiver Call and Template       Warfarin doses taken: Warfarin taken as instructed    Diet: No new diet changes identified    New illness, injury, or hospitalization: No    Medication/supplement changes: None noted    Signs or symptoms of bleeding or clotting: No    Previous INR: Therapeutic last 3 visits    Additional findings:  Reported doing well.       PLAN     Recommended plan for no diet, medication or health factor changes affecting INR     Dosing Instructions: Continue your current warfarin dose with next INR in 5 weeks       Summary  As of 9/12/2022    Full warfarin instructions:  6 mg every Sun, Wed; 3 mg all other days   Next INR check:  10/17/2022             Telephone call with  Leonela ([phone]) who verbalizes understanding and agrees to plan    Lab visit scheduled - INR on 10/17/22 @ [facility]    Education provided: Importance of consistent vitamin K intake and Goal range and significance of current result    Plan made per [facility] anticoagulation protocol    Jeanne Olsen RN  Anticoagulation Clinic  9/12/2022    _______________________________________________________________________     Anticoagulation Episode Summary     Current INR goal:  2.0-3.0   TTR:  83.8 % (1 y)   Target end date:  Indefinite   Send INR reminders to:  [facility]    Indications    History of pulmonary embolism [Z86.711]  Long term (current) use of anticoagulants [Z79.01]  Rash and nonspecific skin eruption [R21]           Comments:           Anticoagulation Care Providers     Provider Role Specialty Phone number    Miri Woodruff MD Referring Family Medicine [phone]

## 2022-09-22 ENCOUNTER — OFFICE VISIT (OUTPATIENT)
Dept: FAMILY MEDICINE | Facility: CLINIC | Age: 72
End: 2022-09-22
Payer: MEDICARE

## 2022-09-22 VITALS
WEIGHT: 138.2 LBS | RESPIRATION RATE: 24 BRPM | SYSTOLIC BLOOD PRESSURE: 125 MMHG | OXYGEN SATURATION: 97 % | BODY MASS INDEX: 27.86 KG/M2 | HEART RATE: 90 BPM | HEIGHT: 59 IN | DIASTOLIC BLOOD PRESSURE: 74 MMHG

## 2022-09-22 DIAGNOSIS — I82.409 RECURRENT DEEP VEIN THROMBOSIS (DVT) (H): ICD-10-CM

## 2022-09-22 DIAGNOSIS — R21 RASH AND NONSPECIFIC SKIN ERUPTION: ICD-10-CM

## 2022-09-22 DIAGNOSIS — Z00.00 ENCOUNTER FOR MEDICARE ANNUAL WELLNESS EXAM: Primary | ICD-10-CM

## 2022-09-22 DIAGNOSIS — I25.2 HISTORY OF NON-ST ELEVATION MYOCARDIAL INFARCTION (NSTEMI): ICD-10-CM

## 2022-09-22 DIAGNOSIS — I10 ESSENTIAL HYPERTENSION: Chronic | ICD-10-CM

## 2022-09-22 DIAGNOSIS — J43.9 PULMONARY EMPHYSEMA, UNSPECIFIED EMPHYSEMA TYPE (H): Chronic | ICD-10-CM

## 2022-09-22 DIAGNOSIS — Z86.711 HISTORY OF PULMONARY EMBOLISM: ICD-10-CM

## 2022-09-22 DIAGNOSIS — Z87.891 PERSONAL HISTORY OF NICOTINE DEPENDENCE: ICD-10-CM

## 2022-09-22 DIAGNOSIS — R12 HEARTBURN: ICD-10-CM

## 2022-09-22 DIAGNOSIS — J42 CHRONIC BRONCHITIS, UNSPECIFIED CHRONIC BRONCHITIS TYPE (H): ICD-10-CM

## 2022-09-22 DIAGNOSIS — E78.5 HYPERLIPIDEMIA, UNSPECIFIED HYPERLIPIDEMIA TYPE: Chronic | ICD-10-CM

## 2022-09-22 DIAGNOSIS — F17.200 TOBACCO USE DISORDER: ICD-10-CM

## 2022-09-22 PROCEDURE — G0008 ADMIN INFLUENZA VIRUS VAC: HCPCS | Performed by: FAMILY MEDICINE

## 2022-09-22 PROCEDURE — G0439 PPPS, SUBSEQ VISIT: HCPCS | Performed by: FAMILY MEDICINE

## 2022-09-22 PROCEDURE — 90662 IIV NO PRSV INCREASED AG IM: CPT | Performed by: FAMILY MEDICINE

## 2022-09-22 PROCEDURE — 99214 OFFICE O/P EST MOD 30 MIN: CPT | Mod: 25 | Performed by: FAMILY MEDICINE

## 2022-09-22 RX ORDER — FAMOTIDINE 20 MG/1
20 TABLET, FILM COATED ORAL 2 TIMES DAILY
Qty: 180 TABLET | Refills: 3 | Status: SHIPPED | OUTPATIENT
Start: 2022-09-22 | End: 2023-07-06

## 2022-09-22 RX ORDER — CLOPIDOGREL BISULFATE 75 MG/1
75 TABLET ORAL DAILY
Qty: 90 TABLET | Refills: 3 | Status: SHIPPED | OUTPATIENT
Start: 2022-09-22 | End: 2023-07-06

## 2022-09-22 RX ORDER — WARFARIN SODIUM 3 MG/1
TABLET ORAL
Qty: 120 TABLET | Refills: 5 | Status: SHIPPED | OUTPATIENT
Start: 2022-09-22 | End: 2023-10-02

## 2022-09-22 ASSESSMENT — ENCOUNTER SYMPTOMS
HEMATOCHEZIA: 0
ABDOMINAL PAIN: 0
PALPITATIONS: 0
HEARTBURN: 1
CHILLS: 0
DIZZINESS: 0
HEMATURIA: 0
WEAKNESS: 0
FREQUENCY: 1
PARESTHESIAS: 0
NERVOUS/ANXIOUS: 0
ARTHRALGIAS: 0
EYE PAIN: 0
NAUSEA: 0
JOINT SWELLING: 0
FEVER: 0
DIARRHEA: 0
BREAST MASS: 0
DYSURIA: 0
MYALGIAS: 0
COUGH: 0
HEADACHES: 0
SORE THROAT: 0
CONSTIPATION: 1
SHORTNESS OF BREATH: 1

## 2022-09-22 ASSESSMENT — ACTIVITIES OF DAILY LIVING (ADL): CURRENT_FUNCTION: NO ASSISTANCE NEEDED

## 2022-09-22 NOTE — PROGRESS NOTES
"SUBJECTIVE:   Leonela is a 72 year old who presents for Preventive Visit.    Patient has been advised of split billing requirements and indicates understanding: Yes     Are you in the first 12 months of your Medicare coverage?  No    Healthy Habits:     In general, how would you rate your overall health?  Good    Frequency of exercise:  1 day/week    Duration of exercise:  15-30 minutes    Do you usually eat at least 4 servings of fruit and vegetables a day, include whole grains    & fiber and avoid regularly eating high fat or \"junk\" foods?  No    Taking medications regularly:  Yes    Medication side effects:  Not applicable    Ability to successfully perform activities of daily living:  No assistance needed    Home Safety:  No safety concerns identified    Hearing Impairment:  Feel that people are mumbling or not speaking clearly and need to ask people to speak up or repeat themselves    In the past 6 months, have you been bothered by leaking of urine?  No    In general, how would you rate your overall mental or emotional health?  Good      PHQ-2 Total Score: 0    Additional concerns today:  Yes    Problems to Add:  1. Rash thighs - not visible now, but can look like patches of flaking.  Not itchy.  No rash elsewhere.  2. Wondering about acid reflux.  Feels acid \"coming back up\" in her throat/chest area.  Worse with tomato sauce.  3. Urine - continues with some incontinence.  Taking losartan-hydrochlorothiazide in the mid-afternoon.  Spaces out all of her medications.    4. Itchy ears, no pain.  Discussed a small amount of hydrocortisone on a Q-tip.    Do you feel safe in your environment? Yes    Have you ever done Advance Care Planning? (For example, a Health Directive, POLST, or a discussion with a medical provider or your loved ones about your wishes): Yes, advance care planning is on file.     Fall risk  Fallen 2 or more times in the past year?: No  Any fall with injury in the past year?: No    Cognitive " Screening   1) Repeat 3 items (Leader, Season, Table)    2) Clock draw: NORMAL  3) 3 item recall: Recalls 3 objects  Results: 3 items recalled: COGNITIVE IMPAIRMENT LESS LIKELY    Reviewed and updated as needed this visit by clinical staff   Tobacco  Allergies  Meds   Med Hx  Surg Hx  Fam Hx  Soc Hx          Reviewed and updated as needed this visit by Provider   Tobacco  Allergies  Meds  Problems  Med Hx  Surg Hx  Fam Hx  Soc   Hx         Social History     Tobacco Use     Smoking status: Current Every Day Smoker     Packs/day: 0.75     Years: 55.00     Pack years: 41.25     Types: Cigarettes     Start date: 1/1/1963     Smokeless tobacco: Never Used   Substance Use Topics     Alcohol use: No       Alcohol Use 9/22/2022   Prescreen: >3 drinks/day or >7 drinks/week? Not Applicable       Hyperlipidemia Follow-Up    Are you regularly taking any medication or supplement to lower your cholesterol?   Yes- Rosuvastatin 40 mg daily    Are you having muscle aches or other side effects that you think could be caused by your cholesterol lowering medication?  No    Hypertension Follow-up    Do you check your blood pressure regularly outside of the clinic? No     Are you following a low salt diet? No    Are your blood pressures ever more than 140 on the top number (systolic) OR more   than 90 on the bottom number (diastolic), for example 140/90? No      Current providers sharing in care for this patient include:   Patient Care Team:  Miri Woodruff MD as PCP - General (Family Medicine)  Bandar Gonzalez MD as Assigned Heart and Vascular Provider  Micah Goodson MD as Assigned Musculoskeletal Provider  Tina Guaman DO as Assigned PCP    The following health maintenance items are reviewed in Epic and correct as of today:  Health Maintenance   Topic Date Due     COPD ACTION PLAN  Never done     Pneumococcal Vaccine: 65+ Years (1 - PCV) Never done     HEPATITIS C SCREENING  Never done      ZOSTER IMMUNIZATION (2 of 2) 06/07/2017     DTAP/TDAP/TD IMMUNIZATION (2 - Td or Tdap) 07/06/2021     COVID-19 Vaccine (4 - Booster for Pfizer series) 12/31/2021     INFLUENZA VACCINE (1) 09/01/2022     LUNG CANCER SCREENING  09/24/2022     ANNUAL REVIEW OF HM ORDERS  03/28/2023     NICOTINE/TOBACCO CESSATION COUNSELING Q 1 YR  09/22/2023     MEDICARE ANNUAL WELLNESS VISIT  09/22/2023     FALL RISK ASSESSMENT  09/22/2023     COLORECTAL CANCER SCREENING  01/26/2024     MAMMO SCREENING  03/15/2024     LIPID  06/30/2027     ADVANCE CARE PLANNING  09/22/2027     DEXA  10/11/2034     SPIROMETRY  Completed     PHQ-2 (once per calendar year)  Completed     IPV IMMUNIZATION  Aged Out     MENINGITIS IMMUNIZATION  Aged Out     HEPATITIS B IMMUNIZATION  Aged Out     BP Readings from Last 3 Encounters:   09/22/22 125/74   06/30/22 116/72   05/02/22 (!) 149/88    Wt Readings from Last 3 Encounters:   09/22/22 62.7 kg (138 lb 3.2 oz)   06/30/22 61.6 kg (135 lb 12.8 oz)   05/09/22 61.2 kg (135 lb)                  Patient Active Problem List   Diagnosis     Hyperlipidemia     Nicotine Dependence     Overweight (BMI 25.0-29.9)     Osteoporosis     Chronic bronchitis, unspecified chronic bronchitis type (H)     History of pulmonary embolism     Upper back pain on left side     Pulmonary nodule     Pulmonary emphysema, unspecified emphysema type (H)     Essential hypertension     History of non-ST elevation myocardial infarction (NSTEMI)     Coronary atherosclerosis due to lipid rich plaque     Pigmented skin lesion     Hypoxia     Ureteral stone     Hemorrhoids, internal     Breast mass, right     H/O: hysterectomy     Nicotine dependence     Irritability and anger     Chronic fatigue     Long term (current) use of anticoagulants     Recurrent deep vein thrombosis (DVT) (H)     Rash and nonspecific skin eruption     Past Surgical History:   Procedure Laterality Date     CARDIAC CATHETERIZATION      Stent     CV CORONARY ANGIOGRAM  N/A 11/20/2017    Procedure: Coronary Angiogram;  Surgeon: Daniela Mazariegos MD;  Location: [facility];  Service:      CV LEFT HEART CATHETERIZATION WITHOUT LEFT VENTRICULOGRAM Left 11/20/2017    Procedure: Left Heart Catheterization Without Left Ventriculogram;  Surgeon: Daniela Mazariegos MD;  Location: [facility];  Service:      CYSTOSCOPY  09/29/2020     HC CYSTOSCOPY,INSERT URETERAL STENT Left 9/11/2020    Procedure: CYSTOSCOPY, WITH URETERAL STENT INSERTION;  Surgeon: Sean Schmitt MD;  Location: [facility];  Service: Urology     HYSTERECTOMY      fibroids     OOPHORECTOMY         Social History     Tobacco Use     Smoking status: Current Every Day Smoker     Packs/day: 0.75     Years: 55.00     Pack years: 41.25     Types: Cigarettes     Start date: 1/1/1963     Smokeless tobacco: Never Used   Substance Use Topics     Alcohol use: No     Family History   Problem Relation Age of Onset     No Known Problems Mother      No Known Problems Father      Diabetes Paternal Uncle      Diabetes Sister      Breast Cancer No family hx of      Cancer No family hx of      Colon Cancer No family hx of      Heart Disease No family hx of      Coronary Artery Disease No family hx of          Current Outpatient Medications   Medication Sig Dispense Refill     albuterol (PROAIR HFA/PROVENTIL HFA/VENTOLIN HFA) 108 (90 Base) MCG/ACT inhaler [ALBUTEROL (PROAIR HFA) 90 MCG/ACTUATION INHALER] USE 1 TO 2 INHALATIONS EVERY 4 TO 6 HOURS AS NEEDED FOR WHEEZING 25.5 g 3     clopidogrel (PLAVIX) 75 MG tablet TAKE 1 TABLET DAILY 30 tablet 11     losartan-hydrochlorothiazide (HYZAAR) 100-12.5 MG tablet Take 1 tablet by mouth daily 90 tablet 11     metoprolol succinate ER (TOPROL XL) 100 MG 24 hr tablet Take 1 tablet (100 mg) by mouth At Bedtime 90 tablet 11     nitroGLYcerin (NITROSTAT) 0.4 MG sublingual tablet Place 1 tablet (0.4 mg) under the tongue every 5 minutes as needed for chest pain 20 tablet 11      "rosuvastatin (CRESTOR) 40 MG tablet Take 1 tablet (40 mg) by mouth daily 90 tablet 11     tiotropium-olodaterol (STIOLTO RESPIMAT) 2.5-2.5 MCG/ACT AERS Take 2 puffs by mouth daily for 90 days 12 g 11     warfarin ANTICOAGULANT (COUMADIN) 3 MG tablet TAKE 1 TO 2 TABLETS DAILY, ADJUST DOSE PER INR RESULTS AS INSTRUCTED 120 tablet 5     Allergies   Allergen Reactions     Sulfa (Sulfonamide Antibiotics) [Sulfa Drugs] Anaphylaxis       Review of Systems   Constitutional: Negative for chills and fever.   HENT: Negative for congestion, ear pain, hearing loss and sore throat.    Eyes: Negative for pain and visual disturbance.   Respiratory: Positive for shortness of breath. Negative for cough.    Cardiovascular: Negative for chest pain, palpitations and peripheral edema.   Gastrointestinal: Positive for constipation and heartburn. Negative for abdominal pain, diarrhea, hematochezia and nausea.   Breasts:  Negative for tenderness, breast mass and discharge.   Genitourinary: Positive for frequency and urgency. Negative for dysuria, genital sores, hematuria, pelvic pain, vaginal bleeding and vaginal discharge.   Musculoskeletal: Negative for arthralgias, joint swelling and myalgias.   Skin: Positive for rash.   Neurological: Negative for dizziness, weakness, headaches and paresthesias.   Psychiatric/Behavioral: Negative for mood changes. The patient is not nervous/anxious.        OBJECTIVE:   /74 (BP Location: Left arm, Patient Position: Sitting, Cuff Size: Adult Regular)   Pulse 90   Resp 24   Ht 1.499 m (4' 11\")   Wt 62.7 kg (138 lb 3.2 oz)   SpO2 97%   BMI 27.91 kg/m   Estimated body mass index is 27.91 kg/m  as calculated from the following:    Height as of this encounter: 1.499 m (4' 11\").    Weight as of this encounter: 62.7 kg (138 lb 3.2 oz).  Physical Exam  GENERAL APPEARANCE: healthy, alert and no distress  EYES: Eyes grossly normal to inspection, PERRL and conjunctivae and sclerae normal  HENT: ear " canals and TM's normal, nose and mouth without ulcers or lesions, oropharynx clear and oral mucous membranes moist  NECK: no adenopathy, no asymmetry, masses, or scars and thyroid normal to palpation  RESP: lungs clear to auscultation - no rales, rhonchi or wheezes  BREAST: normal without masses, tenderness or nipple discharge and no palpable axillary masses or adenopathy  CV: regular rate and rhythm, normal S1 S2, no S3 or S4, no murmur, click or rub, no peripheral edema and peripheral pulses strong  ABDOMEN: soft, nontender, no hepatosplenomegaly, no masses and bowel sounds normal  MS: no musculoskeletal defects are noted and gait is age appropriate without ataxia  SKIN: no suspicious lesions or rashes  NEURO: Normal strength and tone, sensory exam grossly normal, mentation intact and speech normal  PSYCH: mentation appears normal and affect normal/bright    Diagnostic Test Results:  Labs reviewed in Epic  No results found for any visits on 09/22/22.    ASSESSMENT / PLAN:   1. Encounter for Medicare annual wellness exam  Routine medicare wellness visit, updated in EMR.  Labs recently performed, no labs needed today.  Flu vaccine given, patient will get COVID booster later, declines pneumonia vaccine.  Patient declines bone density treatment or re-testing.  Colon cancer screening is up to date, as is mammogram.  Plan repeat physical in 1 year.    2. Pulmonary emphysema, unspecified emphysema type (H)  Continues on Stiolto with albuterol as needed.  Continues to smoke.    3. Recurrent deep vein thrombosis (DVT) (H)  Continues on warfarin.    4. Chronic bronchitis, unspecified chronic bronchitis type (H)  See above #2, patient denies increase in sputum production or shortness of breath.    5. Essential hypertension  Well-controlled on losartan-hydrochlorothiazide.  Recommended changing this to earlier morning dosing to avoid increased urination later in the evening.    6. Heartburn  Patient will try famotidine.  -  "famotidine (PEPCID) 20 MG tablet; Take 1 tablet (20 mg) by mouth 2 times daily  Dispense: 180 tablet; Refill: 3    7. Hyperlipidemia, unspecified hyperlipidemia type  Continues on Crestor.  Lipid profile recently updated and within goal range.    8. Rash and nonspecific skin eruption  Not visible today.  Discussed continued moisturization with emollient lotion or her current body oil, which seems to be working well.    9. Nicotine Dependence  Patient remains contemplative about smoking cessation, but continues to smoke.  She is accepting of low-dose CT scan for lung cancer screening.  - CT Chest Lung Cancer Scrn Low Dose wo; Future    10. Personal history of nicotine dependence   See above #9.  Diagnosis for low-dose screening CT scan.  - CT Chest Lung Cancer Scrn Low Dose wo; Future    11. History of non-ST elevation myocardial infarction (NSTEMI)  Patient continues to follow with Cardiology Dr. Gonzalez.  She continues on Plavix.  - clopidogrel (PLAVIX) 75 MG tablet; Take 1 tablet (75 mg) by mouth daily  Dispense: 90 tablet; Refill: 3    12. History of pulmonary embolism  Continues on warfarin.  No bleeding issues.  - warfarin ANTICOAGULANT (COUMADIN) 3 MG tablet; TAKE 1 TO 2 TABLETS DAILY, ADJUST DOSE PER INR RESULTS AS INSTRUCTED  Dispense: 120 tablet; Refill: 5      Patient has been advised of split billing requirements and indicates understanding: Yes    COUNSELING:  Reviewed preventive health counseling, as reflected in patient instructions  Special attention given to:       Regular exercise       Healthy diet/nutrition       Immunizations    Vaccinated for: Influenza         Osteoporosis prevention/bone health       Consider lung cancer screening for ages 55-80 years (77 for Medicare) and 20 pack-year smoking history         Colon cancer screening    Estimated body mass index is 27.91 kg/m  as calculated from the following:    Height as of this encounter: 1.499 m (4' 11\").    Weight as of this encounter: 62.7 " kg (138 lb 3.2 oz).        She reports that she has been smoking cigarettes. She started smoking about 59 years ago. She has a 41.25 pack-year smoking history. She has never used smokeless tobacco.  Nicotine/Tobacco Cessation Plan:   Information offered: Patient not interested at this time      Appropriate preventive services were discussed with this patient, including applicable screening as appropriate for cardiovascular disease, diabetes, osteopenia/osteoporosis, and glaucoma.  As appropriate for age/gender, discussed screening for colorectal cancer, prostate cancer, breast cancer, and cervical cancer. Checklist reviewing preventive services available has been given to the patient.    Reviewed patients plan of care and provided an AVS. The Basic Care Plan (routine screening as documented in Health Maintenance) for Leonela meets the Care Plan requirement. This Care Plan has been established and reviewed with the Patient.    Counseling Resources:  ATP IV Guidelines  Pooled Cohorts Equation Calculator  Breast Cancer Risk Calculator  Breast Cancer: Medication to Reduce Risk  FRAX Risk Assessment  ICSI Preventive Guidelines  Dietary Guidelines for Americans, 2010  [employer]'s MyPlate  ASA Prophylaxis  Lung CA Screening    Miri Woodruff MD  [facility]    Identified Health Risks:    She is at risk for lack of exercise and has been provided with information to increase physical activity for the benefit of her well-being.  The patient was counseled and encouraged to consider modifying their diet and eating habits. She was provided with information on recommended healthy diet options.  The patient was provided with written information regarding signs of hearing loss.

## 2022-09-22 NOTE — PATIENT INSTRUCTIONS
For your itchy ear, you can try a small amount of hydrocortisone cream applied to a Q-tip in both ear canals.    For the rash on your legs, I think continued moisturizer is a good idea.      Patient Education   Personalized Prevention Plan  You are due for the preventive services outlined below.  Your care team is available to assist you in scheduling these services.  If you have already completed any of these items, please share that information with your care team to update in your medical record.  Health Maintenance Due   Topic Date Due    COPD Action Plan  Never done    Pneumococcal Vaccine (1 - PCV) Never done    Hepatitis C Screening  Never done    Zoster (Shingles) Vaccine (2 of 2) 06/07/2017    Diptheria Tetanus Pertussis (DTAP/TDAP/TD) Vaccine (2 - Td or Tdap) 07/06/2021    COVID-19 Vaccine (4 - Booster for Pfizer series) 12/31/2021    Flu Vaccine (1) 09/01/2022    LUNG CANCER SCREENING  09/24/2022       Exercise for a Healthier Heart  You may wonder how you can improve the health of your heart. If you re thinking about exercise, you re on the right track. You don t need to become an athlete. But you do need a certain amount of brisk exercise to help strengthen your heart. If you have been diagnosed with a heart condition, your healthcare provider may advise exercise to help stabilize your condition. To help make exercise a habit, choose safe, fun activities.      Exercise with a friend. When activity is fun, you're more likely to stick with it.   Before you start  Check with your healthcare provider before starting an exercise program. This is especially important if you have not been active for a while. It's also important if you have a long-term (chronic) health problem such as heart disease, diabetes, or obesity. Or if you are at high risk for having these problems.   Why exercise?  Exercising regularly offers many healthy rewards. It can help you do all of the following:   Improve your blood  cholesterol level to help prevent further heart trouble  Lower your blood pressure to help prevent a stroke or heart attack  Control diabetes, or reduce your risk of getting this disease  Improve your heart and lung function  Reach and stay at a healthy weight  Make your muscles stronger so you can stay active  Prevent falls and fractures by slowing the loss of bone mass (osteoporosis)  Manage stress better  Reduce your blood pressure  Improve your sense of self and your body image  Exercise tips    Ease into your routine. Set small goals. Then build on them. If you are not sure what your activity level should be, talk with your healthcare provider first before starting an exercise routine.  Exercise on most days. Aim for a total of 150 minutes (2 hours and 30 minutes) or more of moderate-intensity aerobic activity each week. Or 75 minutes (1 hour and 15 minutes) or more of vigorous-intensity aerobic activity each week. Or try for a combination of both. Moderate activity means that you breathe heavier and your heart rate increases but you can still talk. Think about doing 40 minutes of moderate exercise, 3 to 4 times a week. For best results, activity should last for about 40 minutes to lower blood pressure and cholesterol. It's OK to work up to the 40-minute period over time. Examples of moderate-intensity activity are walking 1 mile in 15 minutes. Or doing 30 to 45 minutes of yard work.  Step up your daily activity level.  Along with your exercise program, try being more active the whole day. Walk instead of drive. Or park further away so that you take more steps each day. Do more household tasks or yard work. You may not be able to meet the advised mount of physical activity. But doing some moderate- or vigorous-intensity aerobic activity can help reduce your risk for heart disease. Your healthcare provider can help you figure out what is best for you.  Choose 1 or more activities you enjoy.  Walking is one of  the easiest things you can do. You can also try swimming, riding a bike, dancing, or taking an exercise class.    When to call your healthcare provider  Call your healthcare provider if you have any of these:   Chest pain or feel dizzy or lightheaded  Burning, tightness, pressure, or heaviness in your chest, neck, shoulders, back, or arms  Abnormal shortness of breath  More joint or muscle pain  A very fast or irregular heartbeat (palpitations)  [employer] last reviewed this educational content on 7/1/2019 2000-2021 [employer]. All rights reserved. This information is not intended as a substitute for professional medical care. Always follow your healthcare professional's instructions.          Understanding USDA MyPlate  The USDA has guidelines to help you make healthy food choices. These are called MyPlate. MyPlate shows the food groups that make up healthy meals using the image of a place setting. Before you eat, think about the healthiest choices for what to put on your plate or in your cup or bowl. To learn more about building a healthy plate, visit www.choosemyplate.gov.    The food groups  Fruits. Any fruit or 100% fruit juice counts as part of the Fruit Group. Fruits may be fresh, canned, frozen, or dried, and may be whole, cut-up, or pureed. Make 1/2 of your plate fruits and vegetables.  Vegetables. Any vegetable or 100% vegetable juice counts as a member of the Vegetable Group. Vegetables may be fresh, frozen, canned, or dried. They can be served raw or cooked and may be whole, cut-up, or mashed. Make 1/2 of your plate fruits and vegetables.  Grains. All foods made from grains are part of the Grains Group. These include wheat, rice, oats, cornmeal, and barley. Grains are often used to make foods such as bread, pasta, oatmeal, cereal, tortillas, and grits. Grains should be no more than 1/4 of your plate. At least half of your grains should be whole grains.  Protein. This group includes meat,  poultry, seafood, beans and peas, eggs, processed soy products (such as tofu), nuts (including nut butters), and seeds. Make protein choices no more than 1/4 of your plate. Meat and poultry choices should be lean or low fat.  Dairy. The Dairy Group includes all fluid milk products and foods made from milk that contain calcium, such as yogurt and cheese. (Foods that have little calcium, such as cream, butter, and cream cheese, are not part of this group.) Most dairy choices should be low-fat or fat-free.  Oils. Oils aren't a food group, but they do contain essential nutrients. However it's important to watch your intake of oils. These are fats that are liquid at room temperature. They include canola, corn, olive, soybean, vegetable, and sunflower oil. Foods that are mainly oil include mayonnaise, certain salad dressings, and soft margarines. You likely already get your daily oil allowance from the foods you eat.  Things to limit  Eating healthy also means limiting these things in your diet:     Salt (sodium). Many processed foods have a lot of sodium. To keep sodium intake down, eat fresh vegetables, meats, poultry, and seafood when possible. Purchase low-sodium, reduced-sodium, or no-salt-added food products at the store. And don't add salt to your meals at home. Instead, season them with herbs and spices such as dill, oregano, cumin, and paprika. Or try adding flavor with lemon or lime zest and juice.  Saturated fat. Saturated fats are most often found in animal products such as beef, pork, and chicken. They are often solid at room temperature, such as butter. To reduce your saturated fat intake, choose leaner cuts of meat and poultry. And try healthier cooking methods such as grilling, broiling, roasting, or baking. For a simple lower-fat swap, use plain nonfat yogurt instead of mayonnaise when making potato salad or macaroni salad.  Added sugars. These are sugars added to foods. They are in foods such as ice  cream, candy, soda, fruit drinks, sports drinks, energy drinks, cookies, pastries, jams, and syrups. Cut down on added sugars by sharing sweet treats with a family member or friend. You can also choose fruit for dessert, and drink water or other unsweetened beverages.     [employer] last reviewed this educational content on 6/1/2020 2000-2021 [employer]. All rights reserved. This information is not intended as a substitute for professional medical care. Always follow your healthcare professional's instructions.          Signs of Hearing Loss      Hearing much better with one ear can be a sign of hearing loss.   Hearing loss is a problem shared by many people. In fact, it is one of the most common health problems, particularly as people age. Most people age 65 and older have some hearing loss. By age 80, almost everyone does. Hearing loss often occurs slowly over the years. So you may not realize your hearing has gotten worse.  Have your hearing checked  Call your healthcare provider if you:  Have to strain to hear normal conversation  Have to watch other people s faces very carefully to follow what they re saying  Need to ask people to repeat what they ve said  Often misunderstand what people are saying  Turn the volume of the television or radio up so high that others complain  Feel that people are mumbling when they re talking to you  Find that the effort to hear leaves you feeling tired and irritated  Notice, when using the phone, that you hear better with one ear than the other  [employer] last reviewed this educational content on 1/1/2020 2000-2021 [employer]. All rights reserved. This information is not intended as a substitute for professional medical care. Always follow your healthcare professional's instructions.

## 2022-09-24 PROBLEM — N20.1 URETERAL STONE: Status: RESOLVED | Noted: 2020-09-11 | Resolved: 2022-09-24

## 2022-09-24 PROBLEM — R12 HEARTBURN: Status: ACTIVE | Noted: 2022-09-24

## 2022-09-24 PROBLEM — N63.10 BREAST MASS, RIGHT: Status: RESOLVED | Noted: 2021-05-04 | Resolved: 2022-09-24

## 2022-10-17 ENCOUNTER — LAB (OUTPATIENT)
Dept: LAB | Facility: CLINIC | Age: 72
End: 2022-10-17
Payer: MEDICARE

## 2022-10-17 ENCOUNTER — ANTICOAGULATION THERAPY VISIT (OUTPATIENT)
Dept: ANTICOAGULATION | Facility: CLINIC | Age: 72
End: 2022-10-17

## 2022-10-17 DIAGNOSIS — R21 RASH AND NONSPECIFIC SKIN ERUPTION: ICD-10-CM

## 2022-10-17 DIAGNOSIS — Z86.711 HISTORY OF PULMONARY EMBOLISM: Primary | ICD-10-CM

## 2022-10-17 DIAGNOSIS — Z79.01 LONG TERM (CURRENT) USE OF ANTICOAGULANTS: ICD-10-CM

## 2022-10-17 LAB — INR BLD: 2.6 (ref 0.9–1.1)

## 2022-10-17 PROCEDURE — 85610 PROTHROMBIN TIME: CPT

## 2022-10-17 PROCEDURE — 36416 COLLJ CAPILLARY BLOOD SPEC: CPT

## 2022-10-17 NOTE — PROGRESS NOTES
ANTICOAGULATION MANAGEMENT     Leonela Christianson 72 year old female is on warfarin with therapeutic INR result. (Goal INR 2.0-3.0)    Recent labs: (last 7 days)     10/17/22  0730   INR 2.6*       ASSESSMENT       Source(s): Chart Review, Patient/Caregiver Call and Template       Warfarin doses taken: Warfarin taken as instructed    Diet: No new diet changes identified    New illness, injury, or hospitalization: No    Medication/supplement changes: Famotidine 20mg 2x/ daily, started on 9/22/22 No interaction anticipated.    Signs or symptoms of bleeding or clotting: No    Previous INR: Therapeutic last 4 visits    Additional findings: None       PLAN     Recommended plan for ongoing change(s) affecting INR     Dosing Instructions: Continue your current warfarin dose with next INR in 6 weeks       Summary  As of 10/17/2022    Full warfarin instructions:  6 mg every Sun, Wed; 3 mg all other days   Next INR check:  11/28/2022             Telephone call with  Leonela ([phone]) who verbalizes understanding and agrees to plan    Lab visit scheduled - INR on 11/28/22 @ [facility]    Education provided: Importance of consistent vitamin K intake, Goal range and significance of current result and No interaction anticipated between warfarin and Pepcid    Plan made per [facility] anticoagulation protocol    Jeanne Olsen RN  Anticoagulation Clinic  10/17/2022    _______________________________________________________________________     Anticoagulation Episode Summary     Current INR goal:  2.0-3.0   TTR:  83.8 % (1 y)   Target end date:  Indefinite   Send INR reminders to:  [facility]    Indications    History of pulmonary embolism [Z86.711]  Long term (current) use of anticoagulants [Z79.01]  Rash and nonspecific skin eruption [R21]           Comments:           Anticoagulation Care Providers     Provider Role Specialty Phone number    Miri Woodruff MD Referring Family Medicine [phone]

## 2022-10-31 ENCOUNTER — IMMUNIZATION (OUTPATIENT)
Dept: FAMILY MEDICINE | Facility: CLINIC | Age: 72
End: 2022-10-31
Payer: MEDICARE

## 2022-10-31 PROCEDURE — 91312 COVID-19,PF,PFIZER BOOSTER BIVALENT: CPT

## 2022-10-31 PROCEDURE — 0124A COVID-19,PF,PFIZER BOOSTER BIVALENT: CPT

## 2022-12-05 ENCOUNTER — TELEPHONE (OUTPATIENT)
Dept: ANTICOAGULATION | Facility: CLINIC | Age: 72
End: 2022-12-05

## 2022-12-05 NOTE — TELEPHONE ENCOUNTER
ANTICOAGULATION     Leonela Christianson is overdue for INR check.      Spoke with Leonela and scheduled lab appointment on 12/8    - was a NO SHOW on 11/28    Jeanne Olsen RN

## 2022-12-08 ENCOUNTER — ANTICOAGULATION THERAPY VISIT (OUTPATIENT)
Dept: ANTICOAGULATION | Facility: CLINIC | Age: 72
End: 2022-12-08

## 2022-12-08 ENCOUNTER — LAB (OUTPATIENT)
Dept: LAB | Facility: CLINIC | Age: 72
End: 2022-12-08
Payer: MEDICARE

## 2022-12-08 DIAGNOSIS — R21 RASH AND NONSPECIFIC SKIN ERUPTION: ICD-10-CM

## 2022-12-08 DIAGNOSIS — Z86.711 HISTORY OF PULMONARY EMBOLISM: Primary | ICD-10-CM

## 2022-12-08 DIAGNOSIS — Z79.01 LONG TERM (CURRENT) USE OF ANTICOAGULANTS: ICD-10-CM

## 2022-12-08 LAB — INR BLD: 3 (ref 0.9–1.1)

## 2022-12-08 PROCEDURE — 85610 PROTHROMBIN TIME: CPT

## 2022-12-08 NOTE — PROGRESS NOTES
ANTICOAGULATION MANAGEMENT     Leonela Christianson 72 year old female is on warfarin with therapeutic INR result. (Goal INR 2.0-3.0)    Recent labs: (last 7 days)     12/08/22  0748   INR 3.0*       ASSESSMENT       Source(s): Chart Review, Patient/Caregiver Call and Template       Warfarin doses taken: Warfarin taken as instructed    Diet: No new diet changes identified    New illness, injury, or hospitalization: No    Medication/supplement changes: None noted    Signs or symptoms of bleeding or clotting: No    Previous INR: Therapeutic last 5 visits    Additional findings: None       PLAN     Recommended plan for no diet, medication or health factor changes affecting INR     Dosing Instructions: Continue your current warfarin dose with next INR in 6 weeks       Summary  As of 12/8/2022    Full warfarin instructions:  6 mg every Sun, Wed; 3 mg all other days; Starting 12/8/2022   Next INR check:  1/19/2023             Telephone call with  Leonela ([phone]) who verbalizes understanding and agrees to plan    Lab visit scheduled - INR on 1/19/2023 @ [facility]    Education provided:     Taking warfarin: Importance of taking warfarin as instructed    Goal range and lab monitoring: goal range and significance of current result    Dietary considerations: importance of consistent vitamin K intake    Symptom monitoring: monitoring for bleeding signs and symptoms    Importance of notifying anticoagulation clinic for: diarrhea, nausea/vomiting, reduced intake, cold/flu, and/or infections; a sooner lab recheck maybe needed    Plan made per [facility] anticoagulation protocol    Jeanne Olsen, RN  Anticoagulation Clinic  12/8/2022    _______________________________________________________________________     Anticoagulation Episode Summary     Current INR goal:  2.0-3.0   TTR:  83.8 % (1 y)   Target end date:  Indefinite   Send INR reminders to:  [facility]    Indications    History of pulmonary embolism  [Z86.711]  Long term (current) use of anticoagulants [Z79.01]  Rash and nonspecific skin eruption [R21]           Comments:           Anticoagulation Care Providers     Provider Role Specialty Phone number    Miri Woodruff MD [facility] [phone]

## 2023-01-23 ENCOUNTER — ANTICOAGULATION THERAPY VISIT (OUTPATIENT)
Dept: ANTICOAGULATION | Facility: CLINIC | Age: 73
End: 2023-01-23

## 2023-01-23 ENCOUNTER — DOCUMENTATION ONLY (OUTPATIENT)
Dept: ANTICOAGULATION | Facility: CLINIC | Age: 73
End: 2023-01-23

## 2023-01-23 ENCOUNTER — LAB (OUTPATIENT)
Dept: LAB | Facility: CLINIC | Age: 73
End: 2023-01-23
Payer: MEDICARE

## 2023-01-23 ENCOUNTER — TELEPHONE (OUTPATIENT)
Dept: FAMILY MEDICINE | Facility: CLINIC | Age: 73
End: 2023-01-23

## 2023-01-23 DIAGNOSIS — Z79.01 LONG TERM (CURRENT) USE OF ANTICOAGULANTS: ICD-10-CM

## 2023-01-23 DIAGNOSIS — R21 RASH AND NONSPECIFIC SKIN ERUPTION: ICD-10-CM

## 2023-01-23 DIAGNOSIS — Z86.711 HISTORY OF PULMONARY EMBOLISM: Primary | ICD-10-CM

## 2023-01-23 LAB — INR BLD: 2.3 (ref 0.9–1.1)

## 2023-01-23 PROCEDURE — 85610 PROTHROMBIN TIME: CPT | Performed by: FAMILY MEDICINE

## 2023-01-23 NOTE — PROGRESS NOTES
ANTICOAGULATION MANAGEMENT     Leonela Christianson 72 year old female is on warfarin with therapeutic INR result. (Goal INR 2.0-3.0)    Recent labs: (last 7 days)     01/23/23  0812   INR 2.3*       ASSESSMENT       Source(s): Chart Review, Patient/Caregiver Call and Template       Warfarin doses taken: Warfarin taken as instructed    Diet: No new diet changes identified    New illness, injury, or hospitalization: No    Medication/supplement changes: None noted    Signs or symptoms of bleeding or clotting: No    Previous INR: Therapeutic last 2(+) visits    Additional findings: None       PLAN     Recommended plan for no diet, medication or health factor changes affecting INR     Dosing Instructions: Continue your current warfarin dose with next INR in 8 weeks       Summary  As of 1/23/2023    Full warfarin instructions:  6 mg every Sun, Wed; 3 mg all other days   Next INR check:  3/20/2023             Telephone call with Leonela who verbalizes understanding and agrees to plan    Lab visit scheduled    Education provided:     Goal range and lab monitoring: goal range and significance of current result    Contact [phone] with any changes, questions or concerns.     Plan made per [facility] anticoagulation protocol    Mojgan Hancock RN  Anticoagulation Clinic  1/23/2023    _______________________________________________________________________     Anticoagulation Episode Summary     Current INR goal:  2.0-3.0   TTR:  88.4 % (1 y)   Target end date:  Indefinite   Send INR reminders to:  [facility]    Indications    History of pulmonary embolism [Z86.711]  Long term (current) use of anticoagulants [Z79.01]  Rash and nonspecific skin eruption [R21]           Comments:  OK for 8 week checks per Dr. Woodruff on 1/23/23         Anticoagulation Care Providers     Provider Role Specialty Phone number    Miri Woodruff MD Referring Family Medicine [phone]

## 2023-01-23 NOTE — PROGRESS NOTES
Anticoagulation-Extended Recheck Request    Under [facility] Anticoagulation protocol, Anticoagulation team may extend INR recheck interval + 1 week for each stable in range INR to max of 6 weeks. Extended interval rechecks between 8-12 weeks for patients on stable maintenance therapy require approval from referring provider.    Anticoagulation clinic suggests consideration of extended intervals in medically stable patients, with standard INR goals, and no change in warfarin dose for last 4-6 months    Leonela Christianson, 72 year old, female has been on:    Current recheck interval: 6 week  Compliant: Yes  Stable warfarin dose since: 6/2022  INR Goal: 2.0-3.0  Time in Therapeutic range: 88.4%    Lab Results   Component Value Date    INR 2.3 01/23/2023    INR 3.0 12/08/2022    INR 2.6 10/17/2022    INR 2.6 09/12/2022    INR 2.6 08/04/2022    INR 2.3 07/05/2022    INR 2.8 06/20/2022    INR 3.5 06/06/2022    INR 2.70 07/01/2021    INR 2.20 06/17/2021    INR 2.20 05/18/2021    INR 2.60 03/29/2021    INR 2.00 02/15/2021    INR 2.10 01/04/2021    INR 2.20 12/07/2020    INR 2.30 10/30/2020         Please advise if Leonela is approved to have extended interval rechecks every 8 weeks while on stable maintenance therapy    Thank you,    Mojgan Hancock RN

## 2023-03-20 ENCOUNTER — ANTICOAGULATION THERAPY VISIT (OUTPATIENT)
Dept: ANTICOAGULATION | Facility: CLINIC | Age: 73
End: 2023-03-20

## 2023-03-20 ENCOUNTER — LAB (OUTPATIENT)
Dept: LAB | Facility: CLINIC | Age: 73
End: 2023-03-20
Payer: MEDICARE

## 2023-03-20 DIAGNOSIS — Z86.711 HISTORY OF PULMONARY EMBOLISM: Primary | ICD-10-CM

## 2023-03-20 DIAGNOSIS — R21 RASH AND NONSPECIFIC SKIN ERUPTION: ICD-10-CM

## 2023-03-20 DIAGNOSIS — Z79.01 LONG TERM (CURRENT) USE OF ANTICOAGULANTS: ICD-10-CM

## 2023-03-20 LAB — INR BLD: 2.6 (ref 0.9–1.1)

## 2023-03-20 PROCEDURE — 85610 PROTHROMBIN TIME: CPT

## 2023-03-20 NOTE — PROGRESS NOTES
ANTICOAGULATION MANAGEMENT     Leonela Christianson 72 year old female is on warfarin with therapeutic INR result. (Goal INR 2.0-3.0)    Recent labs: (last 7 days)     03/20/23  0758   INR 2.6*       ASSESSMENT       Source(s): Chart Review    Previous INR was Therapeutic last 2(+) visits    Medication, diet, health changes since last INR chart reviewed; none identified      I left a detailed voicemail with the orders reflected in flowsheet. I have also requested a call back if there have been any missed doses, concerns, illness, fever, or if there have been any changes in medications, activity level, or diet         PLAN     Recommended plan for no diet, medication or health factor changes affecting INR     Dosing Instructions: Continue your current warfarin dose with next INR in 8 weeks       Summary  As of 3/20/2023    Full warfarin instructions:  6 mg every Sun, Wed; 3 mg all other days   Next INR check:  5/15/2023             Detailed voice message left for Leonela with dosing instructions and follow up date.     Contact [phone]  to schedule and with any changes, questions or concerns.     Education provided:     Please call back if any changes to your diet, medications or how you've been taking warfarin    Plan made per [facility] anticoagulation protocol    Ena Manuel, RN  Anticoagulation Clinic  3/20/2023    _______________________________________________________________________     Anticoagulation Episode Summary     Current INR goal:  2.0-3.0   TTR:  92.2 % (1 y)   Target end date:  Indefinite   Send INR reminders to:  [facility]    Indications    History of pulmonary embolism [Z86.711]  Long term (current) use of anticoagulants [Z79.01]  Rash and nonspecific skin eruption [R21]           Comments:  OK for 8 week checks per Dr. Woodruff on 1/23/23         Anticoagulation Care Providers     Provider Role Specialty Phone number    Miri Woodruff MD Referring Family Medicine  [phone]

## 2023-03-21 ENCOUNTER — ANCILLARY PROCEDURE (OUTPATIENT)
Dept: MAMMOGRAPHY | Facility: CLINIC | Age: 73
End: 2023-03-21
Attending: FAMILY MEDICINE
Payer: MEDICARE

## 2023-03-21 DIAGNOSIS — Z12.31 VISIT FOR SCREENING MAMMOGRAM: ICD-10-CM

## 2023-03-21 PROCEDURE — 77067 SCR MAMMO BI INCL CAD: CPT

## 2023-04-13 DIAGNOSIS — J43.9 PULMONARY EMPHYSEMA, UNSPECIFIED EMPHYSEMA TYPE (H): ICD-10-CM

## 2023-04-13 NOTE — TELEPHONE ENCOUNTER
Health Call Center    Phone Message    May a detailed message be left on voicemail: yes     Reason for Call: Medication Refill Request    Has the patient contacted the pharmacy for the refill? Yes   Name of medication being requested: albuterol (PROAIR HFA/PROVENTIL HFA/VENTOLIN HFA) 108 (90 Base) MCG/ACT inhaler  Provider who prescribed the medication: Dr Gonzalez  Pharmacy:    [facility] - [address]  Date medication is needed: Today the patient is on her last inhaler     The pharmacy told her we denied this and it was because she was no longer a patient of Dr Gonzalez       Action Taken: Other: Cardiology    Travel Screening: Not Applicable     Thank you!  [facility]

## 2023-04-14 RX ORDER — ALBUTEROL SULFATE 90 UG/1
AEROSOL, METERED RESPIRATORY (INHALATION)
Qty: 25.5 G | Refills: 3 | OUTPATIENT
Start: 2023-04-14

## 2023-04-18 RX ORDER — ALBUTEROL SULFATE 90 UG/1
AEROSOL, METERED RESPIRATORY (INHALATION)
Qty: 25.5 G | Refills: 0 | Status: SHIPPED | OUTPATIENT
Start: 2023-04-18 | End: 2023-06-10

## 2023-04-18 NOTE — TELEPHONE ENCOUNTER
"Last Written Prescription Date:  6/30/22  Last Fill Quantity: 25.5,  # refills: 3   Last office visit provider:  9/22/22     Requested Prescriptions   Pending Prescriptions Disp Refills     albuterol (PROAIR HFA/PROVENTIL HFA/VENTOLIN HFA) 108 (90 Base) MCG/ACT inhaler 25.5 g 3     Sig: [ALBUTEROL (PROAIR HFA) 90 MCG/ACTUATION INHALER] USE 1 TO 2 INHALATIONS EVERY 4 TO 6 HOURS AS NEEDED FOR WHEEZING       Asthma Maintenance Inhalers - Anticholinergics Passed - 4/18/2023  6:10 PM        Passed - Patient is age 12 years or older        Passed - Recent (12 mo) or future (30 days) visit within the authorizing provider's specialty     Patient has had an office visit with the authorizing provider or a provider within the authorizing providers department within the previous 12 mos or has a future within next 30 days. See \"Patient Info\" tab in inbasket, or \"Choose Columns\" in Meds & Orders section of the refill encounter.              Passed - Medication is active on med list       Short-Acting Beta Agonist Inhalers Protocol  Passed - 4/18/2023  6:10 PM        Passed - Patient is age 12 or older        Passed - Recent (12 mo) or future (30 days) visit within the authorizing provider's specialty     Patient has had an office visit with the authorizing provider or a provider within the authorizing providers department within the previous 12 mos or has a future within next 30 days. See \"Patient Info\" tab in inbasket, or \"Choose Columns\" in Meds & Orders section of the refill encounter.              Passed - Medication is active on med list         Refused Prescriptions Disp Refills     albuterol (PROAIR HFA/PROVENTIL HFA/VENTOLIN HFA) 108 (90 Base) MCG/ACT inhaler 25.5 g 3     Sig: [ALBUTEROL (PROAIR HFA) 90 MCG/ACTUATION INHALER] USE 1 TO 2 INHALATIONS EVERY 4 TO 6 HOURS AS NEEDED FOR WHEEZING       Asthma Maintenance Inhalers - Anticholinergics Passed - 4/18/2023  6:10 PM        Passed - Patient is age 12 years or older    " "    Passed - Recent (12 mo) or future (30 days) visit within the authorizing provider's specialty     Patient has had an office visit with the authorizing provider or a provider within the authorizing providers department within the previous 12 mos or has a future within next 30 days. See \"Patient Info\" tab in inbasket, or \"Choose Columns\" in Meds & Orders section of the refill encounter.              Passed - Medication is active on med list       Short-Acting Beta Agonist Inhalers Protocol  Passed - 4/18/2023  6:10 PM        Passed - Patient is age 12 or older        Passed - Recent (12 mo) or future (30 days) visit within the authorizing provider's specialty     Patient has had an office visit with the authorizing provider or a provider within the authorizing providers department within the previous 12 mos or has a future within next 30 days. See \"Patient Info\" tab in inbasket, or \"Choose Columns\" in Meds & Orders section of the refill encounter.              Passed - Medication is active on med list             Ratna English RN 04/18/23 6:29 PM  "

## 2023-04-18 NOTE — TELEPHONE ENCOUNTER
Pending Prescriptions:                       Disp   Refills    albuterol (PROAIR HFA/PROVENTIL HFA/JULIANNE*25.5 g 3            Sig: [ALBUTEROL (PROAIR HFA) 90 MCG/ACTUATION INHALER]           USE 1 TO 2 INHALATIONS EVERY 4 TO 6 HOURS AS           NEEDED FOR WHEEZING

## 2023-05-15 ENCOUNTER — LAB (OUTPATIENT)
Dept: LAB | Facility: CLINIC | Age: 73
End: 2023-05-15
Payer: MEDICARE

## 2023-05-15 ENCOUNTER — DOCUMENTATION ONLY (OUTPATIENT)
Dept: FAMILY MEDICINE | Facility: CLINIC | Age: 73
End: 2023-05-15

## 2023-05-15 ENCOUNTER — TELEPHONE (OUTPATIENT)
Dept: FAMILY MEDICINE | Facility: CLINIC | Age: 73
End: 2023-05-15

## 2023-05-15 ENCOUNTER — ANTICOAGULATION THERAPY VISIT (OUTPATIENT)
Dept: ANTICOAGULATION | Facility: CLINIC | Age: 73
End: 2023-05-15

## 2023-05-15 DIAGNOSIS — Z86.711 HISTORY OF PULMONARY EMBOLISM: Primary | ICD-10-CM

## 2023-05-15 DIAGNOSIS — Z86.711 HISTORY OF PULMONARY EMBOLISM: ICD-10-CM

## 2023-05-15 DIAGNOSIS — Z79.01 LONG TERM (CURRENT) USE OF ANTICOAGULANTS: ICD-10-CM

## 2023-05-15 DIAGNOSIS — R21 RASH AND NONSPECIFIC SKIN ERUPTION: ICD-10-CM

## 2023-05-15 DIAGNOSIS — I82.409 RECURRENT DEEP VEIN THROMBOSIS (DVT) (H): Primary | ICD-10-CM

## 2023-05-15 LAB — INR BLD: 2 (ref 0.9–1.1)

## 2023-05-15 PROCEDURE — 85610 PROTHROMBIN TIME: CPT

## 2023-05-15 NOTE — PROGRESS NOTES
ANTICOAGULATION MANAGEMENT     Leonela Christianson 72 year old female is on warfarin with therapeutic INR result. (Goal INR 2.0-3.0)    Recent labs: (last 7 days)     05/15/23  0738   INR 2.0*       ASSESSMENT       Source(s): Chart Review and Patient/Caregiver Call       Warfarin doses taken: Warfarin taken as instructed    Diet: No new diet changes identified    Medication/supplement changes: None noted    New illness, injury, or hospitalization: No    Signs or symptoms of bleeding or clotting: No    Previous result: Therapeutic last 8 visits    Additional findings: None         PLAN     Recommended plan for no diet, medication or health factor changes affecting INR     Dosing Instructions: Continue your current warfarin dose with next INR in 6-8 weeks       Summary  As of 5/15/2023    Full warfarin instructions:  6 mg every Sun, Wed; 3 mg all other days   Next INR check:  7/10/2023             Telephone call with  Leonela ([phone]) who verbalizes understanding and agrees to plan    Lab visit scheduled - INR on 7/10/23 @ [place].    Education provided:     Please call back if any changes to your diet, medications or how you've been taking warfarin    Taking warfarin: Importance of taking warfarin as instructed    Goal range and lab monitoring: goal range and significance of current result    Dietary considerations: importance of consistent vitamin K intake and impact of vitamin K foods on INR    Plan made per [facility] anticoagulation protocol    Jeanne Olsen RN  Anticoagulation Clinic  5/15/2023    _______________________________________________________________________     Anticoagulation Episode Summary     Current INR goal:  2.0-3.0   TTR:  92.2 % (1 y)   Target end date:  Indefinite   Send INR reminders to:  [facility]    Indications    History of pulmonary embolism [Z86.711]  Long term (current) use of anticoagulants [Z79.01]  Rash and nonspecific skin eruption [R21]           Comments:   OK for 8 week checks per Dr. Woodruff on 1/23/23         Anticoagulation Care Providers     Provider Role Specialty Phone number    Miri Woodruff MD Referring Family Medicine [phone]

## 2023-05-15 NOTE — PROGRESS NOTES
ANTICOAGULATION CLINIC REFERRAL RENEWAL REQUEST       An annual renewal order is required for all patients referred to [facility].?  Please review and sign the pended referral order for Leonela Christianson.       ANTICOAGULATION SUMMARY      Warfarin indication(s)   PE, recurrent    Mechanical heart valve present?  NO       Current goal range   INR: 2.0-3.0     Goal appropriate for indication? Goal INR 2-3, standard for indication(s) above     Time in Therapeutic Range (TTR)  (Goal > 60%) 92.2 %       Office visit with referring provider's group within last year Yes on 9/22/2022       Jeanne Olsen RN  [facility]

## 2023-05-18 NOTE — TELEPHONE ENCOUNTER
Called and scheduled patient appt    Advancement Flap (Double) Text: The defect edges were debeveled with a #15 scalpel blade. Given the location of the defect and the proximity to free margins a double advancement flap was deemed most appropriate. Using a sterile surgical marker, the appropriate advancement flaps were drawn incorporating the defect and placing the expected incisions within the relaxed skin tension lines where possible. The area thus outlined was incised deep to adipose tissue with a #15 scalpel blade. The skin margins were undermined to an appropriate distance in all directions utilizing iris scissors. Following this, the designed flaps were advanced and carried over into the primary defect and sutured into place.

## 2023-06-10 DIAGNOSIS — J43.9 PULMONARY EMPHYSEMA, UNSPECIFIED EMPHYSEMA TYPE (H): ICD-10-CM

## 2023-06-10 RX ORDER — ALBUTEROL SULFATE 90 UG/1
AEROSOL, METERED RESPIRATORY (INHALATION)
Qty: 25.5 G | Refills: 1 | Status: SHIPPED | OUTPATIENT
Start: 2023-06-10 | End: 2023-12-13

## 2023-06-11 NOTE — TELEPHONE ENCOUNTER
"Last Written Prescription Date:  4/18/2023  Last Fill Quantity: 25.5g,  # refills: 0   Last office visit provider:  9/22/2022     Requested Prescriptions   Pending Prescriptions Disp Refills     albuterol (PROAIR HFA/PROVENTIL HFA/VENTOLIN HFA) 108 (90 Base) MCG/ACT inhaler [Pharmacy Med Name: ALBUTEROL(PA) HFA INH 8.5GM 90MCG] 25.5 g 6     Sig: USE 1 TO 2 INHALATIONS EVERY 4 TO 6 HOURS AS NEEDED FOR WHEEZING       Asthma Maintenance Inhalers - Anticholinergics Passed - 6/10/2023  9:49 AM        Passed - Patient is age 12 years or older        Passed - Recent (12 mo) or future (30 days) visit within the authorizing provider's specialty     Patient has had an office visit with the authorizing provider or a provider within the authorizing providers department within the previous 12 mos or has a future within next 30 days. See \"Patient Info\" tab in inbasket, or \"Choose Columns\" in Meds & Orders section of the refill encounter.              Passed - Medication is active on med list       Short-Acting Beta Agonist Inhalers Protocol  Passed - 6/10/2023  9:49 AM        Passed - Patient is age 12 or older        Passed - Recent (12 mo) or future (30 days) visit within the authorizing provider's specialty     Patient has had an office visit with the authorizing provider or a provider within the authorizing providers department within the previous 12 mos or has a future within next 30 days. See \"Patient Info\" tab in inbasket, or \"Choose Columns\" in Meds & Orders section of the refill encounter.              Passed - Medication is active on med list             Ashly Pickett RN 06/10/23 8:59 PM  "

## 2023-06-26 NOTE — TELEPHONE ENCOUNTER
ANTICOAGULATION  MANAGEMENT PROGRAM    Leonela Christainson is overdue for INR check.     Spoke with Leonela and scheduled INR appointment on 4/15.      Jenny Stanley, RN     [Other ___] : [unfilled]

## 2023-07-06 ENCOUNTER — OFFICE VISIT (OUTPATIENT)
Dept: CARDIOLOGY | Facility: CLINIC | Age: 73
End: 2023-07-06
Payer: MEDICARE

## 2023-07-06 VITALS
OXYGEN SATURATION: 96 % | BODY MASS INDEX: 27.67 KG/M2 | HEART RATE: 81 BPM | DIASTOLIC BLOOD PRESSURE: 84 MMHG | RESPIRATION RATE: 24 BRPM | SYSTOLIC BLOOD PRESSURE: 134 MMHG | WEIGHT: 137 LBS

## 2023-07-06 DIAGNOSIS — J43.9 PULMONARY EMPHYSEMA, UNSPECIFIED EMPHYSEMA TYPE (H): ICD-10-CM

## 2023-07-06 DIAGNOSIS — I25.2 HISTORY OF NON-ST ELEVATION MYOCARDIAL INFARCTION (NSTEMI): ICD-10-CM

## 2023-07-06 DIAGNOSIS — I21.4 NSTEMI (NON-ST ELEVATED MYOCARDIAL INFARCTION) (H): ICD-10-CM

## 2023-07-06 DIAGNOSIS — I10 ESSENTIAL HYPERTENSION: ICD-10-CM

## 2023-07-06 DIAGNOSIS — E78.5 HYPERLIPIDEMIA LDL GOAL <70: ICD-10-CM

## 2023-07-06 DIAGNOSIS — R07.89 OTHER CHEST PAIN: Primary | ICD-10-CM

## 2023-07-06 DIAGNOSIS — Z72.0 TOBACCO ABUSE DISORDER: ICD-10-CM

## 2023-07-06 LAB
ALBUMIN SERPL BCG-MCNC: 4.2 G/DL (ref 3.5–5.2)
ALP SERPL-CCNC: 65 U/L (ref 35–104)
ALT SERPL W P-5'-P-CCNC: 22 U/L (ref 0–50)
ANION GAP SERPL CALCULATED.3IONS-SCNC: 10 MMOL/L (ref 7–15)
AST SERPL W P-5'-P-CCNC: 30 U/L (ref 0–45)
BILIRUB SERPL-MCNC: 0.4 MG/DL
BUN SERPL-MCNC: 12.8 MG/DL (ref 8–23)
CALCIUM SERPL-MCNC: 9.7 MG/DL (ref 8.8–10.2)
CHLORIDE SERPL-SCNC: 105 MMOL/L (ref 98–107)
CHOLEST SERPL-MCNC: 138 MG/DL
CREAT SERPL-MCNC: 0.88 MG/DL (ref 0.51–0.95)
DEPRECATED HCO3 PLAS-SCNC: 26 MMOL/L (ref 22–29)
ERYTHROCYTE [DISTWIDTH] IN BLOOD BY AUTOMATED COUNT: 13.4 % (ref 10–15)
GFR SERPL CREATININE-BSD FRML MDRD: 69 ML/MIN/1.73M2
GLUCOSE SERPL-MCNC: 90 MG/DL (ref 70–99)
HCT VFR BLD AUTO: 47.4 % (ref 35–47)
HDLC SERPL-MCNC: 51 MG/DL
HGB BLD-MCNC: 15.9 G/DL (ref 11.7–15.7)
LDLC SERPL CALC-MCNC: 76 MG/DL
MCH RBC QN AUTO: 31.9 PG (ref 26.5–33)
MCHC RBC AUTO-ENTMCNC: 33.5 G/DL (ref 31.5–36.5)
MCV RBC AUTO: 95 FL (ref 78–100)
NONHDLC SERPL-MCNC: 87 MG/DL
PLATELET # BLD AUTO: 202 10E3/UL (ref 150–450)
POTASSIUM SERPL-SCNC: 4.4 MMOL/L (ref 3.4–5.3)
PROT SERPL-MCNC: 7.2 G/DL (ref 6.4–8.3)
RBC # BLD AUTO: 4.99 10E6/UL (ref 3.8–5.2)
SODIUM SERPL-SCNC: 141 MMOL/L (ref 136–145)
TRIGL SERPL-MCNC: 55 MG/DL
WBC # BLD AUTO: 6.2 10E3/UL (ref 4–11)

## 2023-07-06 PROCEDURE — 99214 OFFICE O/P EST MOD 30 MIN: CPT | Performed by: INTERNAL MEDICINE

## 2023-07-06 PROCEDURE — 80061 LIPID PANEL: CPT | Performed by: INTERNAL MEDICINE

## 2023-07-06 PROCEDURE — 80053 COMPREHEN METABOLIC PANEL: CPT | Performed by: INTERNAL MEDICINE

## 2023-07-06 PROCEDURE — 36415 COLL VENOUS BLD VENIPUNCTURE: CPT | Performed by: INTERNAL MEDICINE

## 2023-07-06 PROCEDURE — 85027 COMPLETE CBC AUTOMATED: CPT | Performed by: INTERNAL MEDICINE

## 2023-07-06 RX ORDER — METOPROLOL SUCCINATE 100 MG/1
100 TABLET, EXTENDED RELEASE ORAL AT BEDTIME
Qty: 90 TABLET | Refills: 11 | Status: SHIPPED | OUTPATIENT
Start: 2023-07-06 | End: 2024-07-24

## 2023-07-06 RX ORDER — TIOTROPIUM BROMIDE AND OLODATEROL 3.124; 2.736 UG/1; UG/1
2 SPRAY, METERED RESPIRATORY (INHALATION) DAILY
Qty: 12 G | Refills: 11 | Status: SHIPPED | OUTPATIENT
Start: 2023-07-06 | End: 2024-08-19

## 2023-07-06 RX ORDER — LOSARTAN POTASSIUM AND HYDROCHLOROTHIAZIDE 12.5; 1 MG/1; MG/1
1 TABLET ORAL DAILY
Qty: 90 TABLET | Refills: 11 | Status: SHIPPED | OUTPATIENT
Start: 2023-07-06

## 2023-07-06 RX ORDER — ROSUVASTATIN CALCIUM 40 MG/1
40 TABLET, COATED ORAL DAILY
Qty: 90 TABLET | Refills: 11 | Status: SHIPPED | OUTPATIENT
Start: 2023-07-06 | End: 2024-08-26

## 2023-07-06 RX ORDER — CLOPIDOGREL BISULFATE 75 MG/1
75 TABLET ORAL DAILY
Qty: 90 TABLET | Refills: 11 | Status: SHIPPED | OUTPATIENT
Start: 2023-07-06 | End: 2024-07-24

## 2023-07-06 RX ORDER — CLOPIDOGREL BISULFATE 75 MG/1
75 TABLET ORAL DAILY
Qty: 90 TABLET | Refills: 3 | Status: SHIPPED | OUTPATIENT
Start: 2023-07-06 | End: 2023-07-06

## 2023-07-06 NOTE — LETTER
7/6/2023    Miri Woodruff MD  [address]  [facility] [address]    RE: Leonela Christianson       Dear Colleague,     I had the pleasure of seeing Leonela Christianson in the [facility].    Thank you, Dr. Perez ref. provider found, for asking the [facility] team to see Ms. Leonela Christianson to evaluate       Assessment/Recommendations   Assessment/Plan:  1. CAD - on going tob use, cont clopidogrel, no aspirin, statin  2. HTN well controlled cont medication  3. Pulm - cont inhalers, noted scapular pain - in setting tob use, will obtain CT chest   4. Hematoma left arm - resolving - check baseline labs today including CBC, CMP, lipids    Follow up one year     History of Present Illness/Subjective    Ms. Leonela Christianson is a 73 year old female with CAD, PE, COPD, HTN with tob use, she does report left posterior shoulder pain but not with exertion, random, not pleuretic, but position of body can improve pain, constipation, dyspnea on going related to COPD and PE.  No GI/ bleeding, no syncopal events.        Physical Examination Review of Systems   /84 (BP Location: Right arm, Patient Position: Sitting, Cuff Size: Adult Regular)   Pulse 81   Resp 24   Wt 62.1 kg (137 lb)   SpO2 96%   BMI 27.67 kg/m    Body mass index is 27.67 kg/m .  Wt Readings from Last 3 Encounters:   07/06/23 62.1 kg (137 lb)   09/22/22 62.7 kg (138 lb 3.2 oz)   06/30/22 61.6 kg (135 lb 12.8 oz)     [unfilled]  General Appearance:   no distress, normal body habitus   ENT/Mouth: membranes moist, no oral lesions or bleeding gums.      EYES:  no scleral icterus, normal conjunctivae   Neck: no carotid bruits or thyromegaly   Chest/Lungs:   lungs are clear to auscultation, no rales or wheezing,  sternal scar, equal chest wall expansion    Cardiovascular:   Regular. Normal first and second heart sounds with no murmurs, rubs, or gallops; the carotid, radial and posterior tibial pulses are  intact, Jugular venous pressure , edema bilaterally    Abdomen:  no organomegaly, masses, bruits, or tenderness; bowel sounds are present   Extremities: no cyanosis or clubbing   Skin: no xanthelasma, warm.    Neurologic: normal  bilateral, no tremors     Psychiatric: alert and oriented x3, calm     Review of Systems - 12 points nega other than above      Medical History  Surgical History Family History Social History   Past Medical History:   Diagnosis Date    Acute pyelonephritis     LEELA (acute kidney injury) (H) 9/12/2020    COPD (chronic obstructive pulmonary disease) (H)     Coronary artery disease     Diabetes mellitus (H)     Heart attack (H) 2012, 2019    X3    History of blood clots     PEx2    Hyperlipidemia     Hypertension     Myocardial infarction (H) 5/9/2012    Formatting of this note might be different from the original. Inferior, 1/2012 Formatting of this note might be different from the original. 4/2012     Pulmonary embolism (H) 4/10/2007    Formatting of this note might be different from the original. Two episodes over the last 10 years.  On lifelong coumadin therapy     Ureteral stone 9/11/2020    Added automatically from request for surgery [id number]     Past Surgical History:   Procedure Laterality Date    CARDIAC CATHETERIZATION      Stent    CV CORONARY ANGIOGRAM N/A 11/20/2017    Procedure: Coronary Angiogram;  Surgeon: Daniela Mazariegos MD;  Location: [facility];  Service:     CV LEFT HEART CATHETERIZATION WITHOUT LEFT VENTRICULOGRAM Left 11/20/2017    Procedure: Left Heart Catheterization Without Left Ventriculogram;  Surgeon: Daniela Mazariegos MD;  Location: [facility] Cath Lab;  Service:     CYSTOSCOPY  09/29/2020    HC CYSTOSCOPY,INSERT URETERAL STENT Left 9/11/2020    Procedure: CYSTOSCOPY, WITH URETERAL STENT INSERTION;  Surgeon: Sean Schmitt MD;  Location: [facility] OR;  Service: Urology    HYSTERECTOMY      fibroids    OOPHORECTOMY      Family History    Problem Relation Age of Onset    No Known Problems Mother     No Known Problems Father     Diabetes Paternal Uncle     Diabetes Sister     Breast Cancer No family hx of     Cancer No family hx of     Colon Cancer No family hx of     Heart Disease No family hx of     Coronary Artery Disease No family hx of     Social History     Socioeconomic History    Marital status:      Spouse name: Not on file    Number of children: Not on file    Years of education: Not on file    Highest education level: Not on file   Occupational History    Not on file   Tobacco Use    Smoking status: Every Day     Packs/day: 0.75     Years: 55.00     Pack years: 41.25     Types: Cigarettes     Start date: 1/1/1963    Smokeless tobacco: Never   Vaping Use    Vaping Use: Never used   Substance and Sexual Activity    Alcohol use: No    Drug use: No    Sexual activity: Not Currently     Partners: Male   Other Topics Concern    Not on file   Social History Narrative    She lives with her 40-year-old son and 14-year-old grandson.  She used to work in [employer] department.     Social Determinants of Health     Financial Resource Strain: Not on file   Food Insecurity: Not on file   Transportation Needs: Not on file   Physical Activity: Not on file   Stress: Not on file   Social Connections: Not on file   Intimate Partner Violence: Not on file   Housing Stability: Not on file          Medications  Allergies   Scheduled Meds:  Continuous Infusions:  PRN Meds:. Allergies   Allergen Reactions    Sulfa (Sulfonamide Antibiotics) [Sulfa Antibiotics] Anaphylaxis         Lab Results    Chemistry/lipid CBC Cardiac Enzymes/BNP/TSH/INR   Lab Results   Component Value Date    CHOL 147 06/30/2022    HDL 58 06/30/2022    TRIG 38 06/30/2022    BUN 12 06/30/2022     06/30/2022    CO2 25 06/30/2022    Lab Results   Component Value Date    WBC 6.3 06/30/2022    HGB 16.4 (H) 06/30/2022    HCT 49.8 (H) 06/30/2022    MCV 96 06/30/2022     06/30/2022     Lab Results   Component Value Date    TROPONINI 0.02 09/11/2020    BNP <10 09/11/2020    TSH 1.39 09/09/2021    INR 2.0 (H) 05/15/2023              Bandar Gonzalez MD  Interventional Cardiology  [facility]                Thank you for allowing me to participate in the care of your patient.      Sincerely,     Bandar Gonzalez MD     [facility]  cc:   No referring provider defined for this encounter.

## 2023-07-06 NOTE — PROGRESS NOTES
Thank you, Dr. Perez ref. provider found, for asking the [facility] Heart Care team to see Ms. Leonela Christianson to evaluate       Assessment/Recommendations   Assessment/Plan:  1. CAD - on going tob use, cont clopidogrel, no aspirin, statin  2. HTN well controlled cont medication  3. Pulm - cont inhalers, noted scapular pain - in setting tob use, will obtain CT chest   4. Hematoma left arm - resolving - check baseline labs today including CBC, CMP, lipids    Follow up one year     History of Present Illness/Subjective    Ms. Leonela Christianson is a 73 year old female with CAD, PE, COPD, HTN with tob use, she does report left posterior shoulder pain but not with exertion, random, not pleuretic, but position of body can improve pain, constipation, dyspnea on going related to COPD and PE.  No GI/ bleeding, no syncopal events.        Physical Examination Review of Systems   /84 (BP Location: Right arm, Patient Position: Sitting, Cuff Size: Adult Regular)   Pulse 81   Resp 24   Wt 62.1 kg (137 lb)   SpO2 96%   BMI 27.67 kg/m    Body mass index is 27.67 kg/m .  Wt Readings from Last 3 Encounters:   07/06/23 62.1 kg (137 lb)   09/22/22 62.7 kg (138 lb 3.2 oz)   06/30/22 61.6 kg (135 lb 12.8 oz)     [unfilled]  General Appearance:   no distress, normal body habitus   ENT/Mouth: membranes moist, no oral lesions or bleeding gums.      EYES:  no scleral icterus, normal conjunctivae   Neck: no carotid bruits or thyromegaly   Chest/Lungs:   lungs are clear to auscultation, no rales or wheezing,  sternal scar, equal chest wall expansion    Cardiovascular:   Regular. Normal first and second heart sounds with no murmurs, rubs, or gallops; the carotid, radial and posterior tibial pulses are intact, Jugular venous pressure , edema bilaterally    Abdomen:  no organomegaly, masses, bruits, or tenderness; bowel sounds are present   Extremities: no cyanosis or clubbing   Skin: no xanthelasma, warm.     Neurologic: normal  bilateral, no tremors     Psychiatric: alert and oriented x3, calm     Review of Systems - 12 points nega other than above      Medical History  Surgical History Family History Social History   Past Medical History:   Diagnosis Date     Acute pyelonephritis      LEELA (acute kidney injury) (H) 9/12/2020     COPD (chronic obstructive pulmonary disease) (H)      Coronary artery disease      Diabetes mellitus (H)      Heart attack (H) 2012, 2019    X3     History of blood clots     PEx2     Hyperlipidemia      Hypertension      Myocardial infarction (H) 5/9/2012    Formatting of this note might be different from the original. Inferior, 1/2012 Formatting of this note might be different from the original. 4/2012      Pulmonary embolism (H) 4/10/2007    Formatting of this note might be different from the original. Two episodes over the last 10 years.  On lifelong coumadin therapy      Ureteral stone 9/11/2020    Added automatically from request for surgery [id number]     Past Surgical History:   Procedure Laterality Date     CARDIAC CATHETERIZATION      Stent     CV CORONARY ANGIOGRAM N/A 11/20/2017    Procedure: Coronary Angiogram;  Surgeon: Daniela Mazariegos MD;  Location: [facility] Cath Lab;  Service:      CV LEFT HEART CATHETERIZATION WITHOUT LEFT VENTRICULOGRAM Left 11/20/2017    Procedure: Left Heart Catheterization Without Left Ventriculogram;  Surgeon: Daniela Mazariegos MD;  Location: [facility] Cath Lab;  Service:      CYSTOSCOPY  09/29/2020     HC CYSTOSCOPY,INSERT URETERAL STENT Left 9/11/2020    Procedure: CYSTOSCOPY, WITH URETERAL STENT INSERTION;  Surgeon: Sean Schmitt MD;  Location: [facility] OR;  Service: Urology     HYSTERECTOMY      fibroids     OOPHORECTOMY      Family History   Problem Relation Age of Onset     No Known Problems Mother      No Known Problems Father      Diabetes Paternal Uncle      Diabetes Sister      Breast Cancer No family hx of      Cancer No  family hx of      Colon Cancer No family hx of      Heart Disease No family hx of      Coronary Artery Disease No family hx of     Social History     Socioeconomic History     Marital status:      Spouse name: Not on file     Number of children: Not on file     Years of education: Not on file     Highest education level: Not on file   Occupational History     Not on file   Tobacco Use     Smoking status: Every Day     Packs/day: 0.75     Years: 55.00     Pack years: 41.25     Types: Cigarettes     Start date: 1/1/1963     Smokeless tobacco: Never   Vaping Use     Vaping Use: Never used   Substance and Sexual Activity     Alcohol use: No     Drug use: No     Sexual activity: Not Currently     Partners: Male   Other Topics Concern     Not on file   Social History Narrative    She lives with her 40-year-old son and 14-year-old grandson.  She used to work in [employer] department.     Social Determinants of Health     Financial Resource Strain: Not on file   Food Insecurity: Not on file   Transportation Needs: Not on file   Physical Activity: Not on file   Stress: Not on file   Social Connections: Not on file   Intimate Partner Violence: Not on file   Housing Stability: Not on file          Medications  Allergies   Scheduled Meds:  Continuous Infusions:  PRN Meds:. Allergies   Allergen Reactions     Sulfa (Sulfonamide Antibiotics) [Sulfa Antibiotics] Anaphylaxis         Lab Results    Chemistry/lipid CBC Cardiac Enzymes/BNP/TSH/INR   Lab Results   Component Value Date    CHOL 147 06/30/2022    HDL 58 06/30/2022    TRIG 38 06/30/2022    BUN 12 06/30/2022     06/30/2022    CO2 25 06/30/2022    Lab Results   Component Value Date    WBC 6.3 06/30/2022    HGB 16.4 (H) 06/30/2022    HCT 49.8 (H) 06/30/2022    MCV 96 06/30/2022     06/30/2022    Lab Results   Component Value Date    TROPONINI 0.02 09/11/2020    BNP <10 09/11/2020    TSH 1.39 09/09/2021    INR 2.0 (H) 05/15/2023              Bandar Gonzalez  MD  Interventional Cardiology  [facility]

## 2023-07-10 ENCOUNTER — TELEPHONE (OUTPATIENT)
Dept: FAMILY MEDICINE | Facility: CLINIC | Age: 73
End: 2023-07-10

## 2023-07-10 ENCOUNTER — ANTICOAGULATION THERAPY VISIT (OUTPATIENT)
Dept: ANTICOAGULATION | Facility: CLINIC | Age: 73
End: 2023-07-10

## 2023-07-10 ENCOUNTER — LAB (OUTPATIENT)
Dept: LAB | Facility: CLINIC | Age: 73
End: 2023-07-10
Payer: MEDICARE

## 2023-07-10 DIAGNOSIS — I82.409 RECURRENT DEEP VEIN THROMBOSIS (DVT) (H): ICD-10-CM

## 2023-07-10 DIAGNOSIS — Z86.711 HISTORY OF PULMONARY EMBOLISM: ICD-10-CM

## 2023-07-10 DIAGNOSIS — R21 RASH AND NONSPECIFIC SKIN ERUPTION: ICD-10-CM

## 2023-07-10 DIAGNOSIS — Z86.711 HISTORY OF PULMONARY EMBOLISM: Primary | ICD-10-CM

## 2023-07-10 DIAGNOSIS — Z79.01 LONG TERM (CURRENT) USE OF ANTICOAGULANTS: ICD-10-CM

## 2023-07-10 LAB — INR BLD: 2.6 (ref 0.9–1.1)

## 2023-07-10 PROCEDURE — 85610 PROTHROMBIN TIME: CPT

## 2023-07-10 NOTE — PROGRESS NOTES
ANTICOAGULATION MANAGEMENT     Leonela Christianson 73 year old female is on warfarin with therapeutic INR result. (Goal INR 2.0-3.0)    Recent labs: (last 7 days)     07/10/23  0740   INR 2.6*       ASSESSMENT     Source(s): Chart Review and Patient/Caregiver Call     Warfarin doses taken: Warfarin taken as instructed  Diet: No new diet changes identified  Medication/supplement changes: None noted  New illness, injury, or hospitalization: No  Signs or symptoms of bleeding or clotting: No.  Previous result: Therapeutic last 9(+) visits  Additional findings: None       PLAN     Recommended plan for no diet, medication or health factor changes affecting INR     Dosing Instructions: Continue your current warfarin dose with next INR in 6 weeks       Summary  As of 7/10/2023      Full warfarin instructions:  6 mg every Sun, Wed; 3 mg all other days   Next INR check:  8/21/2023               Telephone call with Leonela who verbalizes understanding and agrees to plan    Lab visit scheduled    Education provided:   Please call back if any changes to your diet, medications or how you've been taking warfarin  Taking warfarin: Importance of taking warfarin as instructed  Goal range and lab monitoring: goal range and significance of current result  Symptom monitoring: monitoring for bleeding signs and symptoms    Plan made per [facility] anticoagulation protocol    Jeanne Olsen RN  Anticoagulation Clinic  7/10/2023    _______________________________________________________________________     Anticoagulation Episode Summary       Current INR goal:  2.0-3.0   TTR:  100.0 % (1 y)   Target end date:  Indefinite   Send INR reminders to:  [facility]    Indications    History of pulmonary embolism [Z86.711]  Long term (current) use of anticoagulants [Z79.01]  Rash and nonspecific skin eruption [R21]  Recurrent deep vein thrombosis (DVT) (H) [I82.409]             Comments:  OK for 8 week checks per Dr. Woodruff on 1/23/23              Anticoagulation Care Providers       Provider Role Specialty Phone number    Miri Woodruff MD Referring Family Medicine [phone]

## 2023-07-10 NOTE — TELEPHONE ENCOUNTER
Patient Returning Call    Reason for call:  INR    Information relayed to patient:  CALL BACK    Patient has additional questions:  Yes    What are your questions/concerns:  PLEASE CALL PATIENT    Who does the patient want to speak with:  INR NURSE    Is an  needed?:  No      Okay to leave a detailed message?: Yes at Home number on file [phone] (home)

## 2023-07-17 ENCOUNTER — HOSPITAL ENCOUNTER (OUTPATIENT)
Dept: CT IMAGING | Facility: HOSPITAL | Age: 73
Discharge: HOME | End: 2023-07-17
Attending: FAMILY MEDICINE | Admitting: FAMILY MEDICINE
Payer: MEDICARE

## 2023-07-17 DIAGNOSIS — F17.200 TOBACCO USE DISORDER: ICD-10-CM

## 2023-07-17 DIAGNOSIS — Z87.891 PERSONAL HISTORY OF NICOTINE DEPENDENCE: ICD-10-CM

## 2023-07-17 PROCEDURE — 71271 CT THORAX LUNG CANCER SCR C-: CPT

## 2023-08-21 ENCOUNTER — ANTICOAGULATION THERAPY VISIT (OUTPATIENT)
Dept: ANTICOAGULATION | Facility: CLINIC | Age: 73
End: 2023-08-21

## 2023-08-21 ENCOUNTER — LAB (OUTPATIENT)
Dept: LAB | Facility: CLINIC | Age: 73
End: 2023-08-21
Payer: MEDICARE

## 2023-08-21 DIAGNOSIS — Z86.711 HISTORY OF PULMONARY EMBOLISM: Primary | ICD-10-CM

## 2023-08-21 DIAGNOSIS — Z86.711 HISTORY OF PULMONARY EMBOLISM: ICD-10-CM

## 2023-08-21 DIAGNOSIS — I82.409 RECURRENT DEEP VEIN THROMBOSIS (DVT) (H): ICD-10-CM

## 2023-08-21 DIAGNOSIS — Z79.01 LONG TERM (CURRENT) USE OF ANTICOAGULANTS: ICD-10-CM

## 2023-08-21 DIAGNOSIS — R21 RASH AND NONSPECIFIC SKIN ERUPTION: ICD-10-CM

## 2023-08-21 LAB — INR BLD: 2.4 (ref 0.9–1.1)

## 2023-08-21 PROCEDURE — 36416 COLLJ CAPILLARY BLOOD SPEC: CPT

## 2023-08-21 PROCEDURE — 85610 PROTHROMBIN TIME: CPT

## 2023-08-21 NOTE — PROGRESS NOTES
ANTICOAGULATION MANAGEMENT     Leonela Christianson 73 year old female is on warfarin with therapeutic INR result. (Goal INR 2.0-3.0)    Recent labs: (last 7 days)     08/21/23  0736   INR 2.4*       ASSESSMENT     Source(s): Chart Review and Patient/Caregiver Call     Warfarin doses taken: Warfarin taken as instructed  Diet: No new diet changes identified  Medication/supplement changes: None noted  New illness, injury, or hospitalization: No  Signs or symptoms of bleeding or clotting: No  Previous result: Therapeutic last 9(+) visits  Additional findings: None       PLAN     Recommended plan for no diet, medication or health factor changes affecting INR     Dosing Instructions: Continue your current warfarin dose with next INR in 8 weeks       Summary  As of 8/21/2023      Full warfarin instructions:  6 mg every Sun, Wed; 3 mg all other days   Next INR check:  10/16/2023               Telephone call with Leonela who verbalizes understanding and agrees to plan    Lab visit scheduled - INR on 10/16/23 @ [facility]    Education provided:   Please call back if any changes to your diet, medications or how you've been taking warfarin  Taking warfarin: Importance of taking warfarin as instructed  Goal range and lab monitoring: goal range and significance of current result  Dietary considerations: importance of consistent vitamin K intake    Plan made per [facility] anticoagulation protocol    Jeanne Olsen RN  Anticoagulation Clinic  8/21/2023    _______________________________________________________________________     Anticoagulation Episode Summary       Current INR goal:  2.0-3.0   TTR:  100.0 % (1 y)   Target end date:  Indefinite   Send INR reminders to:  [facility]    Indications    History of pulmonary embolism [Z86.711]  Long term (current) use of anticoagulants [Z79.01]  Rash and nonspecific skin eruption [R21]  Recurrent deep vein thrombosis (DVT) (H) [I82.409]             Comments:  OK for 8 week  checks per Dr. Woodruff on 1/23/23             Anticoagulation Care Providers       Provider Role Specialty Phone number    Miri Woodruff MD Referring Family Medicine [phone]

## 2023-08-23 ENCOUNTER — PATIENT OUTREACH (OUTPATIENT)
Dept: CARE COORDINATION | Facility: CLINIC | Age: 73
End: 2023-08-23
Payer: MEDICARE

## 2023-09-14 ENCOUNTER — ALLIED HEALTH/NURSE VISIT (OUTPATIENT)
Dept: FAMILY MEDICINE | Facility: CLINIC | Age: 73
End: 2023-09-14
Payer: MEDICARE

## 2023-09-14 DIAGNOSIS — Z23 ENCOUNTER FOR IMMUNIZATION: Primary | ICD-10-CM

## 2023-09-14 PROCEDURE — 99207 PR NO CHARGE NURSE ONLY: CPT

## 2023-09-14 PROCEDURE — G0008 ADMIN INFLUENZA VIRUS VAC: HCPCS

## 2023-09-14 PROCEDURE — 90662 IIV NO PRSV INCREASED AG IM: CPT

## 2023-10-02 DIAGNOSIS — Z86.711 HISTORY OF PULMONARY EMBOLISM: ICD-10-CM

## 2023-10-02 RX ORDER — WARFARIN SODIUM 3 MG/1
TABLET ORAL
Qty: 120 TABLET | Refills: 5 | Status: SHIPPED | OUTPATIENT
Start: 2023-10-02

## 2023-10-16 ENCOUNTER — LAB (OUTPATIENT)
Dept: LAB | Facility: CLINIC | Age: 73
End: 2023-10-16
Payer: MEDICARE

## 2023-10-16 ENCOUNTER — ANTICOAGULATION THERAPY VISIT (OUTPATIENT)
Dept: ANTICOAGULATION | Facility: CLINIC | Age: 73
End: 2023-10-16

## 2023-10-16 DIAGNOSIS — I82.409 RECURRENT DEEP VEIN THROMBOSIS (DVT) (H): ICD-10-CM

## 2023-10-16 DIAGNOSIS — Z79.01 LONG TERM (CURRENT) USE OF ANTICOAGULANTS: ICD-10-CM

## 2023-10-16 DIAGNOSIS — Z86.711 HISTORY OF PULMONARY EMBOLISM: Primary | ICD-10-CM

## 2023-10-16 DIAGNOSIS — R21 RASH AND NONSPECIFIC SKIN ERUPTION: ICD-10-CM

## 2023-10-16 DIAGNOSIS — Z86.711 HISTORY OF PULMONARY EMBOLISM: ICD-10-CM

## 2023-10-16 LAB — INR BLD: 3.1 (ref 0.9–1.1)

## 2023-10-16 PROCEDURE — 36416 COLLJ CAPILLARY BLOOD SPEC: CPT

## 2023-10-16 PROCEDURE — 85610 PROTHROMBIN TIME: CPT

## 2023-10-16 NOTE — PROGRESS NOTES
ANTICOAGULATION MANAGEMENT     Leonela ZARATE Ramon Christianson 73 year old female is on warfarin with supratherapeutic INR result. (Goal INR 2.0-3.0)    Recent labs: (last 7 days)     10/16/23  0745   INR 3.1*       ASSESSMENT     Source(s): Chart Review and Patient/Caregiver Call     Warfarin doses taken: Warfarin taken as instructed  Diet: Decreased greens/vitamin K in diet; plans to resume previous intake  Medication/supplement changes: None noted  New illness, injury, or hospitalization: No  Signs or symptoms of bleeding or clotting: No  Previous result: Therapeutic last 9(+) INR visits  Additional findings: None       PLAN     Recommended plan for temporary change(s) affecting INR     Dosing Instructions:  - already had taken 3mg warfarin dose early this morning,  - advised partial warfarin hold on 10/17/23,  - then continue your current warfarin dose with next INR in 2-3 weeks       Summary  As of 10/16/2023      Full warfarin instructions:  10/17: 1.5 mg; Otherwise 6 mg every Sun, Wed; 3 mg all other days   Next INR check:  10/30/2023               Telephone call with Leonela who verbalizes understanding and agrees to plan    Lab visit scheduled - INR on  11/6/23 @ [facility]    Education provided:   Taking warfarin: Importance of taking warfarin as instructed  Goal range and lab monitoring: goal range and significance of current result  Dietary considerations: importance of consistent vitamin K intake and impact of vitamin K foods on INR    Plan made per [facility] anticoagulation protocol    Jeanne Olsen, RN  Anticoagulation Clinic  10/16/2023    _______________________________________________________________________     Anticoagulation Episode Summary       Current INR goal:  2.0-3.0   TTR:  97.8% (1 y)   Target end date:  Indefinite   Send INR reminders to:  [facility]    Indications    History of pulmonary embolism [Z86.711]  Long term (current) use of anticoagulants [Z79.01]  Rash and nonspecific skin  eruption [R21]  Recurrent deep vein thrombosis (DVT) (H) [I82.409]             Comments:  OK for 8 week checks per Dr. Woodruff on 1/23/23             Anticoagulation Care Providers       Provider Role Specialty Phone number    Miri Woodruff MD Referring Family Medicine [phone]             (2) Patient Placed in Bed

## 2023-10-30 ENCOUNTER — LAB (OUTPATIENT)
Dept: LAB | Facility: CLINIC | Age: 73
End: 2023-10-30
Payer: MEDICARE

## 2023-10-30 ENCOUNTER — ANTICOAGULATION THERAPY VISIT (OUTPATIENT)
Dept: ANTICOAGULATION | Facility: CLINIC | Age: 73
End: 2023-10-30

## 2023-10-30 DIAGNOSIS — R21 RASH AND NONSPECIFIC SKIN ERUPTION: ICD-10-CM

## 2023-10-30 DIAGNOSIS — Z86.711 HISTORY OF PULMONARY EMBOLISM: ICD-10-CM

## 2023-10-30 DIAGNOSIS — Z79.01 LONG TERM (CURRENT) USE OF ANTICOAGULANTS: ICD-10-CM

## 2023-10-30 DIAGNOSIS — Z86.711 HISTORY OF PULMONARY EMBOLISM: Primary | ICD-10-CM

## 2023-10-30 DIAGNOSIS — I82.409 RECURRENT DEEP VEIN THROMBOSIS (DVT) (H): ICD-10-CM

## 2023-10-30 LAB — INR BLD: 3 (ref 0.9–1.1)

## 2023-10-30 PROCEDURE — 36416 COLLJ CAPILLARY BLOOD SPEC: CPT

## 2023-10-30 PROCEDURE — 85610 PROTHROMBIN TIME: CPT

## 2023-10-30 NOTE — PROGRESS NOTES
ANTICOAGULATION MANAGEMENT     Leonela Christianson 73 year old female is on warfarin with therapeutic INR result. (Goal INR 2.0-3.0)    Recent labs: (last 7 days)     10/30/23  0752   INR 3.0*       ASSESSMENT     Source(s): Chart Review and Patient/Caregiver Call     Warfarin doses taken: Held partial warfarin dose on 10/17/23. recently which may be affecting INR  Diet: Decreased greens/vitamin K in diet; plans to resume previous intake  Medication/supplement changes: None noted  New illness, injury, or hospitalization: No  Signs or symptoms of bleeding or clotting: No  Previous result: Supratherapeutic at 3.1 on 10/16/23.  Additional findings: None       PLAN     Recommended plan for temporary change(s) affecting INR     Dosing Instructions: Continue your current warfarin dose with next INR in 2-3 weeks       Summary  As of 10/30/2023      Full warfarin instructions:  6 mg every Sun, Wed; 3 mg all other days   Next INR check:  11/20/2023               Telephone call with Leonela who verbalizes understanding and agrees to plan.   - she preferred 6 wks INR check.  However, INR is in the upper range of her target goal, and need to ensure INR stability.    Lab visit scheduled - INR on 11/20/23 @ [facility]    Education provided:   Taking warfarin: Importance of taking warfarin as instructed  Goal range and lab monitoring: goal range and significance of current result  Dietary considerations: importance of consistent vitamin K intake    Plan made per [facility] anticoagulation protocol    Jeanne Olsen, RN  Anticoagulation Clinic  10/30/2023    _______________________________________________________________________     Anticoagulation Episode Summary       Current INR goal:  2.0-3.0   TTR:  94.0% (1 y)   Target end date:  Indefinite   Send INR reminders to:  [facility]    Indications    History of pulmonary embolism [Z86.711]  Long term (current) use of anticoagulants [Z79.01]  Rash and nonspecific skin  eruption [R21]  Recurrent deep vein thrombosis (DVT) (H) [I82.409]             Comments:  OK for 8 week checks per Dr. Woodruff on 1/23/23             Anticoagulation Care Providers       Provider Role Specialty Phone number    Miri Woodruff MD Referring Family Medicine [phone]

## 2023-11-20 ENCOUNTER — LAB (OUTPATIENT)
Dept: LAB | Facility: CLINIC | Age: 73
End: 2023-11-20
Payer: MEDICARE

## 2023-11-20 ENCOUNTER — ANTICOAGULATION THERAPY VISIT (OUTPATIENT)
Dept: ANTICOAGULATION | Facility: CLINIC | Age: 73
End: 2023-11-20

## 2023-11-20 DIAGNOSIS — I82.409 RECURRENT DEEP VEIN THROMBOSIS (DVT) (H): ICD-10-CM

## 2023-11-20 DIAGNOSIS — Z79.01 LONG TERM (CURRENT) USE OF ANTICOAGULANTS: ICD-10-CM

## 2023-11-20 DIAGNOSIS — R21 RASH AND NONSPECIFIC SKIN ERUPTION: ICD-10-CM

## 2023-11-20 DIAGNOSIS — Z86.711 HISTORY OF PULMONARY EMBOLISM: ICD-10-CM

## 2023-11-20 DIAGNOSIS — Z86.711 HISTORY OF PULMONARY EMBOLISM: Primary | ICD-10-CM

## 2023-11-20 LAB — INR BLD: 2.9 (ref 0.9–1.1)

## 2023-11-20 PROCEDURE — 36416 COLLJ CAPILLARY BLOOD SPEC: CPT

## 2023-11-20 PROCEDURE — 85610 PROTHROMBIN TIME: CPT

## 2023-11-20 NOTE — PROGRESS NOTES
ANTICOAGULATION MANAGEMENT     Leonela Christianson 73 year old female is on warfarin with therapeutic INR result. (Goal INR 2.0-3.0)    Recent labs: (last 7 days)     11/20/23  0742   INR 2.9*       ASSESSMENT     Source(s): Chart Reviewed    Medication/supplement changes: None noted  New illness, injury, or hospitalization: None noted.  Previous result: Therapeutic last visit at 3.0; previously outside of goal range at 3.1  Additional findings: None       PLAN     Recommended plan for no diet, medication or health factor changes affecting INR     Dosing Instructions: Continue your current warfarin dose with next INR in 3 weeks       Summary  As of 11/20/2023      Full warfarin instructions:  6 mg every Sun, Wed; 3 mg all other days   Next INR check:  12/11/2023               Detailed voice message left for Leonela with dosing instructions and follow up date.     Check at provider office visit - INR on 12/12/23 at annual wellness check with Dr. Woodruff.    Education provided:   Please call back if any changes to your diet, medications or how you've been taking warfarin  Taking warfarin: Importance of taking warfarin as instructed  Goal range and lab monitoring: goal range and significance of current result  Dietary considerations: importance of consistent vitamin K intake    Plan made per [facility] anticoagulation protocol    Jeanne Olsen RN  Anticoagulation Clinic  11/20/2023    _______________________________________________________________________     Anticoagulation Episode Summary       Current INR goal:  2.0-3.0   TTR:  94.0% (1 y)   Target end date:  Indefinite   Send INR reminders to:  [facility]    Indications    History of pulmonary embolism [Z86.711]  Long term (current) use of anticoagulants [Z79.01]  Rash and nonspecific skin eruption [R21]  Recurrent deep vein thrombosis (DVT) (H) [I82.409]             Comments:  OK for 8 week checks per Dr. Woodruff on 1/23/23             Anticoagulation  Care Providers       Provider Role Specialty Phone number    Miri Woodruff MD Referring Family Medicine [phone]

## 2023-11-24 ENCOUNTER — TELEPHONE (OUTPATIENT)
Dept: FAMILY MEDICINE | Facility: CLINIC | Age: 73
End: 2023-11-24
Payer: MEDICARE

## 2023-11-24 DIAGNOSIS — Z79.01 LONG TERM (CURRENT) USE OF ANTICOAGULANTS: ICD-10-CM

## 2023-11-24 DIAGNOSIS — Z86.711 HISTORY OF PULMONARY EMBOLISM: Primary | ICD-10-CM

## 2023-11-24 DIAGNOSIS — R21 RASH AND NONSPECIFIC SKIN ERUPTION: ICD-10-CM

## 2023-11-24 DIAGNOSIS — I82.409 RECURRENT DEEP VEIN THROMBOSIS (DVT) (H): ICD-10-CM

## 2023-11-24 NOTE — TELEPHONE ENCOUNTER
Reason for Call:  Other call back    Detailed comments: Never received a call about recent INR results. What were they?    Phone Number Patient can be reached at: Home number on file [phone] (home)    Best Time: any    Can we leave a detailed message on this number? YES    Call taken on 11/24/2023 at 11:49 AM by Soraida Joiner

## 2023-12-12 ENCOUNTER — OFFICE VISIT (OUTPATIENT)
Dept: FAMILY MEDICINE | Facility: CLINIC | Age: 73
End: 2023-12-12
Payer: MEDICARE

## 2023-12-12 ENCOUNTER — ANTICOAGULATION THERAPY VISIT (OUTPATIENT)
Dept: ANTICOAGULATION | Facility: CLINIC | Age: 73
End: 2023-12-12

## 2023-12-12 VITALS
DIASTOLIC BLOOD PRESSURE: 72 MMHG | OXYGEN SATURATION: 90 % | WEIGHT: 136.2 LBS | SYSTOLIC BLOOD PRESSURE: 135 MMHG | HEIGHT: 59 IN | HEART RATE: 85 BPM | TEMPERATURE: 98.1 F | RESPIRATION RATE: 20 BRPM | BODY MASS INDEX: 27.46 KG/M2

## 2023-12-12 DIAGNOSIS — Z79.01 LONG TERM (CURRENT) USE OF ANTICOAGULANTS: ICD-10-CM

## 2023-12-12 DIAGNOSIS — I10 ESSENTIAL HYPERTENSION: ICD-10-CM

## 2023-12-12 DIAGNOSIS — E78.5 HYPERLIPIDEMIA, UNSPECIFIED HYPERLIPIDEMIA TYPE: Chronic | ICD-10-CM

## 2023-12-12 DIAGNOSIS — Z86.711 HISTORY OF PULMONARY EMBOLISM: ICD-10-CM

## 2023-12-12 DIAGNOSIS — R21 RASH AND NONSPECIFIC SKIN ERUPTION: ICD-10-CM

## 2023-12-12 DIAGNOSIS — I25.83 CORONARY ATHEROSCLEROSIS DUE TO LIPID RICH PLAQUE: Chronic | ICD-10-CM

## 2023-12-12 DIAGNOSIS — J42 CHRONIC BRONCHITIS, UNSPECIFIED CHRONIC BRONCHITIS TYPE (H): ICD-10-CM

## 2023-12-12 DIAGNOSIS — M81.0 OSTEOPOROSIS WITHOUT CURRENT PATHOLOGICAL FRACTURE, UNSPECIFIED OSTEOPOROSIS TYPE: ICD-10-CM

## 2023-12-12 DIAGNOSIS — R53.82 CHRONIC FATIGUE: ICD-10-CM

## 2023-12-12 DIAGNOSIS — Z86.711 HISTORY OF PULMONARY EMBOLISM: Primary | ICD-10-CM

## 2023-12-12 DIAGNOSIS — F17.200 TOBACCO USE DISORDER: ICD-10-CM

## 2023-12-12 DIAGNOSIS — J43.9 PULMONARY EMPHYSEMA, UNSPECIFIED EMPHYSEMA TYPE (H): Chronic | ICD-10-CM

## 2023-12-12 DIAGNOSIS — I82.409 RECURRENT DEEP VEIN THROMBOSIS (DVT) (H): ICD-10-CM

## 2023-12-12 DIAGNOSIS — Z00.00 ENCOUNTER FOR MEDICARE ANNUAL WELLNESS EXAM: Primary | ICD-10-CM

## 2023-12-12 DIAGNOSIS — R23.8 SKIN IRRITATION: ICD-10-CM

## 2023-12-12 DIAGNOSIS — R09.02 HYPOXIA: ICD-10-CM

## 2023-12-12 LAB
BASOPHILS # BLD AUTO: 0 10E3/UL (ref 0–0.2)
BASOPHILS NFR BLD AUTO: 1 %
EOSINOPHIL # BLD AUTO: 0.1 10E3/UL (ref 0–0.7)
EOSINOPHIL NFR BLD AUTO: 2 %
ERYTHROCYTE [DISTWIDTH] IN BLOOD BY AUTOMATED COUNT: 13.1 % (ref 10–15)
HCT VFR BLD AUTO: 51.2 % (ref 35–47)
HGB BLD-MCNC: 17 G/DL (ref 11.7–15.7)
IMM GRANULOCYTES # BLD: 0 10E3/UL
IMM GRANULOCYTES NFR BLD: 0 %
INR BLD: 2.8 (ref 0.9–1.1)
LYMPHOCYTES # BLD AUTO: 2 10E3/UL (ref 0.8–5.3)
LYMPHOCYTES NFR BLD AUTO: 27 %
MCH RBC QN AUTO: 31.3 PG (ref 26.5–33)
MCHC RBC AUTO-ENTMCNC: 33.2 G/DL (ref 31.5–36.5)
MCV RBC AUTO: 94 FL (ref 78–100)
MONOCYTES # BLD AUTO: 0.5 10E3/UL (ref 0–1.3)
MONOCYTES NFR BLD AUTO: 7 %
NEUTROPHILS # BLD AUTO: 4.9 10E3/UL (ref 1.6–8.3)
NEUTROPHILS NFR BLD AUTO: 64 %
PLATELET # BLD AUTO: 220 10E3/UL (ref 150–450)
RBC # BLD AUTO: 5.44 10E6/UL (ref 3.8–5.2)
WBC # BLD AUTO: 7.7 10E3/UL (ref 4–11)

## 2023-12-12 PROCEDURE — 85025 COMPLETE CBC W/AUTO DIFF WBC: CPT | Performed by: FAMILY MEDICINE

## 2023-12-12 PROCEDURE — 99214 OFFICE O/P EST MOD 30 MIN: CPT | Mod: 25 | Performed by: FAMILY MEDICINE

## 2023-12-12 PROCEDURE — 36415 COLL VENOUS BLD VENIPUNCTURE: CPT | Performed by: FAMILY MEDICINE

## 2023-12-12 PROCEDURE — 80053 COMPREHEN METABOLIC PANEL: CPT | Performed by: FAMILY MEDICINE

## 2023-12-12 PROCEDURE — G0439 PPPS, SUBSEQ VISIT: HCPCS | Performed by: FAMILY MEDICINE

## 2023-12-12 PROCEDURE — 85610 PROTHROMBIN TIME: CPT | Performed by: FAMILY MEDICINE

## 2023-12-12 PROCEDURE — 84443 ASSAY THYROID STIM HORMONE: CPT | Performed by: FAMILY MEDICINE

## 2023-12-12 RX ORDER — RESPIRATORY SYNCYTIAL VIRUS VACCINE 120MCG/0.5
0.5 KIT INTRAMUSCULAR ONCE
Qty: 1 EACH | Refills: 0 | Status: CANCELLED | OUTPATIENT
Start: 2023-12-12 | End: 2023-12-12

## 2023-12-12 ASSESSMENT — ENCOUNTER SYMPTOMS
HEADACHES: 0
SHORTNESS OF BREATH: 1
DIARRHEA: 0
FREQUENCY: 1
FEVER: 0
CHILLS: 0
HEMATURIA: 0
WEAKNESS: 0
JOINT SWELLING: 0
CONSTIPATION: 1
SORE THROAT: 0
DYSURIA: 0
ABDOMINAL PAIN: 0
DIZZINESS: 1
HEMATOCHEZIA: 0
HEARTBURN: 0
BREAST MASS: 0
ARTHRALGIAS: 0
NAUSEA: 0
PARESTHESIAS: 1
EYE PAIN: 0
PALPITATIONS: 0
MYALGIAS: 0
COUGH: 0
NERVOUS/ANXIOUS: 0

## 2023-12-12 ASSESSMENT — ACTIVITIES OF DAILY LIVING (ADL): CURRENT_FUNCTION: NO ASSISTANCE NEEDED

## 2023-12-12 NOTE — PROGRESS NOTES
"SUBJECTIVE:   Leonela is a 73 year old, presenting for the following:  Wellness Visit        12/12/2023    11:27 AM   Additional Questions   Roomed by Kate ANDERSON LPN       Are you in the first 12 months of your Medicare coverage?  No    Healthy Habits:     In general, how would you rate your overall health?  Fair    Frequency of exercise:  None    Do you usually eat at least 4 servings of fruit and vegetables a day, include whole grains    & fiber and avoid regularly eating high fat or \"junk\" foods?  No    Taking medications regularly:  Yes    Medication side effects:  None    Ability to successfully perform activities of daily living:  No assistance needed    Home Safety:  No safety concerns identified    Hearing Impairment:  No hearing concerns    In the past 6 months, have you been bothered by leaking of urine?  No    In general, how would you rate your overall mental or emotional health?  Good    Additional concerns today:  No      Today's PHQ-2 Score:       12/12/2023    11:09 AM   PHQ-2 ( 1999 [employer])   Q1: Little interest or pleasure in doing things 0   Q2: Feeling down, depressed or hopeless 0   PHQ-2 Score 0   Q1: Little interest or pleasure in doing things Not at all   Q2: Feeling down, depressed or hopeless Not at all   PHQ-2 Score 0     Have you ever done Advance Care Planning? (For example, a Health Directive, POLST, or a discussion with a medical provider or your loved ones about your wishes): No, advance care planning information given to patient to review.  Patient plans to discuss their wishes with loved ones or provider.      Fall risk  Fallen 2 or more times in the past year?: No  Any fall with injury in the past year?: No    Cognitive Screening   1) Repeat 3 items (Leader, Season, Table)    2) Clock draw: NORMAL  3) 3 item recall: Recalls NO objects   Results: 0 items recalled: PROBABLE COGNITIVE IMPAIRMENT, **INFORM PROVIDER**          Reviewed and updated as needed this visit by clinical staff   " Tobacco  Allergies  Meds              Reviewed and updated as needed this visit by Provider   Tobacco  Allergies  Meds  Problems  Med Hx  Surg Hx  Fam Hx  Soc   Hx         Social History     Tobacco Use    Smoking status: Every Day     Packs/day: 0.75     Years: 55.00     Additional pack years: 0.00     Total pack years: 41.25     Types: Cigarettes     Start date: 1/1/1963    Smokeless tobacco: Never   Substance Use Topics    Alcohol use: No           12/12/2023    11:08 AM   Alcohol Use   Prescreen: >3 drinks/day or >7 drinks/week? Not Applicable     Do you have a current opioid prescription? No  Do you use any other controlled substances or medications that are not prescribed by a provider? None        PROBLEMS TO ADD ON...  Would like to go to a clinic closer to her home.  Also expresses frustration with difficulty getting an urgent appointment when she wants one.  Wondering about seeing a dermatologist.  Has an itchy skin rash on anterior lower legs, thighs and ventral elbows.  Fatigue, chronic.  Getting maybe 7-9 hours of sleep at night.  Unsure if she snores.  Does seem like she wakes feeling rested.  Not getting any regular exercise.  Has prunes and prune juice, but feels that she is still a bit constipated.  Takes Dulcolax periodically.    Current providers sharing in care for this patient include:   Patient Care Team:  Miri Woodruff MD as PCP - General (Family Medicine)  Bandar Gonzalez MD as Assigned Heart and Vascular Provider  Miri Woodruff MD as Assigned PCP    The following health maintenance items are reviewed in Epic and correct as of today:  Health Maintenance   Topic Date Due    COPD ACTION PLAN  Never done    Pneumococcal Vaccine: 65+ Years (1 - PCV) Never done    HEPATITIS C SCREENING  Never done    RSV VACCINE (Pregnancy & 60+) (1 - 1-dose 60+ series) Never done    ZOSTER IMMUNIZATION (2 of 2) 06/07/2017    DTAP/TDAP/TD IMMUNIZATION (2 - Td or Tdap) 07/06/2021     ANNUAL REVIEW OF HM ORDERS  03/28/2023    COVID-19 Vaccine (5 - 2023-24 season) 09/01/2023    NICOTINE/TOBACCO CESSATION COUNSELING Q 1 YR  09/22/2023    MEDICARE ANNUAL WELLNESS VISIT  09/22/2023    COLORECTAL CANCER SCREENING  01/26/2024    LUNG CANCER SCREENING  07/17/2024    FALL RISK ASSESSMENT  12/12/2024    MAMMO SCREENING  03/21/2025    ADVANCE CARE PLANNING  09/24/2027    LIPID  07/06/2028    DEXA  10/11/2034    SPIROMETRY  Completed    PHQ-2 (once per calendar year)  Completed    INFLUENZA VACCINE  Completed    IPV IMMUNIZATION  Aged Out    HPV IMMUNIZATION  Aged Out    MENINGITIS IMMUNIZATION  Aged Out    RSV MONOCLONAL ANTIBODY  Aged Out     BP Readings from Last 3 Encounters:   12/12/23 135/72   07/06/23 134/84   09/22/22 125/74    Wt Readings from Last 3 Encounters:   12/12/23 61.8 kg (136 lb 3.2 oz)   07/06/23 62.1 kg (137 lb)   09/22/22 62.7 kg (138 lb 3.2 oz)                  Patient Active Problem List   Diagnosis    Hyperlipidemia    Nicotine Dependence    Overweight (BMI 25.0-29.9)    Osteoporosis    Chronic bronchitis, unspecified chronic bronchitis type (H)    History of pulmonary embolism    Pulmonary nodule    Pulmonary emphysema, unspecified emphysema type (H)    Essential hypertension    History of non-ST elevation myocardial infarction (NSTEMI)    Coronary atherosclerosis due to lipid rich plaque    Pigmented skin lesion    Hypoxia    Hemorrhoids, internal    H/O: hysterectomy    Nicotine dependence    Irritability and anger    Chronic fatigue    Long term (current) use of anticoagulants    Recurrent deep vein thrombosis (DVT) (H)    Rash and nonspecific skin eruption    Heartburn     Past Surgical History:   Procedure Laterality Date    CARDIAC CATHETERIZATION      Stent    CV CORONARY ANGIOGRAM N/A 11/20/2017    Procedure: Coronary Angiogram;  Surgeon: Daniela Mazariegos MD;  Location: [facility];  Service:     CV LEFT HEART CATHETERIZATION WITHOUT LEFT VENTRICULOGRAM Left  11/20/2017    Procedure: Left Heart Catheterization Without Left Ventriculogram;  Surgeon: Daniela Mazariegos MD;  Location: [facility];  Service:     CYSTOSCOPY  09/29/2020    HC CYSTOSCOPY,INSERT URETERAL STENT Left 9/11/2020    Procedure: CYSTOSCOPY, WITH URETERAL STENT INSERTION;  Surgeon: Sean Schmitt MD;  Location: [facility] OR;  Service: Urology    HYSTERECTOMY      fibroids    OOPHORECTOMY         Social History     Tobacco Use    Smoking status: Every Day     Packs/day: 0.75     Years: 55.00     Additional pack years: 0.00     Total pack years: 41.25     Types: Cigarettes     Start date: 1/1/1963    Smokeless tobacco: Never   Substance Use Topics    Alcohol use: No     Family History   Problem Relation Age of Onset    No Known Problems Mother     No Known Problems Father     Diabetes Paternal Uncle     Diabetes Sister     Breast Cancer No family hx of     Cancer No family hx of     Colon Cancer No family hx of     Heart Disease No family hx of     Coronary Artery Disease No family hx of          Current Outpatient Medications   Medication Sig Dispense Refill    albuterol (PROAIR HFA/PROVENTIL HFA/VENTOLIN HFA) 108 (90 Base) MCG/ACT inhaler USE 1 TO 2 INHALATIONS EVERY 4 TO 6 HOURS AS NEEDED FOR WHEEZING 25.5 g 1    clopidogrel (PLAVIX) 75 MG tablet Take 1 tablet (75 mg) by mouth daily 90 tablet 11    losartan-hydrochlorothiazide (HYZAAR) 100-12.5 MG tablet Take 1 tablet by mouth daily 90 tablet 11    metoprolol succinate ER (TOPROL XL) 100 MG 24 hr tablet Take 1 tablet (100 mg) by mouth At Bedtime 90 tablet 11    nitroGLYcerin (NITROSTAT) 0.4 MG sublingual tablet Place 1 tablet (0.4 mg) under the tongue every 5 minutes as needed for chest pain 20 tablet 11    rosuvastatin (CRESTOR) 40 MG tablet Take 1 tablet (40 mg) by mouth daily 90 tablet 11    tiotropium-olodaterol (STIOLTO RESPIMAT) 2.5-2.5 MCG/ACT AERS Take 2 puffs by mouth daily 12 g 11    warfarin ANTICOAGULANT (COUMADIN) 3 MG  "tablet TAKE 1 TO 2 TABLETS DAILY , ADJUST DOSE PER INR RESULTS AS INSTRUCTED 120 tablet 5     Allergies   Allergen Reactions    Sulfa (Sulfonamide Antibiotics) [Sulfa Antibiotics] Anaphylaxis         Review of Systems   Constitutional:  Negative for chills and fever.   HENT:  Negative for congestion, ear pain, hearing loss and sore throat.    Eyes:  Negative for pain and visual disturbance.   Respiratory:  Positive for shortness of breath. Negative for cough.    Cardiovascular:  Negative for chest pain, palpitations and peripheral edema.   Gastrointestinal:  Positive for constipation. Negative for abdominal pain, diarrhea, heartburn, hematochezia and nausea.   Breasts:  Negative for tenderness, breast mass and discharge.   Genitourinary:  Positive for frequency. Negative for dysuria, genital sores, hematuria, pelvic pain, urgency, vaginal bleeding and vaginal discharge.   Musculoskeletal:  Negative for arthralgias, joint swelling and myalgias.   Skin:  Positive for rash.   Neurological:  Positive for dizziness and paresthesias. Negative for weakness and headaches.   Psychiatric/Behavioral:  Negative for mood changes. The patient is not nervous/anxious.        OBJECTIVE:   /72   Pulse 85   Temp 98.1  F (36.7  C) (Oral)   Resp 20   Ht 1.499 m (4' 11\")   Wt 61.8 kg (136 lb 3.2 oz)   SpO2 90%   BMI 27.51 kg/m   Estimated body mass index is 27.51 kg/m  as calculated from the following:    Height as of this encounter: 1.499 m (4' 11\").    Weight as of this encounter: 61.8 kg (136 lb 3.2 oz).  Physical Exam  GENERAL APPEARANCE: Appears fatigued, no acute distress  EYES: Eyes grossly normal to inspection, PERRL and conjunctivae and sclerae normal  HENT: ear canals and TM's normal, nose and mouth without ulcers or lesions, oropharynx clear and oral mucous membranes moist  NECK: no adenopathy, no asymmetry, masses, or scars and thyroid normal to palpation  RESP: Lung sounds diminished bilaterally, rhonchi that " seem to clear with cough, no wheeze, breath sounds quiet throughout  BREAST: normal without masses, tenderness or nipple discharge and no palpable axillary masses or adenopathy  CV: regular rate and rhythm, normal S1 S2, no S3 or S4, no murmur, click or rub, no peripheral edema and peripheral pulses strong  ABDOMEN: soft, nontender, no hepatosplenomegaly, no masses and bowel sounds normal  MS: no musculoskeletal defects are noted and gait is age appropriate without ataxia  SKIN: no suspicious lesions or rashes  NEURO: Normal strength and tone, sensory exam grossly normal, mentation intact and speech normal  PSYCH: mentation appears normal, affect negative    Diagnostic Test Results:  Labs reviewed in Epic  Results for orders placed or performed in visit on 12/12/23   INR point of care     Status: Abnormal   Result Value Ref Range    INR 2.8 (H) 0.9 - 1.1    Narrative    This test is intended for monitoring Coumadin therapy. Results are not accurate in patients with prolonged INR due to factor deficiency.   Comprehensive metabolic panel (BMP + Alb, Alk Phos, ALT, AST, Total. Bili, TP)     Status: Abnormal   Result Value Ref Range    Sodium 136 135 - 145 mmol/L    Potassium 4.0 3.4 - 5.3 mmol/L    Carbon Dioxide (CO2) 24 22 - 29 mmol/L    Anion Gap 12 7 - 15 mmol/L    Urea Nitrogen 10.1 8.0 - 23.0 mg/dL    Creatinine 0.97 (H) 0.51 - 0.95 mg/dL    GFR Estimate 61 >60 mL/min/1.73m2    Calcium 9.5 8.8 - 10.2 mg/dL    Chloride 100 98 - 107 mmol/L    Glucose 84 70 - 99 mg/dL    Alkaline Phosphatase 76 40 - 150 U/L    AST 24 0 - 45 U/L    ALT 20 0 - 50 U/L    Protein Total 7.6 6.4 - 8.3 g/dL    Albumin 4.2 3.5 - 5.2 g/dL    Bilirubin Total 0.5 <=1.2 mg/dL   TSH with free T4 reflex     Status: Normal   Result Value Ref Range    TSH 1.87 0.30 - 4.20 uIU/mL   CBC with platelets and differential     Status: Abnormal   Result Value Ref Range    WBC Count 7.7 4.0 - 11.0 10e3/uL    RBC Count 5.44 (H) 3.80 - 5.20 10e6/uL     Hemoglobin 17.0 (H) 11.7 - 15.7 g/dL    Hematocrit 51.2 (H) 35.0 - 47.0 %    MCV 94 78 - 100 fL    MCH 31.3 26.5 - 33.0 pg    MCHC 33.2 31.5 - 36.5 g/dL    RDW 13.1 10.0 - 15.0 %    Platelet Count 220 150 - 450 10e3/uL    % Neutrophils 64 %    % Lymphocytes 27 %    % Monocytes 7 %    % Eosinophils 2 %    % Basophils 1 %    % Immature Granulocytes 0 %    Absolute Neutrophils 4.9 1.6 - 8.3 10e3/uL    Absolute Lymphocytes 2.0 0.8 - 5.3 10e3/uL    Absolute Monocytes 0.5 0.0 - 1.3 10e3/uL    Absolute Eosinophils 0.1 0.0 - 0.7 10e3/uL    Absolute Basophils 0.0 0.0 - 0.2 10e3/uL    Absolute Immature Granulocytes 0.0 <=0.4 10e3/uL   CBC with platelets and differential     Status: Abnormal    Narrative    The following orders were created for panel order CBC with platelets and differential.  Procedure                               Abnormality         Status                     ---------                               -----------         ------                     CBC with platelets and d...[[id number]]  Abnormal            Final result                 Please view results for these tests on the individual orders.       ASSESSMENT / PLAN:   1. Encounter for Medicare annual wellness exam  Routine Medicare wellness visit, updated in EMR.  Lipids recently updated with cardiology.  Other labs updated today.  Patient wishes to come back for her COVID shot, this was ordered today.  She refuses other immunizations like RSV and pneumococcal vaccine.  She is status post hysterectomy.  Mammogram is up-to-date.  Colon cancer screening will be due next year.  Patient declines discussion of treatment of osteoporosis.  Plan repeat wellness visit in 1 year.    2. Pulmonary emphysema, unspecified emphysema type (H)  3. Chronic bronchitis, unspecified chronic bronchitis type (H)  Patient has elevated hemoglobin and chronic fatigue.  Her O2 sats are barely within normal range at rest today.  I suspect she may be desaturating overnight and with  activity.  Recommended a follow-up visit with pulmonology to discuss her lung function.  She continues to smoke.  - Adult Pulmonary Medicine  Referral; Future    4. Recurrent deep vein thrombosis (DVT) (H)  5. Long term (current) use of anticoagulants  Patient will be on warfarin lifelong.  She is due for INR today.  - INR point of care    6. Hyperlipidemia, unspecified hyperlipidemia type  Patient continues on Crestor and tolerates this well.  She is precontemplative about diet or lifestyle changes.  - Comprehensive metabolic panel (BMP + Alb, Alk Phos, ALT, AST, Total. Bili, TP)    7. Essential hypertension  Continues on losartan-hydrochlorothiazide and metoprolol.  Blood pressure within reasonable range today.  - Comprehensive metabolic panel (BMP + Alb, Alk Phos, ALT, AST, Total. Bili, TP)  - CBC with platelets and differential  - TSH with free T4 reflex    8. Coronary atherosclerosis due to lipid rich plaque  Patient follows closely with cardiology Dr. Gonzalez.  She denies any new anginal symptoms.    9. Nicotine Dependence  Patient continues to smoke.  She is contemplative about cessation, but has not been able to cut back.  She declines any pharmacologic assistance.    10. Hypoxia  This is a historical diagnosis for the patient.  She does not use oxygen with activity or overnight.  Discussed with patient that this should be investigated further and recommended referral to pulmonology for recommendations in this regard.  She also continues to cough quite a bit on her current inhaler regimen.  - Adult Pulmonary Medicine  Referral; Future  - CBC with platelets and differential    11. Osteoporosis without current pathological fracture, unspecified osteoporosis type  Patient declines any discussion of bone density medication.    12. Skin irritation  No visible rash on examination today.  Discussed frequent use of emollient lotion, can try an anti-itch lotion like Sarna.  Patient desired referral  to dermatology.  - Adult Dermatology  Referral; Future    13. Chronic fatigue  I suspect this may have to do with hypoxia.  Recommended further workup with pulmonology.  Thyroid hormone level normal.  - TSH with free T4 reflex    14. History of pulmonary embolism  Due for INR.  Continues on warfarin.  - INR point of care      Patient has been advised of split billing requirements and indicates understanding: Yes      COUNSELING:  Reviewed preventive health counseling, as reflected in patient instructions  Special attention given to:       Regular exercise       Healthy diet/nutrition       Immunizations  Declined: Pneumococcal, TDAP, and Zoster due to Concerns about side effects/safety           Osteoporosis prevention/bone health       Colon cancer screening       The ASCVD Risk score (Balbir VAZQUEZ, et al., 2019) failed to calculate for the following reasons:    The patient has a prior MI or stroke diagnosis        She reports that she has been smoking cigarettes. She started smoking about 60 years ago. She has a 41.25 pack-year smoking history. She has never used smokeless tobacco.  Nicotine/Tobacco Cessation Plan:   Information offered: Patient not interested at this time      Appropriate preventive services were discussed with this patient, including applicable screening as appropriate for fall prevention, nutrition, physical activity, Tobacco-use cessation, weight loss and cognition.  Checklist reviewing preventive services available has been given to the patient.    Reviewed patients plan of care and provided an AVS. The Basic Care Plan (routine screening as documented in Health Maintenance) for Leonela meets the Care Plan requirement. This Care Plan has been established and reviewed with the Patient.          Miri Woodruff MD  [facility]    Identified Health Risks:  I have reviewed Opioid Use Disorder and Substance Use Disorder risk factors and made any needed referrals. The  patient was provided with suggestions to help her develop a healthy physical lifestyle.  She is at risk for lack of exercise and has been provided with information to increase physical activity for the benefit of her well-being.  The patient was counseled and encouraged to consider modifying their diet and eating habits. She was provided with information on recommended healthy diet options.

## 2023-12-12 NOTE — LETTER
December 13, 2023      Leonlea Ramon Sammy  [address] [address]        Dear Ms.Adams Christianson,    We are writing to inform you of your test results.    Your kidney function is stable.  Try to drink 6-8 small glasses of water daily.  Blood sugar looks good.  Thyroid hormone level is normal, not a cause of your fatigue.  Your hemoglobin continues to increase, I think this is related to poor oxygenation.  Follow-up with pulmonology to have the oxygen level further assessed as we discussed.    Resulted Orders   Comprehensive metabolic panel (BMP + Alb, Alk Phos, ALT, AST, Total. Bili, TP)   Result Value Ref Range    Sodium 136 135 - 145 mmol/L      Comment:      Reference intervals for this test were updated on 09/26/2023 to more accurately reflect our healthy population. There may be differences in the flagging of prior results with similar values performed with this method. Interpretation of those prior results can be made in the context of the updated reference intervals.     Potassium 4.0 3.4 - 5.3 mmol/L    Carbon Dioxide (CO2) 24 22 - 29 mmol/L    Anion Gap 12 7 - 15 mmol/L    Urea Nitrogen 10.1 8.0 - 23.0 mg/dL    Creatinine 0.97 (H) 0.51 - 0.95 mg/dL    GFR Estimate 61 >60 mL/min/1.73m2    Calcium 9.5 8.8 - 10.2 mg/dL    Chloride 100 98 - 107 mmol/L    Glucose 84 70 - 99 mg/dL    Alkaline Phosphatase 76 40 - 150 U/L      Comment:      Reference intervals for this test were updated on 11/14/2023 to more accurately reflect our healthy population. There may be differences in the flagging of prior results with similar values performed with this method. Interpretation of those prior results can be made in the context of the updated reference intervals.    AST 24 0 - 45 U/L      Comment:      Reference intervals for this test were updated on 6/12/2023 to more accurately reflect our healthy population. There may be differences in the flagging of prior results with similar values performed with this  method. Interpretation of those prior results can be made in the context of the updated reference intervals.    ALT 20 0 - 50 U/L      Comment:      Reference intervals for this test were updated on 6/12/2023 to more accurately reflect our healthy population. There may be differences in the flagging of prior results with similar values performed with this method. Interpretation of those prior results can be made in the context of the updated reference intervals.      Protein Total 7.6 6.4 - 8.3 g/dL    Albumin 4.2 3.5 - 5.2 g/dL    Bilirubin Total 0.5 <=1.2 mg/dL   TSH with free T4 reflex   Result Value Ref Range    TSH 1.87 0.30 - 4.20 uIU/mL   CBC with platelets and differential   Result Value Ref Range    WBC Count 7.7 4.0 - 11.0 10e3/uL    RBC Count 5.44 (H) 3.80 - 5.20 10e6/uL    Hemoglobin 17.0 (H) 11.7 - 15.7 g/dL    Hematocrit 51.2 (H) 35.0 - 47.0 %    MCV 94 78 - 100 fL    MCH 31.3 26.5 - 33.0 pg    MCHC 33.2 31.5 - 36.5 g/dL    RDW 13.1 10.0 - 15.0 %    Platelet Count 220 150 - 450 10e3/uL    % Neutrophils 64 %    % Lymphocytes 27 %    % Monocytes 7 %    % Eosinophils 2 %    % Basophils 1 %    % Immature Granulocytes 0 %    Absolute Neutrophils 4.9 1.6 - 8.3 10e3/uL    Absolute Lymphocytes 2.0 0.8 - 5.3 10e3/uL    Absolute Monocytes 0.5 0.0 - 1.3 10e3/uL    Absolute Eosinophils 0.1 0.0 - 0.7 10e3/uL    Absolute Basophils 0.0 0.0 - 0.2 10e3/uL    Absolute Immature Granulocytes 0.0 <=0.4 10e3/uL       If you have any questions or concerns, please call the clinic at the number listed above.       Sincerely,      Miri Woodruff MD

## 2023-12-12 NOTE — PATIENT INSTRUCTIONS
Patient Education   Personalized Prevention Plan  You are due for the preventive services outlined below.  Your care team is available to assist you in scheduling these services.  If you have already completed any of these items, please share that information with your care team to update in your medical record.  Health Maintenance Due   Topic Date Due     COPD Action Plan  Never done     Pneumococcal Vaccine (1 - PCV) Never done     Hepatitis C Screening  Never done     RSV VACCINE (Pregnancy & 60+) (1 - 1-dose 60+ series) Never done     Zoster (Shingles) Vaccine (2 of 2) 06/07/2017     Diptheria Tetanus Pertussis (DTAP/TDAP/TD) Vaccine (2 - Td or Tdap) 07/06/2021     ANNUAL REVIEW OF HM ORDERS  03/28/2023     COVID-19 Vaccine (5 - 2023-24 season) 09/01/2023     Your Health Risk Assessment indicates you feel you are not in good health    A healthy lifestyle helps keep the body fit and the mind alert. It helps protect you from disease, helps you fight disease, and helps prevent chronic disease (disease that doesn't go away) from getting worse. This is important as you get older and begin to notice twinges in muscles and joints and a decline in the strength and stamina you once took for granted. A healthy lifestyle includes good healthcare, good nutrition, weight control, recreation, and regular exercise. Avoid harmful substances and do what you can to keep safe. Another part of a healthy lifestyle is stay mentally active and socially involved.    Good healthcare   Have a wellness visit every year.   If you have new symptoms, let us know right away. Don't wait until the next checkup.   Take medicines exactly as prescribed and keep your medicines in a safe place. Tell us if your medicine causes problems.   Healthy diet and weight control   Eat 3 or 4 small, nutritious, low-fat, high-fiber meals a day. Include a variety of fruits, vegetables, and whole-grain foods.   Make sure you get enough calcium in your diet.  "Calcium, vitamin D, and exercise help prevent osteoporosis (bone thinning).   If you live alone, try eating with others when you can. That way you get a good meal and have company while you eat it.   Try to keep a healthy weight. If you eat more calories than your body uses for energy, it will be stored as fat and you will gain weight.     Recreation   Recreation is not limited to sports and team events. It includes any activity that provides relaxation, interest, enjoyment, and exercise. Recreation provides an outlet for physical, mental, and social energy. It can give a sense of worth and achievement. It can help you stay healthy.    Mental Exercise and Social Involvement  Mental and emotional health is as important as physical health. Keep in touch with friends and family. Stay as active as possible. Continue to learn and challenge yourself.   Things you can do to stay mentally active are:  Learn something new, like a foreign language or musical instrument.   Play SCRABBLE or do crossword puzzles. If you cannot find people to play these games with you at home, you can play them with others on your computer through the Internet.   Join a games club--anything from card games to chess or checkers or lawn bowling.   Start a new hobby.   Go back to school.   Volunteer.   Read.   Keep up with world events.  Learning About Being Physically Active  What is physical activity?     Being physically active means doing any kind of activity that gets your body moving.  The types of physical activity that can help you get fit and stay healthy include:  Aerobic or \"cardio\" activities. These make your heart beat faster and make you breathe harder, such as brisk walking, riding a bike, or running. They strengthen your heart and lungs and build up your endurance.  Strength training activities. These make your muscles work against, or \"resist,\" something. Examples include lifting weights or doing push-ups. These activities help tone " and strengthen your muscles and bones.  Stretches. These let you move your joints and muscles through their full range of motion. Stretching helps you be more flexible.  Reaching a balance between these three types of physical activity is important because each one contributes to your overall fitness.  What are the benefits of being active?  Being active is one of the best things you can do for your health. It helps you to:  Feel stronger and have more energy to do all the things you like to do.  Focus better at school or work.  Feel, think, and sleep better.  Reach and stay at a healthy weight.  Lose fat and build lean muscle.  Lower your risk for serious health problems, including diabetes, heart attack, high blood pressure, and some cancers.  Keep your heart, lungs, bones, muscles, and joints strong and healthy.  How can you make being active part of your life?  Start slowly. Make it your long-term goal to get at least 30 minutes of exercise on most days of the week. Walking is a good choice. You also may want to do other activities, such as running, swimming, cycling, or playing tennis or team sports.  Pick activities that you like--ones that make your heart beat faster, your muscles stronger, and your muscles and joints more flexible. If you find more than one thing you like doing, do them all. You don't have to do the same thing every day.  Get your heart pumping every day. Any activity that makes your heart beat faster and keeps it at that rate for a while counts.  Here are some great ways to get your heart beating faster:  Go for a brisk walk, run, or hike.  Go for a swim or bike ride.  Take an online exercise class or dance.  Play a game of touch football, basketball, or soccer.  Play tennis, pickleball, or racquetball.  Climb stairs.  Even some household chores can be aerobic. Just do them at a faster pace. Raking or mowing the lawn, sweeping the garage, and vacuuming and cleaning your home all can help  "get your heart rate up.  Strengthen your muscles during the week. You don't have to lift heavy weights or grow big, bulky muscles to get stronger. Doing a few simple activities that make your muscles work against, or \"resist,\" something can help you get stronger. Aim for at least twice a week.  For example, you can:  Do push-ups or sit-ups, which use your own body weight as resistance.  Lift weights or dumbbells or use stretch bands at home or in a gym or community center.  Stretch your muscles often. Stretching will help you as you become more active. It can help you stay flexible and loosen tight muscles. It can also help improve your balance and posture and can be a great way to relax.  Be sure to stretch the muscles you'll be using when you work out. It's best to warm your muscles slightly before you stretch them. Walk or do some other light aerobic activity for a few minutes. Then start stretching.  When you stretch your muscles:  Do it slowly. Stretching is not about going fast or making sudden movements.  Don't push or bounce during a stretch.  Hold each stretch for at least 15 to 30 seconds, if you can. You should feel a stretch in the muscle, but not pain.  Breathe out as you do the stretch. Then breathe in as you hold the stretch. Don't hold your breath.  If you're worried about how more activity might affect your health, have a checkup before you start. Follow any special advice your doctor gives you for getting a smart start.  Where can you learn more?  Go to https://www.[employer].net/patiented  Enter W332 in the search box to learn more about \"Learning About Being Physically Active.\"  Current as of: June 6, 2023               Content Version: 13.8    4516-7226 [employer].   Care instructions adapted under license by your healthcare professional. If you have questions about a medical condition or this instruction, always ask your healthcare professional. [employer] disclaims " any warranty or liability for your use of this information.      Learning About Dietary Guidelines  What are the Dietary Guidelines for Americans?     Dietary Guidelines for Americans provide tips for eating well and staying healthy. This helps reduce the risk for long-term (chronic) diseases.  These guidelines recommend that you:  Eat and drink the right amount for you. The U.S. government's food guide is called MyPlate. It can help you make your own well-balanced eating plan.  Try to balance your eating with your activity. This helps you stay at a healthy weight.  Drink alcohol in moderation, if at all.  Limit foods high in salt, saturated fat, trans fat, and added sugar.  These guidelines are from the U.S. Department of Agriculture and the U.S. Department of Health and Human Services. They are updated every 5 years.  What is MyPlate?  MyPlate is the U.S. government's food guide. It can help you make your own well-balanced eating plan. A balanced eating plan means that you eat enough, but not too much, and that your food gives you the nutrients you need to stay healthy.  MyPlate focuses on eating plenty of whole grains, fruits, and vegetables, and on limiting fat and sugar. It is available online at www.ChooseMyPlate.gov.  How can you get started?  If you're trying to eat healthier, you can slowly change your eating habits over time. You don't have to make big changes all at once. Start by adding one or two healthy foods to your meals each day.  Grains  Choose whole-grain breads and cereals and whole-wheat pasta and whole-grain crackers.  Vegetables  Eat a variety of vegetables every day. They have lots of nutrients and are part of a heart-healthy diet.  Fruits  Eat a variety of fruits every day. Fruits contain lots of nutrients. Choose fresh fruit instead of fruit juice.  Protein foods  Choose fish and lean poultry more often. Eat red meat and fried meats less often. Dried beans, tofu, and nuts are also good  "sources of protein.  Dairy  Choose low-fat or fat-free products from this food group. If you have problems digesting milk, try eating cheese or yogurt instead.  Fats and oils  Limit fats and oils if you're trying to cut calories. Choose healthy fats when you cook. These include canola oil and olive oil.  Where can you learn more?  Go to https://www.[employer].net/patiented  Enter D676 in the search box to learn more about \"Learning About Dietary Guidelines.\"  Current as of: February 28, 2023               Content Version: 13.8    1243-0841 [employer].   Care instructions adapted under license by your healthcare professional. If you have questions about a medical condition or this instruction, always ask your healthcare professional. [employer] disclaims any warranty or liability for your use of this information.         "

## 2023-12-13 DIAGNOSIS — J43.9 PULMONARY EMPHYSEMA, UNSPECIFIED EMPHYSEMA TYPE (H): ICD-10-CM

## 2023-12-13 LAB
ALBUMIN SERPL BCG-MCNC: 4.2 G/DL (ref 3.5–5.2)
ALP SERPL-CCNC: 76 U/L (ref 40–150)
ALT SERPL W P-5'-P-CCNC: 20 U/L (ref 0–50)
ANION GAP SERPL CALCULATED.3IONS-SCNC: 12 MMOL/L (ref 7–15)
AST SERPL W P-5'-P-CCNC: 24 U/L (ref 0–45)
BILIRUB SERPL-MCNC: 0.5 MG/DL
BUN SERPL-MCNC: 10.1 MG/DL (ref 8–23)
CALCIUM SERPL-MCNC: 9.5 MG/DL (ref 8.8–10.2)
CHLORIDE SERPL-SCNC: 100 MMOL/L (ref 98–107)
CREAT SERPL-MCNC: 0.97 MG/DL (ref 0.51–0.95)
DEPRECATED HCO3 PLAS-SCNC: 24 MMOL/L (ref 22–29)
EGFRCR SERPLBLD CKD-EPI 2021: 61 ML/MIN/1.73M2
GLUCOSE SERPL-MCNC: 84 MG/DL (ref 70–99)
POTASSIUM SERPL-SCNC: 4 MMOL/L (ref 3.4–5.3)
PROT SERPL-MCNC: 7.6 G/DL (ref 6.4–8.3)
SODIUM SERPL-SCNC: 136 MMOL/L (ref 135–145)
TSH SERPL DL<=0.005 MIU/L-ACNC: 1.87 UIU/ML (ref 0.3–4.2)

## 2023-12-13 RX ORDER — ALBUTEROL SULFATE 90 UG/1
AEROSOL, METERED RESPIRATORY (INHALATION)
Qty: 25.5 G | Refills: 6 | Status: SHIPPED | OUTPATIENT
Start: 2023-12-13

## 2023-12-14 DIAGNOSIS — R09.02 HYPOXIA: ICD-10-CM

## 2023-12-14 DIAGNOSIS — J41.0 SIMPLE CHRONIC BRONCHITIS (H): ICD-10-CM

## 2023-12-14 DIAGNOSIS — J44.9 COPD (CHRONIC OBSTRUCTIVE PULMONARY DISEASE) (H): Primary | ICD-10-CM

## 2023-12-15 ENCOUNTER — IMMUNIZATION (OUTPATIENT)
Dept: FAMILY MEDICINE | Facility: CLINIC | Age: 73
End: 2023-12-15
Payer: MEDICARE

## 2023-12-15 PROCEDURE — 91320 SARSCV2 VAC 30MCG TRS-SUC IM: CPT

## 2023-12-15 PROCEDURE — 90480 ADMN SARSCOV2 VAC 1/ONLY CMP: CPT

## 2023-12-28 ENCOUNTER — OFFICE VISIT (OUTPATIENT)
Dept: PULMONOLOGY | Facility: CLINIC | Age: 73
End: 2023-12-28
Attending: FAMILY MEDICINE
Payer: MEDICARE

## 2023-12-28 DIAGNOSIS — J44.9 COPD (CHRONIC OBSTRUCTIVE PULMONARY DISEASE) (H): ICD-10-CM

## 2023-12-28 DIAGNOSIS — J41.0 SIMPLE CHRONIC BRONCHITIS (H): ICD-10-CM

## 2023-12-28 DIAGNOSIS — R09.02 HYPOXIA: ICD-10-CM

## 2023-12-28 LAB
6 MIN WALK (FT): 900 FT
6 MIN WALK (M): 274 M
DLCOCOR-%PRED-PRE: 39 %
DLCOCOR-PRE: 6.58 ML/MIN/MMHG
DLCOUNC-%PRED-PRE: 42 %
DLCOUNC-PRE: 7.22 ML/MIN/MMHG
DLCOUNC-PRED: 16.87 ML/MIN/MMHG
ERV-%PRED-PRE: 40 %
ERV-PRE: 0.32 L
ERV-PRED: 0.79 L
EXPTIME-PRE: 7.24 SEC
FEF2575-%PRED-PRE: 22 %
FEF2575-PRE: 0.35 L/SEC
FEF2575-PRED: 1.51 L/SEC
FEFMAX-%PRED-PRE: 48 %
FEFMAX-PRE: 2.3 L/SEC
FEFMAX-PRED: 4.77 L/SEC
FEV1-%PRED-PRE: 46 %
FEV1-PRE: 0.8 L
FEV1FEV6-PRE: 51 %
FEV1FEV6-PRED: 79 %
FEV1FVC-PRE: 47 %
FEV1FVC-PRED: 79 %
FEV1SVC-PRE: 47 %
FEV1SVC-PRED: 65 %
FIFMAX-PRE: 3.22 L/SEC
FRCPLETH-%PRED-PRE: 169 %
FRCPLETH-PRE: 4.21 L
FRCPLETH-PRED: 2.49 L
FVC-%PRED-PRE: 77 %
FVC-PRE: 1.69 L
FVC-PRED: 2.17 L
HGB BLD-MCNC: 17.1 G/DL
IC-%PRED-PRE: 88 %
IC-PRE: 1.4 L
IC-PRED: 1.58 L
RVPLETH-%PRED-PRE: 200 %
RVPLETH-PRE: 3.89 L
RVPLETH-PRED: 1.94 L
TLCPLETH-%PRED-PRE: 131 %
TLCPLETH-PRE: 5.61 L
TLCPLETH-PRED: 4.27 L
VA-%PRED-PRE: 85 %
VA-PRE: 3.36 L
VC-%PRED-PRE: 65 %
VC-PRE: 1.72 L
VC-PRED: 2.62 L

## 2023-12-28 PROCEDURE — 94375 RESPIRATORY FLOW VOLUME LOOP: CPT | Performed by: INTERNAL MEDICINE

## 2023-12-28 PROCEDURE — 85018 HEMOGLOBIN: CPT | Mod: QW | Performed by: INTERNAL MEDICINE

## 2023-12-28 PROCEDURE — 94729 DIFFUSING CAPACITY: CPT | Performed by: INTERNAL MEDICINE

## 2023-12-28 PROCEDURE — 94726 PLETHYSMOGRAPHY LUNG VOLUMES: CPT | Performed by: INTERNAL MEDICINE

## 2023-12-28 NOTE — PROGRESS NOTES
Pulmonary Clinic Consultation          Assessment/Plan:     73 year old female with a history of COPD, PE x2 on warfarin, CAD s/p MI, DM II, HTN, HLD - presenting for evaluation of COPD.      COPD - GOLD E  Emphysema  Chronic hypoxic respiratory failure  Current smoker with 60 pack year smoking hx, presents for evaluation of COPD.  Severe emphysema noted per chest imaging, no concerning nodules, LungRADS category 2.  Pulmonary function testing with severe airway obstruction (FEV1 47% predicted), air-trapping, and severely reduced diffusion capacity (DLCo 39% predicted).  Of note her hgb was 17.1 during PFTs.  6 minute walk test done d/t PFT results, which did show oxygen desaturations, requiring 2L of oxygen during activity.  Her main daily respiratory symptoms include shortness of breath, cough, some sputum production.  She does not exacerbate frequently or require prednisone.  CAT score 26 today.  Plan:  - discussed PFT results and chest imaging results extensively with patient today.  - discussed need for smoking cessation, discussed this x5 minutes.  She is motivated and she states once she is done with current packs of cigarettes she is quitting cold turkey.  We discussed multiple modalities to quit and she declines these today.  - start 2L oxygen with activity, to keep oxygen saturations >88%.  - we discussed increasing inhaler regimen to triple therapy vs staying with LAMA/LABA.  She elects to continue Stiolto (tiotropium/olodaterol) two puffs daily.  - continue Albuterol HFA PRN  - we will perform an overnight oximetry study, to check saturations at night, I will call her with results.  - due again for LDCT for lung cancer screening in 7/2024.  - she is UTD with COVID booster and annual influenza vaccine.  She is due for pneumococcal vaccine, she declines this today.  - Action plan: prednisone 40mg x5 days, + azithromycin x5 days (if increased SOB + sputum)    Follow-up  -3 months    Glory Ospina,  CNP  Pulmonary Medicine  [facility]  [phone]    Due to desaturation of SaO2 less than or equal to 88% on Room air from COPD, emphysema - home oxygen therapy will benefit my patient's condition.  The patient has tried (or considered) medications with limited success and oxygen is still required.  This patient is mobile in the home and requires portability.    Patient needs portable oxygen with ambulation at 2 LPM/NC.  Patient also needs POC.  OK for conserving device.  OK to do titration study if needed--keep oxygen saturation >88%.  Please issue conserving device with appropriate titrated setting after patient completes successful assessment.       CC:     COPD evaluation     HPI:     73 year old female with a history of COPD, PE x2 on warfarin, CAD s/p MI, DM II, HTN, HLD - presenting for evaluation of COPD.      Smoking hx: started age 13, 1PPD, now at 3/4 PPD.  Goal is to quit.  Has tried nicotine gum, patches, acupuncture, hypnotism.  Hasn't tried Chantix, not interested in it.    Shortness of breath with any movement, although not all the time.  Cough throughout day, has had this for years.  Some mucous, not always.  Does not get ill frequently.  Has never required prednisone or antibiotics for exacerbation.  Started Stiolto, wasn't taking every day, 2-3 months ago started taking daily.  Breathing did feel improved with this.  Albuterol 5 times/week, it is beneficial.  Does cough during night, but doesn't wake up SOB.  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  No seasonal allergies.    Medical records reviewed for this visit include primary care provider notes.    Patient supplied answers from flow sheet for:  COPD Assessment Test (CAT)  2009 [employer]. All rights reserved.      1/3/2024     7:00 AM   COPD assessment test (CAT)   Cough 3   Phlegm 3   Chest tightness 1   Walk up hill 5   Limited activities 4   Leaving my  home 3   Sleep 2   Energy 5   Total Score 26      CAT Key:  The CAT consist of 8 items which are each scored 0-5. The total score ranges from 0-40 with higher scores representing a poorer health status. When interpreting CAT scores, the individual s disease severity should be considered.   Low impact  (1-9)  Medium impact  (10-20)  High impact  (21-30)  Very high impact  (31-40)         ROS:     A 12-system review was obtained and was negative with the exception of the symptoms endorsed in the HPI.       Medical history:       PMH:  Past Medical History:   Diagnosis Date    Acute pyelonephritis     LEELA (acute kidney injury) (H24) 9/12/2020    COPD (chronic obstructive pulmonary disease) (H)     Coronary artery disease     Diabetes mellitus (H)     Heart attack (H) 2012, 2019    X3    History of blood clots     PEx2    Hyperlipidemia     Hypertension     Myocardial infarction (H) 5/9/2012    Formatting of this note might be different from the original. Inferior, 1/2012 Formatting of this note might be different from the original. 4/2012     Pulmonary embolism (H) 4/10/2007    Formatting of this note might be different from the original. Two episodes over the last 10 years.  On lifelong coumadin therapy     Ureteral stone 9/11/2020    Added automatically from request for surgery [id number]        PSH:  Past Surgical History:   Procedure Laterality Date    CARDIAC CATHETERIZATION      Stent    CV CORONARY ANGIOGRAM N/A 11/20/2017    Procedure: Coronary Angiogram;  Surgeon: Daniela Mazariegos MD;  Location: [facility];  Service:     CV LEFT HEART CATHETERIZATION WITHOUT LEFT VENTRICULOGRAM Left 11/20/2017    Procedure: Left Heart Catheterization Without Left Ventriculogram;  Surgeon: Daniela Mazariegos MD;  Location: [facility] Cath Lab;  Service:     CYSTOSCOPY  09/29/2020    HC CYSTOSCOPY,INSERT URETERAL STENT Left 9/11/2020    Procedure: CYSTOSCOPY, WITH URETERAL STENT INSERTION;  Surgeon: Sean Schmitt MD;   Location: [facility] OR;  Service: Urology    HYSTERECTOMY      fibroids    OOPHORECTOMY         Allergies:  Allergies   Allergen Reactions    Sulfa (Sulfonamide Antibiotics) [Sulfa Antibiotics] Anaphylaxis       Family Hx:  Family History   Problem Relation Age of Onset    No Known Problems Mother     No Known Problems Father     Diabetes Paternal Uncle     Diabetes Sister     Breast Cancer No family hx of     Cancer No family hx of     Colon Cancer No family hx of     Heart Disease No family hx of     Coronary Artery Disease No family hx of        Social Hx:  Social History     Socioeconomic History    Marital status:      Spouse name: Not on file    Number of children: Not on file    Years of education: Not on file    Highest education level: Not on file   Occupational History    Not on file   Tobacco Use    Smoking status: Every Day     Packs/day: 1.00     Years: 60.00     Additional pack years: 0.00     Total pack years: 60.00     Types: Cigarettes     Start date: 1[birth year]1/1963    Smokeless tobacco: Never   Vaping Use    Vaping Use: Never used   Substance and Sexual Activity    Alcohol use: No    Drug use: No    Sexual activity: Not Currently     Partners: Male   Other Topics Concern    Not on file   Social History Narrative    She lives with her 40-year-old son and 14-year-old grandson.  She used to work in [employer] department.     Social Determinants of Health     Financial Resource Strain: Low Risk  (12/12/2023)    Financial Resource Strain     Within the past 12 months, have you or your family members you live with been unable to get utilities (heat, electricity) when it was really needed?: No   Food Insecurity: Low Risk  (12/12/2023)    Food Insecurity     Within the past 12 months, did you worry that your food would run out before you got money to buy more?: No     Within the past 12 months, did the food you bought just not last and you didn t have money to get more?: No   Transportation Needs:  Low Risk  (12/12/2023)    Transportation Needs     Within the past 12 months, has lack of transportation kept you from medical appointments, getting your medicines, non-medical meetings or appointments, work, or from getting things that you need?: No   Physical Activity: Not on file   Stress: Not on file   Social Connections: Not on file   Interpersonal Safety: Low Risk  (12/12/2023)    Interpersonal Safety     Do you feel physically and emotionally safe where you currently live?: Yes     Within the past 12 months, have you been hit, slapped, kicked or otherwise physically hurt by someone?: No     Within the past 12 months, have you been humiliated or emotionally abused in other ways by your partner or ex-partner?: No   Housing Stability: Low Risk  (12/12/2023)    Housing Stability     Do you have housing? : Yes     Are you worried about losing your housing?: No       Current Meds:  Current Outpatient Medications   Medication Sig Dispense Refill    albuterol (PROAIR HFA/PROVENTIL HFA/VENTOLIN HFA) 108 (90 Base) MCG/ACT inhaler USE 1 TO 2 INHALATIONS EVERY 4 TO 6 HOURS AS NEEDED FOR WHEEZING 25.5 g 6    clopidogrel (PLAVIX) 75 MG tablet Take 1 tablet (75 mg) by mouth daily 90 tablet 11    losartan-hydrochlorothiazide (HYZAAR) 100-12.5 MG tablet Take 1 tablet by mouth daily 90 tablet 11    metoprolol succinate ER (TOPROL XL) 100 MG 24 hr tablet Take 1 tablet (100 mg) by mouth At Bedtime 90 tablet 11    rosuvastatin (CRESTOR) 40 MG tablet Take 1 tablet (40 mg) by mouth daily 90 tablet 11    tiotropium-olodaterol (STIOLTO RESPIMAT) 2.5-2.5 MCG/ACT AERS Take 2 puffs by mouth daily 12 g 11    warfarin ANTICOAGULANT (COUMADIN) 3 MG tablet TAKE 1 TO 2 TABLETS DAILY , ADJUST DOSE PER INR RESULTS AS INSTRUCTED 120 tablet 5    nitroGLYcerin (NITROSTAT) 0.4 MG sublingual tablet Place 1 tablet (0.4 mg) under the tongue every 5 minutes as needed for chest pain (Patient not taking: Reported on 1/3/2024) 20 tablet 11           "Physical Exam:     /80 (BP Location: Left arm, Patient Position: Chair, Cuff Size: Adult Regular)   Pulse 76   Ht 1.499 m (4' 11\")   Wt 60.8 kg (134 lb)   SpO2 93%   BMI 27.06 kg/m    Gen: adult female , appears in NAD  HEENT: clear conjunctivae, moist mucous membranes  CV: RRR, no M/G/R  Resp: coarse rhonchi in bases.  Respirations even and unlabored.  On RA.  Intermittent dry cough.  Skin: no apparent rashes on visible skin  Ext: no cyanosis, clubbing or edema  Neuro: alert and answering questions appropriately       Data:     Labs:  reviewed    Imaging studies:  I have personally reviewed all pertinent imaging studies and PFT results unless otherwise noted.    EXAM: LOW DOSE LUNG CANCER SCREENING CT CHEST  LOCATION: [facility]  DATE: 7/17/2023     INDICATION: Lung cancer screening. History of smoking. High risk patient.  COMPARISON: 09/24/2021.      TECHNIQUE: Low-dose lung cancer screening non-contrast CT chest. Dose reduction techniques were used. Images assessed using LungRADS 2022 criteria.     FINDINGS:  NODULES: Stable 2 mm peripheral right upper lobe nodule (series 4, image 41).     LUNGS AND PLEURA: Severe emphysema. Mild airway thickening. Mild retained airway secretions. Mild bands of atelectasis or scarring.     MEDIASTINUM: No adenopathy. Mildly tortuous aorta without focal aneurysm. Moderate aortic atherosclerosis.     CORONARY ARTERY CALCIFICATION: Severe.     LIMITED UPPER ABDOMEN: Small - moderate hiatus hernia. Severe circumferential abdominal aorta calcifications.     MUSCULOSKELETAL: Bony demineralization.                                                                      IMPRESSION:  1.  Negative for lung cancer screening purposes.     LungRADS CATEGORY: 2 : Benign.    Pulmonary Function Testing          "

## 2024-01-03 ENCOUNTER — OFFICE VISIT (OUTPATIENT)
Dept: PULMONOLOGY | Facility: CLINIC | Age: 74
End: 2024-01-03
Payer: MEDICARE

## 2024-01-03 ENCOUNTER — TELEPHONE (OUTPATIENT)
Dept: PULMONOLOGY | Facility: CLINIC | Age: 74
End: 2024-01-03

## 2024-01-03 VITALS
DIASTOLIC BLOOD PRESSURE: 80 MMHG | HEIGHT: 59 IN | WEIGHT: 134 LBS | OXYGEN SATURATION: 93 % | HEART RATE: 76 BPM | SYSTOLIC BLOOD PRESSURE: 134 MMHG | BODY MASS INDEX: 27.01 KG/M2

## 2024-01-03 DIAGNOSIS — R06.02 SHORTNESS OF BREATH: ICD-10-CM

## 2024-01-03 DIAGNOSIS — J44.9 COPD, SEVERE (H): Primary | ICD-10-CM

## 2024-01-03 DIAGNOSIS — J42 CHRONIC BRONCHITIS, UNSPECIFIED CHRONIC BRONCHITIS TYPE (H): ICD-10-CM

## 2024-01-03 DIAGNOSIS — J43.8 OTHER EMPHYSEMA (H): ICD-10-CM

## 2024-01-03 DIAGNOSIS — J96.11 CHRONIC HYPOXIC RESPIRATORY FAILURE (H): ICD-10-CM

## 2024-01-03 PROCEDURE — 99204 OFFICE O/P NEW MOD 45 MIN: CPT | Performed by: NURSE PRACTITIONER

## 2024-01-03 NOTE — TELEPHONE ENCOUNTER
DATE:  1/3/24  DME PROVIDER:  [facility]  SUPPLY ORDERED:  NEW/RECERTIFICATION OXYGEN  PROVIDER:  JELENA STERLING NP      9:07 AM: Faxed prescription order to [facility].    Claudy IRBY CMA  1/3/24

## 2024-01-03 NOTE — PATIENT INSTRUCTIONS
It was a pleasure to see you in clinic today.   Here is what we discussed:    Your pulmonary function testing shows severe COPD.  Your chest CT shows severe emphysema.  Your walk test showed your oxygen levels are dropping with activity, so we are starting oxygen.  Start oxygen 2L with activity, to keep oxygen saturations >88%.  Buy an oximeter at a drug store or online ([employer]), to check your oxygen saturations during the day.  We just need you at 88% and above.  Continue Stiolto two inhalations daily.  Continue Albuterol every 4-6 hours as needed for shortness of breath or wheezing.  Continue to work on smoking cessation, let us know if you need any help.    We will check on your oxygen levels overnight, with an overnight oximetry study.  I will call you with results.  Your chest CT for lung cancer screening will be due again in July of 2024.  Call my nurse, Nayan ([phone]) with any change or worsening of your breathing and we can activate your 'action plan' (usually prednisone + an antibiotic).  Follow-up in 3 months.    Glory Ospina, CNP  Pulmonary Medicine  [facility]  [phone]

## 2024-01-03 NOTE — PROGRESS NOTES
Patient has been assessed for Home Oxygen needs.  Oxygen readings:   *   RA - at rest  Pulse oximetry SPO2 94 %  *   RA - during activity/with exercise SPO2 85 %  *   O2 at  2 liters/minute (at rest) ...SPO2 98 %  *   O2 at  2 liters/minute (during activity/with exercise) ...SPO2 93 %

## 2024-01-03 NOTE — NURSING NOTE
Patient instructed in use of Overnight Oximetry Testing equipment from [facility].  Patient states good understanding ofhow to use the Overnight Testing equipment.  All paperwork signed, faxed to [facility], and copy scanned to chart.   Phone numbers given to patient to call with any questions.

## 2024-01-08 ENCOUNTER — DOCUMENTATION ONLY (OUTPATIENT)
Dept: PULMONOLOGY | Facility: CLINIC | Age: 74
End: 2024-01-08
Payer: MEDICARE

## 2024-01-08 NOTE — PROGRESS NOTES
Received MATILDA results, only one desaturation event, basal sats 91% - although we only had results from 2 hours and 53 minutes, not the entire night.  Asked RN to update over phone, will discuss further at next clinic visit.

## 2024-01-09 ENCOUNTER — TELEPHONE (OUTPATIENT)
Dept: PULMONOLOGY | Facility: CLINIC | Age: 74
End: 2024-01-09
Payer: MEDICARE

## 2024-01-09 NOTE — TELEPHONE ENCOUNTER
----- Message from Glory Ospina NP sent at 1/8/2024 12:00 PM CST -----  Regarding: update patient  Hi Nayan, can you let patient know there was no sustained oxygen desaturations during her overnight oximetry study?  I will discuss this further at next follow-up clinic visit.  Thanks.    -Estefany

## 2024-01-09 NOTE — TELEPHONE ENCOUNTER
Spoke with Leonela and gave her the results of MATILDA per Glory Ospina CNP. She has no further questions.

## 2024-01-23 ENCOUNTER — ANTICOAGULATION THERAPY VISIT (OUTPATIENT)
Dept: ANTICOAGULATION | Facility: CLINIC | Age: 74
End: 2024-01-23

## 2024-01-23 ENCOUNTER — LAB (OUTPATIENT)
Dept: LAB | Facility: CLINIC | Age: 74
End: 2024-01-23
Payer: MEDICARE

## 2024-01-23 DIAGNOSIS — Z86.711 HISTORY OF PULMONARY EMBOLISM: Primary | ICD-10-CM

## 2024-01-23 DIAGNOSIS — R21 RASH AND NONSPECIFIC SKIN ERUPTION: ICD-10-CM

## 2024-01-23 DIAGNOSIS — I82.409 RECURRENT DEEP VEIN THROMBOSIS (DVT) (H): ICD-10-CM

## 2024-01-23 DIAGNOSIS — Z86.711 HISTORY OF PULMONARY EMBOLISM: ICD-10-CM

## 2024-01-23 DIAGNOSIS — Z79.01 LONG TERM (CURRENT) USE OF ANTICOAGULANTS: ICD-10-CM

## 2024-01-23 LAB — INR BLD: 2.8 (ref 0.9–1.1)

## 2024-01-23 PROCEDURE — 36416 COLLJ CAPILLARY BLOOD SPEC: CPT

## 2024-01-23 PROCEDURE — 85610 PROTHROMBIN TIME: CPT

## 2024-01-23 NOTE — PROGRESS NOTES
ANTICOAGULATION MANAGEMENT     Leonela Christianson 73 year old female is on warfarin with therapeutic INR result. (Goal INR 2.0-3.0)    Recent labs: (last 7 days)     01/23/24  0741   INR 2.8*       ASSESSMENT     Source(s): Chart Review and Patient/Caregiver Call     Warfarin doses taken: Warfarin taken as instructed  Diet: No new diet changes identified  Medication/supplement changes: None noted  New illness, injury, or hospitalization: No  Signs or symptoms of bleeding or clotting: No  Previous result: Therapeutic last 3 INR visits  Additional findings: None       PLAN     Recommended plan for no diet, medication or health factor changes affecting INR     Dosing Instructions: Continue your current warfarin dose with next INR in 5 weeks       Summary  As of 1/23/2024      Full warfarin instructions:  6 mg every Sun, Wed; 3 mg all other days   Next INR check:  2/27/2024               Telephone call with Leonela who verbalizes understanding and agrees to plan    Lab visit scheduled - INR on 3/5/24 @ [facility]    Education provided:   Taking warfarin: Importance of taking warfarin as instructed  Goal range and lab monitoring: goal range and significance of current result    Plan made per [facility] anticoagulation protocol    Jeanne Olsen RN  Anticoagulation Clinic  1/23/2024    _______________________________________________________________________     Anticoagulation Episode Summary       Current INR goal:  2.0-3.0   TTR:  94.0% (1 y)   Target end date:  Indefinite   Send INR reminders to:  [facility]    Indications    History of pulmonary embolism [Z86.711]  Long term (current) use of anticoagulants [Z79.01]  Rash and nonspecific skin eruption [R21]  Recurrent deep vein thrombosis (DVT) (H) [I82.409]             Comments:  OK for 8 week checks per Dr. Woodruff on 1/23/23             Anticoagulation Care Providers       Provider Role Specialty Phone number    Miri Woodruff MD Referring Family  Medicine [phone]

## 2024-01-25 ENCOUNTER — ORDERS ONLY (AUTO-RELEASED) (OUTPATIENT)
Dept: FAMILY MEDICINE | Facility: CLINIC | Age: 74
End: 2024-01-25
Payer: MEDICARE

## 2024-01-25 ENCOUNTER — TELEPHONE (OUTPATIENT)
Dept: FAMILY MEDICINE | Facility: CLINIC | Age: 74
End: 2024-01-25
Payer: MEDICARE

## 2024-01-25 DIAGNOSIS — Z12.11 COLON CANCER SCREENING: Primary | ICD-10-CM

## 2024-01-25 DIAGNOSIS — Z12.11 COLON CANCER SCREENING: ICD-10-CM

## 2024-01-25 NOTE — TELEPHONE ENCOUNTER
Incoming call from patient stating that she got letter from [facility] letting her know that she's due for Cologuard. Patient said he last screening was 01/26/21 and wants Dr. Woodruff to order one.     Also, patient said she will not see anyone else( this has nothing to with coloquard).

## 2024-02-22 LAB — NONINV COLON CA DNA+OCC BLD SCRN STL QL: NEGATIVE

## 2024-03-05 ENCOUNTER — LAB (OUTPATIENT)
Dept: LAB | Facility: CLINIC | Age: 74
End: 2024-03-05
Payer: MEDICARE

## 2024-03-05 ENCOUNTER — ANTICOAGULATION THERAPY VISIT (OUTPATIENT)
Dept: ANTICOAGULATION | Facility: CLINIC | Age: 74
End: 2024-03-05

## 2024-03-05 DIAGNOSIS — I82.409 RECURRENT DEEP VEIN THROMBOSIS (DVT) (H): ICD-10-CM

## 2024-03-05 DIAGNOSIS — Z86.711 HISTORY OF PULMONARY EMBOLISM: Primary | ICD-10-CM

## 2024-03-05 DIAGNOSIS — Z86.711 HISTORY OF PULMONARY EMBOLISM: ICD-10-CM

## 2024-03-05 DIAGNOSIS — Z79.01 LONG TERM (CURRENT) USE OF ANTICOAGULANTS: ICD-10-CM

## 2024-03-05 DIAGNOSIS — R21 RASH AND NONSPECIFIC SKIN ERUPTION: ICD-10-CM

## 2024-03-05 LAB — INR BLD: 3.2 (ref 0.9–1.1)

## 2024-03-05 PROCEDURE — 85610 PROTHROMBIN TIME: CPT

## 2024-03-05 PROCEDURE — 36416 COLLJ CAPILLARY BLOOD SPEC: CPT

## 2024-03-05 NOTE — PROGRESS NOTES
ANTICOAGULATION MANAGEMENT     Leonela Christianson 73 year old female is on warfarin with supratherapeutic INR result. (Goal INR 2.0-3.0)    Recent labs: (last 7 days)     03/05/24  0738   INR 3.2*       ASSESSMENT     Source(s): Chart Review and Patient/Caregiver Call     Warfarin doses taken: Warfarin taken as instructed  Diet: Decreased greens/vitamin K in diet; plans to resume previous intake  Medication/supplement changes: None noted  New illness, injury, or hospitalization: No  Signs or symptoms of bleeding or clotting: No  Previous result: Therapeutic last 4 INR visits  Additional findings: None       PLAN     Recommended plan for temporary change(s) affecting INR     Dosing Instructions: hold dose then continue your current warfarin dose with next INR in 2 weeks       Summary  As of 3/5/2024      Full warfarin instructions:  3/5: Hold; Otherwise 6 mg every Sun, Wed; 3 mg all other days   Next INR check:  3/19/2024               Telephone call with Leonela who verbalizes understanding and agrees to plan    Lab visit scheduled - INR 3/22/24 @ [facility]    Education provided:   Taking warfarin: Importance of taking warfarin as instructed  Goal range and lab monitoring: goal range and significance of current result  Dietary considerations: importance of consistent vitamin K intake and impact of vitamin K foods on INR  Symptom monitoring: monitoring for bleeding signs and symptoms    Plan made per [facility] anticoagulation protocol    Jeanne Olsen RN  Anticoagulation Clinic  3/5/2024    _______________________________________________________________________     Anticoagulation Episode Summary       Current INR goal:  2.0-3.0   TTR:  88.2% (1 y)   Target end date:  Indefinite   Send INR reminders to:  [facility]    Indications    History of pulmonary embolism [Z86.711]  Long term (current) use of anticoagulants [Z79.01]  Rash and nonspecific skin eruption [R21]  Recurrent deep vein thrombosis (DVT) (H)  [I82.409]             Comments:  OK for 8 week checks per Dr. Woodruff on 1/23/23             Anticoagulation Care Providers       Provider Role Specialty Phone number    Miri Woodruff MD Referring Family Medicine [phone]

## 2024-03-28 ENCOUNTER — ANTICOAGULATION THERAPY VISIT (OUTPATIENT)
Dept: ANTICOAGULATION | Facility: CLINIC | Age: 74
End: 2024-03-28

## 2024-03-28 ENCOUNTER — LAB (OUTPATIENT)
Dept: LAB | Facility: CLINIC | Age: 74
End: 2024-03-28
Payer: MEDICARE

## 2024-03-28 DIAGNOSIS — R21 RASH AND NONSPECIFIC SKIN ERUPTION: ICD-10-CM

## 2024-03-28 DIAGNOSIS — I82.409 RECURRENT DEEP VEIN THROMBOSIS (DVT) (H): ICD-10-CM

## 2024-03-28 DIAGNOSIS — Z86.711 HISTORY OF PULMONARY EMBOLISM: Primary | ICD-10-CM

## 2024-03-28 DIAGNOSIS — Z79.01 LONG TERM (CURRENT) USE OF ANTICOAGULANTS: ICD-10-CM

## 2024-03-28 DIAGNOSIS — Z86.711 HISTORY OF PULMONARY EMBOLISM: ICD-10-CM

## 2024-03-28 LAB — INR BLD: 3 (ref 0.9–1.1)

## 2024-03-28 PROCEDURE — 36416 COLLJ CAPILLARY BLOOD SPEC: CPT

## 2024-03-28 PROCEDURE — 85610 PROTHROMBIN TIME: CPT

## 2024-03-28 NOTE — PROGRESS NOTES
ANTICOAGULATION MANAGEMENT     Leonela Christianson 73 year old female is on warfarin with therapeutic INR result. (Goal INR 2.0-3.0)    Recent labs: (last 7 days)     03/28/24  0746   INR 3.0*       ASSESSMENT     Source(s): Chart Review  Previous INR was Supratherapeutic  Medication, diet, health changes since last INR chart reviewed; none identified         PLAN     Recommended plan for no diet, medication or health factor changes affecting INR     Dosing Instructions: Continue your current warfarin dose with next INR in 2 weeks       Summary  As of 3/28/2024      Full warfarin instructions:  6 mg every Sun, Wed; 3 mg all other days   Next INR check:  4/11/2024               Detailed voice message left for Leonela with dosing instructions and follow up date.     Lab visit scheduled with mammogram visit 4/10    Education provided:   Please call back if any changes to your diet, medications or how you've been taking warfarin    Plan made per [facility] anticoagulation protocol    Caleb Queen RN  Anticoagulation Clinic  3/28/2024    _______________________________________________________________________     Anticoagulation Episode Summary       Current INR goal:  2.0-3.0   TTR:  81.9% (1 y)   Target end date:  Indefinite   Send INR reminders to:  [facility]    Indications    History of pulmonary embolism [Z86.711]  Long term (current) use of anticoagulants [Z79.01]  Rash and nonspecific skin eruption [R21]  Recurrent deep vein thrombosis (DVT) (H) [I82.409]             Comments:  OK for 8 week checks per Dr. Woodruff on 1/23/23             Anticoagulation Care Providers       Provider Role Specialty Phone number    Miri Woodruff MD Referring Family Medicine [phone]

## 2024-04-03 ENCOUNTER — OFFICE VISIT (OUTPATIENT)
Dept: PULMONOLOGY | Facility: CLINIC | Age: 74
End: 2024-04-03
Attending: NURSE PRACTITIONER
Payer: MEDICARE

## 2024-04-03 VITALS
WEIGHT: 137 LBS | OXYGEN SATURATION: 90 % | BODY MASS INDEX: 27.67 KG/M2 | HEART RATE: 80 BPM | DIASTOLIC BLOOD PRESSURE: 68 MMHG | SYSTOLIC BLOOD PRESSURE: 124 MMHG

## 2024-04-03 DIAGNOSIS — F17.218 CIGARETTE NICOTINE DEPENDENCE WITH OTHER NICOTINE-INDUCED DISORDER: ICD-10-CM

## 2024-04-03 DIAGNOSIS — J43.8 OTHER EMPHYSEMA (H): ICD-10-CM

## 2024-04-03 DIAGNOSIS — J96.11 CHRONIC HYPOXIC RESPIRATORY FAILURE (H): ICD-10-CM

## 2024-04-03 DIAGNOSIS — J44.9 COPD, SEVERE (H): Primary | ICD-10-CM

## 2024-04-03 PROCEDURE — 99213 OFFICE O/P EST LOW 20 MIN: CPT | Performed by: NURSE PRACTITIONER

## 2024-04-03 NOTE — PATIENT INSTRUCTIONS
It was a pleasure to see you in clinic today.   Here is what we discussed:    Continue Stiolto two puffs daily.  Continue Albuterol every 4-6 hours as needed for shortness of breath or wheezing.  Continue oxygen 2L with activity as needed to keep oxygen saturations >88%.  Continue to work on smoking cessation.  Call my nurse, Nayan ([phone]) with any change or worsening of your breathing.  Follow-up in 6 months.    Glory Ospina, CNP  Pulmonary Medicine  [facility]  [phone]

## 2024-04-03 NOTE — PROGRESS NOTES
Pulmonary Clinic Follow-up          Assessment/Plan:     73 year old female with a history of COPD, PE x2 on warfarin, CAD s/p MI, DM II, HTN, HLD - presenting for 3 month follow-up of COPD.      COPD - GOLD E  Emphysema  Chronic hypoxic respiratory failure  Current smoker with 60 pack year smoking hx, presented in 1/2024 for evaluation of COPD.  Severe emphysema noted per chest imaging 7/2023, no concerning nodules, LungRADS category 2.  Pulmonary function testing with severe airway obstruction (FEV1 47% predicted), air-trapping, and severely reduced diffusion capacity (DLCo 39% predicted).  Of note her hgb was 17.1 during PFTs.  6 minute walk test done d/t PFT results, which did show oxygen desaturations, requiring 2L of oxygen during activity.  Her main daily respiratory symptoms include shortness of breath, cough, some sputum production.  She does not exacerbate frequently or require prednisone.  CAT score 15 today, improved from 26 last visit.  Last visit we encouraged smoking cessation and discussed increasing inhaler regimen, but she elected to stay with Stiolto.  An overnight oximetry study was performed, which shows basal saturations 91%, but we only had results from about 3 hours of the night.  Today she reports stable breathing, no URIs or exacerbations, but she continues to smoke.    Plan:  - continue Stiolto (tiotropium/olodaterol) two puffs daily.  - continue Albuterol HFA PRN  - continue 2L oxygen with activity, to keep oxygen saturations >88%.  - continue to work on smoking cessation.  - due again for LDCT for lung cancer screening in 7/2024.  Ordered today.  - she is UTD with COVID booster and annual influenza vaccine.  She is due for pneumococcal vaccine, she declined this last visit.  - Action plan: prednisone 40mg x5 days, + azithromycin x5 days    Follow-up  - 6 months.    Glory Ospina, CNP  Pulmonary Medicine  [facility]  [phone]    Due to  desaturation of SaO2 less than or equal to 88% on Room air from COPD, emphysema - home oxygen therapy will benefit my patient's condition.  The patient has tried (or considered) medications with limited success and oxygen is still required.  This patient is mobile in the home and requires portability.    Patient needs portable oxygen with ambulation at 2 LPM/NC.  Patient also needs POC.  OK for conserving device.  OK to do titration study if needed--keep oxygen saturation >88%.  Please issue conserving device with appropriate titrated setting after patient completes successful assessment.       CC:     COPD follow-up     HPI:     73 year old female with a history of COPD, PE x2 on warfarin, CAD s/p MI, DM II, HTN, HLD - presenting for 3 month follow-up of COPD.      Since last visit (1/2024),   Continues to smoke, less than 1PPD.  No URIs or exacerbations.   Used oxygen twice, hasn't used the portable oxygen yet.  Oxygen saturations have been about 88%-92%.    Previous HPI:  Smoking hx: started age 13, 1PPD, now at 3/4 PPD.  Goal is to quit.  Has tried nicotine gum, patches, acupuncture, hypnotism.  Hasn't tried Chantix, not interested in it.  Shortness of breath with any movement, although not all the time.  Cough throughout day, has had this for years.  Some mucous, not always.  Does not get ill frequently.  Has never required prednisone or antibiotics for exacerbation.  Started Stiolto, wasn't taking every day, 2-3 months ago started taking daily.  Breathing did feel improved with this.  Albuterol 5 times/week, it is beneficial.  Does cough during night, but doesn't wake up SOB.  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  No seasonal allergies.      Patient supplied answers from flow sheet for:  COPD Assessment Test (CAT)  2009 [employer]. All rights reserved.      1/3/2024     7:00 AM 4/3/2024     9:02 AM   COPD assessment test (CAT)   Cough 3 3   Phlegm 3 3   Chest  tightness 1 0   Walk up hill 5 3   Limited activities 4 2   Leaving my home 3 0   Sleep 2 1   Energy 5 3   Total Score 26 15      CAT Key:  The CAT consist of 8 items which are each scored 0-5. The total score ranges from 0-40 with higher scores representing a poorer health status. When interpreting CAT scores, the individual s disease severity should be considered.   Low impact  (1-9)  Medium impact  (10-20)  High impact  (21-30)  Very high impact  (31-40)         ROS:     A 6-system review was obtained and was negative with the exception of the symptoms endorsed in the HPI.       Medical history:       PMH:  Past Medical History:   Diagnosis Date    Acute pyelonephritis     LEELA (acute kidney injury) (H24) 9/12/2020    COPD (chronic obstructive pulmonary disease) (H)     Coronary artery disease     Diabetes mellitus (H)     Heart attack (H) 2012, 2019    X3    History of blood clots     PEx2    Hyperlipidemia     Hypertension     Myocardial infarction (H) 5/9/2012    Formatting of this note might be different from the original. Inferior, 1/2012 Formatting of this note might be different from the original. 4/2012     Pulmonary embolism (H) 4/10/2007    Formatting of this note might be different from the original. Two episodes over the last 10 years.  On lifelong coumadin therapy     Ureteral stone 9/11/2020    Added automatically from request for surgery [id number]        PSH:  Past Surgical History:   Procedure Laterality Date    CARDIAC CATHETERIZATION      Stent    CV CORONARY ANGIOGRAM N/A 11/20/2017    Procedure: Coronary Angiogram;  Surgeon: Daniela Mazariegos MD;  Location: [facility];  Service:     CV LEFT HEART CATHETERIZATION WITHOUT LEFT VENTRICULOGRAM Left 11/20/2017    Procedure: Left Heart Catheterization Without Left Ventriculogram;  Surgeon: Daniela Mazariegos MD;  Location: [facility];  Service:     CYSTOSCOPY  09/29/2020    HC CYSTOSCOPY,INSERT URETERAL STENT Left 9/11/2020    Procedure:  CYSTOSCOPY, WITH URETERAL STENT INSERTION;  Surgeon: Sean Schmitt MD;  Location: [facility];  Service: Urology    HYSTERECTOMY      fibroids    OOPHORECTOMY         Allergies:  Allergies   Allergen Reactions    Sulfa (Sulfonamide Antibiotics) [Sulfa Antibiotics] Anaphylaxis       Family Hx:  Family History   Problem Relation Age of Onset    No Known Problems Mother     No Known Problems Father     Diabetes Paternal Uncle     Diabetes Sister     Breast Cancer No family hx of     Cancer No family hx of     Colon Cancer No family hx of     Heart Disease No family hx of     Coronary Artery Disease No family hx of        Social Hx:  Social History     Socioeconomic History    Marital status:      Spouse name: Not on file    Number of children: Not on file    Years of education: Not on file    Highest education level: Not on file   Occupational History    Not on file   Tobacco Use    Smoking status: Every Day     Packs/day: 1.00     Years: 60.00     Additional pack years: 0.00     Total pack years: 60.00     Types: Cigarettes     Start date: 1/1/1963    Smokeless tobacco: Never   Vaping Use    Vaping Use: Never used   Substance and Sexual Activity    Alcohol use: No    Drug use: No    Sexual activity: Not Currently     Partners: Male   Other Topics Concern    Not on file   Social History Narrative    She lives with her 40-year-old son and 14-year-old grandson.  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     Social Determinants of Health     Financial Resource Strain: Low Risk  (12/12/2023)    Financial Resource Strain     Within the past 12 months, have you or your family members you live with been unable to get utilities (heat, electricity) when it was really needed?: No   Food Insecurity: Low Risk  (12/12/2023)    Food Insecurity     Within the past 12 months, did you worry that your food would run out before you got money to buy more?: No     Within the past 12 months, did the food you bought  just not last and you didn t have money to get more?: No   Transportation Needs: Low Risk  (12/12/2023)    Transportation Needs     Within the past 12 months, has lack of transportation kept you from medical appointments, getting your medicines, non-medical meetings or appointments, work, or from getting things that you need?: No   Physical Activity: Not on file   Stress: Not on file   Social Connections: Not on file   Interpersonal Safety: Low Risk  (12/12/2023)    Interpersonal Safety     Do you feel physically and emotionally safe where you currently live?: Yes     Within the past 12 months, have you been hit, slapped, kicked or otherwise physically hurt by someone?: No     Within the past 12 months, have you been humiliated or emotionally abused in other ways by your partner or ex-partner?: No   Housing Stability: Low Risk  (12/12/2023)    Housing Stability     Do you have housing? : Yes     Are you worried about losing your housing?: No       Current Meds:  Current Outpatient Medications   Medication Sig Dispense Refill    albuterol (PROAIR HFA/PROVENTIL HFA/VENTOLIN HFA) 108 (90 Base) MCG/ACT inhaler USE 1 TO 2 INHALATIONS EVERY 4 TO 6 HOURS AS NEEDED FOR WHEEZING 25.5 g 6    clopidogrel (PLAVIX) 75 MG tablet Take 1 tablet (75 mg) by mouth daily 90 tablet 11    losartan-hydrochlorothiazide (HYZAAR) 100-12.5 MG tablet Take 1 tablet by mouth daily 90 tablet 11    metoprolol succinate ER (TOPROL XL) 100 MG 24 hr tablet Take 1 tablet (100 mg) by mouth At Bedtime 90 tablet 11    rosuvastatin (CRESTOR) 40 MG tablet Take 1 tablet (40 mg) by mouth daily 90 tablet 11    tiotropium-olodaterol (STIOLTO RESPIMAT) 2.5-2.5 MCG/ACT AERS Take 2 puffs by mouth daily 12 g 11    warfarin ANTICOAGULANT (COUMADIN) 3 MG tablet TAKE 1 TO 2 TABLETS DAILY , ADJUST DOSE PER INR RESULTS AS INSTRUCTED 120 tablet 5    nitroGLYcerin (NITROSTAT) 0.4 MG sublingual tablet Place 1 tablet (0.4 mg) under the tongue every 5 minutes as needed for  chest pain (Patient not taking: Reported on 1/3/2024) 20 tablet 11          Physical Exam:     /68   Pulse 80   Wt 62.1 kg (137 lb)   SpO2 90%   BMI 27.67 kg/m    Gen: adult female , appears in NAD  HEENT: clear conjunctivae, moist mucous membranes  CV: RRR, no M/G/R  Resp: CTA bilaterally.  Respirations even and unlabored.  On RA.  No cough.  Skin: no apparent rashes on visible skin  Ext: no cyanosis, clubbing or edema  Neuro: alert and answering questions appropriately       Data:     Labs:  reviewed    Imaging studies:  I have personally reviewed all pertinent imaging studies and PFT results unless otherwise noted.    EXAM: LOW DOSE LUNG CANCER SCREENING CT CHEST  LOCATION: [facility]  DATE: 7/17/2023  FINDINGS:  NODULES: Stable 2 mm peripheral right upper lobe nodule (series 4, image 41).  LUNGS AND PLEURA: Severe emphysema. Mild airway thickening. Mild retained airway secretions. Mild bands of atelectasis or scarring.  MEDIASTINUM: No adenopathy. Mildly tortuous aorta without focal aneurysm. Moderate aortic atherosclerosis.  CORONARY ARTERY CALCIFICATION: Severe.  LIMITED UPPER ABDOMEN: Small - moderate hiatus hernia. Severe circumferential abdominal aorta calcifications.  MUSCULOSKELETAL: Bony demineralization.                                                            IMPRESSION:  1.  Negative for lung cancer screening purposes.  LungRADS CATEGORY: 2 : Benign.        Pulmonary Function Testing

## 2024-04-10 ENCOUNTER — ANCILLARY PROCEDURE (OUTPATIENT)
Dept: MAMMOGRAPHY | Facility: CLINIC | Age: 74
End: 2024-04-10
Attending: FAMILY MEDICINE
Payer: MEDICARE

## 2024-04-10 DIAGNOSIS — Z12.31 SCREENING MAMMOGRAM, ENCOUNTER FOR: ICD-10-CM

## 2024-04-10 PROCEDURE — 77067 SCR MAMMO BI INCL CAD: CPT

## 2024-04-11 ENCOUNTER — LAB (OUTPATIENT)
Dept: LAB | Facility: CLINIC | Age: 74
End: 2024-04-11
Payer: MEDICARE

## 2024-04-11 ENCOUNTER — ANTICOAGULATION THERAPY VISIT (OUTPATIENT)
Dept: ANTICOAGULATION | Facility: CLINIC | Age: 74
End: 2024-04-11

## 2024-04-11 DIAGNOSIS — Z86.711 HISTORY OF PULMONARY EMBOLISM: Primary | ICD-10-CM

## 2024-04-11 DIAGNOSIS — Z86.711 HISTORY OF PULMONARY EMBOLISM: ICD-10-CM

## 2024-04-11 DIAGNOSIS — I82.409 RECURRENT DEEP VEIN THROMBOSIS (DVT) (H): ICD-10-CM

## 2024-04-11 DIAGNOSIS — Z79.01 LONG TERM (CURRENT) USE OF ANTICOAGULANTS: ICD-10-CM

## 2024-04-11 DIAGNOSIS — R21 RASH AND NONSPECIFIC SKIN ERUPTION: ICD-10-CM

## 2024-04-11 LAB — INR BLD: 3.1 (ref 0.9–1.1)

## 2024-04-11 PROCEDURE — 36416 COLLJ CAPILLARY BLOOD SPEC: CPT

## 2024-04-11 PROCEDURE — 85610 PROTHROMBIN TIME: CPT

## 2024-04-11 NOTE — PROGRESS NOTES
ANTICOAGULATION MANAGEMENT     Leonela Christianson 73 year old female is on warfarin with supratherapeutic INR result. (Goal INR 2.0-3.0)    Recent labs: (last 7 days)     04/11/24  0736   INR 3.1*       ASSESSMENT     Source(s): Chart Review and Patient/Caregiver Call     Warfarin doses taken: Warfarin taken as instructed  Diet: No new diet changes identified  Medication/supplement changes: None noted  New illness, injury, or hospitalization: No  Signs or symptoms of bleeding or clotting: No  Previous result: Therapeutic last visit; previously outside of goal range  Additional findings:  discussed adding green or decreasing dose and Leonela wants to try adding 1 green serving weekly to help inr stay in range.       PLAN     Recommended plan for no diet, medication or health factor changes affecting INR     Dosing Instructions: Continue your current warfarin dose with next INR in 2 weeks       Summary  As of 4/11/2024      Full warfarin instructions:  6 mg every Sun, Wed; 3 mg all other days   Next INR check:  4/25/2024               Telephone call with Leonela who verbalizes understanding and agrees to plan    Contact [phone] to schedule and with any changes, questions or concerns.     Education provided:   Please call back if any changes to your diet, medications or how you've been taking warfarin    Plan made per [facility] anticoagulation protocol    Caleb Queen RN  Anticoagulation Clinic  4/11/2024    _______________________________________________________________________     Anticoagulation Episode Summary       Current INR goal:  2.0-3.0   TTR:  78.1% (1 y)   Target end date:  Indefinite   Send INR reminders to:  [facility]    Indications    History of pulmonary embolism [Z86.711]  Long term (current) use of anticoagulants [Z79.01]  Rash and nonspecific skin eruption [R21]  Recurrent deep vein thrombosis (DVT) (H) [I82.409]             Comments:  OK for 8 week checks per Dr. Woodruff on 1/23/23              Anticoagulation Care Providers       Provider Role Specialty Phone number    Miri Woodruff MD Referring Family Medicine [phone]

## 2024-04-16 ENCOUNTER — DOCUMENTATION ONLY (OUTPATIENT)
Dept: ANTICOAGULATION | Facility: CLINIC | Age: 74
End: 2024-04-16
Payer: MEDICARE

## 2024-04-16 DIAGNOSIS — Z79.01 LONG TERM (CURRENT) USE OF ANTICOAGULANTS: ICD-10-CM

## 2024-04-16 DIAGNOSIS — Z86.711 HISTORY OF PULMONARY EMBOLISM: Primary | ICD-10-CM

## 2024-04-16 DIAGNOSIS — I82.409 RECURRENT DEEP VEIN THROMBOSIS (DVT) (H): ICD-10-CM

## 2024-04-16 NOTE — PROGRESS NOTES
ANTICOAGULATION CLINIC REFERRAL RENEWAL REQUEST       An annual renewal order is required for all patients referred to [facility].?  Please review and sign the pended referral order for Leonela Christianson.       ANTICOAGULATION SUMMARY      Warfarin indication(s)   DVT and recurrent PE, x2    Mechanical heart valve present?  NO       Current goal range   INR: 2.0-3.0     Goal appropriate for indication? Goal INR 2-3, standard for indication(s) above     Time in Therapeutic Range (TTR)  (Goal > 60%) 78.1 %       Office visit with referring provider's group within last year Yes on 12/12/2023.       Jeanne Olsen RN  [facility]ticoagulation [facility]

## 2024-04-25 ENCOUNTER — ANTICOAGULATION THERAPY VISIT (OUTPATIENT)
Dept: ANTICOAGULATION | Facility: CLINIC | Age: 74
End: 2024-04-25

## 2024-04-25 ENCOUNTER — LAB (OUTPATIENT)
Dept: LAB | Facility: CLINIC | Age: 74
End: 2024-04-25
Payer: MEDICARE

## 2024-04-25 DIAGNOSIS — Z79.01 LONG TERM (CURRENT) USE OF ANTICOAGULANTS: ICD-10-CM

## 2024-04-25 DIAGNOSIS — R21 RASH AND NONSPECIFIC SKIN ERUPTION: ICD-10-CM

## 2024-04-25 DIAGNOSIS — I82.409 RECURRENT DEEP VEIN THROMBOSIS (DVT) (H): ICD-10-CM

## 2024-04-25 DIAGNOSIS — Z86.711 HISTORY OF PULMONARY EMBOLISM: Primary | ICD-10-CM

## 2024-04-25 DIAGNOSIS — Z86.711 HISTORY OF PULMONARY EMBOLISM: ICD-10-CM

## 2024-04-25 LAB — INR BLD: 2.9 (ref 0.9–1.1)

## 2024-04-25 PROCEDURE — 85610 PROTHROMBIN TIME: CPT

## 2024-04-25 PROCEDURE — 36416 COLLJ CAPILLARY BLOOD SPEC: CPT

## 2024-04-25 NOTE — PROGRESS NOTES
ANTICOAGULATION MANAGEMENT     Leonela Christianson 73 year old female is on warfarin with therapeutic INR result. (Goal INR 2.0-3.0)    Recent labs: (last 7 days)     04/25/24  0739   INR 2.9*       ASSESSMENT     Source(s): Chart Review and Patient/Caregiver Call     Warfarin doses taken: Warfarin taken as instructed  Diet: No new diet changes identified  Medication/supplement changes: None noted  New illness, injury, or hospitalization: No  Signs or symptoms of bleeding or clotting: No  Previous result: Supratherapeutic at 3.1 on 4/11/24.  Additional findings: None       PLAN     Recommended plan for no diet, medication or health factor changes affecting INR     Dosing Instructions: Continue your current warfarin dose with next INR in 2 weeks       Summary  As of 4/25/2024      Full warfarin instructions:  6 mg every Sun, Wed; 3 mg all other days   Next INR check:  5/9/2024               Telephone call with Leonela who verbalizes understanding and agrees to plan    Lab visit scheduled - INR on 5/16/24 @ [facility]    Education provided:   Taking warfarin: Importance of taking warfarin as instructed  Goal range and lab monitoring: goal range and significance of current result  Dietary considerations: importance of consistent vitamin K intake  Symptom monitoring: monitoring for bleeding signs and symptoms    Plan made per [facility] anticoagulation protocol    Jeanne Olsen RN  Anticoagulation Clinic  4/25/2024    _______________________________________________________________________     Anticoagulation Episode Summary       Current INR goal:  2.0-3.0   TTR:  76.2% (1 y)   Target end date:  Indefinite   Send INR reminders to:  [facility]    Indications    History of pulmonary embolism [Z86.711]  Long term (current) use of anticoagulants [Z79.01]  Rash and nonspecific skin eruption [R21]  Recurrent deep vein thrombosis (DVT) (H) [I82.409]             Comments:  OK for 8 week checks per Dr. Woodruff  on 1/23/23             Anticoagulation Care Providers       Provider Role Specialty Phone number    Miri Woodruff MD Referring Family Medicine [phone]

## 2024-05-16 ENCOUNTER — LAB (OUTPATIENT)
Dept: LAB | Facility: CLINIC | Age: 74
End: 2024-05-16
Payer: MEDICARE

## 2024-05-16 ENCOUNTER — ANTICOAGULATION THERAPY VISIT (OUTPATIENT)
Dept: ANTICOAGULATION | Facility: CLINIC | Age: 74
End: 2024-05-16

## 2024-05-16 DIAGNOSIS — Z86.711 HISTORY OF PULMONARY EMBOLISM: Primary | ICD-10-CM

## 2024-05-16 DIAGNOSIS — I82.409 RECURRENT DEEP VEIN THROMBOSIS (DVT) (H): ICD-10-CM

## 2024-05-16 DIAGNOSIS — Z79.01 LONG TERM (CURRENT) USE OF ANTICOAGULANTS: ICD-10-CM

## 2024-05-16 DIAGNOSIS — Z86.711 HISTORY OF PULMONARY EMBOLISM: ICD-10-CM

## 2024-05-16 DIAGNOSIS — R21 RASH AND NONSPECIFIC SKIN ERUPTION: ICD-10-CM

## 2024-05-16 LAB — INR BLD: 2.7 (ref 0.9–1.1)

## 2024-05-16 PROCEDURE — 85610 PROTHROMBIN TIME: CPT

## 2024-05-16 PROCEDURE — 36415 COLL VENOUS BLD VENIPUNCTURE: CPT

## 2024-05-16 NOTE — PROGRESS NOTES
ANTICOAGULATION MANAGEMENT     Leonela Christianson 73 year old female is on warfarin with therapeutic INR result. (Goal INR 2.0-3.0)    Recent labs: (last 7 days)     05/16/24  0741   INR 2.7*       ASSESSMENT     Source(s): Chart Review and Patient/Caregiver Call     Warfarin doses taken: Warfarin taken as instructed  Diet: No new diet changes identified  Medication/supplement changes: None noted  New illness, injury, or hospitalization: No  Signs or symptoms of bleeding or clotting: No  Previous result: Therapeutic last visit at 2.9; previously outside of goal range at 3.1  Additional findings: None       PLAN     Recommended plan for no diet, medication or health factor changes affecting INR     Dosing Instructions: Continue your current warfarin dose with next INR in 3 weeks       Summary  As of 5/16/2024      Full warfarin instructions:  6 mg every Sun, Wed; 3 mg all other days   Next INR check:  6/6/2024               Telephone call with Leonela who verbalizes understanding and agrees to plan    Lab visit scheduled - INR on 6/13/24 @ [place].    Education provided:   Taking warfarin: Importance of taking warfarin as instructed  Goal range and lab monitoring: goal range and significance of current result    Plan made per [facility] anticoagulation protocol    Jeanne Olsen RN  Anticoagulation Clinic  5/16/2024    _______________________________________________________________________     Anticoagulation Episode Summary       Current INR goal:  2.0-3.0   TTR:  76.2% (1 y)   Target end date:  Indefinite   Send INR reminders to:  [facility]    Indications    History of pulmonary embolism [Z86.711]  Long term (current) use of anticoagulants [Z79.01]  Rash and nonspecific skin eruption [R21]  Recurrent deep vein thrombosis (DVT) (H) [I82.409]             Comments:  OK for 8 week checks per Dr. Woodruff on 1/23/23             Anticoagulation Care Providers       Provider Role Specialty Phone number     Miri Woodruff MD [facility] [phone]

## 2024-05-16 NOTE — CONFIDENTIAL NOTE
General Call    Contacts         Type Contact Phone/Fax    10/16/2023 10:13 AM CDT Phone (Outgoing) Leonela Fraser (Self) [phone] (H)    Left Message -  Transfer call to [facility]  [phone]    10/16/2023 12:33 PM CDT Phone (Incoming) Leonela Fraser (Self) [phone] (H)     Anticoag          Reason for Call:  Anticoag    What are your questions or concerns:  The patient is returning a call to [facility] regarding today's INR.    Date of last appointment with provider: n/a    Okay to leave a detailed message?: Yes at Home number on file [phone] (home)       Population Health Chart Review & Patient Outreach Details    Health Maintenance Topics Addressed and Outreach Outcomes / Actions Taken:  Diabetic Eye Exam [x] AZUCENA sent to [facility].

## 2024-06-13 ENCOUNTER — ANTICOAGULATION THERAPY VISIT (OUTPATIENT)
Dept: ANTICOAGULATION | Facility: CLINIC | Age: 74
End: 2024-06-13

## 2024-06-13 ENCOUNTER — LAB (OUTPATIENT)
Dept: LAB | Facility: CLINIC | Age: 74
End: 2024-06-13
Payer: MEDICARE

## 2024-06-13 DIAGNOSIS — I82.409 RECURRENT DEEP VEIN THROMBOSIS (DVT) (H): ICD-10-CM

## 2024-06-13 DIAGNOSIS — Z79.01 LONG TERM (CURRENT) USE OF ANTICOAGULANTS: ICD-10-CM

## 2024-06-13 DIAGNOSIS — Z86.711 HISTORY OF PULMONARY EMBOLISM: Primary | ICD-10-CM

## 2024-06-13 DIAGNOSIS — Z86.711 HISTORY OF PULMONARY EMBOLISM: ICD-10-CM

## 2024-06-13 DIAGNOSIS — R21 RASH AND NONSPECIFIC SKIN ERUPTION: ICD-10-CM

## 2024-06-13 LAB — INR BLD: 2.8 (ref 0.9–1.1)

## 2024-06-13 PROCEDURE — 85610 PROTHROMBIN TIME: CPT

## 2024-06-13 PROCEDURE — 36416 COLLJ CAPILLARY BLOOD SPEC: CPT

## 2024-06-13 NOTE — PROGRESS NOTES
ANTICOAGULATION MANAGEMENT     Leonela Christianson 74 year old female is on warfarin with therapeutic INR result. (Goal INR 2.0-3.0)    Recent labs: (last 7 days)     06/13/24  0740   INR 2.8*       ASSESSMENT     Source(s): Chart Review and Patient/Caregiver Call     Warfarin doses taken: Warfarin taken as instructed  Diet: No new diet changes identified  Medication/supplement changes: None noted  New illness, injury, or hospitalization: No  Signs or symptoms of bleeding or clotting: No  Previous result: Therapeutic last 2 INR visits  Additional findings: None       PLAN     Recommended plan for no diet, medication or health factor changes affecting INR     Dosing Instructions: Continue your current warfarin dose with next INR in 4-5 weeks       Summary  As of 6/13/2024      Full warfarin instructions:  6 mg every Sun, Wed; 3 mg all other days   Next INR check:  7/11/2024               Telephone call with Leonela who verbalizes understanding and agrees to plan    Lab visit scheduled - INR on 7/18/24 @ [place]    Education provided:   Taking warfarin: Importance of taking warfarin as instructed  Goal range and lab monitoring: goal range and significance of current result    Plan made per [facility] anticoagulation protocol    Jeanne Olsen RN  Anticoagulation Clinic  6/13/2024    _______________________________________________________________________     Anticoagulation Episode Summary       Current INR goal:  2.0-3.0   TTR:  76.2% (1 y)   Target end date:  Indefinite   Send INR reminders to:  [facility]    Indications    History of pulmonary embolism [Z86.711]  Long term (current) use of anticoagulants [Z79.01]  Rash and nonspecific skin eruption [R21]  Recurrent deep vein thrombosis (DVT) (H) [I82.409]             Comments:  OK for 8 week checks per Dr. Woodruff on 1/23/23             Anticoagulation Care Providers       Provider Role Specialty Phone number    Miri Woodruff MD Referring Family  Medicine [phone]

## 2024-07-18 ENCOUNTER — LAB (OUTPATIENT)
Dept: LAB | Facility: CLINIC | Age: 74
End: 2024-07-18
Payer: MEDICARE

## 2024-07-18 ENCOUNTER — ANTICOAGULATION THERAPY VISIT (OUTPATIENT)
Dept: ANTICOAGULATION | Facility: CLINIC | Age: 74
End: 2024-07-18

## 2024-07-18 DIAGNOSIS — I82.409 RECURRENT DEEP VEIN THROMBOSIS (DVT) (H): ICD-10-CM

## 2024-07-18 DIAGNOSIS — Z86.711 HISTORY OF PULMONARY EMBOLISM: Primary | ICD-10-CM

## 2024-07-18 DIAGNOSIS — Z86.711 HISTORY OF PULMONARY EMBOLISM: ICD-10-CM

## 2024-07-18 DIAGNOSIS — R21 RASH AND NONSPECIFIC SKIN ERUPTION: ICD-10-CM

## 2024-07-18 DIAGNOSIS — Z79.01 LONG TERM (CURRENT) USE OF ANTICOAGULANTS: ICD-10-CM

## 2024-07-18 LAB — INR BLD: 3 (ref 0.9–1.1)

## 2024-07-18 PROCEDURE — 36416 COLLJ CAPILLARY BLOOD SPEC: CPT

## 2024-07-18 PROCEDURE — 85610 PROTHROMBIN TIME: CPT

## 2024-07-18 NOTE — PROGRESS NOTES
ANTICOAGULATION MANAGEMENT     Leonela Christianson 74 year old female is on warfarin with therapeutic INR result. (Goal INR 2.0-3.0)    Recent labs: (last 7 days)     07/18/24  0744   INR 3.0*       ASSESSMENT     Source(s): Chart Review and Patient/Caregiver Call     Warfarin doses taken: Warfarin taken as instructed  Diet: Decreased greens/vitamin K in diet; will resume usual intake.   Reported this week has not had her usual Vitamin-K intake.  Medication/supplement changes: None noted  New illness, injury, or hospitalization: No  Signs or symptoms of bleeding or clotting: No  Previous result: Therapeutic last 3 INR visits  Additional findings:  None.       PLAN     Recommended plan for temporary change(s) affecting INR     Dosing Instructions: Continue your current warfarin dose with next INR in 5-6 weeks       Summary  As of 7/18/2024      Full warfarin instructions:  6 mg every Sun, Wed; 3 mg all other days   Next INR check:  8/29/2024               Telephone call with Leonela who verbalizes understanding and agrees to plan    Lab visit scheduled - INR on 8/29/24 @ [place]    Education provided: Taking warfarin: Importance of taking warfarin as instructed  Goal range and lab monitoring: goal range and significance of current result  Dietary considerations: importance of consistent vitamin K intake and impact of vitamin K foods on INR  Symptom monitoring: monitoring for bleeding signs and symptoms and check INR sooner.    Plan made per [facility] anticoagulation protocol    Jeanne Olsen RN  Anticoagulation Clinic  7/18/2024    _______________________________________________________________________     Anticoagulation Episode Summary       Current INR goal:  2.0-3.0   TTR:  76.2% (1 y)   Target end date:  Indefinite   Send INR reminders to:  [facility]    Indications    History of pulmonary embolism [Z86.711]  Long term (current) use of anticoagulants [Z79.01]  Rash and nonspecific skin eruption  [R21]  Recurrent deep vein thrombosis (DVT) (H) [I82.409]             Comments:  OK for 8 week checks per Dr. Woodruff on 1/23/23             Anticoagulation Care Providers       Provider Role Specialty Phone number    iMri Woodruff MD Referring Family Medicine [phone]

## 2024-07-22 ENCOUNTER — HOSPITAL ENCOUNTER (OUTPATIENT)
Dept: CT IMAGING | Facility: HOSPITAL | Age: 74
Discharge: HOME | End: 2024-07-22
Attending: NURSE PRACTITIONER | Admitting: NURSE PRACTITIONER
Payer: MEDICARE

## 2024-07-22 DIAGNOSIS — F17.218 CIGARETTE NICOTINE DEPENDENCE WITH OTHER NICOTINE-INDUCED DISORDER: ICD-10-CM

## 2024-07-22 PROCEDURE — 71271 CT THORAX LUNG CANCER SCR C-: CPT

## 2024-07-24 DIAGNOSIS — I25.2 HISTORY OF NON-ST ELEVATION MYOCARDIAL INFARCTION (NSTEMI): ICD-10-CM

## 2024-07-24 DIAGNOSIS — I21.4 NSTEMI (NON-ST ELEVATED MYOCARDIAL INFARCTION) (H): ICD-10-CM

## 2024-07-24 RX ORDER — METOPROLOL SUCCINATE 100 MG/1
100 TABLET, EXTENDED RELEASE ORAL AT BEDTIME
Qty: 90 TABLET | Refills: 0 | Status: SHIPPED | OUTPATIENT
Start: 2024-07-24

## 2024-07-24 RX ORDER — CLOPIDOGREL BISULFATE 75 MG/1
75 TABLET ORAL DAILY
Qty: 90 TABLET | Refills: 0 | Status: SHIPPED | OUTPATIENT
Start: 2024-07-24

## 2024-08-16 ENCOUNTER — TELEPHONE (OUTPATIENT)
Dept: CARDIOLOGY | Facility: CLINIC | Age: 74
End: 2024-08-16
Payer: MEDICARE

## 2024-08-16 DIAGNOSIS — E78.5 HYPERLIPIDEMIA LDL GOAL <70: Primary | ICD-10-CM

## 2024-08-16 RX ORDER — ROSUVASTATIN CALCIUM 40 MG/1
40 TABLET, COATED ORAL DAILY
Qty: 14 TABLET | Refills: 0 | Status: SHIPPED | OUTPATIENT
Start: 2024-08-16 | End: 2024-08-26

## 2024-08-16 NOTE — TELEPHONE ENCOUNTER
[facility] Call Center    Phone Message    May a detailed message be left on voicemail: yes     Reason for Call: Medication Refill Request    Has the patient contacted the pharmacy for the refill? Yes   Name of medication being requested: Rosuvastatin 40mg  Provider who prescribed the medication: Dr. Gonzalez  Pharmacy: [facility] on [address]   Date medication is needed: 08/16/2024   Pt is out of medication and can't get hers in the mail for about a week and needs a small refill until she receives hers in the mail , she would like a call when this is sent     Action Taken: Other: Cardio    Travel Screening: Not Applicable     Date of Service:

## 2024-08-17 DIAGNOSIS — J43.9 PULMONARY EMPHYSEMA, UNSPECIFIED EMPHYSEMA TYPE (H): ICD-10-CM

## 2024-08-19 RX ORDER — TIOTROPIUM BROMIDE AND OLODATEROL 3.124; 2.736 UG/1; UG/1
SPRAY, METERED RESPIRATORY (INHALATION)
Qty: 12 G | Refills: 1 | Status: SHIPPED | OUTPATIENT
Start: 2024-08-19 | End: 2024-10-03

## 2024-08-25 ENCOUNTER — TELEPHONE (OUTPATIENT)
Dept: CARDIOLOGY | Facility: CLINIC | Age: 74
End: 2024-08-25
Payer: MEDICARE

## 2024-08-25 DIAGNOSIS — E78.5 HYPERLIPIDEMIA LDL GOAL <70: Primary | ICD-10-CM

## 2024-08-26 RX ORDER — ROSUVASTATIN CALCIUM 40 MG/1
40 TABLET, COATED ORAL DAILY
Qty: 30 TABLET | Refills: 0 | Status: SHIPPED | OUTPATIENT
Start: 2024-08-26 | End: 2024-08-27

## 2024-08-26 RX ORDER — ROSUVASTATIN CALCIUM 40 MG/1
40 TABLET, COATED ORAL DAILY
Qty: 30 TABLET | Refills: 0 | OUTPATIENT
Start: 2024-08-26

## 2024-08-26 NOTE — TELEPHONE ENCOUNTER
Patient called about this request for Rosuvastatin - patient is out of medication. Would like filled asap. Has 4 tablets left. Would like another partial (2 week Rx) to get her through until her mail order. Please call patient once this request is processed.    [id number] - [place], [place] - [address]

## 2024-08-26 NOTE — TELEPHONE ENCOUNTER
PC to patient. She states [facility] except rosuvastatin. Explained to patient was likely interrupted by local refill. Will send 30 day supply to local pharmacy, and patient will call writer when picked up to convert script back [facility]. Patient agrees to plan, no further questions.  -sea

## 2024-08-27 RX ORDER — ROSUVASTATIN CALCIUM 40 MG/1
40 TABLET, COATED ORAL DAILY
Qty: 90 TABLET | Refills: 3 | Status: SHIPPED | OUTPATIENT
Start: 2024-08-27

## 2024-08-29 ENCOUNTER — LAB (OUTPATIENT)
Dept: LAB | Facility: CLINIC | Age: 74
End: 2024-08-29
Payer: MEDICARE

## 2024-08-29 ENCOUNTER — ANTICOAGULATION THERAPY VISIT (OUTPATIENT)
Dept: ANTICOAGULATION | Facility: CLINIC | Age: 74
End: 2024-08-29

## 2024-08-29 DIAGNOSIS — Z79.01 LONG TERM (CURRENT) USE OF ANTICOAGULANTS: ICD-10-CM

## 2024-08-29 DIAGNOSIS — Z86.711 HISTORY OF PULMONARY EMBOLISM: Primary | ICD-10-CM

## 2024-08-29 DIAGNOSIS — I82.409 RECURRENT DEEP VEIN THROMBOSIS (DVT) (H): ICD-10-CM

## 2024-08-29 DIAGNOSIS — Z86.711 HISTORY OF PULMONARY EMBOLISM: ICD-10-CM

## 2024-08-29 DIAGNOSIS — R21 RASH AND NONSPECIFIC SKIN ERUPTION: ICD-10-CM

## 2024-08-29 LAB — INR BLD: 3.8 (ref 0.9–1.1)

## 2024-08-29 PROCEDURE — 36416 COLLJ CAPILLARY BLOOD SPEC: CPT

## 2024-08-29 PROCEDURE — 85610 PROTHROMBIN TIME: CPT

## 2024-08-29 NOTE — PROGRESS NOTES
ANTICOAGULATION MANAGEMENT     Leonela Christianson 74 year old female is on warfarin with supratherapeutic INR result. (Goal INR 2.0-3.0)    Recent labs: (last 7 days)     08/29/24  0740   INR 3.8*       ASSESSMENT     Source(s): Chart Review and Patient/Caregiver Call     Warfarin doses taken: Warfarin taken as instructed  Diet: Decreased greens/vitamin K in diet; plans to resume previous intake  Medication/supplement changes: None noted  New illness, injury, or hospitalization: No  Signs or symptoms of bleeding or clotting: No  Previous result: Therapeutic last 2(+) visits  Additional findings: None       PLAN     Recommended plan for temporary change(s) affecting INR     Dosing Instructions: hold dose then continue your current warfarin dose with next INR in 2 weeks       Summary  As of 8/29/2024      Full warfarin instructions:  8/29: Hold; Otherwise 6 mg every Sun, Wed; 3 mg all other days   Next INR check:  9/12/2024               Telephone call with Leonela who agrees to plan and repeated back plan correctly    Lab visit scheduled    Education provided: Please call back if any changes to your diet, medications or how you've been taking warfarin  Goal range and lab monitoring: goal range and significance of current result and Importance of therapeutic range  Dietary considerations: importance of consistent vitamin K intake  Contact [phone] with any changes, questions or concerns.     Plan made per [facility] anticoagulation protocol    Bella Swain RN  Anticoagulation Clinic  8/29/2024    _______________________________________________________________________     Anticoagulation Episode Summary       Current INR goal:  2.0-3.0   TTR:  64.7% (1 y)   Target end date:  Indefinite   Send INR reminders to:  [facility]    Indications    History of pulmonary embolism [Z86.711]  Long term (current) use of anticoagulants [Z79.01]  Rash and nonspecific skin eruption [R21]  Recurrent deep vein  thrombosis (DVT) (H) [I82.409]             Comments:  OK for 8 week checks per Dr. Woodruff on 1/23/23             Anticoagulation Care Providers       Provider Role Specialty Phone number    Miri Woodruff MD Referring Family Medicine [phone]

## 2024-09-13 ENCOUNTER — LAB (OUTPATIENT)
Dept: LAB | Facility: CLINIC | Age: 74
End: 2024-09-13
Payer: MEDICARE

## 2024-09-13 ENCOUNTER — ANTICOAGULATION THERAPY VISIT (OUTPATIENT)
Dept: ANTICOAGULATION | Facility: CLINIC | Age: 74
End: 2024-09-13

## 2024-09-13 DIAGNOSIS — I82.409 RECURRENT DEEP VEIN THROMBOSIS (DVT) (H): ICD-10-CM

## 2024-09-13 DIAGNOSIS — Z79.01 LONG TERM (CURRENT) USE OF ANTICOAGULANTS: ICD-10-CM

## 2024-09-13 DIAGNOSIS — Z86.711 HISTORY OF PULMONARY EMBOLISM: Primary | ICD-10-CM

## 2024-09-13 DIAGNOSIS — Z86.711 HISTORY OF PULMONARY EMBOLISM: ICD-10-CM

## 2024-09-13 DIAGNOSIS — R21 RASH AND NONSPECIFIC SKIN ERUPTION: ICD-10-CM

## 2024-09-13 LAB — INR BLD: 2.6 (ref 0.9–1.1)

## 2024-09-13 PROCEDURE — 85610 PROTHROMBIN TIME: CPT

## 2024-09-13 PROCEDURE — 36416 COLLJ CAPILLARY BLOOD SPEC: CPT

## 2024-09-13 NOTE — PROGRESS NOTES
ANTICOAGULATION MANAGEMENT     Leonela Christianson 74 year old female is on warfarin with therapeutic INR result. (Goal INR 2.0-3.0)    Recent labs: (last 7 days)     09/13/24  0738   INR 2.6*       ASSESSMENT     Source(s): Chart Review and Patient/Caregiver Call     Warfarin doses taken: Held one dose on 8/29/24, recently which may be affecting INR  Diet: No new diet changes identified  Medication/supplement changes: None noted  New illness, injury, or hospitalization: No  Signs or symptoms of bleeding or clotting: No  Previous result: Supratherapeutic at 3.8 on 8/29/24.  Additional findings: None       PLAN     Recommended plan for no diet, medication or health factor changes affecting INR     Dosing Instructions: Continue your current warfarin dose with next INR in 2-3 weeks       Summary  As of 9/13/2024      Full warfarin instructions:  6 mg every Sun, Wed; 3 mg all other days   Next INR check:  9/27/2024               Telephone call with Leonela who verbalizes understanding and agrees to plan    Lab visit scheduled - INR on 10/4/24 @ [place]    Education provided: Taking warfarin: Importance of taking warfarin as instructed  Goal range and lab monitoring: goal range and significance of current result    Plan made per [facility] anticoagulation protocol    Jeanne Olsen RN  9/13/2024  Anticoagulation Clinic  [employer] for routing messages: julian WALDRON  [facility] patient phone line: [phone]        _______________________________________________________________________     Anticoagulation Episode Summary       Current INR goal:  2.0-3.0   TTR:  62.0% (1 y)   Target end date:  Indefinite   Send INR reminders to:  [facility]    Indications    History of pulmonary embolism [Z86.711]  Long term (current) use of anticoagulants [Z79.01]  Rash and nonspecific skin eruption [R21]  Recurrent deep vein thrombosis (DVT) (H) [I82.409]             Comments:  OK for 8 week checks per Dr. Woodruff on  1/23/23             Anticoagulation Care Providers       Provider Role Specialty Phone number    Miri Woodruff MD Referring Family Medicine [phone]

## 2024-09-24 ENCOUNTER — IMMUNIZATION (OUTPATIENT)
Dept: FAMILY MEDICINE | Facility: CLINIC | Age: 74
End: 2024-09-24
Payer: MEDICARE

## 2024-09-24 PROCEDURE — G0008 ADMIN INFLUENZA VIRUS VAC: HCPCS

## 2024-09-24 PROCEDURE — 90662 IIV NO PRSV INCREASED AG IM: CPT

## 2024-10-03 ENCOUNTER — OFFICE VISIT (OUTPATIENT)
Dept: PULMONOLOGY | Facility: CLINIC | Age: 74
End: 2024-10-03
Attending: NURSE PRACTITIONER
Payer: MEDICARE

## 2024-10-03 VITALS
SYSTOLIC BLOOD PRESSURE: 122 MMHG | WEIGHT: 134.8 LBS | DIASTOLIC BLOOD PRESSURE: 74 MMHG | OXYGEN SATURATION: 95 % | HEART RATE: 88 BPM | BODY MASS INDEX: 27.23 KG/M2

## 2024-10-03 DIAGNOSIS — J44.9 COPD, SEVERE (H): Primary | ICD-10-CM

## 2024-10-03 DIAGNOSIS — F17.218 CIGARETTE NICOTINE DEPENDENCE WITH OTHER NICOTINE-INDUCED DISORDER: ICD-10-CM

## 2024-10-03 DIAGNOSIS — J43.9 PULMONARY EMPHYSEMA, UNSPECIFIED EMPHYSEMA TYPE (H): ICD-10-CM

## 2024-10-03 DIAGNOSIS — J96.11 CHRONIC HYPOXIC RESPIRATORY FAILURE (H): ICD-10-CM

## 2024-10-03 DIAGNOSIS — J43.8 OTHER EMPHYSEMA (H): ICD-10-CM

## 2024-10-03 PROCEDURE — 99214 OFFICE O/P EST MOD 30 MIN: CPT | Performed by: NURSE PRACTITIONER

## 2024-10-03 RX ORDER — TIOTROPIUM BROMIDE AND OLODATEROL 3.124; 2.736 UG/1; UG/1
2 SPRAY, METERED RESPIRATORY (INHALATION) DAILY
Qty: 12 G | Refills: 11 | Status: SHIPPED | OUTPATIENT
Start: 2024-10-03

## 2024-10-03 RX ORDER — IPRATROPIUM BROMIDE AND ALBUTEROL SULFATE 2.5; .5 MG/3ML; MG/3ML
1 SOLUTION RESPIRATORY (INHALATION) EVERY 6 HOURS PRN
Qty: 180 ML | Refills: 11 | Status: SHIPPED | OUTPATIENT
Start: 2024-10-03

## 2024-10-03 NOTE — PATIENT INSTRUCTIONS
It was a pleasure to see you in clinic today.   Here is what we discussed:    Continue Stiolto two puffs daily.  Start Duonebs in nebulizer machine as needed for chest congestion, mucous, shortness of breath.  Use the flutter valve after treatment.  Continue Albuterol every 4-6 hours as needed for shortness of breath or wheezing.  Continue oxygen 2L with activity to keep oxygen saturations >88%.  Continue to work on smoking cessation.  Call my nurse, Nayan ([phone]) with any change or worsening of your breathing. We can then send out your action plan medications (prednisone + antibiotic).   Follow-up in 6 months.    Glory Ospina, CNP  Pulmonary Medicine  [facility]  [phone]

## 2024-10-03 NOTE — PROGRESS NOTES
Pulmonary Clinic Follow-up          Assessment/Plan:     74 year old female with a history of COPD, PE x2 on warfarin, CAD s/p MI, DM II, HTN, HLD - presenting for 6 month follow-up of COPD.      COPD - GOLD E  Emphysema  Chronic hypoxic respiratory failure  Current smoker with 60 pack year smoking hx, presented in 1/2024 for evaluation of COPD.  Severe emphysema noted per chest imaging.  Pulmonary function testing with severe airway obstruction (FEV1 47% predicted), air-trapping, and severely reduced diffusion capacity (DLCo 39% predicted).  Of note her hgb was 17.1 during PFTs.  6 minute walk test done d/t PFT results, which did show oxygen desaturations, requiring 2L of oxygen during activity.  Her main daily respiratory symptoms include shortness of breath, cough, some sputum production.  She does not exacerbate frequently or require prednisone.    Previous visit we encouraged smoking cessation and discussed increasing inhaler regimen, but she elected to stay with Stiolto.  An overnight oximetry study was performed, which shows basal saturations 91%, but we only had results from about 3 hours of the night.  LDCT 7/2024 negative for lung cancer screening, LungRADS category 2.  Did note endoluminal secretions with downstream atelectasis.    Plan:  - start Duonebs + flutter valve for mucous plugging and atelectasis.  We supplied her with a nebulizer machine today.  - continue Stiolto (tiotropium/olodaterol) two puffs daily.  I discussed the option of increasing her regimen to Trelegy, she is happy with Stiolto.  - continue Albuterol HFA PRN  - continue 2L oxygen with activity, to keep oxygen saturations >88%.  She is rarely using this.  - continue to work on smoking cessation.  She declines any prescriptions for this today.  - continue annual LDCT for lung cancer screening, due again in 7/2025.    - she is UTD with annual influenza vaccine.  She is due for pneumococcal vaccine, she declines.  - Action plan:  prednisone 40mg x5 days, + azithromycin x5 days    Follow-up  - 6 months.    Glory Ospina, CNP  Pulmonary Medicine  [facility]  [phone]    Due to desaturation of SaO2 less than or equal to 88% on Room air from COPD, emphysema - home oxygen therapy will benefit my patient's condition.  The patient has tried (or considered) medications with limited success and oxygen is still required.  This patient is mobile in the home and requires portability.    Patient needs portable oxygen with ambulation at 2 LPM/NC.  Patient also needs POC.  OK for conserving device.  OK to do titration study if needed--keep oxygen saturation >88%.  Please issue conserving device with appropriate titrated setting after patient completes successful assessment.       CC:     COPD follow-up     HPI:     73 year old female with a history of COPD, PE x2 on warfarin, CAD s/p MI, DM II, HTN, HLD - presenting for 3 month follow-up of COPD.      Using stiolto.  Happy with this.  Does use albuterol, but not a lot.   Continues to smoke, 3/4 PPD.   Doesn't use oxygen.     Previous HPI:  Smoking hx: started age 13, 1PPD, now at 3/4 PPD.  Goal is to quit.  Has tried nicotine gum, patches, acupuncture, hypnotism.  Hasn't tried Chantix, not interested in it.  Shortness of breath with any movement, although not all the time.  Cough throughout day, has had this for years.  Some mucous, not always.  Does not get ill frequently.  Has never required prednisone or antibiotics for exacerbation.  Started Stiolto, wasn't taking every day, 2-3 months ago started taking daily.  Breathing did feel improved with this.  Albuterol 5 times/week, it is beneficial.  Does cough during night, but doesn't wake up SOB.  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  No seasonal allergies.      Patient supplied answers from flow sheet for:  COPD Assessment Test (CAT)  2009 [employer]. All  rights reserved.      1/3/2024     7:00 AM 4/3/2024     9:02 AM 10/3/2024     9:36 AM   COPD assessment test (CAT)   Cough 3 3 4   Phlegm 3 3 3   Chest tightness 1 0 0   Walk up hill 5 3 4   Limited activities 4 2 3   Leaving my home 3 0 2   Sleep 2 1 2   Energy 5 3 3   Total Score 26 15 21      CAT Key:  The CAT consist of 8 items which are each scored 0-5. The total score ranges from 0-40 with higher scores representing a poorer health status. When interpreting CAT scores, the individual s disease severity should be considered.   Low impact  (1-9)  Medium impact  (10-20)  High impact  (21-30)  Very high impact  (31-40)         ROS:     A 6-system review was obtained and was negative with the exception of the symptoms endorsed in the HPI.       Medical history:       PMH:  Past Medical History:   Diagnosis Date    Acute pyelonephritis     LEELA (acute kidney injury) (H) 9/12/2020    COPD (chronic obstructive pulmonary disease) (H)     Coronary artery disease     Diabetes mellitus (H)     Heart attack (H) 2012, 2019    X3    History of blood clots     PEx2    Hyperlipidemia     Hypertension     Myocardial infarction (H) 5/9/2012    Formatting of this note might be different from the original. Inferior, 1/2012 Formatting of this note might be different from the original. 4/2012     Pulmonary embolism (H) 4/10/2007    Formatting of this note might be different from the original. Two episodes over the last 10 years.  On lifelong coumadin therapy     Ureteral stone 9/11/2020    Added automatically from request for surgery [id number]        PSH:  Past Surgical History:   Procedure Laterality Date    CARDIAC CATHETERIZATION      Stent    CV CORONARY ANGIOGRAM N/A 11/20/2017    Procedure: Coronary Angiogram;  Surgeon: Daniela Mazariegos MD;  Location: [facility];  Service:     CV LEFT HEART CATHETERIZATION WITHOUT LEFT VENTRICULOGRAM Left 11/20/2017    Procedure: Left Heart Catheterization Without Left Ventriculogram;   Surgeon: Daniela Mazariegos MD;  Location: [facility] Lab;  Service:     CYSTOSCOPY  09/29/2020    HC CYSTOSCOPY,INSERT URETERAL STENT Left 9/11/2020    Procedure: CYSTOSCOPY, WITH URETERAL STENT INSERTION;  Surgeon: eSan Schmitt MD;  Location: [facility];  Service: Urology    HYSTERECTOMY      fibroids    OOPHORECTOMY         Allergies:  Allergies   Allergen Reactions    Sulfa (Sulfonamide Antibiotics) [Sulfa Antibiotics] Anaphylaxis       Family Hx:  Family History   Problem Relation Age of Onset    No Known Problems Mother     No Known Problems Father     Diabetes Paternal Uncle     Diabetes Sister     Breast Cancer No family hx of     Cancer No family hx of     Colon Cancer No family hx of     Heart Disease No family hx of     Coronary Artery Disease No family hx of        Social Hx:  Social History     Socioeconomic History    Marital status:      Spouse name: Not on file    Number of children: Not on file    Years of education: Not on file    Highest education level: Not on file   Occupational History    Not on file   Tobacco Use    Smoking status: Every Day     Current packs/day: 1.00     Average packs/day: 1 pack/day for 61.8 years (61.8 ttl pk-yrs)     Types: Cigarettes     Start date: 1/1/1963     Passive exposure: Past    Smokeless tobacco: Never   Vaping Use    Vaping status: Never Used   Substance and Sexual Activity    Alcohol use: No    Drug use: No    Sexual activity: Not Currently     Partners: Male   Other Topics Concern    Not on file   Social History Narrative    She lives with her 40-year-old son and 14-year-old grandson.  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     Social Determinants of Health     Financial Resource Strain: Low Risk  (12/12/2023)    Financial Resource Strain     Within the past 12 months, have you or your family members you live with been unable to get utilities (heat, electricity) when it was really needed?: No   Food Insecurity: Low Risk   (12/12/2023)    Food Insecurity     Within the past 12 months, did you worry that your food would run out before you got money to buy more?: No     Within the past 12 months, did the food you bought just not last and you didn t have money to get more?: No   Transportation Needs: Low Risk  (12/12/2023)    Transportation Needs     Within the past 12 months, has lack of transportation kept you from medical appointments, getting your medicines, non-medical meetings or appointments, work, or from getting things that you need?: No   Physical Activity: Not on file   Stress: Not on file   Social Connections: Not on file   Interpersonal Safety: Low Risk  (12/12/2023)    Interpersonal Safety     Do you feel physically and emotionally safe where you currently live?: Yes     Within the past 12 months, have you been hit, slapped, kicked or otherwise physically hurt by someone?: No     Within the past 12 months, have you been humiliated or emotionally abused in other ways by your partner or ex-partner?: No   Housing Stability: Low Risk  (12/12/2023)    Housing Stability     Do you have housing? : Yes     Are you worried about losing your housing?: No       Current Meds:  Current Outpatient Medications   Medication Sig Dispense Refill    albuterol (PROAIR HFA/PROVENTIL HFA/VENTOLIN HFA) 108 (90 Base) MCG/ACT inhaler USE 1 TO 2 INHALATIONS EVERY 4 TO 6 HOURS AS NEEDED FOR WHEEZING 25.5 g 6    clopidogrel (PLAVIX) 75 MG tablet TAKE 1 TABLET DAILY 90 tablet 0    ipratropium - albuterol 0.5 mg/2.5 mg/3 mL (DUONEB) 0.5-2.5 (3) MG/3ML neb solution Take 1 vial (3 mLs) by nebulization every 6 hours as needed for shortness of breath, wheezing or cough. 180 mL 11    losartan-hydrochlorothiazide (HYZAAR) 100-12.5 MG tablet Take 1 tablet by mouth daily 90 tablet 11    metoprolol succinate ER (TOPROL XL) 100 MG 24 hr tablet TAKE 1 TABLET AT BEDTIME 90 tablet 0    rosuvastatin (CRESTOR) 40 MG tablet Take 1 tablet (40 mg) by mouth daily. 90  tablet 3    tiotropium-olodaterol (STIOLTO RESPIMAT) 2.5-2.5 MCG/ACT AERS Inhale 2 puffs into the lungs daily. 12 g 11    warfarin ANTICOAGULANT (COUMADIN) 3 MG tablet TAKE 1 TO 2 TABLETS DAILY , ADJUST DOSE PER INR RESULTS AS INSTRUCTED 120 tablet 5    nitroGLYcerin (NITROSTAT) 0.4 MG sublingual tablet Place 1 tablet (0.4 mg) under the tongue every 5 minutes as needed for chest pain (Patient not taking: Reported on 1/3/2024) 20 tablet 11          Physical Exam:     /74 (BP Location: Left arm, Patient Position: Sitting, Cuff Size: Adult Regular)   Pulse 88   Wt 61.1 kg (134 lb 12.8 oz)   SpO2 95%   BMI 27.23 kg/m    Gen: adult female, appears in NAD  HEENT: clear conjunctivae, moist mucous membranes  CV: RRR, no M/G/R  Resp: CTA bilaterally.  Respirations even and unlabored.  On RA.  No cough.  Skin: no apparent rashes on visible skin  Ext: no cyanosis, clubbing or edema  Neuro: alert and answering questions appropriately       Data:     Labs:  reviewed    Imaging studies:  I have personally reviewed all pertinent imaging studies and PFT results unless otherwise noted.    EXAM: LOW DOSE LUNG CANCER SCREENING CT CHEST  LOCATION: [facility]  DATE: 7/22/2024  FINDINGS:  NODULES: Stable 2-3 mm peripheral pulmonary nodule in the lateral right upper lobe (series 4, image 42). No new or enlarging lung nodules.  LUNGS AND PLEURA: Advanced emphysema and related hyperinflation. Sparse areas of peripheral fibrosis in the apices. New endoluminal secretions are present with segmental and proximal subsegmental airways in the right lower lobe associated with increased   downstream atelectasis particularly anterior basal segment. A band of cicatrization atelectasis in the posterior basal right lower lobe towards the posterior sulcus is unchanged. There is a globule of adherent mucus in the anterior wall of the left   bronchus. No pleural effusions.  MEDIASTINUM: Recurrent hiatal hernia status post  gastric fundoplication. Esophagus is decompressed. Cardiac chambers are normal in size. Severe calcified atheroma in the thoracic aorta but no thoracic aortic dilation. No lymphadenopathy.  CORONARY ARTERY CALCIFICATION: Severe calcifications and coronary stents.  LIMITED UPPER ABDOMEN: Benign cyst in the caudate lobe of the liver. No new findings in the imaged upper abdomen.  MUSCULOSKELETAL: There is a mild superior endplate deformity of T4. Generalized bone demineralization. There is a stable and benign-appearing solid ovoid structure with substantial, coarse calcification along the superficial border of the left trapezius   muscle measuring 12 x 22 mm.                                                             IMPRESSION:  Negative for lung cancer screening purposes.  LungRADS CATEGORY: 2 : Benign.      EXAM: LOW DOSE LUNG CANCER SCREENING CT CHEST  LOCATION: [facility]  DATE: 7/17/2023                                                        IMPRESSION:  1.  Negative for lung cancer screening purposes.  LungRADS CATEGORY: 2 : Benign.        Pulmonary Function Testing

## 2024-10-03 NOTE — PROGRESS NOTES
[facility].  Patient states good understanding of how to use the nebulizer machine.  [facility]  All paperwork signed and copy scanned to chart. Phone numbers given to patient to call if any questions.      Patient also instructed in use of acapella/flutter device.  She will use this after her nebulizer treatments.  Patient states good understanding of how to use the acapella device.  [facility].

## 2024-10-04 ENCOUNTER — LAB (OUTPATIENT)
Dept: LAB | Facility: CLINIC | Age: 74
End: 2024-10-04
Payer: MEDICARE

## 2024-10-04 ENCOUNTER — ANTICOAGULATION THERAPY VISIT (OUTPATIENT)
Dept: ANTICOAGULATION | Facility: CLINIC | Age: 74
End: 2024-10-04

## 2024-10-04 DIAGNOSIS — Z86.711 HISTORY OF PULMONARY EMBOLISM: ICD-10-CM

## 2024-10-04 DIAGNOSIS — I82.409 RECURRENT DEEP VEIN THROMBOSIS (DVT) (H): ICD-10-CM

## 2024-10-04 DIAGNOSIS — R21 RASH AND NONSPECIFIC SKIN ERUPTION: ICD-10-CM

## 2024-10-04 DIAGNOSIS — Z79.01 LONG TERM (CURRENT) USE OF ANTICOAGULANTS: ICD-10-CM

## 2024-10-04 DIAGNOSIS — Z86.711 HISTORY OF PULMONARY EMBOLISM: Primary | ICD-10-CM

## 2024-10-04 LAB — INR BLD: 2.5 (ref 0.9–1.1)

## 2024-10-04 PROCEDURE — 85610 PROTHROMBIN TIME: CPT

## 2024-10-04 PROCEDURE — 36416 COLLJ CAPILLARY BLOOD SPEC: CPT

## 2024-10-04 NOTE — PROGRESS NOTES
ANTICOAGULATION MANAGEMENT     Leonela Christianson 74 year old female is on warfarin with therapeutic INR result. (Goal INR 2.0-3.0)    Recent labs: (last 7 days)     10/04/24  0750   INR 2.5*       ASSESSMENT     Source(s): Chart Review and Patient/Caregiver Call     Warfarin doses taken: Warfarin taken as instructed  Diet: No new diet changes identified  Medication/supplement changes: None noted   Action plan in place - for Emphysema / severe COPD - Prednisone 40mg x5 days and Azithromycin.  New illness, injury, or hospitalization: No  Signs or symptoms of bleeding or clotting: No  Previous result: Therapeutic last visit at 2.6; previously outside of goal range at 3.8  Additional findings: NOTE:  if INR is therapeutic - only wants to come in 4-6 wks and not any sooner.       PLAN     Recommended plan for no diet, medication or health factor changes affecting INR     Dosing Instructions: Continue your current warfarin dose with next INR in 6 weeks       Summary  As of 10/4/2024      Full warfarin instructions:  6 mg every Sun, Wed; 3 mg all other days   Next INR check:  11/15/2024               Telephone call with Leonela who verbalizes understanding and agrees to plan   - did adivse and instruct - if she ever has to use the action plan for Prednisone /  Azzithromycin, to check INR within 5 days, as these medication can increase the INR and risk for bleeding.    Lab visit scheduled - INR on 11/15/24 @ [facility].    Education provided: Taking warfarin: Importance of taking warfarin as instructed  Goal range and lab monitoring: goal range and significance of current result    Plan made per [facility] anticoagulation protocol    Jeanne Olsen RN  10/4/2024  Anticoagulation Clinic  [employer] for routing messages: julian WALDRON  [facility] patient phone line: [phone]        _______________________________________________________________________     Anticoagulation Episode Summary       Current INR goal:   2.0-3.0   TTR:  62.0% (1 y)   Target end date:  Indefinite   Send INR reminders to:  [facility]    Indications    History of pulmonary embolism [Z86.711]  Long term (current) use of anticoagulants [Z79.01]  Rash and nonspecific skin eruption [R21]  Recurrent deep vein thrombosis (DVT) (H) [I82.409]             Comments:  OK for 8 week checks per Dr. Woodruff on 1/23/23             Anticoagulation Care Providers       Provider Role Specialty Phone number    Miri Woodruff MD Referring Family Medicine [phone]

## 2024-10-22 DIAGNOSIS — I21.4 NSTEMI (NON-ST ELEVATED MYOCARDIAL INFARCTION) (H): ICD-10-CM

## 2024-10-22 DIAGNOSIS — I25.2 HISTORY OF NON-ST ELEVATION MYOCARDIAL INFARCTION (NSTEMI): ICD-10-CM

## 2024-10-22 RX ORDER — CLOPIDOGREL BISULFATE 75 MG/1
75 TABLET ORAL DAILY
Qty: 90 TABLET | Refills: 0 | Status: SHIPPED | OUTPATIENT
Start: 2024-10-22

## 2024-10-22 RX ORDER — METOPROLOL SUCCINATE 100 MG/1
100 TABLET, EXTENDED RELEASE ORAL AT BEDTIME
Qty: 90 TABLET | Refills: 0 | Status: SHIPPED | OUTPATIENT
Start: 2024-10-22

## 2024-11-15 ENCOUNTER — LAB (OUTPATIENT)
Dept: LAB | Facility: CLINIC | Age: 74
End: 2024-11-15
Payer: MEDICARE

## 2024-11-15 ENCOUNTER — ANTICOAGULATION THERAPY VISIT (OUTPATIENT)
Dept: ANTICOAGULATION | Facility: CLINIC | Age: 74
End: 2024-11-15

## 2024-11-15 DIAGNOSIS — Z86.711 HISTORY OF PULMONARY EMBOLISM: ICD-10-CM

## 2024-11-15 DIAGNOSIS — I82.409 RECURRENT DEEP VEIN THROMBOSIS (DVT) (H): ICD-10-CM

## 2024-11-15 DIAGNOSIS — R21 RASH AND NONSPECIFIC SKIN ERUPTION: ICD-10-CM

## 2024-11-15 DIAGNOSIS — Z86.711 HISTORY OF PULMONARY EMBOLISM: Primary | ICD-10-CM

## 2024-11-15 DIAGNOSIS — Z79.01 LONG TERM (CURRENT) USE OF ANTICOAGULANTS: ICD-10-CM

## 2024-11-15 LAB — INR BLD: 3.1 (ref 0.9–1.1)

## 2024-11-15 PROCEDURE — 36416 COLLJ CAPILLARY BLOOD SPEC: CPT

## 2024-11-15 PROCEDURE — 85610 PROTHROMBIN TIME: CPT

## 2024-11-15 NOTE — PROGRESS NOTES
ANTICOAGULATION MANAGEMENT     Leonela Christianson 74 year old female is on warfarin with supratherapeutic INR result. (Goal INR 2.0-3.0)    Recent labs: (last 7 days)     11/15/24  0737   INR 3.1*       ASSESSMENT     Source(s): Chart Review and Patient/Caregiver Call     Warfarin doses taken: Warfarin taken as instructed  Diet: No new diet changes identified  Medication/supplement changes: None noted  New illness, injury, or hospitalization: No  Signs or symptoms of bleeding or clotting: No  Previous result: Therapeutic last 2 INR visits  Additional findings: None       PLAN     Recommended plan for no diet, medication or health factor changes affecting INR     Dosing Instructions: Continue your current warfarin dose with next INR in 2 weeks       Summary  As of 11/15/2024      Full warfarin instructions:  6 mg every Sun, Wed; 3 mg all other days   Next INR check:  11/29/2024               Telephone call with Leonela who verbalizes understanding and agrees to plan    Lab visit scheduled - INR on 11/29/24 at [facility] with Dr. Gonzalez @ [facility]    Education provided: Taking warfarin: Importance of taking warfarin as instructed  Goal range and lab monitoring: goal range and significance of current result    Plan made per [facility] anticoagulation protocol    Jeanne Olsen RN  11/15/2024  Anticoagulation Clinic  [place] for routing messages: p COLBY WALDRON  [facility] patient phone line: [phone]        _______________________________________________________________________     Anticoagulation Episode Summary       Current INR goal:  2.0-3.0   TTR:  66.1% (1 y)   Target end date:  Indefinite   Send INR reminders to:  [facility]ART[facility]    Indications    History of pulmonary embolism [Z86.711]  Long term (current) use of anticoagulants [Z79.01]  Rash and nonspecific skin eruption [R21]  Recurrent deep vein thrombosis (DVT) (H) [I82.409]             Comments:  OK for 8 week checks per Dr. Woodruff on  1/23/23             Anticoagulation Care Providers       Provider Role Specialty Phone number    Miri Woodruff MD Referring Family Medicine [phone]

## 2024-11-20 ENCOUNTER — DOCUMENTATION ONLY (OUTPATIENT)
Dept: PULMONOLOGY | Facility: CLINIC | Age: 74
End: 2024-11-20
Payer: MEDICARE

## 2024-11-20 DIAGNOSIS — J44.9 COPD, SEVERE (H): ICD-10-CM

## 2024-11-20 DIAGNOSIS — J44.9 CHRONIC OBSTRUCTIVE PULMONARY DISEASE, UNSPECIFIED (H): Primary | ICD-10-CM

## 2024-11-20 DIAGNOSIS — J43.8 OTHER EMPHYSEMA (H): ICD-10-CM

## 2024-11-20 DIAGNOSIS — J96.11 CHRONIC HYPOXIC RESPIRATORY FAILURE (H): ICD-10-CM

## 2024-11-29 ENCOUNTER — LAB (OUTPATIENT)
Dept: CARDIOLOGY | Facility: CLINIC | Age: 74
End: 2024-11-29
Payer: MEDICARE

## 2024-11-29 DIAGNOSIS — I25.2 HISTORY OF NON-ST ELEVATION MYOCARDIAL INFARCTION (NSTEMI): Primary | ICD-10-CM

## 2024-11-29 DIAGNOSIS — Z79.01 LONG TERM (CURRENT) USE OF ANTICOAGULANTS: ICD-10-CM

## 2024-11-29 LAB — INR POINT OF CARE: 2.9 (ref 0.9–1.1)

## 2024-12-17 ENCOUNTER — OFFICE VISIT (OUTPATIENT)
Dept: FAMILY MEDICINE | Facility: CLINIC | Age: 74
End: 2024-12-17
Payer: MEDICARE

## 2024-12-17 ENCOUNTER — ANTICOAGULATION THERAPY VISIT (OUTPATIENT)
Dept: ANTICOAGULATION | Facility: CLINIC | Age: 74
End: 2024-12-17

## 2024-12-17 VITALS
SYSTOLIC BLOOD PRESSURE: 134 MMHG | HEIGHT: 59 IN | HEART RATE: 85 BPM | RESPIRATION RATE: 24 BRPM | OXYGEN SATURATION: 92 % | WEIGHT: 135.4 LBS | DIASTOLIC BLOOD PRESSURE: 85 MMHG | TEMPERATURE: 97.9 F | BODY MASS INDEX: 27.3 KG/M2

## 2024-12-17 DIAGNOSIS — Z79.01 LONG TERM (CURRENT) USE OF ANTICOAGULANTS: ICD-10-CM

## 2024-12-17 DIAGNOSIS — I82.409 RECURRENT DEEP VEIN THROMBOSIS (DVT) (H): ICD-10-CM

## 2024-12-17 DIAGNOSIS — J42 CHRONIC BRONCHITIS, UNSPECIFIED CHRONIC BRONCHITIS TYPE (H): ICD-10-CM

## 2024-12-17 DIAGNOSIS — I10 ESSENTIAL HYPERTENSION: Chronic | ICD-10-CM

## 2024-12-17 DIAGNOSIS — F17.200 TOBACCO USE DISORDER: ICD-10-CM

## 2024-12-17 DIAGNOSIS — R21 RASH AND NONSPECIFIC SKIN ERUPTION: ICD-10-CM

## 2024-12-17 DIAGNOSIS — J43.9 PULMONARY EMPHYSEMA, UNSPECIFIED EMPHYSEMA TYPE (H): Chronic | ICD-10-CM

## 2024-12-17 DIAGNOSIS — Z86.711 HISTORY OF PULMONARY EMBOLISM: Primary | ICD-10-CM

## 2024-12-17 DIAGNOSIS — E78.5 HYPERLIPIDEMIA, UNSPECIFIED HYPERLIPIDEMIA TYPE: Chronic | ICD-10-CM

## 2024-12-17 DIAGNOSIS — Z00.00 ENCOUNTER FOR MEDICARE ANNUAL WELLNESS EXAM: Primary | ICD-10-CM

## 2024-12-17 DIAGNOSIS — M81.0 OSTEOPOROSIS WITHOUT CURRENT PATHOLOGICAL FRACTURE, UNSPECIFIED OSTEOPOROSIS TYPE: ICD-10-CM

## 2024-12-17 DIAGNOSIS — I25.83 CORONARY ATHEROSCLEROSIS DUE TO LIPID RICH PLAQUE: Chronic | ICD-10-CM

## 2024-12-17 DIAGNOSIS — Z86.711 HISTORY OF PULMONARY EMBOLISM: ICD-10-CM

## 2024-12-17 LAB — INR BLD: 2.8 (ref 0.9–1.1)

## 2024-12-17 PROCEDURE — 36416 COLLJ CAPILLARY BLOOD SPEC: CPT | Performed by: FAMILY MEDICINE

## 2024-12-17 PROCEDURE — 85610 PROTHROMBIN TIME: CPT | Performed by: FAMILY MEDICINE

## 2024-12-17 RX ORDER — WARFARIN SODIUM 3 MG/1
TABLET ORAL
Qty: 120 TABLET | Refills: 5 | Status: SHIPPED | OUTPATIENT
Start: 2024-12-17

## 2024-12-17 SDOH — HEALTH STABILITY: PHYSICAL HEALTH: ON AVERAGE, HOW MANY DAYS PER WEEK DO YOU ENGAGE IN MODERATE TO STRENUOUS EXERCISE (LIKE A BRISK WALK)?: 0 DAYS

## 2024-12-17 ASSESSMENT — SOCIAL DETERMINANTS OF HEALTH (SDOH): HOW OFTEN DO YOU GET TOGETHER WITH FRIENDS OR RELATIVES?: PATIENT DECLINED

## 2024-12-17 NOTE — PROGRESS NOTES
Preventive Care Visit  [facility]  Miri Woodruff MD, Family Medicine  Dec 17, 2024      Assessment & Plan     Encounter for Medicare annual wellness exam  Routine Medicare wellness visit, updated in EMR.  Labs were updated recently with cardiology, not repeated today.  Mammogram is up-to-date as of April.  Colon cancer screening is up-to-date as of February, Cologuard will be due again in 2027.  We reviewed patient's last bone density test from 2019.  She continues to decline treatment, and so we will hold off on further bone density surveillance for now.  Plan repeat wellness visit in 1 year.    Pulmonary emphysema, unspecified emphysema type (H)  Chronic bronchitis, unspecified chronic bronchitis type (H)  Patient follows with pulmonology.  She has oxygen to be used as needed.  She rarely uses this she states.  She has been compliant with her inhaler regimen.  She has not had any recent exacerbations requiring steroids or antibiotics.  We did discuss her elevated hemoglobin and the fact that this would likely come down if she used her oxygen periodically.    Recurrent deep vein thrombosis (DVT) (H)  History of pulmonary embolism  Patient continues on warfarin.  INR drawn today.  - warfarin ANTICOAGULANT (COUMADIN) 3 MG tablet; TAKE 1 TO 2 TABLETS DAILY, ADJUST DOSE PER INR RESULTS AS INSTRUCTED    Essential hypertension  Well-controlled on current medication regimen of losartan-hydrochlorothiazide and metoprolol.    Hyperlipidemia, unspecified hyperlipidemia type  Continues on Crestor.  Lipids reasonably well-controlled, LDL very close to under 70.    Coronary atherosclerosis due to lipid rich plaque  No recent episodes of chest pain per patient.    Nicotine Dependence  Patient continues to smoke.  She is not interested in pharmacologic assistance with cessation at present.    Osteoporosis without current pathological fracture, unspecified osteoporosis type  Reviewed bone density  "test from 2019.  Patient continues to decline treatment for osteoporosis.            Nicotine/Tobacco Cessation  She reports that she has been smoking cigarettes. She started smoking about 62 years ago. She has a 62 pack-year smoking history. She has been exposed to tobacco smoke. She has never used smokeless tobacco.  Nicotine/Tobacco Cessation Plan  Information offered: Patient not interested at this time      BMI  Estimated body mass index is 27.35 kg/m  as calculated from the following:    Height as of this encounter: 1.499 m (4' 11\").    Weight as of this encounter: 61.4 kg (135 lb 6.4 oz).       Counseling  Appropriate preventive services were addressed with this patient via screening, questionnaire, or discussion as appropriate for fall prevention, nutrition, physical activity, Tobacco-use cessation, social engagement, weight loss and cognition.  Checklist reviewing preventive services available has been given to the patient.  Reviewed patient's diet, addressing concerns and/or questions.   The patient was instructed to see the dentist every 6 months.   Discussed possible causes of fatigue. Updated plan of care.  Patient reported difficulty with activities of daily living were addressed today.The patient was provided with written information regarding signs of hearing loss.           Nalini Gipson is a 74 year old, presenting for the following:  Wellness Visit        12/17/2024     9:13 AM   Additional Questions   Roomed by Kate ANDERSON LPN           HPI    Patient reports that overall she has been feeling well.  She declines pneumococcal vaccine today.  She did see cardiology recently and had her labs updated.  She is also following with pulmonology and is appreciative of this referral.  She feels very stable and has no particular questions or concerns today.  She mentions problems hearing, she states that her son and his friends will mumble quite a bit, otherwise she denies any issues with her hearing.  She " does not wish to have an evaluation with audiology.        Health Care Directive  Patient does not have a Health Care Directive: Discussed advance care planning with patient; however, patient declined at this time.      12/17/2024   General Health   How would you rate your overall physical health? (!) FAIR   Feel stress (tense, anxious, or unable to sleep) Only a little      (!) STRESS CONCERN      12/17/2024   Nutrition   Diet: Regular (no restrictions)            12/17/2024   Exercise   Days per week of moderate/strenous exercise 0 days      (!) EXERCISE CONCERN      12/17/2024   Social Factors   Frequency of gathering with friends or relatives Patient declined   Worry food won't last until get money to buy more No   Food not last or not have enough money for food? No   Do you have housing? (Housing is defined as stable permanent housing and does not include staying ouside in a car, in a tent, in an abandoned building, in an overnight shelter, or couch-surfing.) Yes   Are you worried about losing your housing? No   Lack of transportation? No   Unable to get utilities (heat,electricity)? No            12/17/2024   Fall Risk   Fallen 2 or more times in the past year? No     No    Trouble with walking or balance? No     No        Patient-reported    Multiple values from one day are sorted in reverse-chronological order          12/17/2024   Activities of Daily Living- Home Safety   Needs help with the following daily activites Preparing meals    Housework    Laundry   Safety concerns in the home None of the above       Multiple values from one day are sorted in reverse-chronological order         12/17/2024   Dental   Dentist two times every year? (!) NO            12/17/2024   Hearing Screening   Hearing concerns? (!) I FEEL THAT PEOPLE ARE MUMBLING OR NOT SPEAKING CLEARLY.            12/17/2024   Driving Risk Screening   Patient/family members have concerns about driving No            12/17/2024   General  Alertness/Fatigue Screening   Have you been more tired than usual lately? (!) YES            12/17/2024   Urinary Incontinence Screening   Bothered by leaking urine in past 6 months No            12/17/2024   TB Screening   Were you born outside of the [place]? No            Today's PHQ-2 Score:       12/17/2024     9:21 AM   PHQ-2 ( 1999 [employer])   Q1: Little interest or pleasure in doing things 1    Q2: Feeling down, depressed or hopeless 0    PHQ-2 Score 1    Q1: Little interest or pleasure in doing things Several days   Q2: Feeling down, depressed or hopeless Not at all   PHQ-2 Score 1       Patient-reported           12/17/2024   Substance Use   Alcohol more than 3/day or more than 7/wk Not Applicable   Do you have a current opioid prescription? No   How severe/bad is pain from 1 to 10? 0/10 (No Pain)   Do you use any other substances recreationally? No        Social History     Tobacco Use    Smoking status: Every Day     Current packs/day: 1.00     Average packs/day: 1 pack/day for 62.0 years (62.0 ttl pk-yrs)     Types: Cigarettes     Start date: 1/1/1963     Passive exposure: Past    Smokeless tobacco: Never   Vaping Use    Vaping status: Never Used   Substance Use Topics    Alcohol use: No    Drug use: No           4/10/2024   LAST FHS-7 RESULTS   1st degree relative breast or ovarian cancer No   Any relative bilateral breast cancer No   Any male have breast cancer No   Any ONE woman have BOTH breast AND ovarian cancer No   Any woman with breast cancer before 50yrs No   2 or more relatives with breast AND/OR ovarian cancer No   2 or more relatives with breast AND/OR bowel cancer No        Mammogram Screening - Mammogram every 1-2 years updated in Health Maintenance based on mutual decision making    ASCVD Risk   The ASCVD Risk score (Balbir VAZQUEZ, et al., 2019) failed to calculate for the following reasons:    Risk score cannot be calculated because patient has a medical history suggesting prior/existing  ASCVD            Reviewed and updated as needed this visit by Provider   Tobacco  Allergies  Meds  Problems               Past Medical History:   Diagnosis Date    Acute pyelonephritis     LEELA (acute kidney injury) (H) 2020    COPD (chronic obstructive pulmonary disease) (H)     Coronary artery disease     Diabetes mellitus (H)     Heart attack (H) 2012, 2019    X3    History of blood clots     PEx2    Hyperlipidemia     Hypertension     Myocardial infarction (H) 2012    Formatting of this note might be different from the original. Inferior, 2012 Formatting of this note might be different from the original. 2012     Pulmonary embolism (H) 4/10/2007    Formatting of this note might be different from the original. Two episodes over the last 10 years.  On lifelong coumadin therapy     Ureteral stone 2020    Added automatically from request for surgery [id number]      Past Surgical History:   Procedure Laterality Date    CARDIAC CATHETERIZATION      Stent    CV CORONARY ANGIOGRAM N/A 2017    Procedure: Coronary Angiogram;  Surgeon: Daniela Mazariegos MD;  Location: [facility];  Service:     CV LEFT HEART CATHETERIZATION WITHOUT LEFT VENTRICULOGRAM Left 2017    Procedure: Left Heart Catheterization Without Left Ventriculogram;  Surgeon: Daniela Mazariegos MD;  Location: [facility];  Service:     CYSTOSCOPY  2020    HC CYSTOSCOPY,INSERT URETERAL STENT Left 2020    Procedure: CYSTOSCOPY, WITH URETERAL STENT INSERTION;  Surgeon: Sean Schmitt MD;  Location: [facility];  Service: Urology    HYSTERECTOMY      fibroids    OOPHORECTOMY       OB History    Para Term  AB Living   1 1 1 0 0 0   SAB IAB Ectopic Multiple Live Births   0 0 0 0 0      # Outcome Date GA Lbr Jean Marie/2nd Weight Sex Type Anes PTL Lv   1 Term              BP Readings from Last 3 Encounters:   24 134/85   24 (!) 149/81   10/03/24 122/74    Wt Readings from Last 3  Encounters:   12/17/24 61.4 kg (135 lb 6.4 oz)   11/29/24 60.8 kg (134 lb)   10/03/24 61.1 kg (134 lb 12.8 oz)                  Patient Active Problem List   Diagnosis    Hyperlipidemia    Nicotine Dependence    Osteoporosis    Chronic bronchitis, unspecified chronic bronchitis type (H)    History of pulmonary embolism    Pulmonary nodule    Pulmonary emphysema, unspecified emphysema type (H)    Essential hypertension    History of non-ST elevation myocardial infarction (NSTEMI)    Coronary atherosclerosis due to lipid rich plaque    Pigmented skin lesion    Hypoxia    Hemorrhoids, internal    H/O: hysterectomy    Irritability and anger    Chronic fatigue    Long term (current) use of anticoagulants    Recurrent deep vein thrombosis (DVT) (H)    Rash and nonspecific skin eruption    Heartburn     Past Surgical History:   Procedure Laterality Date    CARDIAC CATHETERIZATION      Stent    CV CORONARY ANGIOGRAM N/A 11/20/2017    Procedure: Coronary Angiogram;  Surgeon: Daniela Mazariegos MD;  Location: [facility];  Service:     CV LEFT HEART CATHETERIZATION WITHOUT LEFT VENTRICULOGRAM Left 11/20/2017    Procedure: Left Heart Catheterization Without Left Ventriculogram;  Surgeon: Daniela Mazariegos MD;  Location: [facility];  Service:     CYSTOSCOPY  09/29/2020    HC CYSTOSCOPY,INSERT URETERAL STENT Left 9/11/2020    Procedure: CYSTOSCOPY, WITH URETERAL STENT INSERTION;  Surgeon: Sean Schmitt MD;  Location: [facility] OR;  Service: Urology    HYSTERECTOMY      fibroids    OOPHORECTOMY         Social History     Tobacco Use    Smoking status: Every Day     Current packs/day: 1.00     Average packs/day: 1 pack/day for 62.0 years (62.0 ttl pk-yrs)     Types: Cigarettes     Start date: 1/1/1963     Passive exposure: Past    Smokeless tobacco: Never   Substance Use Topics    Alcohol use: No     Family History   Problem Relation Age of Onset    No Known Problems Mother     No Known Problems  Father     Diabetes Paternal Uncle     Diabetes Sister     Breast Cancer No family hx of     Cancer No family hx of     Colon Cancer No family hx of     Heart Disease No family hx of     Coronary Artery Disease No family hx of          Current Outpatient Medications   Medication Sig Dispense Refill    albuterol (PROAIR HFA/PROVENTIL HFA/VENTOLIN HFA) 108 (90 Base) MCG/ACT inhaler USE 1 TO 2 INHALATIONS EVERY 4 TO 6 HOURS AS NEEDED FOR WHEEZING 25.5 g 6    Blood Pressure Monitoring (BLOOD PRESSURE CUFF) MISC 1 Device every 7 days. 1 each 3    clopidogrel (PLAVIX) 75 MG tablet Take 1 tablet (75 mg) by mouth daily. 90 tablet 11    ipratropium - albuterol 0.5 mg/2.5 mg/3 mL (DUONEB) 0.5-2.5 (3) MG/3ML neb solution Take 1 vial (3 mLs) by nebulization every 6 hours as needed for shortness of breath, wheezing or cough. 180 mL 11    losartan-hydrochlorothiazide (HYZAAR) 100-12.5 MG tablet Take 1 tablet by mouth daily. 90 tablet 11    metoprolol succinate ER (TOPROL XL) 100 MG 24 hr tablet Take 1 tablet (100 mg) by mouth at bedtime. 90 tablet 11    nitroGLYcerin (NITROSTAT) 0.4 MG sublingual tablet Place 1 tablet (0.4 mg) under the tongue every 5 minutes as needed for chest pain 20 tablet 11    rosuvastatin (CRESTOR) 40 MG tablet Take 1 tablet (40 mg) by mouth daily. 90 tablet 11    tiotropium-olodaterol (STIOLTO RESPIMAT) 2.5-2.5 MCG/ACT AERS Inhale 2 puffs into the lungs daily. 12 g 11    warfarin ANTICOAGULANT (COUMADIN) 3 MG tablet TAKE 1 TO 2 TABLETS DAILY, ADJUST DOSE PER INR RESULTS AS INSTRUCTED 120 tablet 5     Allergies   Allergen Reactions    Sulfa (Sulfonamide Antibiotics) [Sulfa Antibiotics] Anaphylaxis     Current providers sharing in care for this patient include:  Patient Care Team:  Miri Woodruff MD as PCP - General (Family Medicine)  Bandar Gonzalez MD as Assigned Heart and Vascular Provider  Miri Woodruff MD as Assigned PCP  Glory Ospina NP as Nurse Practitioner  "(Pulmonary Disease)  Glory Ospina, NP as Assigned Pulmonology Provider  Bandar Gonzalez MD as MD (Interventional Cardiology)    The following health maintenance items are reviewed in Epic and correct as of today:  Health Maintenance   Topic Date Due    COPD ACTION PLAN  Never done    Pneumococcal Vaccine: 65+ Years (1 of 2 - PCV) Never done    HEPATITIS C SCREENING  Never done    RSV VACCINE (1 - Risk 60-74 years 1-dose series) Never done    ZOSTER IMMUNIZATION (2 of 2) 06/07/2017    DTAP/TDAP/TD IMMUNIZATION (2 - Td or Tdap) 07/06/2021    ANNUAL REVIEW OF HM ORDERS  03/28/2023    COVID-19 Vaccine (6 - 2024-25 season) 09/01/2024    NICOTINE/TOBACCO CESSATION COUNSELING Q 1 YR  12/12/2024    MEDICARE ANNUAL WELLNESS VISIT  12/12/2024    LUNG CANCER SCREENING  07/22/2025    BMP  11/29/2025    LIPID  11/29/2025    FALL RISK ASSESSMENT  12/17/2025    MAMMO SCREENING  04/10/2026    COLORECTAL CANCER SCREENING  02/12/2027    GLUCOSE  11/29/2027    ADVANCE CARE PLANNING  12/17/2029    DEXA  10/11/2034    SPIROMETRY  Completed    PHQ-2 (once per calendar year)  Completed    INFLUENZA VACCINE  Completed    HPV IMMUNIZATION  Aged Out    MENINGITIS IMMUNIZATION  Aged Out    RSV MONOCLONAL ANTIBODY  Aged Out         Review of Systems  Constitutional, HEENT, cardiovascular, pulmonary, GI, , musculoskeletal, neuro, skin, endocrine and psych systems are negative, except as otherwise noted.     Objective    Exam  /85   Pulse 85   Temp 97.9  F (36.6  C) (Oral)   Resp 24   Ht 1.499 m (4' 11\")   Wt 61.4 kg (135 lb 6.4 oz)   SpO2 92%   BMI 27.35 kg/m     Estimated body mass index is 27.35 kg/m  as calculated from the following:    Height as of this encounter: 1.499 m (4' 11\").    Weight as of this encounter: 61.4 kg (135 lb 6.4 oz).    Physical Exam  GENERAL: alert and no distress  EYES: Eyes grossly normal to inspection, PERRL and conjunctivae and sclerae normal  HENT: ear canals and TM's normal, nose " and mouth without ulcers or lesions  NECK: no adenopathy, no asymmetry, masses, or scars  RESP: lungs clear to auscultation - no rales, rhonchi or wheezes  BREAST: normal without masses, tenderness or nipple discharge and no palpable axillary masses or adenopathy  CV: regular rate and rhythm, normal S1 S2, no S3 or S4, no murmur, click or rub, no peripheral edema  ABDOMEN: soft, nontender, no hepatosplenomegaly, no masses and bowel sounds normal  MS: no gross musculoskeletal defects noted, no edema  SKIN: no suspicious lesions or rashes  NEURO: Normal strength and tone, mentation intact and speech normal  PSYCH: mentation appears normal, affect normal/bright         12/17/2024   Mini Cog   Clock Draw Score 2 Normal   3 Item Recall 2 objects recalled   Mini Cog Total Score 4                 Signed Electronically by: Miri Woodruff MD

## 2024-12-17 NOTE — PROGRESS NOTES
ANTICOAGULATION MANAGEMENT     Leonela Christianson 74 year old female is on warfarin with therapeutic INR result. (Goal INR 2.0-3.0)    Recent labs: (last 7 days)     12/17/24  1032   INR 2.8*       ASSESSMENT     Source(s): Chart Reviewed    Medication/supplement changes: None noted  New illness, injury, or hospitalization: No   Medicare annual wellness check today - continues to smoke and not interested in stopping.  Previous result: Therapeutic last visit at 2.9; previously outside of goal range at 3.1  Additional findings:  NOTE:  patient only wants to check INR 4-6 weeks and not any sooner.       PLAN     Recommended plan for no diet, medication or health factor changes affecting INR     Dosing Instructions: Continue your current warfarin dose with next INR in 4-5 weeks       Summary  As of 12/17/2024      Full warfarin instructions:  6 mg every Sun, Wed; 3 mg all other days   Next INR check:  1/14/2025               Detailed voice message left for Leonela with dosing instructions and follow up date.     Contact [phone] to schedule and with any changes, questions or concerns.     Education provided: Please call back if any changes to your diet, medications or how you've been taking warfarin  Taking warfarin: Importance of taking warfarin as instructed  Goal range and lab monitoring: goal range and significance of current result    Plan made per [facility] anticoagulation protocol    Jeanne Olsen RN  12/17/2024  Anticoagulation Clinic  [facility] for routing messages: julian WALDRON  [facility] patient phone line: [phone]        _______________________________________________________________________     Anticoagulation Episode Summary       Current INR goal:  2.0-3.0   TTR:  64.1% (1 y)   Target end date:  Indefinite   Send INR reminders to:  COLBY WALDRON    Indications    History of pulmonary embolism [Z86.711]  Long term (current) use of anticoagulants [Z79.01]  Rash and nonspecific  skin eruption [R21]  Recurrent deep vein thrombosis (DVT) (H) [I82.409]             Comments:  OK for 8 week checks per Dr. Woodruff on 1/23/23             Anticoagulation Care Providers       Provider Role Specialty Phone number    Miri Woodruff MD Referring Family Medicine [phone]

## 2024-12-17 NOTE — PATIENT INSTRUCTIONS
Patient Education   Preventive Care Advice   This is general advice given by our system to help you stay healthy. However, your care team may have specific advice just for you. Please talk to your care team about your preventive care needs.  Nutrition  Eat 5 or more servings of fruits and vegetables each day.  Try wheat bread, brown rice and whole grain pasta (instead of white bread, rice, and pasta).  Get enough calcium and vitamin D. Check the label on foods and aim for 100% of the RDA (recommended daily allowance).  Lifestyle  Exercise at least 150 minutes each week  (30 minutes a day, 5 days a week).  Do muscle strengthening activities 2 days a week. These help control your weight and prevent disease.  No smoking.  Wear sunscreen to prevent skin cancer.  Have a dental exam and cleaning every 6 months.  Yearly exams  See your health care team every year to talk about:  Any changes in your health.  Any medicines your care team has prescribed.  Preventive care, family planning, and ways to prevent chronic diseases.  Shots (vaccines)   HPV shots (up to age 26), if you've never had them before.  Hepatitis B shots (up to age 59), if you've never had them before.  COVID-19 shot: Get this shot when it's due.  Flu shot: Get a flu shot every year.  Tetanus shot: Get a tetanus shot every 10 years.  Pneumococcal, hepatitis A, and RSV shots: Ask your care team if you need these based on your risk.  Shingles shot (for age 50 and up)  General health tests  Diabetes screening:  Starting at age 35, Get screened for diabetes at least every 3 years.  If you are younger than age 35, ask your care team if you should be screened for diabetes.  Cholesterol test: At age 39, start having a cholesterol test every 5 years, or more often if advised.  Bone density scan (DEXA): At age 50, ask your care team if you should have this scan for osteoporosis (brittle bones).  Hepatitis C: Get tested at least once in your life.  STIs (sexually  transmitted infections)  Before age 24: Ask your care team if you should be screened for STIs.  After age 24: Get screened for STIs if you're at risk. You are at risk for STIs (including HIV) if:  You are sexually active with more than one person.  You don't use condoms every time.  You or a partner was diagnosed with a sexually transmitted infection.  If you are at risk for HIV, ask about PrEP medicine to prevent HIV.  Get tested for HIV at least once in your life, whether you are at risk for HIV or not.  Cancer screening tests  Cervical cancer screening: If you have a cervix, begin getting regular cervical cancer screening tests starting at age 21.  Breast cancer scan (mammogram): If you've ever had breasts, begin having regular mammograms starting at age 40. This is a scan to check for breast cancer.  Colon cancer screening: It is important to start screening for colon cancer at age 45.  Have a colonoscopy test every 10 years (or more often if you're at risk) Or, ask your provider about stool tests like a FIT test every year or Cologuard test every 3 years.  To learn more about your testing options, visit:   .  For help making a decision, visit:   https://bit.ly/cj08873.  Prostate cancer screening test: If you have a prostate, ask your care team if a prostate cancer screening test (PSA) at age 55 is right for you.  Lung cancer screening: If you are a current or former smoker ages 50 to 80, ask your care team if ongoing lung cancer screenings are right for you.  For informational purposes only. Not to replace the advice of your health care provider. Copyright   2023 [facility]. All rights reserved. Clinically reviewed by the [facility] Transitions Program. [employer] 213716 - REV 01/24.  Learning About Activities of Daily Living  What are activities of daily living?     Activities of daily living (ADLs) are the basic self-care tasks you do every day. These include eating, bathing, dressing,  and moving around.  As you age, and if you have health problems, you may find that it's harder to do some of these tasks. If so, your doctor can suggest ideas that may help.  To measure what kind of help you may need, your doctor will ask how well you are able to do ADLs. Let your doctor know if there are any tasks that you are having trouble doing. This is an important first step to getting help. And when you have the help you need, you can stay as independent as possible.  How will a doctor assess your ADLs?  Asking about ADLs is part of a routine health checkup your doctor will likely do as you age. Your health check might be done in a doctor's office, in your home, or at a hospital. The goal is to find out if you are having any problems that could make it hard to care for yourself or that make it unsafe for you to be on your own.  To measure your ADLs, your doctor will ask how hard it is for you to do routine tasks. Your doctor may also want to know if you have changed the way you do a task because of a health problem. Your doctor may watch how you:  Walk back and forth.  Keep your balance while you stand or walk.  Move from sitting to standing or from a bed to a chair.  Button or unbutton a shirt or sweater.  Remove and put on your shoes.  It's common to feel a little worried or anxious if you find you can't do all the things you used to be able to do. Talking with your doctor about ADLs is a way to make sure you're as safe as possible and able to care for yourself as well as you can. You may want to bring a caregiver, friend, or family member to your checkup. They can help you talk to your doctor.  Follow-up care is a key part of your treatment and safety. Be sure to make and go to all appointments, and call your doctor if you are having problems. It's also a good idea to know your test results and keep a list of the medicines you take.  Current as of: October 24, 2023  Content Version: 14.2 2024 [facility]  [employer].   Care instructions adapted under license by your healthcare professional. If you have questions about a medical condition or this instruction, always ask your healthcare professional. [employer] disclaims any warranty or liability for your use of this information.    Hearing Loss: Care Instructions  Overview     Hearing loss is a sudden or slow decrease in how well you hear. It can range from slight to profound. Permanent hearing loss can occur with aging. It also can happen when you are exposed long-term to loud noise. Examples include listening to loud music, riding motorcycles, or being around other loud machines.  Hearing loss can affect your work and home life. It can make you feel lonely or depressed. You may feel that you have lost your independence. But hearing aids and other devices can help you hear better and feel connected to others.  Follow-up care is a key part of your treatment and safety. Be sure to make and go to all appointments, and call your doctor if you are having problems. It's also a good idea to know your test results and keep a list of the medicines you take.  How can you care for yourself at home?  Avoid loud noises whenever possible. This helps keep your hearing from getting worse.  Always wear hearing protection around loud noises.  Wear a hearing aid as directed.  A professional can help you pick a hearing aid that will work best for you.  You can also get hearing aids over the counter for mild to moderate hearing loss.  Have hearing tests as your doctor suggests. They can show whether your hearing has changed. Your hearing aid may need to be adjusted.  Use other devices as needed. These may include:  Telephone amplifiers and hearing aids that can connect to a television, stereo, radio, or microphone.  Devices that use lights or vibrations. These alert you to the doorbell, a ringing telephone, or a baby monitor.  Television closed-captioning. This shows  "the words at the bottom of the screen. Most new TVs can do this.  TTY (text telephone). This lets you type messages back and forth on the telephone instead of talking or listening. These devices are also called TDD. When messages are typed on the keyboard, they are sent over the phone line to a receiving TTY. The message is shown on a monitor.  Use text messaging, social media, and email if it is hard for you to communicate by telephone.  Try to learn a listening technique called speechreading. It is not lipreading. You pay attention to people's gestures, expressions, posture, and tone of voice. These clues can help you understand what a person is saying. Face the person you are talking to, and have them face you. Make sure the lighting is good. You need to see the other person's face clearly.  Think about counseling if you need help to adjust to your hearing loss.  When should you call for help?  Watch closely for changes in your health, and be sure to contact your doctor if:    You think your hearing is getting worse.     You have new symptoms, such as dizziness or nausea.   Where can you learn more?  Go to https://www.[employer].net/patiented  Enter R798 in the search box to learn more about \"Hearing Loss: Care Instructions.\"  Current as of: September 27, 2023  Content Version: 14.2 2024 [employer].   Care instructions adapted under license by your healthcare professional. If you have questions about a medical condition or this instruction, always ask your healthcare professional. [employer] disclaims any warranty or liability for your use of this information.    Learning About Sleeping Well  What does sleeping well mean?     Sleeping well means getting enough sleep to feel good and stay healthy. How much sleep is enough varies among people.  The number of hours you sleep and how you feel when you wake up are both important. If you do not feel refreshed, you probably need more sleep. " "Another sign of not getting enough sleep is feeling tired during the day.  Experts recommend that adults get at least 7 or more hours of sleep per day. Children and older adults need more sleep.  Why is getting enough sleep important?  Getting enough quality sleep is a basic part of good health. When your sleep suffers, your physical health, mood, and your thoughts can suffer too. You may find yourself feeling more grumpy or stressed. Not getting enough sleep also can lead to serious problems, including injury, accidents, anxiety, and depression.  What might cause poor sleeping?  Many things can cause sleep problems, including:  Changes to your sleep schedule.  Stress. Stress can be caused by fear about a single event, such as giving a speech. Or you may have ongoing stress, such as worry about work or school.  Depression, anxiety, and other mental or emotional conditions.  Changes in your sleep habits or surroundings. This includes changes that happen where you sleep, such as noise, light, or sleeping in a different bed. It also includes changes in your sleep pattern, such as having jet lag or working a late shift.  Health problems, such as pain, breathing problems, and restless legs syndrome.  Lack of regular exercise.  Using alcohol, nicotine, or caffeine before bed.  How can you help yourself?  Here are some tips that may help you sleep more soundly and wake up feeling more refreshed.  Your sleeping area   Use your bedroom only for sleeping and sex. A bit of light reading may help you fall asleep. But if it doesn't, do your reading elsewhere in the house. Try not to use your TV, computer, smartphone, or tablet while you are in bed.  Be sure your bed is big enough to stretch out comfortably, especially if you have a sleep partner.  Keep your bedroom quiet, dark, and cool. Use curtains, blinds, or a sleep mask to block out light. To block out noise, use earplugs, soothing music, or a \"white noise\" machine.  Your " "evening and bedtime routine   Create a relaxing bedtime routine. You might want to take a warm shower or bath, or listen to soothing music.  Go to bed at the same time every night. And get up at the same time every morning, even if you feel tired.  What to avoid   Limit caffeine (coffee, tea, caffeinated sodas) during the day, and don't have any for at least 6 hours before bedtime.  Avoid drinking alcohol before bedtime. Alcohol can cause you to wake up more often during the night.  Try not to smoke or use tobacco, especially in the evening. Nicotine can keep you awake.  Limit naps during the day, especially close to bedtime.  Avoid lying in bed awake for too long. If you can't fall asleep or if you wake up in the middle of the night and can't get back to sleep within about 20 minutes, get out of bed and go to another room until you feel sleepy.  Avoid taking medicine right before bed that may keep you awake or make you feel hyper or energized. Your doctor can tell you if your medicine may do this and if you can take it earlier in the day.  If you can't sleep   Imagine yourself in a peaceful, pleasant scene. Focus on the details and feelings of being in a place that is relaxing.  Get up and do a quiet or boring activity until you feel sleepy.  Avoid drinking any liquids before going to bed to help prevent waking up often to use the bathroom.  Where can you learn more?  Go to https://www.[employer].net/patiented  Enter J942 in the search box to learn more about \"Learning About Sleeping Well.\"  Current as of: July 10, 2023  Content Version: 14.2 2024 [facility] [employer].   Care instructions adapted under license by your healthcare professional. If you have questions about a medical condition or this instruction, always ask your healthcare professional. [employer] disclaims any warranty or liability for your use of this information.       "

## 2024-12-29 DIAGNOSIS — J43.9 PULMONARY EMPHYSEMA, UNSPECIFIED EMPHYSEMA TYPE (H): ICD-10-CM

## 2024-12-30 RX ORDER — ALBUTEROL SULFATE 90 UG/1
INHALANT RESPIRATORY (INHALATION)
Qty: 25.5 G | Refills: 1 | Status: SHIPPED | OUTPATIENT
Start: 2024-12-30

## 2025-01-22 ENCOUNTER — TELEPHONE (OUTPATIENT)
Dept: ANTICOAGULATION | Facility: CLINIC | Age: 75
End: 2025-01-22
Payer: MEDICARE

## 2025-01-22 NOTE — TELEPHONE ENCOUNTER
ANTICOAGULATION     Leonela Christianson is overdue for an INR check.     Spoke with Leonela and scheduled lab appointment on 1/23    Jeanne Olsen, RN  1/22/2025  Anticoagulation Clinic  [facility] for routing messages: julian MARTINS [facility] patient phone line: [phone]   No

## 2025-01-23 ENCOUNTER — ANTICOAGULATION THERAPY VISIT (OUTPATIENT)
Dept: ANTICOAGULATION | Facility: CLINIC | Age: 75
End: 2025-01-23

## 2025-01-23 ENCOUNTER — LAB (OUTPATIENT)
Dept: LAB | Facility: CLINIC | Age: 75
End: 2025-01-23
Payer: MEDICARE

## 2025-01-23 DIAGNOSIS — I82.409 RECURRENT DEEP VEIN THROMBOSIS (DVT) (H): ICD-10-CM

## 2025-01-23 DIAGNOSIS — Z79.01 LONG TERM (CURRENT) USE OF ANTICOAGULANTS: ICD-10-CM

## 2025-01-23 DIAGNOSIS — R21 RASH AND NONSPECIFIC SKIN ERUPTION: ICD-10-CM

## 2025-01-23 DIAGNOSIS — Z86.711 HISTORY OF PULMONARY EMBOLISM: Primary | ICD-10-CM

## 2025-01-23 DIAGNOSIS — Z86.711 HISTORY OF PULMONARY EMBOLISM: ICD-10-CM

## 2025-01-23 LAB — INR BLD: 3.4 (ref 0.9–1.1)

## 2025-02-13 ENCOUNTER — LAB (OUTPATIENT)
Dept: LAB | Facility: CLINIC | Age: 75
End: 2025-02-13
Payer: MEDICARE

## 2025-02-13 ENCOUNTER — ANTICOAGULATION THERAPY VISIT (OUTPATIENT)
Dept: ANTICOAGULATION | Facility: CLINIC | Age: 75
End: 2025-02-13

## 2025-02-13 DIAGNOSIS — Z86.711 HISTORY OF PULMONARY EMBOLISM: ICD-10-CM

## 2025-02-13 DIAGNOSIS — Z86.711 HISTORY OF PULMONARY EMBOLISM: Primary | ICD-10-CM

## 2025-02-13 DIAGNOSIS — Z79.01 LONG TERM (CURRENT) USE OF ANTICOAGULANTS: ICD-10-CM

## 2025-02-13 DIAGNOSIS — I82.409 RECURRENT DEEP VEIN THROMBOSIS (DVT) (H): ICD-10-CM

## 2025-02-13 DIAGNOSIS — R21 RASH AND NONSPECIFIC SKIN ERUPTION: ICD-10-CM

## 2025-02-13 LAB — INR BLD: 2.9 (ref 0.9–1.1)

## 2025-02-13 NOTE — PROGRESS NOTES
ANTICOAGULATION MANAGEMENT     Leonela Christianson 74 year old female is on warfarin with therapeutic INR result. (Goal INR 2.0-3.0)    Recent labs: (last 7 days)     02/13/25  0746   INR 2.9*       ASSESSMENT     Source(s): Chart Review and Patient/Caregiver Call     Warfarin doses taken: Warfarin taken as instructed  Diet: No new diet changes identified  Medication/supplement changes:  Yes   Weekly warfarin dose was decreased by 11.1% on 1/23/25.  New illness, injury, or hospitalization: No  Signs or symptoms of bleeding or clotting: No  Previous result: Supratherapeutic at 3.4 on 1/23/25.  Additional findings: NOTE: patient only wants to check INR 4-6 weeks and not any sooner.        PLAN     Recommended plan for no diet, medication or health factor changes affecting INR     Dosing Instructions: Continue your current warfarin dose with next INR in 3 weeks       Summary  As of 2/13/2025      Full warfarin instructions:  6 mg every Wed; 3 mg all other days   Next INR check:  3/6/2025               Telephone call with Leonela who verbalizes understanding and agrees to plan    Lab visit scheduled - INR on 3/6/25 @ [place]    Education provided: Taking warfarin: Importance of taking warfarin as instructed  Goal range and lab monitoring: goal range and significance of current result    Plan made per [facility] anticoagulation protocol    Jeanne Olsen RN  2/13/2025  Anticoagulation Clinic  [place] for routing messages: julian WALDRON  [facility] patient phone line: [phone]        _______________________________________________________________________     Anticoagulation Episode Summary       Current INR goal:  2.0-3.0   TTR:  53.0% (1 y)   Target end date:  Indefinite   Send INR reminders to:  COLBY WALDRON    Indications    History of pulmonary embolism [Z86.711]  Long term (current) use of anticoagulants [Z79.01]  Rash and nonspecific skin eruption [R21]  Recurrent deep vein thrombosis (DVT)  (H) [I82.409]             Comments:  OK for 8 week checks per Dr. Woodruff on 1/23/23             Anticoagulation Care Providers       Provider Role Specialty Phone number    Miri Woodruff MD Referring Family Medicine [phone]

## 2025-02-20 NOTE — TELEPHONE ENCOUNTER
General Call      Reason for Call:  Patient is returning a call for Mylene/Please contact patient for next route of care. Patient was rude    What are your questions or concerns:  Returning call for Mylene/please return call    Date of last appointment with provider: N/A    Okay to leave a detailed message?: No at Home number on file [phone] (home)     No lymphadedenopathy

## 2025-03-02 DIAGNOSIS — J43.9 PULMONARY EMPHYSEMA, UNSPECIFIED EMPHYSEMA TYPE (H): ICD-10-CM

## 2025-03-03 RX ORDER — ALBUTEROL SULFATE 90 UG/1
INHALANT RESPIRATORY (INHALATION)
Qty: 42.5 G | Refills: 3 | Status: SHIPPED | OUTPATIENT
Start: 2025-03-03

## 2025-03-06 ENCOUNTER — LAB (OUTPATIENT)
Dept: LAB | Facility: CLINIC | Age: 75
End: 2025-03-06
Payer: MEDICARE

## 2025-03-06 ENCOUNTER — ANTICOAGULATION THERAPY VISIT (OUTPATIENT)
Dept: ANTICOAGULATION | Facility: CLINIC | Age: 75
End: 2025-03-06

## 2025-03-06 DIAGNOSIS — Z79.01 LONG TERM (CURRENT) USE OF ANTICOAGULANTS: ICD-10-CM

## 2025-03-06 DIAGNOSIS — I82.409 RECURRENT DEEP VEIN THROMBOSIS (DVT) (H): ICD-10-CM

## 2025-03-06 DIAGNOSIS — Z86.711 HISTORY OF PULMONARY EMBOLISM: ICD-10-CM

## 2025-03-06 DIAGNOSIS — R21 RASH AND NONSPECIFIC SKIN ERUPTION: ICD-10-CM

## 2025-03-06 DIAGNOSIS — Z86.711 HISTORY OF PULMONARY EMBOLISM: Primary | ICD-10-CM

## 2025-03-06 LAB — INR BLD: 2.7 (ref 0.9–1.1)

## 2025-03-06 NOTE — PROGRESS NOTES
ANTICOAGULATION MANAGEMENT     Leonela Christianson 74 year old female is on warfarin with therapeutic INR result. (Goal INR 2.0-3.0)    Recent labs: (last 7 days)     03/06/25  0743   INR 2.7*       ASSESSMENT     Source(s): Chart Review and Patient/Caregiver Call     Warfarin doses taken: Warfarin taken as instructed  Diet: No new diet changes identified  Medication/supplement changes: None noted  New illness, injury, or hospitalization: No  Signs or symptoms of bleeding or clotting: No  Previous result: Therapeutic last visit at 2.9; previously outside of goal range at 3.4  Additional findings: NOTE: patient only wants to check INR 4-6 weeks and not any sooner.        PLAN     Recommended plan for no diet, medication or health factor changes affecting INR     Dosing Instructions: Continue your current warfarin dose with next INR in 5 weeks       Summary  As of 3/6/2025      Full warfarin instructions:  6 mg every Wed; 3 mg all other days   Next INR check:  4/10/2025               Telephone call with Leonela who verbalizes understanding and agrees to plan    Lab visit scheduled - [facility]10[facility]    Education provided: Taking warfarin: Importance of taking warfarin as instructed  Goal range and lab monitoring: goal range and significance of current result    Plan made per [facility] anticoagulation protocol    Jeanne Olsen RN  3/6/2025  Anticoagulation Clinic  [facility] for routing messages: julian WALDRON  [facility] patient phone line: [phone]        _______________________________________________________________________     Anticoagulation Episode Summary       Current INR goal:  2.0-3.0   TTR:  58.7% (1 y)   Target end date:  Indefinite   Send INR reminders to:  COLBY WALDRON    Indications    History of pulmonary embolism [Z86.711]  Long term (current) use of anticoagulants [Z79.01]  Rash and nonspecific skin eruption [R21]  Recurrent deep vein thrombosis (DVT) (H) [I82.409]              Comments:  OK for 8 week checks per Dr. Woodruff on 1/23/23             Anticoagulation Care Providers       Provider Role Specialty Phone number    Miri Woodruff MD Referring Family Medicine [phone]

## 2025-04-03 ENCOUNTER — OFFICE VISIT (OUTPATIENT)
Dept: PULMONOLOGY | Facility: CLINIC | Age: 75
End: 2025-04-03
Attending: NURSE PRACTITIONER
Payer: MEDICARE

## 2025-04-03 ENCOUNTER — ANCILLARY ORDERS (OUTPATIENT)
Dept: FAMILY MEDICINE | Facility: CLINIC | Age: 75
End: 2025-04-03

## 2025-04-03 VITALS
BODY MASS INDEX: 27.47 KG/M2 | OXYGEN SATURATION: 96 % | SYSTOLIC BLOOD PRESSURE: 104 MMHG | WEIGHT: 136 LBS | HEART RATE: 96 BPM | DIASTOLIC BLOOD PRESSURE: 76 MMHG

## 2025-04-03 DIAGNOSIS — J43.8 OTHER EMPHYSEMA (H): ICD-10-CM

## 2025-04-03 DIAGNOSIS — J96.11 CHRONIC HYPOXIC RESPIRATORY FAILURE (H): ICD-10-CM

## 2025-04-03 DIAGNOSIS — J44.9 COPD, SEVERE (H): Primary | ICD-10-CM

## 2025-04-03 DIAGNOSIS — Z12.31 VISIT FOR SCREENING MAMMOGRAM: Primary | ICD-10-CM

## 2025-04-03 DIAGNOSIS — F17.218 CIGARETTE NICOTINE DEPENDENCE WITH OTHER NICOTINE-INDUCED DISORDER: ICD-10-CM

## 2025-04-03 NOTE — PATIENT INSTRUCTIONS
It was a pleasure to see you in clinic today.   Here is what we discussed:    Start Trelegy Ellipta, one puff daily, rinse/gargle after use.  If this is too expensive, continue Stiolto two puffs daily.  Continue Duonebs + flutter valve once-twice daily, to help bring up mucous.   Continue Albuterol inhaler every 4-6 hours as needed for shortness of breath or wheezing.  Continue 2L of oxygen during activity to keep oxygen saturations >88%.  Continue to work on smoking cessation.   Have the chest CT done in July for lung cancer screening.   Call my nurse, Nayan ([phone]) with any change or worsening of your breathing. We can then send out your 'action plan' medications (prednisone + antibiotic).   Follow-up in 6 months.    Glory Ospina, CNP  Pulmonary Medicine  [facility]  [phone]

## 2025-04-03 NOTE — PROGRESS NOTES
Pulmonary Clinic Follow-up          Assessment/Plan:     74 year old female with a history of COPD, PE x2 on warfarin, CAD s/p MI, DM II, HTN, HLD - presenting for 6 month follow-up of COPD.      COPD - GOLD E  Emphysema  Chronic hypoxic respiratory failure  Current smoker with 60 pack year smoking hx, presented in 1/2024 for evaluation of COPD.  Severe emphysema noted per chest imaging.  Pulmonary function testing with severe airway obstruction (FEV1 47% predicted), air-trapping, and severely reduced diffusion capacity (DLCo 39% predicted).  Of note her hgb was 17.1 during PFTs.  6 minute walk test done d/t PFT results, which did show oxygen desaturations, requiring 2L of oxygen during activity.  Her main daily respiratory symptoms include shortness of breath, cough, some sputum production.  She does not exacerbate frequently or require prednisone.    Previous visit we encouraged smoking cessation and discussed increasing inhaler regimen, but she elected to stay with Stiolto.  An overnight oximetry study was performed, which shows basal saturations 91%, but we only had results from about 3 hours of the night.  LDCT 7/2024 negative for lung cancer screening, LungRADS category 2.  Did note endoluminal secretions with downstream atelectasis.  We started Duonebs + flutter valve for mucous plugging.   Today she reports fatigue and some SOB.  The Duonebs did help her but she stopped using them.  She continues to smoke 0.5 PPD.  She is not using her oxygen with activity.     Plan:  - stop Stiolto, start Trelegy Ellipta one inhalation daily, rinse/gargle after use.  If there are cost/coverage issues - stay on Stiolto.  - encouraged her to restart Duonebs + flutter valve for mucous plugging and atelectasis.   - continue Albuterol HFA PRN  - encouraged compliance with 2L oxygen with activity, to keep oxygen saturations >88%.  She does have an oximeter at home to monitor her saturations.  - continue to work on smoking  cessation.  She declines any prescriptions for this today.  - continue annual LDCT for lung cancer screening, due again in 7/2025, ordered today.  - she is UTD with annual influenza vaccine.  She is due for pneumococcal vaccine, she previously declined.   - Action plan: prednisone 40mg x5 days, + azithromycin x5 days    Follow-up  - 6 months.    Glory Ospina, CNP  Pulmonary Medicine  [facility]  [phone]    Due to desaturation of SaO2 less than or equal to 88% on Room air from COPD, emphysema - home oxygen therapy will benefit my patient's condition.  The patient has tried (or considered) medications with limited success and oxygen is still required.  This patient is mobile in the home and requires portability.    Patient needs portable oxygen with ambulation at 2 LPM/NC.  Patient also needs POC.  OK for conserving device.  OK to do titration study if needed--keep oxygen saturation >88%.  Please issue conserving device with appropriate titrated setting after patient completes successful assessment.       CC:     COPD follow-up     HPI:     74 year old female with a history of COPD, PE x2 on warfarin, CAD s/p MI, DM II, HTN, HLD - presenting for 6 month follow-up of COPD.      Tried Duonebs, did help her feel better, but hasn't been doing them.  Smoking 10-15 cigarettes/day.   She has tried hypnosis and acupuncture for smoking cessation.   She does not use her oxygen.  Having some fatigue.      Patient supplied answers from flow sheet for:  COPD Assessment Test (CAT)  2009 "[employer]. All rights reserved.      1/3/2024     7:00 AM 4/3/2024     9:02 AM 10/3/2024     9:36 AM 4/3/2025     9:38 AM   COPD assessment test (CAT)   Cough 3 3 4 1   Phlegm 3 3 3 2   Chest tightness 1 0 0 0   Walk up hill 5 3 4 4   Limited activities 4 2 3 3   Leaving my home 3 0 2 1   Sleep 2 1 2 1   Energy 5 3 3 4   Total Score 26 15 21 16        Patient-reported      CAT Key:  The CAT consist of 8  items which are each scored 0-5. The total score ranges from 0-40 with higher scores representing a poorer health status. When interpreting CAT scores, the individual s disease severity should be considered.   Low impact  (1-9)  Medium impact  (10-20)  High impact  (21-30)  Very high impact  (31-40)         ROS:     A 6-system review was obtained and was negative with the exception of the symptoms endorsed in the HPI.       Medical history:       PMH:  Past Medical History:   Diagnosis Date    Acute pyelonephritis     LEELA (acute kidney injury) 9/12/2020    COPD (chronic obstructive pulmonary disease) (H)     Coronary artery disease     Diabetes mellitus (H)     Heart attack (H) 2012, 2019    X3    History of blood clots     PEx2    Hyperlipidemia     Hypertension     Myocardial infarction (H) 5/9/2012    Formatting of this note might be different from the original. Inferior, 1/2012 Formatting of this note might be different from the original. 4/2012     Pulmonary embolism (H) 4/10/2007    Formatting of this note might be different from the original. Two episodes over the last 10 years.  On lifelong coumadin therapy     Ureteral stone 9/11/2020    Added automatically from request for surgery [id number]        PSH:  Past Surgical History:   Procedure Laterality Date    CARDIAC CATHETERIZATION      Stent    CV CORONARY ANGIOGRAM N/A 11/20/2017    Procedure: Coronary Angiogram;  Surgeon: Daniela Mazariegos MD;  Location: [facility] Cath Lab;  Service:     CV LEFT HEART CATHETERIZATION WITHOUT LEFT VENTRICULOGRAM Left 11/20/2017    Procedure: Left Heart Catheterization Without Left Ventriculogram;  Surgeon: Daniela Mazariegos MD;  Location: [facility] Cath Lab;  Service:     CYSTOSCOPY  09/29/2020    HC CYSTOSCOPY,INSERT URETERAL STENT Left 9/11/2020    Procedure: CYSTOSCOPY, WITH URETERAL STENT INSERTION;  Surgeon: Sean Schmitt MD;  Location: [facility] OR;  Service: Urology    HYSTERECTOMY      fibroids     OOPHORECTOMY         Allergies:  Allergies   Allergen Reactions    Sulfa (Sulfonamide Antibiotics) [Sulfa Antibiotics] Anaphylaxis       Family Hx:  Family History   Problem Relation Age of Onset    No Known Problems Mother     No Known Problems Father     Diabetes Paternal Uncle     Diabetes Sister     Breast Cancer No family hx of     Cancer No family hx of     Colon Cancer No family hx of     Heart Disease No family hx of     Coronary Artery Disease No family hx of        Social Hx:  Social History     Socioeconomic History    Marital status:      Spouse name: Not on file    Number of children: Not on file    Years of education: Not on file    Highest education level: Not on file   Occupational History    Not on file   Tobacco Use    Smoking status: Every Day     Current packs/day: 1.00     Average packs/day: 1 pack/day for 62.3 years (62.3 ttl pk-yrs)     Types: Cigarettes     Start date: 1/1/1963     Passive exposure: Past    Smokeless tobacco: Never   Vaping Use    Vaping status: Never Used   Substance and Sexual Activity    Alcohol use: No    Drug use: No    Sexual activity: Not Currently     Partners: Male   Other Topics Concern    Not on file   Social History Narrative    She lives with her 40-year-old son and 14-year-old grandson.  She used to work in [employer] department.     Social Drivers of Health     Financial Resource Strain: Low Risk  (12/17/2024)    Financial Resource Strain     Within the past 12 months, have you or your family members you live with been unable to get utilities (heat, electricity) when it was really needed?: No   Food Insecurity: Low Risk  (12/17/2024)    Food Insecurity     Within the past 12 months, did you worry that your food would run out before you got money to buy more?: No     Within the past 12 months, did the food you bought just not last and you didn t have money to get more?: No   Transportation Needs: Low Risk  (12/17/2024)    Transportation Needs     Within the  past 12 months, has lack of transportation kept you from medical appointments, getting your medicines, non-medical meetings or appointments, work, or from getting things that you need?: No   Physical Activity: Unknown (12/17/2024)    Exercise Vital Sign     Days of Exercise per Week: 0 days     Minutes of Exercise per Session: Not on file   Stress: No Stress Concern Present (12/17/2024)    [employer] - Occupational Stress Questionnaire     Feeling of Stress : Only a little   Social Connections: Unknown (12/17/2024)    Social Connection and Isolation Panel [NHANES]     Frequency of Communication with Friends and Family: Not on file     Frequency of Social Gatherings with Friends and Family: Patient declined     Attends [employer] Services: Not on file     Active Member of Clubs or Organizations: Not on file     Attends Club or Organization Meetings: Not on file     Marital Status: Not on file   Interpersonal Safety: Low Risk  (12/17/2024)    Interpersonal Safety     Do you feel physically and emotionally safe where you currently live?: Yes     Within the past 12 months, have you been hit, slapped, kicked or otherwise physically hurt by someone?: No     Within the past 12 months, have you been humiliated or emotionally abused in other ways by your partner or ex-partner?: No   Housing Stability: Low Risk  (12/17/2024)    Housing Stability     Do you have housing? : Yes     Are you worried about losing your housing?: No       Current Meds:  Current Outpatient Medications   Medication Sig Dispense Refill    albuterol (PROAIR HFA/PROVENTIL HFA/VENTOLIN HFA) 108 (90 Base) MCG/ACT inhaler USE 1 TO 2 INHALATIONS EVERY 4 TO 6 HOURS AS NEEDED FOR WHEEZING 42.5 g 3    Blood Pressure Monitoring (BLOOD PRESSURE CUFF) MISC 1 Device every 7 days. 1 each 3    clopidogrel (PLAVIX) 75 MG tablet Take 1 tablet (75 mg) by mouth daily. 90 tablet 11    Fluticasone-Umeclidin-Vilant (TRELEGY ELLIPTA) 100-62.5-25  MCG/ACT oral inhaler Inhale 1 puff into the lungs daily. 120 each 11    ipratropium - albuterol 0.5 mg/2.5 mg/3 mL (DUONEB) 0.5-2.5 (3) MG/3ML neb solution Take 1 vial (3 mLs) by nebulization every 6 hours as needed for shortness of breath, wheezing or cough. 180 mL 11    losartan-hydrochlorothiazide (HYZAAR) 100-12.5 MG tablet Take 1 tablet by mouth daily. 90 tablet 11    metoprolol succinate ER (TOPROL XL) 100 MG 24 hr tablet Take 1 tablet (100 mg) by mouth at bedtime. 90 tablet 11    nitroGLYcerin (NITROSTAT) 0.4 MG sublingual tablet Place 1 tablet (0.4 mg) under the tongue every 5 minutes as needed for chest pain 20 tablet 11    rosuvastatin (CRESTOR) 40 MG tablet Take 1 tablet (40 mg) by mouth daily. 90 tablet 11    tiotropium-olodaterol (STIOLTO RESPIMAT) 2.5-2.5 MCG/ACT AERS Inhale 2 puffs into the lungs daily. 12 g 11    warfarin ANTICOAGULANT (COUMADIN) 3 MG tablet TAKE 1 TO 2 TABLETS DAILY, ADJUST DOSE PER INR RESULTS AS INSTRUCTED 120 tablet 5          Physical Exam:     /76 (BP Location: Left arm, Patient Position: Sitting, Cuff Size: Adult Regular)   Pulse 96   Wt 61.7 kg (136 lb)   SpO2 96%   BMI 27.47 kg/m    Gen: adult female, appears in NAD  HEENT: clear conjunctivae, moist mucous membranes  CV: RRR, no M/G/R  Resp: CTA bilaterally.  Respirations even and unlabored.  On RA.  No cough.  Skin: no apparent rashes on visible skin  Ext: no cyanosis, clubbing or edema  Neuro: alert and answering questions appropriately       Data:     Labs:  reviewed    Imaging studies:  I have personally reviewed all pertinent imaging studies and PFT results unless otherwise noted.    EXAM: LOW DOSE LUNG CANCER SCREENING CT CHEST  LOCATION: [facility]  DATE: 7/22/2024  FINDINGS:  NODULES: Stable 2-3 mm peripheral pulmonary nodule in the lateral right upper lobe (series 4, image 42). No new or enlarging lung nodules.  LUNGS AND PLEURA: Advanced emphysema and related hyperinflation.  Sparse areas of peripheral fibrosis in the apices. New endoluminal secretions are present with segmental and proximal subsegmental airways in the right lower lobe associated with increased   downstream atelectasis particularly anterior basal segment. A band of cicatrization atelectasis in the posterior basal right lower lobe towards the posterior sulcus is unchanged. There is a globule of adherent mucus in the anterior wall of the left   bronchus. No pleural effusions.  MEDIASTINUM: Recurrent hiatal hernia status post gastric fundoplication. Esophagus is decompressed. Cardiac chambers are normal in size. Severe calcified atheroma in the thoracic aorta but no thoracic aortic dilation. No lymphadenopathy.  CORONARY ARTERY CALCIFICATION: Severe calcifications and coronary stents.  LIMITED UPPER ABDOMEN: Benign cyst in the caudate lobe of the liver. No new findings in the imaged upper abdomen.  MUSCULOSKELETAL: There is a mild superior endplate deformity of T4. Generalized bone demineralization. There is a stable and benign-appearing solid ovoid structure with substantial, coarse calcification along the superficial border of the left trapezius   muscle measuring 12 x 22 mm.                                                             IMPRESSION:  Negative for lung cancer screening purposes.  LungRADS CATEGORY: 2 : Benign.      EXAM: LOW DOSE LUNG CANCER SCREENING CT CHEST  LOCATION: [facility]  DATE: 7/17/2023                                                        IMPRESSION:  1.  Negative for lung cancer screening purposes.  LungRADS CATEGORY: 2 : Benign.        Pulmonary Function Testing

## 2025-04-10 ENCOUNTER — LAB (OUTPATIENT)
Dept: LAB | Facility: CLINIC | Age: 75
End: 2025-04-10
Payer: MEDICARE

## 2025-04-10 ENCOUNTER — ANTICOAGULATION THERAPY VISIT (OUTPATIENT)
Dept: ANTICOAGULATION | Facility: CLINIC | Age: 75
End: 2025-04-10

## 2025-04-10 DIAGNOSIS — Z86.711 HISTORY OF PULMONARY EMBOLISM: Primary | ICD-10-CM

## 2025-04-10 DIAGNOSIS — I82.409 RECURRENT DEEP VEIN THROMBOSIS (DVT) (H): ICD-10-CM

## 2025-04-10 DIAGNOSIS — R21 RASH AND NONSPECIFIC SKIN ERUPTION: ICD-10-CM

## 2025-04-10 DIAGNOSIS — Z79.01 LONG TERM (CURRENT) USE OF ANTICOAGULANTS: ICD-10-CM

## 2025-04-10 DIAGNOSIS — Z86.711 HISTORY OF PULMONARY EMBOLISM: ICD-10-CM

## 2025-04-10 LAB — INR BLD: 2.3 (ref 0.9–1.1)

## 2025-04-10 NOTE — PROGRESS NOTES
ANTICOAGULATION MANAGEMENT     Leonela Christianson 74 year old female is on warfarin with therapeutic INR result. (Goal INR 2.0-3.0)    Recent labs: (last 7 days)     04/10/25  0741   INR 2.3*       ASSESSMENT     Source(s): Chart Review  Previous INR was Therapeutic last 2(+) visits  Medication, diet, health changes since last INR chart reviewed; none identified  First call patient picked up phone and then  hung up. Did not answer the next call.         PLAN     Recommended plan for no diet, medication or health factor changes affecting INR     Dosing Instructions: Continue your current warfarin dose with next INR in 6 weeks       Summary  As of 4/10/2025      Full warfarin instructions:  6 mg every Wed; 3 mg all other days   Next INR check:  5/22/2025               Detailed voice message left for Leonela with dosing instructions and follow up date.     Contact [phone] to schedule and with any changes, questions or concerns.     Education provided: Please call back if any changes to your diet, medications or how you've been taking warfarin  Contact [phone] with any changes, questions or concerns.     Plan made per [facility] anticoagulation protocol    Bella Swain RN  4/10/2025  Anticoagulation Clinic  [employer] for routing messages: julian WALDRON  [facility] patient phone line: [phone]        _______________________________________________________________________     Anticoagulation Episode Summary       Current INR goal:  2.0-3.0   TTR:  68.3% (1 y)   Target end date:  Indefinite   Send INR reminders to:  COLBY WALDRON    Indications    History of pulmonary embolism [Z86.711]  Long term (current) use of anticoagulants [Z79.01]  Rash and nonspecific skin eruption [R21]  Recurrent deep vein thrombosis (DVT) (H) [I82.409]             Comments:  OK for 8 week checks per Dr. Woodruff on 1/23/23             Anticoagulation Care Providers       Provider Role Specialty Phone number     Miri Woodruff MD [facility] [phone]

## 2025-05-22 ENCOUNTER — ANTICOAGULATION THERAPY VISIT (OUTPATIENT)
Dept: ANTICOAGULATION | Facility: CLINIC | Age: 75
End: 2025-05-22

## 2025-05-22 ENCOUNTER — LAB (OUTPATIENT)
Dept: LAB | Facility: CLINIC | Age: 75
End: 2025-05-22
Payer: MEDICARE

## 2025-05-22 DIAGNOSIS — I82.409 RECURRENT DEEP VEIN THROMBOSIS (DVT) (H): ICD-10-CM

## 2025-05-22 DIAGNOSIS — R21 RASH AND NONSPECIFIC SKIN ERUPTION: ICD-10-CM

## 2025-05-22 DIAGNOSIS — Z86.711 HISTORY OF PULMONARY EMBOLISM: Primary | ICD-10-CM

## 2025-05-22 DIAGNOSIS — Z86.711 HISTORY OF PULMONARY EMBOLISM: ICD-10-CM

## 2025-05-22 DIAGNOSIS — Z79.01 LONG TERM (CURRENT) USE OF ANTICOAGULANTS: ICD-10-CM

## 2025-05-22 LAB — INR BLD: 2.7 (ref 0.9–1.1)

## 2025-05-22 NOTE — PROGRESS NOTES
ANTICOAGULATION MANAGEMENT     Leonela Christianson 74 year old female is on warfarin with therapeutic INR result. (Goal INR 2.0-3.0)    Recent labs: (last 7 days)     05/22/25  0741   INR 2.7*       ASSESSMENT     Source(s): Chart Review and Patient/Caregiver Call     Warfarin doses taken: Warfarin taken as instructed  Diet: No new diet changes identified  Medication/supplement changes: None noted  New illness, injury, or hospitalization: No  Signs or symptoms of bleeding or clotting: No  Previous result: Therapeutic last 3 INR visits  Additional findings: None       PLAN     Recommended plan for no diet, medication or health factor changes affecting INR     Dosing Instructions: Continue your current warfarin dose with next INR in 5 weeks       Summary  As of 5/22/2025      Full warfarin instructions:  6 mg every Wed; 3 mg all other days   Next INR check:  6/26/2025               Telephone call with Leonela who verbalizes understanding and agrees to plan    Lab visit scheduled - INR on 6/26/26 @ [facility]    Education provided: Taking warfarin: Importance of taking warfarin as instructed  Goal range and lab monitoring: goal range and significance of current result    Plan made per [facility] anticoagulation protocol    Jeanen Olsen RN  5/22/2025  Anticoagulation Clinic  [email or web address] for routing messages: julian WALDRON  [facility] patient phone line: [phone]        _______________________________________________________________________     Anticoagulation Episode Summary       Current INR goal:  2.0-3.0   TTR:  70.6% (1 y)   Target end date:  Indefinite   Send INR reminders to:  COLBY WALDRON    Indications    History of pulmonary embolism [Z86.711]  Long term (current) use of anticoagulants [Z79.01]  Rash and nonspecific skin eruption [R21]  Recurrent deep vein thrombosis (DVT) (H) [I82.409]             Comments:  OK for 8 week checks per Dr. Woodruff on 1/23/23             Anticoagulation Care  Providers       Provider Role Specialty Phone number    Miri Woodruff MD Referring Family Medicine [phone]

## 2025-06-02 ENCOUNTER — OFFICE VISIT (OUTPATIENT)
Dept: CARDIOLOGY | Facility: CLINIC | Age: 75
End: 2025-06-02
Attending: INTERNAL MEDICINE
Payer: MEDICARE

## 2025-06-02 VITALS
DIASTOLIC BLOOD PRESSURE: 80 MMHG | WEIGHT: 136 LBS | SYSTOLIC BLOOD PRESSURE: 139 MMHG | HEART RATE: 77 BPM | BODY MASS INDEX: 27.47 KG/M2 | RESPIRATION RATE: 14 BRPM

## 2025-06-02 DIAGNOSIS — I25.2 HISTORY OF NON-ST ELEVATION MYOCARDIAL INFARCTION (NSTEMI): ICD-10-CM

## 2025-06-02 DIAGNOSIS — R06.09 DYSPNEA ON EXERTION: Primary | ICD-10-CM

## 2025-06-02 DIAGNOSIS — I21.4 NSTEMI (NON-ST ELEVATED MYOCARDIAL INFARCTION) (H): ICD-10-CM

## 2025-06-02 DIAGNOSIS — E78.5 HYPERLIPIDEMIA LDL GOAL <70: ICD-10-CM

## 2025-06-02 DIAGNOSIS — R07.89 CHEST PRESSURE: ICD-10-CM

## 2025-06-02 DIAGNOSIS — I10 ESSENTIAL HYPERTENSION: ICD-10-CM

## 2025-06-02 PROCEDURE — 3079F DIAST BP 80-89 MM HG: CPT | Performed by: INTERNAL MEDICINE

## 2025-06-02 PROCEDURE — 3075F SYST BP GE 130 - 139MM HG: CPT | Performed by: INTERNAL MEDICINE

## 2025-06-02 PROCEDURE — G2211 COMPLEX E/M VISIT ADD ON: HCPCS | Performed by: INTERNAL MEDICINE

## 2025-06-02 PROCEDURE — 99214 OFFICE O/P EST MOD 30 MIN: CPT | Performed by: INTERNAL MEDICINE

## 2025-06-02 RX ORDER — ROSUVASTATIN CALCIUM 40 MG/1
40 TABLET, COATED ORAL DAILY
Qty: 90 TABLET | Refills: 11 | Status: SHIPPED | OUTPATIENT
Start: 2025-06-02

## 2025-06-02 RX ORDER — CLOPIDOGREL BISULFATE 75 MG/1
75 TABLET ORAL DAILY
Qty: 90 TABLET | Refills: 11 | Status: SHIPPED | OUTPATIENT
Start: 2025-06-02

## 2025-06-02 RX ORDER — METOPROLOL SUCCINATE 100 MG/1
100 TABLET, EXTENDED RELEASE ORAL AT BEDTIME
Qty: 90 TABLET | Refills: 11 | Status: SHIPPED | OUTPATIENT
Start: 2025-06-02

## 2025-06-02 NOTE — LETTER
6/2/2025    Miri Woodruff MD  [address]  [facility] [address]    RE: Leonela Christianson       Dear Colleague,     I had the pleasure of seeing Leonela Christianson in the [facility].    Thank you, Dr. Gonzalez, for asking the [facility] team to see Ms. Leonela Christianson to evaluate       Assessment/Recommendations   Assessment/Plan:  Dyspnea on exetion and chest pressure - noted hx PE and COPD , but hx PCI and ongoing tob use, will plan echo to screen for pulm HTN/RV fxn, and regadenosine nuclear imaging. Cont warfarin/plavix  CAD s/p PCI to Circ and RCA - cont clopdigorel given tob use, and rosuvastatin 40mg  CV prevention - almost at target  HTN well controlled cont losartan/hydrochlorothiazide/metoprolol.     Follow up 6 mo     History of Present Illness/Subjective    Ms. Leonela Christianson is a 75 year old female with CAD PCI 2017 to prox   Circ with DANA and PCI in past to RCA, PE, COPD, HTN< tob use (still using), noted intermittent dull pressure in her chest but not with exertion, last LLD 72mg/dL 12/24.  No PND/orhtopnea, but with dyspne aon ewalkin gfrom bath to bed, lays and then goes back to sleep.    On clopiodgrel, rosuvasststatin, metoprolol 100mg at bedtime, warfarin, losartan HCTZ.  Last CBC 11/24 hgb 16.   She stoppped inhaler due to concern for heart problems with med.  Last INR 2.7 11 days earlier.  Last echo 2017.      Meds     Physical Examination Review of Systems   /80 (BP Location: Right arm, Patient Position: Sitting, Cuff Size: Adult Regular)   Pulse 77   Resp 14   Wt 61.7 kg (136 lb)   BMI 27.47 kg/m    Body mass index is 27.47 kg/m .  Wt Readings from Last 3 Encounters:   06/02/25 61.7 kg (136 lb)   04/03/25 61.7 kg (136 lb)   12/17/24 61.4 kg (135 lb 6.4 oz)     [unfilled]  General Appearance:   no distress, normal body habitus   ENT/Mouth: membranes moist, no oral lesions or bleeding gums.      EYES:  no scleral icterus,  normal conjunctivae   Neck: no carotid bruits or thyromegaly   Chest/Lungs:   lungs are clear to auscultation, no rales or wheezing,  sternal scar, equal chest wall expansion    Cardiovascular:   Regular. Normal first and second heart sounds with no murmurs, rubs, or gallops; the carotid, radial and posterior tibial pulses are intact, Jugular venous pressure , edema bilaterally    Abdomen:  no organomegaly, masses, bruits, or tenderness; bowel sounds are present   Extremities: no cyanosis or clubbing   Skin: no xanthelasma, warm.    Neurologic: normal  bilateral, no tremors     Psychiatric: alert and oriented x3, calm     Review of Systems - 12 points nega other than above      Medical History  Surgical History Family History Social History   Past Medical History:   Diagnosis Date     Acute pyelonephritis      LEELA (acute kidney injury) 9/12/2020     COPD (chronic obstructive pulmonary disease) (H)      Coronary artery disease      Diabetes mellitus (H)      Heart attack (H) 2012, 2019    X3     History of blood clots     PEx2     Hyperlipidemia      Hypertension      Myocardial infarction (H) 5/9/2012    Formatting of this note might be different from the original. Inferior, 1/2012 Formatting of this note might be different from the original. 4/2012      Pulmonary embolism (H) 4/10/2007    Formatting of this note might be different from the original. Two episodes over the last 10 years.  On lifelong coumadin therapy      Ureteral stone 9/11/2020    Added automatically from request for surgery [id number]     Past Surgical History:   Procedure Laterality Date     CARDIAC CATHETERIZATION      Stent     CV CORONARY ANGIOGRAM N/A 11/20/2017    Procedure: Coronary Angiogram;  Surgeon: Daniela Mazariegos MD;  Location: [facility];  Service:      CV LEFT HEART CATHETERIZATION WITHOUT LEFT VENTRICULOGRAM Left 11/20/2017    Procedure: Left Heart Catheterization Without Left Ventriculogram;  Surgeon: Daniela Mazariegos MD;   Location: [facility];  Service:      CYSTOSCOPY  09/29/2020     HC CYSTOSCOPY,INSERT URETERAL STENT Left 9/11/2020    Procedure: CYSTOSCOPY, WITH URETERAL STENT INSERTION;  Surgeon: Sean Schmitt MD;  Location: [facility] OR;  Service: Urology     HYSTERECTOMY      fibroids     OOPHORECTOMY      Family History   Problem Relation Age of Onset     No Known Problems Mother      No Known Problems Father      Diabetes Paternal Uncle      Diabetes Sister      Breast Cancer No family hx of      Cancer No family hx of      Colon Cancer No family hx of      Heart Disease No family hx of      Coronary Artery Disease No family hx of     Social History     Socioeconomic History     Marital status:      Spouse name: Not on file     Number of children: Not on file     Years of education: Not on file     Highest education level: Not on file   Occupational History     Not on file   Tobacco Use     Smoking status: Every Day     Current packs/day: 1.00     Average packs/day: 1 pack/day for 62.4 years (62.4 ttl pk-yrs)     Types: Cigarettes     Start date: 1/1/1963     Passive exposure: Past     Smokeless tobacco: Never   Vaping Use     Vaping status: Never Used   Substance and Sexual Activity     Alcohol use: No     Drug use: No     Sexual activity: Not Currently     Partners: Male   Other Topics Concern     Not on file   Social History Narrative    She lives with her 40-year-old son and 14-year-old grandson.  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     Social Drivers of Health     Financial Resource Strain: Low Risk  (12/17/2024)    Financial Resource Strain      Within the past 12 months, have you or your family members you live with been unable to get utilities (heat, electricity) when it was really needed?: No   Food Insecurity: Low Risk  (12/17/2024)    Food Insecurity      Within the past 12 months, did you worry that your food would run out before you got money to buy more?: No      Within the  past 12 months, did the food you bought just not last and you didn t have money to get more?: No   Transportation Needs: Low Risk  (12/17/2024)    Transportation Needs      Within the past 12 months, has lack of transportation kept you from medical appointments, getting your medicines, non-medical meetings or appointments, work, or from getting things that you need?: No   Physical Activity: Unknown (12/17/2024)    Exercise Vital Sign      Days of Exercise per Week: 0 days      Minutes of Exercise per Session: Not on file   Stress: No Stress Concern Present (12/17/2024)    [employer] - Occupational Stress Questionnaire      Feeling of Stress : Only a little   Social Connections: Unknown (12/17/2024)    Social Connection and Isolation Panel [NHANES]      Frequency of Communication with Friends and Family: Not on file      Frequency of Social Gatherings with Friends and Family: Patient declined      Attends Druze Services: Not on file      Active Member of Clubs or Organizations: Not on file      Attends Club or Organization Meetings: Not on file      Marital Status: Not on file   Interpersonal Safety: Low Risk  (12/17/2024)    Interpersonal Safety      Do you feel physically and emotionally safe where you currently live?: Yes      Within the past 12 months, have you been hit, slapped, kicked or otherwise physically hurt by someone?: No      Within the past 12 months, have you been humiliated or emotionally abused in other ways by your partner or ex-partner?: No   Housing Stability: Low Risk  (12/17/2024)    Housing Stability      Do you have housing? : Yes      Are you worried about losing your housing?: No          Medications  Allergies   Scheduled Meds:  No current facility-administered medications for this visit.     Continuous Infusions:  No current facility-administered medications for this visit.     PRN Meds:.  No current facility-administered medications for this visit.     Allergies   Allergen Reactions     Sulfa (Sulfonamide Antibiotics) [Sulfa Antibiotics] Anaphylaxis         Lab Results    Chemistry/lipid CBC Cardiac Enzymes/BNP/TSH/INR   Lab Results   Component Value Date    CHOL 142 11/29/2024    HDL 61 11/29/2024    TRIG 47 11/29/2024    BUN 11.3 11/29/2024     11/29/2024    CO2 28 11/29/2024    Lab Results   Component Value Date    WBC 8.7 11/29/2024    HGB 16.0 (H) 11/29/2024    HCT 48.2 (H) 11/29/2024    MCV 96 11/29/2024     11/29/2024    Lab Results   Component Value Date    TROPONINI 0.02 09/11/2020    BNP <10 09/11/2020    TSH 1.87 12/12/2023    INR 2.7 (H) 05/22/2025              Bandar Gonzalez MD  Interventional Cardiology  [facility]              Thank you for allowing me to participate in the care of your patient.      Sincerely,     Bandar Gonzalez MD     [facility]  cc:   Bandar Gonzalez MD  [address]  [place] [address]

## 2025-06-02 NOTE — PROGRESS NOTES
Thank you, Dr. Gonzalez, for asking the [facility] team to see Ms. Leonela Christianson to evaluate       Assessment/Recommendations   Assessment/Plan:  Dyspnea on exetion and chest pressure - noted hx PE and COPD , but hx PCI and ongoing tob use, will plan echo to screen for pulm HTN/RV fxn, and regadenosine nuclear imaging. Cont warfarin/plavix  CAD s/p PCI to Circ and RCA - cont clopdigorel given tob use, and rosuvastatin 40mg  CV prevention - almost at target  HTN well controlled cont losartan/hydrochlorothiazide/metoprolol.     Follow up 6 mo     History of Present Illness/Subjective    Ms. Leonela Christianson is a 75 year old female with CAD PCI 2017 to prox   Circ with DANA and PCI in past to RCA, PE, COPD, HTN< tob use (still using), noted intermittent dull pressure in her chest but not with exertion, last LLD 72mg/dL 12/24.  No PND/orhtopnea, but with dyspne aon ewalkin gfrom bath to bed, lays and then goes back to sleep.    On clopiodgrel, rosuvasststatin, metoprolol 100mg at bedtime, warfarin, losartan HCTZ.  Last CBC 11/24 hgb 16.   She stoppped inhaler due to concern for heart problems with med.  Last INR 2.7 11 days earlier.  Last echo 2017.      Meds     Physical Examination Review of Systems   /80 (BP Location: Right arm, Patient Position: Sitting, Cuff Size: Adult Regular)   Pulse 77   Resp 14   Wt 61.7 kg (136 lb)   BMI 27.47 kg/m    Body mass index is 27.47 kg/m .  Wt Readings from Last 3 Encounters:   06/02/25 61.7 kg (136 lb)   04/03/25 61.7 kg (136 lb)   12/17/24 61.4 kg (135 lb 6.4 oz)     [unfilled]  General Appearance:   no distress, normal body habitus   ENT/Mouth: membranes moist, no oral lesions or bleeding gums.      EYES:  no scleral icterus, normal conjunctivae   Neck: no carotid bruits or thyromegaly   Chest/Lungs:   lungs are clear to auscultation, no rales or wheezing,  sternal scar, equal chest wall expansion    Cardiovascular:   Regular. Normal first  and second heart sounds with no murmurs, rubs, or gallops; the carotid, radial and posterior tibial pulses are intact, Jugular venous pressure , edema bilaterally    Abdomen:  no organomegaly, masses, bruits, or tenderness; bowel sounds are present   Extremities: no cyanosis or clubbing   Skin: no xanthelasma, warm.    Neurologic: normal  bilateral, no tremors     Psychiatric: alert and oriented x3, calm     Review of Systems - 12 points nega other than above      Medical History  Surgical History Family History Social History   Past Medical History:   Diagnosis Date    Acute pyelonephritis     LEELA (acute kidney injury) 9/12/2020    COPD (chronic obstructive pulmonary disease) (H)     Coronary artery disease     Diabetes mellitus (H)     Heart attack (H) 2012, 2019    X3    History of blood clots     PEx2    Hyperlipidemia     Hypertension     Myocardial infarction (H) 5/9/2012    Formatting of this note might be different from the original. Inferior, 1/2012 Formatting of this note might be different from the original. 4/2012     Pulmonary embolism (H) 4/10/2007    Formatting of this note might be different from the original. Two episodes over the last 10 years.  On lifelong coumadin therapy     Ureteral stone 9/11/2020    Added automatically from request for surgery [id number]     Past Surgical History:   Procedure Laterality Date    CARDIAC CATHETERIZATION      Stent    CV CORONARY ANGIOGRAM N/A 11/20/2017    Procedure: Coronary Angiogram;  Surgeon: Daniela Mazariegos MD;  Location: [facility];  Service:     CV LEFT HEART CATHETERIZATION WITHOUT LEFT VENTRICULOGRAM Left 11/20/2017    Procedure: Left Heart Catheterization Without Left Ventriculogram;  Surgeon: Daniela Mazariegos MD;  Location: [facility];  Service:     CYSTOSCOPY  09/29/2020     CYSTOSCOPY,INSERT URETERAL STENT Left 9/11/2020    Procedure: CYSTOSCOPY, WITH URETERAL STENT INSERTION;  Surgeon: Sean Schmitt MD;  Location: [facility];  Service: Urology    HYSTERECTOMY      fibroids    OOPHORECTOMY      Family History   Problem Relation Age of Onset    No Known Problems Mother     No Known Problems Father     Diabetes Paternal Uncle     Diabetes Sister     Breast Cancer No family hx of     Cancer No family hx of     Colon Cancer No family hx of     Heart Disease No family hx of     Coronary Artery Disease No family hx of     Social History     Socioeconomic History    Marital status:      Spouse name: Not on file    Number of children: Not on file    Years of education: Not on file    Highest education level: Not on file   Occupational History    Not on file   Tobacco Use    Smoking status: Every Day     Current packs/day: 1.00     Average packs/day: 1 pack/day for 62.4 years (62.4 ttl pk-yrs)     Types: Cigarettes     Start date: 1/1/1963     Passive exposure: Past    Smokeless tobacco: Never   Vaping Use    Vaping status: Never Used   Substance and Sexual Activity    Alcohol use: No    Drug use: No    Sexual activity: Not Currently     Partners: Male   Other Topics Concern    Not on file   Social History Narrative    She lives with her 40-year-old son and 14-year-old grandson.  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     Social Drivers of Health     Financial Resource Strain: Low Risk  (12/17/2024)    Financial Resource Strain     Within the past 12 months, have you or your family members you live with been unable to get utilities (heat, electricity) when it was really needed?: No   Food Insecurity: Low Risk  (12/17/2024)    Food Insecurity     Within the past 12 months, did you worry that your food would run out before you got money to buy more?: No     Within the past 12 months, did the food you bought just not last and you didn t have money to get more?: No   Transportation Needs: Low Risk  (12/17/2024)    Transportation Needs     Within the past 12 months, has lack of transportation kept you from medical  appointments, getting your medicines, non-medical meetings or appointments, work, or from getting things that you need?: No   Physical Activity: Unknown (12/17/2024)    Exercise Vital Sign     Days of Exercise per Week: 0 days     Minutes of Exercise per Session: Not on file   Stress: No Stress Concern Present (12/17/2024)    [employer] - Occupational Stress Questionnaire     Feeling of Stress : Only a little   Social Connections: Unknown (12/17/2024)    Social Connection and Isolation Panel [NHANES]     Frequency of Communication with Friends and Family: Not on file     Frequency of Social Gatherings with Friends and Family: Patient declined     Attends Gnosticist Services: Not on file     Active Member of Clubs or Organizations: Not on file     Attends Club or Organization Meetings: Not on file     Marital Status: Not on file   Interpersonal Safety: Low Risk  (12/17/2024)    Interpersonal Safety     Do you feel physically and emotionally safe where you currently live?: Yes     Within the past 12 months, have you been hit, slapped, kicked or otherwise physically hurt by someone?: No     Within the past 12 months, have you been humiliated or emotionally abused in other ways by your partner or ex-partner?: No   Housing Stability: Low Risk  (12/17/2024)    Housing Stability     Do you have housing? : Yes     Are you worried about losing your housing?: No          Medications  Allergies   Scheduled Meds:  No current facility-administered medications for this visit.     Continuous Infusions:  No current facility-administered medications for this visit.     PRN Meds:.  No current facility-administered medications for this visit.    Allergies   Allergen Reactions    Sulfa (Sulfonamide Antibiotics) [Sulfa Antibiotics] Anaphylaxis         Lab Results    Chemistry/lipid CBC Cardiac Enzymes/BNP/TSH/INR   Lab Results   Component Value Date    CHOL 142 11/29/2024    HDL 61 11/29/2024    TRIG 47  11/29/2024    BUN 11.3 11/29/2024     11/29/2024    CO2 28 11/29/2024    Lab Results   Component Value Date    WBC 8.7 11/29/2024    HGB 16.0 (H) 11/29/2024    HCT 48.2 (H) 11/29/2024    MCV 96 11/29/2024     11/29/2024    Lab Results   Component Value Date    TROPONINI 0.02 09/11/2020    BNP <10 09/11/2020    TSH 1.87 12/12/2023    INR 2.7 (H) 05/22/2025              Bandar Gonzalez MD  Interventional Cardiology  [facility]

## 2025-06-26 ENCOUNTER — LAB (OUTPATIENT)
Dept: LAB | Facility: CLINIC | Age: 75
End: 2025-06-26
Payer: MEDICARE

## 2025-06-26 ENCOUNTER — ANTICOAGULATION THERAPY VISIT (OUTPATIENT)
Dept: ANTICOAGULATION | Facility: CLINIC | Age: 75
End: 2025-06-26

## 2025-06-26 DIAGNOSIS — I82.409 RECURRENT DEEP VEIN THROMBOSIS (DVT) (H): ICD-10-CM

## 2025-06-26 DIAGNOSIS — R21 RASH AND NONSPECIFIC SKIN ERUPTION: ICD-10-CM

## 2025-06-26 DIAGNOSIS — Z86.711 HISTORY OF PULMONARY EMBOLISM: ICD-10-CM

## 2025-06-26 DIAGNOSIS — Z79.01 LONG TERM (CURRENT) USE OF ANTICOAGULANTS: ICD-10-CM

## 2025-06-26 DIAGNOSIS — Z86.711 HISTORY OF PULMONARY EMBOLISM: Primary | ICD-10-CM

## 2025-06-26 LAB — INR BLD: 2.8 (ref 0.9–1.1)

## 2025-06-26 NOTE — PROGRESS NOTES
ANTICOAGULATION MANAGEMENT     Leonela Christianson 75 year old female is on warfarin with therapeutic INR result. (Goal INR 2.0-3.0)    Recent labs: (last 7 days)     06/26/25  0743   INR 2.8*       ASSESSMENT     Source(s): Chart Review and Patient/Caregiver Call     Warfarin doses taken: Warfarin taken as instructed  Diet: No new diet changes identified  Medication/supplement changes: None noted  New illness, injury, or hospitalization: Yes:    Follow-up on 6/2/25 with [facility] for dyspnea w/ exertion and ongoing tob use, will plan echo to screen for pulm HTN/RV fxn, and regadenosine nuclear imaging, scheduled on 7/18/25..   Signs or symptoms of bleeding or clotting: No  Previous result: Therapeutic last 4 INR visits  Additional findings: None       PLAN     Recommended plan for no diet, medication or health factor changes affecting INR     Dosing Instructions: Continue your current warfarin dose with next INR in 6-7 weeks       Summary  As of 6/26/2025      Full warfarin instructions:  6 mg every Wed; 3 mg all other days   Next INR check:  8/14/2025               Telephone call with Leonela who verbalizes understanding and agrees to plan    Lab visit scheduled - INR on 8/14/25 @ [facility]    Education provided: Taking warfarin: Importance of taking warfarin as instructed  Goal range and lab monitoring: goal range and significance of current result    Plan made per [facility] anticoagulation protocol    Jeanne Olsen RN  6/26/2025  Anticoagulation Clinic  LxDATA for routing messages: julian WALDRON  [facility] patient phone line: [phone]        _______________________________________________________________________     Anticoagulation Episode Summary       Current INR goal:  2.0-3.0   TTR:  70.6% (1 y)   Target end date:  Indefinite   Send INR reminders to:  COLBY WALDRON    Indications    History of pulmonary embolism [Z86.711]  Long term (current) use of anticoagulants [Z79.01]  Rash and  nonspecific skin eruption [R21]  Recurrent deep vein thrombosis (DVT) (H) [I82.409]             Comments:  OK for 8 week checks per Dr. Woodruff on 1/23/23             Anticoagulation Care Providers       Provider Role Specialty Phone number    Miri Woodruff MD Referring Family Medicine [phone]

## 2025-07-18 ENCOUNTER — HOSPITAL ENCOUNTER (OUTPATIENT)
Dept: NUCLEAR MEDICINE | Facility: HOSPITAL | Age: 75
Discharge: HOME | End: 2025-07-18
Attending: INTERNAL MEDICINE
Payer: MEDICARE

## 2025-07-18 ENCOUNTER — HOSPITAL ENCOUNTER (OUTPATIENT)
Dept: CARDIOLOGY | Facility: HOSPITAL | Age: 75
Discharge: HOME | End: 2025-07-18
Attending: INTERNAL MEDICINE
Payer: MEDICARE

## 2025-07-18 DIAGNOSIS — R07.89 CHEST PRESSURE: ICD-10-CM

## 2025-07-18 DIAGNOSIS — R06.09 DYSPNEA ON EXERTION: ICD-10-CM

## 2025-07-18 LAB
CV STRESS CURRENT BP HE: NORMAL
CV STRESS CURRENT HR HE: 101
CV STRESS CURRENT HR HE: 102
CV STRESS CURRENT HR HE: 106
CV STRESS CURRENT HR HE: 107
CV STRESS CURRENT HR HE: 108
CV STRESS CURRENT HR HE: 109
CV STRESS CURRENT HR HE: 116
CV STRESS CURRENT HR HE: 117
CV STRESS CURRENT HR HE: 118
CV STRESS CURRENT HR HE: 119
CV STRESS CURRENT HR HE: 119
CV STRESS CURRENT HR HE: 120
CV STRESS CURRENT HR HE: 121
CV STRESS CURRENT HR HE: 121
CV STRESS CURRENT HR HE: 123
CV STRESS CURRENT HR HE: 127
CV STRESS CURRENT HR HE: 128
CV STRESS CURRENT HR HE: 98
CV STRESS DEVIATION TIME HE: NORMAL
CV STRESS ECHO PERCENT HR HE: NORMAL
CV STRESS EXERCISE STAGE HE: NORMAL
CV STRESS FINAL RESTING BP HE: NORMAL
CV STRESS FINAL RESTING HR HE: 102
CV STRESS MAX HR HE: 128
CV STRESS MAX TREADMILL GRADE HE: 0
CV STRESS MAX TREADMILL SPEED HE: 0
CV STRESS PEAK DIA BP HE: NORMAL
CV STRESS PEAK SYS BP HE: NORMAL
CV STRESS PHASE HE: NORMAL
CV STRESS PROTOCOL HE: NORMAL
CV STRESS RESTING PT POSITION HE: NORMAL
CV STRESS RESTING PT POSITION HE: NORMAL
CV STRESS ST DEVIATION AMOUNT HE: NORMAL
CV STRESS ST DEVIATION ELEVATION HE: NORMAL
CV STRESS ST EVELATION AMOUNT HE: NORMAL
CV STRESS TEST TYPE HE: NORMAL
CV STRESS TOTAL STAGE TIME MIN 1 HE: NORMAL
RATE PRESSURE PRODUCT: NORMAL
STRESS ECHO BASELINE DIASTOLIC HE: 80
STRESS ECHO BASELINE HR: 90
STRESS ECHO BASELINE SYSTOLIC BP: 143
STRESS ECHO CALCULATED PERCENT HR: 88 %
STRESS ECHO LAST STRESS DIASTOLIC BP: 87
STRESS ECHO LAST STRESS HR: 119
STRESS ECHO LAST STRESS SYSTOLIC BP: 141
STRESS ECHO POST ESTIMATED WORKLOAD: 3.1
STRESS ECHO POST EXERCISE DUR MIN: 1
STRESS ECHO POST EXERCISE DUR SEC: 53
STRESS ECHO TARGET HR: 145

## 2025-07-18 PROCEDURE — A9500 TC99M SESTAMIBI: HCPCS | Performed by: INTERNAL MEDICINE

## 2025-07-18 PROCEDURE — 999N000208 ECHOCARDIOGRAM COMPLETE

## 2025-07-18 PROCEDURE — 78452 HT MUSCLE IMAGE SPECT MULT: CPT

## 2025-07-18 PROCEDURE — 343N000001 HC RX 343 MED OP 636: Performed by: INTERNAL MEDICINE

## 2025-07-18 PROCEDURE — 93017 CV STRESS TEST TRACING ONLY: CPT

## 2025-07-18 PROCEDURE — 93018 CV STRESS TEST I&R ONLY: CPT | Performed by: INTERNAL MEDICINE

## 2025-07-18 PROCEDURE — 93306 TTE W/DOPPLER COMPLETE: CPT | Mod: 26 | Performed by: INTERNAL MEDICINE

## 2025-07-18 PROCEDURE — 255N000002 HC RX 255 OP 636: Performed by: INTERNAL MEDICINE

## 2025-07-18 PROCEDURE — 78452 HT MUSCLE IMAGE SPECT MULT: CPT | Mod: 26 | Performed by: INTERNAL MEDICINE

## 2025-07-18 PROCEDURE — 93016 CV STRESS TEST SUPVJ ONLY: CPT | Performed by: INTERNAL MEDICINE

## 2025-07-18 RX ADMIN — PERFLUTREN 2 ML (DILUTED): 6.52 INJECTION, SUSPENSION INTRAVENOUS at 08:58

## 2025-07-18 RX ADMIN — TECHNETIUM TC 99M SESTAMIBI 33 MILLICURIE: 1 INJECTION INTRAVENOUS at 10:15

## 2025-07-18 RX ADMIN — TECHNETIUM TC 99M SESTAMIBI 8.6 MILLICURIE: 1 INJECTION INTRAVENOUS at 08:49

## 2025-07-23 ENCOUNTER — ANCILLARY PROCEDURE (OUTPATIENT)
Dept: MAMMOGRAPHY | Facility: HOSPITAL | Age: 75
End: 2025-07-23
Attending: FAMILY MEDICINE
Payer: MEDICARE

## 2025-07-23 ENCOUNTER — HOSPITAL ENCOUNTER (OUTPATIENT)
Dept: CT IMAGING | Facility: HOSPITAL | Age: 75
Discharge: HOME | End: 2025-07-23
Attending: NURSE PRACTITIONER
Payer: MEDICARE

## 2025-07-23 DIAGNOSIS — F17.218 CIGARETTE NICOTINE DEPENDENCE WITH OTHER NICOTINE-INDUCED DISORDER: ICD-10-CM

## 2025-07-23 DIAGNOSIS — Z12.31 VISIT FOR SCREENING MAMMOGRAM: ICD-10-CM

## 2025-07-23 PROCEDURE — 77063 BREAST TOMOSYNTHESIS BI: CPT

## 2025-07-23 PROCEDURE — 71271 CT THORAX LUNG CANCER SCR C-: CPT

## 2025-07-31 ENCOUNTER — ANCILLARY PROCEDURE (OUTPATIENT)
Dept: MAMMOGRAPHY | Facility: CLINIC | Age: 75
End: 2025-07-31
Attending: FAMILY MEDICINE
Payer: MEDICARE

## 2025-07-31 DIAGNOSIS — R92.8 ABNORMAL MAMMOGRAM: ICD-10-CM

## 2025-07-31 PROCEDURE — 77065 DX MAMMO INCL CAD UNI: CPT | Mod: LT

## 2025-08-14 ENCOUNTER — LAB (OUTPATIENT)
Dept: LAB | Facility: CLINIC | Age: 75
End: 2025-08-14
Payer: MEDICARE

## 2025-08-14 ENCOUNTER — ANTICOAGULATION THERAPY VISIT (OUTPATIENT)
Dept: ANTICOAGULATION | Facility: CLINIC | Age: 75
End: 2025-08-14

## 2025-08-14 DIAGNOSIS — Z79.01 LONG TERM (CURRENT) USE OF ANTICOAGULANTS: ICD-10-CM

## 2025-08-14 DIAGNOSIS — Z86.711 HISTORY OF PULMONARY EMBOLISM: Primary | ICD-10-CM

## 2025-08-14 DIAGNOSIS — Z86.711 HISTORY OF PULMONARY EMBOLISM: ICD-10-CM

## 2025-08-14 DIAGNOSIS — I82.409 RECURRENT DEEP VEIN THROMBOSIS (DVT) (H): ICD-10-CM

## 2025-08-14 DIAGNOSIS — R21 RASH AND NONSPECIFIC SKIN ERUPTION: ICD-10-CM

## 2025-08-14 LAB — INR BLD: 2.5 (ref 0.9–1.1)

## 2025-09-04 ENCOUNTER — TELEPHONE (OUTPATIENT)
Dept: CARDIOLOGY | Facility: CLINIC | Age: 75
End: 2025-09-04
Payer: MEDICARE